# Patient Record
Sex: FEMALE | Race: WHITE | Employment: OTHER
[De-identification: names, ages, dates, MRNs, and addresses within clinical notes are randomized per-mention and may not be internally consistent; named-entity substitution may affect disease eponyms.]

---

## 2017-03-13 ENCOUNTER — TELEPHONE (OUTPATIENT)
Dept: CASE MANAGEMENT | Age: 62
End: 2017-03-13

## 2017-03-22 ENCOUNTER — TELEPHONE (OUTPATIENT)
Dept: CASE MANAGEMENT | Age: 62
End: 2017-03-22

## 2017-04-21 ENCOUNTER — TELEPHONE (OUTPATIENT)
Dept: CASE MANAGEMENT | Age: 62
End: 2017-04-21

## 2017-04-24 PROBLEM — I95.1 ORTHOSTATIC HYPOTENSION DYSAUTONOMIC SYNDROME: Status: ACTIVE | Noted: 2017-04-24

## 2017-04-24 PROBLEM — E34.9 NEUROPATHY ASSOCIATED WITH ENDOCRINE DISORDER (HCC): Status: ACTIVE | Noted: 2017-04-24

## 2017-04-24 PROBLEM — G63 NEUROPATHY ASSOCIATED WITH ENDOCRINE DISORDER (HCC): Status: ACTIVE | Noted: 2017-04-24

## 2017-05-02 PROBLEM — D12.6 TUBULAR ADENOMA OF COLON: Status: ACTIVE | Noted: 2017-05-02

## 2018-03-23 ENCOUNTER — HOSPITAL ENCOUNTER (OUTPATIENT)
Age: 63
Discharge: HOME | End: 2018-03-25
Payer: COMMERCIAL

## 2018-03-23 ENCOUNTER — OFFICE VISIT (OUTPATIENT)
Dept: FAMILY MEDICINE CLINIC | Age: 63
End: 2018-03-23
Payer: COMMERCIAL

## 2018-03-23 VITALS
HEART RATE: 66 BPM | BODY MASS INDEX: 41.14 KG/M2 | TEMPERATURE: 98.3 F | DIASTOLIC BLOOD PRESSURE: 82 MMHG | RESPIRATION RATE: 16 BRPM | HEIGHT: 66 IN | OXYGEN SATURATION: 96 % | WEIGHT: 256 LBS | SYSTOLIC BLOOD PRESSURE: 124 MMHG

## 2018-03-23 DIAGNOSIS — G63 NEUROPATHY ASSOCIATED WITH ENDOCRINE DISORDER (HCC): ICD-10-CM

## 2018-03-23 DIAGNOSIS — E11.9 TYPE 2 DIABETES MELLITUS WITHOUT COMPLICATION, WITHOUT LONG-TERM CURRENT USE OF INSULIN (HCC): ICD-10-CM

## 2018-03-23 DIAGNOSIS — Z79.899 ENCOUNTER FOR LONG-TERM (CURRENT) USE OF HIGH-RISK MEDICATION: ICD-10-CM

## 2018-03-23 DIAGNOSIS — G25.81 RESTLESS LEG SYNDROME: Primary | ICD-10-CM

## 2018-03-23 DIAGNOSIS — Z23 NEED FOR PROPHYLACTIC VACCINATION AND INOCULATION AGAINST VARICELLA: ICD-10-CM

## 2018-03-23 DIAGNOSIS — E34.9 NEUROPATHY ASSOCIATED WITH ENDOCRINE DISORDER (HCC): ICD-10-CM

## 2018-03-23 LAB
AMPHETAMINE SCREEN, URINE: NOT DETECTED
BARBITURATE SCREEN URINE: NOT DETECTED
BENZODIAZEPINE SCREEN, URINE: NOT DETECTED
CANNABINOID SCREEN URINE: NOT DETECTED
COCAINE METABOLITE SCREEN URINE: NOT DETECTED
CREATININE URINE POCT: 100
METHADONE SCREEN, URINE: NOT DETECTED
MICROALBUMIN/CREAT 24H UR: 10 MG/G{CREAT}
MICROALBUMIN/CREAT UR-RTO: <30
OPIATE SCREEN URINE: NOT DETECTED
PHENCYCLIDINE SCREEN URINE: NOT DETECTED
PROPOXYPHENE SCREEN: NOT DETECTED

## 2018-03-23 PROCEDURE — 3014F SCREEN MAMMO DOC REV: CPT | Performed by: FAMILY MEDICINE

## 2018-03-23 PROCEDURE — G8427 DOCREV CUR MEDS BY ELIG CLIN: HCPCS | Performed by: FAMILY MEDICINE

## 2018-03-23 PROCEDURE — G8417 CALC BMI ABV UP PARAM F/U: HCPCS | Performed by: FAMILY MEDICINE

## 2018-03-23 PROCEDURE — 80307 DRUG TEST PRSMV CHEM ANLYZR: CPT

## 2018-03-23 PROCEDURE — 3046F HEMOGLOBIN A1C LEVEL >9.0%: CPT | Performed by: FAMILY MEDICINE

## 2018-03-23 PROCEDURE — 4004F PT TOBACCO SCREEN RCVD TLK: CPT | Performed by: FAMILY MEDICINE

## 2018-03-23 PROCEDURE — G8482 FLU IMMUNIZE ORDER/ADMIN: HCPCS | Performed by: FAMILY MEDICINE

## 2018-03-23 PROCEDURE — 3017F COLORECTAL CA SCREEN DOC REV: CPT | Performed by: FAMILY MEDICINE

## 2018-03-23 PROCEDURE — 99214 OFFICE O/P EST MOD 30 MIN: CPT | Performed by: FAMILY MEDICINE

## 2018-03-23 PROCEDURE — 82044 UR ALBUMIN SEMIQUANTITATIVE: CPT | Performed by: FAMILY MEDICINE

## 2018-03-23 RX ORDER — ROPINIROLE 1 MG/1
1 TABLET, FILM COATED ORAL EVERY MORNING
Qty: 30 TABLET | Refills: 2 | Status: ON HOLD
Start: 2018-03-23 | End: 2021-01-01

## 2018-03-23 RX ORDER — PREGABALIN 50 MG/1
50 CAPSULE ORAL 2 TIMES DAILY
Qty: 60 CAPSULE | Refills: 0 | Status: SHIPPED | OUTPATIENT
Start: 2018-03-23 | End: 2018-04-23 | Stop reason: SDUPTHER

## 2018-03-23 ASSESSMENT — ENCOUNTER SYMPTOMS
WHEEZING: 0
VOMITING: 0
RHINORRHEA: 0
DIARRHEA: 0
COUGH: 0
CONSTIPATION: 0
SINUS PRESSURE: 0
SORE THROAT: 0
NAUSEA: 0
SHORTNESS OF BREATH: 0
BACK PAIN: 0
ABDOMINAL PAIN: 0

## 2018-03-23 NOTE — PATIENT INSTRUCTIONS
longer than recommended. Follow the directions on your prescription label. You may take pregabalin with or without food. Measure liquid medicine with a special dose-measuring spoon or medicine cup, not with a regular table spoon. If you do not have a dose-measuring device, ask your pharmacist for one. Do not change your dose of pregabalin without your doctor's advice. Tell your doctor if the medication does not seem to work as well in treating your condition. Call your doctor if you have any problems with your vision while taking pregabalin. If you are taking pregabalin to prevent seizures, keep taking it even if you feel fine. You may have an increase in seizures if you stop taking pregabalin. Follow your doctor's instructions. Do not stop using pregabalin without first talking to your doctor, even if you feel fine. You may have increased seizures or withdrawal symptoms such as headache, sleep problems, nausea, and diarrhea. Ask your doctor how to avoid withdrawal symptoms when you stop using pregabalin. Wear a medical alert tag or carry an ID card stating that you take pregabalin. Any medical care provider who treats you should know that you take seizure medication. Store at room temperature away from moisture, light, and heat. What happens if I miss a dose? Take the missed dose as soon as you remember. Skip the missed dose if it is almost time for your next scheduled dose. Do not  take extra medicine to make up the missed dose. What happens if I overdose? Seek emergency medical attention or call the Poison Help line at 1-972.581.1133. What should I avoid while taking pregabalin? Drinking alcohol can increase certain side effects of pregabalin. Pregabalin may impair your thinking or reactions. Be careful if you drive or do anything that requires you to be alert. What are the possible side effects of pregabalin?   Get emergency medical help if you have any of these signs of an allergic reaction: hives; difficulty breathing; swelling of your face, lips, tongue, or throat. Report any new or worsening symptoms to your doctor, such as: mood or behavior changes, anxiety, panic attacks, trouble sleeping, or if you feel impulsive, irritable, agitated, hostile, aggressive, restless, hyperactive (mentally or physically), more depressed, or have thoughts about suicide or hurting yourself. Call your doctor at once if you have any of these serious side effects:  · muscle pain, weakness, or tenderness (especially if you also have a fever and feel tired);  · vision problems;  · easy bruising or bleeding; or  · swelling in your hands or feet, rapid weight gain. Less serious side effects may include:  · dizziness, drowsiness;  · loss of balance or coordination;  · problems with memory or concentration;  · breast swelling;  · tremors; or  · dry mouth, constipation. This is not a complete list of side effects and others may occur. Call your doctor for medical advice about side effects. You may report side effects to FDA at 8-387-FDA-6973. What other drugs will affect pregabalin? Cold or allergy medicine, sedatives, narcotic pain medicine, sleeping pills, muscle relaxers, and medicine for depression or anxiety can add to dizziness or sleepiness caused by pregabalin. Tell your doctor if you regularly use any of these medicines, or any other seizure medication. Tell your doctor about all other medicines you use, especially:  · rosiglitazone (Avandia, Avandamet, Avandaryl); or  · heart or blood pressure medication such as benazepril (Lotensin), enalapril (Vasotec), lisinopril (Prinivil, Zestril), quinapril (Accupril), ramipril (Altace), and others. This list is not complete and other drugs may interact with pregabalin. Tell your doctor about all medications you use. This includes prescription, over-the-counter, vitamin, and herbal products. Do not start a new medication without telling your doctor.   Where can I get more

## 2018-03-28 ENCOUNTER — TELEPHONE (OUTPATIENT)
Dept: CASE MANAGEMENT | Age: 63
End: 2018-03-28

## 2018-04-05 ENCOUNTER — HOSPITAL ENCOUNTER (OUTPATIENT)
Age: 63
Discharge: HOME | End: 2018-04-07

## 2018-04-05 PROCEDURE — 88342 IMHCHEM/IMCYTCHM 1ST ANTB: CPT

## 2018-04-05 PROCEDURE — 88305 TISSUE EXAM BY PATHOLOGIST: CPT

## 2018-04-10 ENCOUNTER — OFFICE VISIT (OUTPATIENT)
Dept: FAMILY MEDICINE CLINIC | Age: 63
End: 2018-04-10
Payer: COMMERCIAL

## 2018-04-10 VITALS
HEART RATE: 75 BPM | SYSTOLIC BLOOD PRESSURE: 126 MMHG | BODY MASS INDEX: 41.69 KG/M2 | OXYGEN SATURATION: 94 % | WEIGHT: 259.4 LBS | TEMPERATURE: 97.7 F | HEIGHT: 66 IN | DIASTOLIC BLOOD PRESSURE: 82 MMHG | RESPIRATION RATE: 15 BRPM

## 2018-04-10 DIAGNOSIS — G25.81 RESTLESS LEG SYNDROME: Primary | ICD-10-CM

## 2018-04-10 DIAGNOSIS — F17.210 CIGARETTE NICOTINE DEPENDENCE WITHOUT COMPLICATION: ICD-10-CM

## 2018-04-10 PROCEDURE — 3017F COLORECTAL CA SCREEN DOC REV: CPT | Performed by: FAMILY MEDICINE

## 2018-04-10 PROCEDURE — 4004F PT TOBACCO SCREEN RCVD TLK: CPT | Performed by: FAMILY MEDICINE

## 2018-04-10 PROCEDURE — 3014F SCREEN MAMMO DOC REV: CPT | Performed by: FAMILY MEDICINE

## 2018-04-10 PROCEDURE — 99214 OFFICE O/P EST MOD 30 MIN: CPT | Performed by: FAMILY MEDICINE

## 2018-04-10 PROCEDURE — G8427 DOCREV CUR MEDS BY ELIG CLIN: HCPCS | Performed by: FAMILY MEDICINE

## 2018-04-10 PROCEDURE — G8417 CALC BMI ABV UP PARAM F/U: HCPCS | Performed by: FAMILY MEDICINE

## 2018-04-10 RX ORDER — TOPIRAMATE 25 MG/1
25 TABLET ORAL NIGHTLY
Qty: 60 TABLET | Refills: 3 | Status: SHIPPED | OUTPATIENT
Start: 2018-04-10 | End: 2018-04-24 | Stop reason: SDUPTHER

## 2018-04-10 RX ORDER — POLYETHYLENE GLYCOL 3350 17 G
4 POWDER IN PACKET (EA) ORAL PRN
Qty: 100 EACH | Refills: 3 | Status: SHIPPED | OUTPATIENT
Start: 2018-04-10 | End: 2018-10-18 | Stop reason: SDUPTHER

## 2018-04-10 ASSESSMENT — ENCOUNTER SYMPTOMS
COUGH: 0
DIARRHEA: 0
SORE THROAT: 0
CONSTIPATION: 0
SINUS PRESSURE: 0
NAUSEA: 0
BACK PAIN: 0
RHINORRHEA: 0
SHORTNESS OF BREATH: 0
WHEEZING: 0
VOMITING: 0
ABDOMINAL PAIN: 0

## 2018-04-18 ENCOUNTER — TELEPHONE (OUTPATIENT)
Dept: FAMILY MEDICINE CLINIC | Age: 63
End: 2018-04-18

## 2018-04-18 DIAGNOSIS — G25.81 RESTLESS LEG SYNDROME: ICD-10-CM

## 2018-04-23 DIAGNOSIS — E11.9 TYPE 2 DIABETES MELLITUS WITHOUT COMPLICATION, WITHOUT LONG-TERM CURRENT USE OF INSULIN (HCC): ICD-10-CM

## 2018-04-23 DIAGNOSIS — E34.9 NEUROPATHY ASSOCIATED WITH ENDOCRINE DISORDER (HCC): ICD-10-CM

## 2018-04-23 DIAGNOSIS — G63 NEUROPATHY ASSOCIATED WITH ENDOCRINE DISORDER (HCC): ICD-10-CM

## 2018-04-23 RX ORDER — PREGABALIN 50 MG/1
50 CAPSULE ORAL 2 TIMES DAILY
Qty: 60 CAPSULE | Refills: 0 | Status: SHIPPED | OUTPATIENT
Start: 2018-04-23 | End: 2018-04-30 | Stop reason: SDUPTHER

## 2018-04-24 RX ORDER — TOPIRAMATE 25 MG/1
25 TABLET ORAL 2 TIMES DAILY
Qty: 60 TABLET | Refills: 3 | Status: SHIPPED | OUTPATIENT
Start: 2018-04-24 | End: 2018-05-10 | Stop reason: SDUPTHER

## 2018-04-30 DIAGNOSIS — E34.9 NEUROPATHY ASSOCIATED WITH ENDOCRINE DISORDER (HCC): ICD-10-CM

## 2018-04-30 DIAGNOSIS — G63 NEUROPATHY ASSOCIATED WITH ENDOCRINE DISORDER (HCC): ICD-10-CM

## 2018-04-30 RX ORDER — PREGABALIN 50 MG/1
50 CAPSULE ORAL 2 TIMES DAILY
Qty: 60 CAPSULE | Refills: 0 | Status: SHIPPED | OUTPATIENT
Start: 2018-04-30 | End: 2018-06-07 | Stop reason: SDUPTHER

## 2018-05-10 ENCOUNTER — OFFICE VISIT (OUTPATIENT)
Dept: FAMILY MEDICINE CLINIC | Age: 63
End: 2018-05-10
Payer: COMMERCIAL

## 2018-05-10 VITALS
DIASTOLIC BLOOD PRESSURE: 74 MMHG | TEMPERATURE: 97.8 F | HEIGHT: 66 IN | HEART RATE: 76 BPM | BODY MASS INDEX: 39.36 KG/M2 | OXYGEN SATURATION: 98 % | WEIGHT: 244.9 LBS | SYSTOLIC BLOOD PRESSURE: 128 MMHG

## 2018-05-10 DIAGNOSIS — I10 ESSENTIAL HYPERTENSION: ICD-10-CM

## 2018-05-10 DIAGNOSIS — G25.81 RESTLESS LEG SYNDROME: ICD-10-CM

## 2018-05-10 DIAGNOSIS — E11.9 TYPE 2 DIABETES MELLITUS WITHOUT COMPLICATION, WITHOUT LONG-TERM CURRENT USE OF INSULIN (HCC): Primary | ICD-10-CM

## 2018-05-10 DIAGNOSIS — E78.5 HYPERLIPIDEMIA ASSOCIATED WITH TYPE 2 DIABETES MELLITUS (HCC): ICD-10-CM

## 2018-05-10 DIAGNOSIS — R25.2 LEG CRAMPING: ICD-10-CM

## 2018-05-10 DIAGNOSIS — E11.69 HYPERLIPIDEMIA ASSOCIATED WITH TYPE 2 DIABETES MELLITUS (HCC): ICD-10-CM

## 2018-05-10 LAB — HBA1C MFR BLD: 8 %

## 2018-05-10 PROCEDURE — 3045F PR MOST RECENT HEMOGLOBIN A1C LEVEL 7.0-9.0%: CPT | Performed by: FAMILY MEDICINE

## 2018-05-10 PROCEDURE — 83036 HEMOGLOBIN GLYCOSYLATED A1C: CPT | Performed by: FAMILY MEDICINE

## 2018-05-10 PROCEDURE — 4004F PT TOBACCO SCREEN RCVD TLK: CPT | Performed by: FAMILY MEDICINE

## 2018-05-10 PROCEDURE — G8427 DOCREV CUR MEDS BY ELIG CLIN: HCPCS | Performed by: FAMILY MEDICINE

## 2018-05-10 PROCEDURE — 2022F DILAT RTA XM EVC RTNOPTHY: CPT | Performed by: FAMILY MEDICINE

## 2018-05-10 PROCEDURE — 3017F COLORECTAL CA SCREEN DOC REV: CPT | Performed by: FAMILY MEDICINE

## 2018-05-10 PROCEDURE — 99214 OFFICE O/P EST MOD 30 MIN: CPT | Performed by: FAMILY MEDICINE

## 2018-05-10 PROCEDURE — G8417 CALC BMI ABV UP PARAM F/U: HCPCS | Performed by: FAMILY MEDICINE

## 2018-05-10 RX ORDER — TOPIRAMATE 50 MG/1
50 TABLET, FILM COATED ORAL 2 TIMES DAILY
Qty: 60 TABLET | Refills: 1 | Status: SHIPPED | OUTPATIENT
Start: 2018-05-10 | End: 2018-06-07 | Stop reason: SDUPTHER

## 2018-05-10 ASSESSMENT — ENCOUNTER SYMPTOMS
COUGH: 0
SINUS PRESSURE: 0
DIARRHEA: 0
CONSTIPATION: 0
SHORTNESS OF BREATH: 0
ABDOMINAL PAIN: 0
NAUSEA: 0
VOMITING: 0
RHINORRHEA: 0
BACK PAIN: 0
SORE THROAT: 0
WHEEZING: 0

## 2018-05-17 ENCOUNTER — HOSPITAL ENCOUNTER (OUTPATIENT)
Age: 63
Discharge: HOME | End: 2018-05-17
Payer: COMMERCIAL

## 2018-05-17 ENCOUNTER — HOSPITAL ENCOUNTER (OUTPATIENT)
Dept: INTERVENTIONAL RADIOLOGY/VASCULAR | Age: 63
Discharge: HOME | End: 2018-05-19
Payer: COMMERCIAL

## 2018-05-17 DIAGNOSIS — R25.2 LEG CRAMPING: ICD-10-CM

## 2018-05-17 DIAGNOSIS — E78.5 HYPERLIPIDEMIA ASSOCIATED WITH TYPE 2 DIABETES MELLITUS (HCC): ICD-10-CM

## 2018-05-17 DIAGNOSIS — E11.69 HYPERLIPIDEMIA ASSOCIATED WITH TYPE 2 DIABETES MELLITUS (HCC): ICD-10-CM

## 2018-05-17 DIAGNOSIS — E11.9 TYPE 2 DIABETES MELLITUS WITHOUT COMPLICATION, WITHOUT LONG-TERM CURRENT USE OF INSULIN (HCC): ICD-10-CM

## 2018-05-17 DIAGNOSIS — G25.81 RESTLESS LEG SYNDROME: ICD-10-CM

## 2018-05-17 LAB
ALBUMIN SERPL-MCNC: 4.1 G/DL (ref 3.5–5.2)
ALP BLD-CCNC: 75 U/L (ref 35–104)
ALT SERPL-CCNC: 24 U/L (ref 0–32)
ANION GAP SERPL CALCULATED.3IONS-SCNC: 16 MMOL/L (ref 7–16)
AST SERPL-CCNC: 32 U/L (ref 0–31)
BILIRUB SERPL-MCNC: 0.2 MG/DL (ref 0–1.2)
BUN BLDV-MCNC: 14 MG/DL (ref 8–23)
CALCIUM SERPL-MCNC: 9.2 MG/DL (ref 8.6–10.2)
CHLORIDE BLD-SCNC: 103 MMOL/L (ref 98–107)
CHOLESTEROL, TOTAL: 137 MG/DL (ref 0–199)
CO2: 21 MMOL/L (ref 22–29)
CREAT SERPL-MCNC: 0.8 MG/DL (ref 0.5–1)
GFR AFRICAN AMERICAN: >60
GFR NON-AFRICAN AMERICAN: >60 ML/MIN/1.73
GLUCOSE BLD-MCNC: 143 MG/DL (ref 74–109)
HDLC SERPL-MCNC: 32 MG/DL
LDL CHOLESTEROL CALCULATED: 64 MG/DL (ref 0–99)
POTASSIUM SERPL-SCNC: 3.8 MMOL/L (ref 3.5–5)
SODIUM BLD-SCNC: 140 MMOL/L (ref 132–146)
TOTAL PROTEIN: 7.2 G/DL (ref 6.4–8.3)
TRIGL SERPL-MCNC: 204 MG/DL (ref 0–149)
VLDLC SERPL CALC-MCNC: 41 MG/DL

## 2018-05-17 PROCEDURE — 80061 LIPID PANEL: CPT

## 2018-05-17 PROCEDURE — 93922 UPR/L XTREMITY ART 2 LEVELS: CPT

## 2018-05-17 PROCEDURE — 80053 COMPREHEN METABOLIC PANEL: CPT

## 2018-05-17 PROCEDURE — 36415 COLL VENOUS BLD VENIPUNCTURE: CPT

## 2018-05-30 DIAGNOSIS — G63 NEUROPATHY ASSOCIATED WITH ENDOCRINE DISORDER (HCC): ICD-10-CM

## 2018-05-30 DIAGNOSIS — E34.9 NEUROPATHY ASSOCIATED WITH ENDOCRINE DISORDER (HCC): ICD-10-CM

## 2018-06-01 RX ORDER — PREGABALIN 50 MG/1
50 CAPSULE ORAL 2 TIMES DAILY
Qty: 60 CAPSULE | Refills: 0 | OUTPATIENT
Start: 2018-06-01 | End: 2018-07-01

## 2018-06-01 RX ORDER — ATORVASTATIN CALCIUM 10 MG/1
10 TABLET, FILM COATED ORAL DAILY
Qty: 30 TABLET | Refills: 2 | Status: SHIPPED | OUTPATIENT
Start: 2018-06-01 | End: 2018-06-07 | Stop reason: SDUPTHER

## 2018-06-07 ENCOUNTER — OFFICE VISIT (OUTPATIENT)
Dept: FAMILY MEDICINE CLINIC | Age: 63
End: 2018-06-07
Payer: COMMERCIAL

## 2018-06-07 ENCOUNTER — HOSPITAL ENCOUNTER (OUTPATIENT)
Age: 63
Discharge: HOME | End: 2018-06-09
Payer: COMMERCIAL

## 2018-06-07 VITALS
BODY MASS INDEX: 39.57 KG/M2 | OXYGEN SATURATION: 98 % | HEIGHT: 66 IN | SYSTOLIC BLOOD PRESSURE: 112 MMHG | DIASTOLIC BLOOD PRESSURE: 70 MMHG | TEMPERATURE: 98 F | HEART RATE: 67 BPM | WEIGHT: 246.2 LBS

## 2018-06-07 DIAGNOSIS — G63 NEUROPATHY ASSOCIATED WITH ENDOCRINE DISORDER (HCC): ICD-10-CM

## 2018-06-07 DIAGNOSIS — F06.30 MOOD DISORDER DUE TO A GENERAL MEDICAL CONDITION: ICD-10-CM

## 2018-06-07 DIAGNOSIS — Z79.899 ENCOUNTER FOR LONG-TERM (CURRENT) DRUG USE: ICD-10-CM

## 2018-06-07 DIAGNOSIS — G25.81 RESTLESS LEG SYNDROME: Primary | ICD-10-CM

## 2018-06-07 DIAGNOSIS — I95.1 ORTHOSTATIC HYPOTENSION DYSAUTONOMIC SYNDROME: ICD-10-CM

## 2018-06-07 DIAGNOSIS — I10 ESSENTIAL HYPERTENSION: ICD-10-CM

## 2018-06-07 DIAGNOSIS — E34.9 NEUROPATHY ASSOCIATED WITH ENDOCRINE DISORDER (HCC): ICD-10-CM

## 2018-06-07 LAB
AMPHETAMINE SCREEN, URINE: NOT DETECTED
BARBITURATE SCREEN URINE: NOT DETECTED
BENZODIAZEPINE SCREEN, URINE: NOT DETECTED
CANNABINOID SCREEN URINE: NOT DETECTED
COCAINE METABOLITE SCREEN URINE: NOT DETECTED
METHADONE SCREEN, URINE: NOT DETECTED
OPIATE SCREEN URINE: NOT DETECTED
PHENCYCLIDINE SCREEN URINE: NOT DETECTED
PROPOXYPHENE SCREEN: NOT DETECTED

## 2018-06-07 PROCEDURE — G8427 DOCREV CUR MEDS BY ELIG CLIN: HCPCS | Performed by: FAMILY MEDICINE

## 2018-06-07 PROCEDURE — 3017F COLORECTAL CA SCREEN DOC REV: CPT | Performed by: FAMILY MEDICINE

## 2018-06-07 PROCEDURE — G8417 CALC BMI ABV UP PARAM F/U: HCPCS | Performed by: FAMILY MEDICINE

## 2018-06-07 PROCEDURE — 80307 DRUG TEST PRSMV CHEM ANLYZR: CPT

## 2018-06-07 PROCEDURE — 99214 OFFICE O/P EST MOD 30 MIN: CPT | Performed by: FAMILY MEDICINE

## 2018-06-07 PROCEDURE — 4004F PT TOBACCO SCREEN RCVD TLK: CPT | Performed by: FAMILY MEDICINE

## 2018-06-07 RX ORDER — ATORVASTATIN CALCIUM 10 MG/1
10 TABLET, FILM COATED ORAL DAILY
Qty: 30 TABLET | Refills: 3 | Status: SHIPPED | OUTPATIENT
Start: 2018-06-07 | End: 2018-12-18 | Stop reason: SDUPTHER

## 2018-06-07 RX ORDER — OMEPRAZOLE 40 MG/1
1 CAPSULE, DELAYED RELEASE ORAL DAILY
Status: ON HOLD | COMMUNITY
Start: 2018-05-30 | End: 2021-01-01

## 2018-06-07 RX ORDER — TOPIRAMATE 50 MG/1
50 TABLET, FILM COATED ORAL 2 TIMES DAILY
Qty: 60 TABLET | Refills: 3 | Status: SHIPPED | OUTPATIENT
Start: 2018-06-07 | End: 2018-11-16 | Stop reason: SDUPTHER

## 2018-06-07 RX ORDER — PREGABALIN 50 MG/1
50 CAPSULE ORAL 2 TIMES DAILY
Qty: 60 CAPSULE | Refills: 1 | Status: SHIPPED | OUTPATIENT
Start: 2018-06-07 | End: 2018-07-16 | Stop reason: SDUPTHER

## 2018-06-07 RX ORDER — ALOGLIPTIN 25 MG/1
25 TABLET, FILM COATED ORAL DAILY
Qty: 30 TABLET | Refills: 3 | Status: ON HOLD
Start: 2018-06-07 | End: 2021-01-01 | Stop reason: HOSPADM

## 2018-06-07 RX ORDER — HYDROXYZINE PAMOATE 25 MG/1
CAPSULE ORAL
Qty: 60 CAPSULE | Refills: 3 | Status: ON HOLD
Start: 2018-06-07 | End: 2021-01-01

## 2018-06-07 RX ORDER — HYDROCHLOROTHIAZIDE 25 MG/1
TABLET ORAL
Qty: 30 TABLET | Refills: 3 | Status: SHIPPED | OUTPATIENT
Start: 2018-06-07 | End: 2018-11-16 | Stop reason: SDUPTHER

## 2018-06-07 ASSESSMENT — ENCOUNTER SYMPTOMS
VOMITING: 0
WHEEZING: 0
DIARRHEA: 0
SORE THROAT: 0
RHINORRHEA: 0
COUGH: 0
NAUSEA: 0
SINUS PRESSURE: 0
CONSTIPATION: 0
BACK PAIN: 0
SHORTNESS OF BREATH: 0
ABDOMINAL PAIN: 0

## 2018-06-18 ENCOUNTER — OFFICE VISIT (OUTPATIENT)
Dept: FAMILY MEDICINE CLINIC | Age: 63
End: 2018-06-18
Payer: COMMERCIAL

## 2018-06-18 VITALS
BODY MASS INDEX: 39.21 KG/M2 | HEART RATE: 79 BPM | RESPIRATION RATE: 18 BRPM | TEMPERATURE: 98 F | OXYGEN SATURATION: 98 % | WEIGHT: 244 LBS | SYSTOLIC BLOOD PRESSURE: 123 MMHG | DIASTOLIC BLOOD PRESSURE: 86 MMHG | HEIGHT: 66 IN

## 2018-06-18 DIAGNOSIS — N76.4 LABIAL ABSCESS: Primary | ICD-10-CM

## 2018-06-18 DIAGNOSIS — M54.42 ACUTE LEFT-SIDED LOW BACK PAIN WITH LEFT-SIDED SCIATICA: ICD-10-CM

## 2018-06-18 PROCEDURE — G8417 CALC BMI ABV UP PARAM F/U: HCPCS | Performed by: PHYSICIAN ASSISTANT

## 2018-06-18 PROCEDURE — G8427 DOCREV CUR MEDS BY ELIG CLIN: HCPCS | Performed by: PHYSICIAN ASSISTANT

## 2018-06-18 PROCEDURE — 99213 OFFICE O/P EST LOW 20 MIN: CPT | Performed by: PHYSICIAN ASSISTANT

## 2018-06-18 PROCEDURE — 4004F PT TOBACCO SCREEN RCVD TLK: CPT | Performed by: PHYSICIAN ASSISTANT

## 2018-06-18 PROCEDURE — 3017F COLORECTAL CA SCREEN DOC REV: CPT | Performed by: PHYSICIAN ASSISTANT

## 2018-06-18 RX ORDER — SULFAMETHOXAZOLE AND TRIMETHOPRIM 800; 160 MG/1; MG/1
1 TABLET ORAL 2 TIMES DAILY
Qty: 20 TABLET | Refills: 0 | Status: SHIPPED | OUTPATIENT
Start: 2018-06-18 | End: 2018-06-28

## 2018-06-18 RX ORDER — NAPROXEN 500 MG/1
500 TABLET ORAL 2 TIMES DAILY WITH MEALS
Qty: 60 TABLET | Refills: 3 | Status: SHIPPED | OUTPATIENT
Start: 2018-06-18 | End: 2018-10-18 | Stop reason: SDUPTHER

## 2018-06-18 RX ORDER — CEPHALEXIN 500 MG/1
1000 CAPSULE ORAL 2 TIMES DAILY
Qty: 40 CAPSULE | Refills: 0 | Status: SHIPPED | OUTPATIENT
Start: 2018-06-18 | End: 2018-07-19

## 2018-06-21 ENCOUNTER — OFFICE VISIT (OUTPATIENT)
Dept: FAMILY MEDICINE CLINIC | Age: 63
End: 2018-06-21
Payer: COMMERCIAL

## 2018-06-21 VITALS
DIASTOLIC BLOOD PRESSURE: 62 MMHG | RESPIRATION RATE: 16 BRPM | SYSTOLIC BLOOD PRESSURE: 106 MMHG | OXYGEN SATURATION: 96 % | BODY MASS INDEX: 39.53 KG/M2 | WEIGHT: 246 LBS | HEIGHT: 66 IN | HEART RATE: 74 BPM

## 2018-06-21 DIAGNOSIS — N76.4 LEFT GENITAL LABIAL ABSCESS: Primary | ICD-10-CM

## 2018-06-21 PROCEDURE — 99213 OFFICE O/P EST LOW 20 MIN: CPT | Performed by: FAMILY MEDICINE

## 2018-06-21 PROCEDURE — 4004F PT TOBACCO SCREEN RCVD TLK: CPT | Performed by: FAMILY MEDICINE

## 2018-06-21 PROCEDURE — G8427 DOCREV CUR MEDS BY ELIG CLIN: HCPCS | Performed by: FAMILY MEDICINE

## 2018-06-21 PROCEDURE — 3017F COLORECTAL CA SCREEN DOC REV: CPT | Performed by: FAMILY MEDICINE

## 2018-06-21 PROCEDURE — G8417 CALC BMI ABV UP PARAM F/U: HCPCS | Performed by: FAMILY MEDICINE

## 2018-06-21 ASSESSMENT — ENCOUNTER SYMPTOMS
ABDOMINAL PAIN: 0
DIARRHEA: 0
VOMITING: 0
SINUS PRESSURE: 0
WHEEZING: 0
SHORTNESS OF BREATH: 0
CONSTIPATION: 0
SORE THROAT: 0
NAUSEA: 0
BACK PAIN: 0
COUGH: 0
RHINORRHEA: 0

## 2018-06-27 ENCOUNTER — OFFICE VISIT (OUTPATIENT)
Dept: FAMILY MEDICINE CLINIC | Age: 63
End: 2018-06-27
Payer: COMMERCIAL

## 2018-06-27 VITALS
SYSTOLIC BLOOD PRESSURE: 126 MMHG | HEIGHT: 66 IN | BODY MASS INDEX: 42.19 KG/M2 | OXYGEN SATURATION: 97 % | DIASTOLIC BLOOD PRESSURE: 76 MMHG | HEART RATE: 65 BPM | TEMPERATURE: 97.5 F | WEIGHT: 262.5 LBS

## 2018-06-27 DIAGNOSIS — E11.9 TYPE 2 DIABETES MELLITUS WITHOUT COMPLICATION, WITHOUT LONG-TERM CURRENT USE OF INSULIN (HCC): Primary | ICD-10-CM

## 2018-06-27 DIAGNOSIS — R60.0 LOWER EXTREMITY EDEMA: ICD-10-CM

## 2018-06-27 LAB — GLUCOSE BLD-MCNC: 135 MG/DL

## 2018-06-27 PROCEDURE — 3017F COLORECTAL CA SCREEN DOC REV: CPT | Performed by: FAMILY MEDICINE

## 2018-06-27 PROCEDURE — 99214 OFFICE O/P EST MOD 30 MIN: CPT | Performed by: FAMILY MEDICINE

## 2018-06-27 PROCEDURE — 2022F DILAT RTA XM EVC RTNOPTHY: CPT | Performed by: FAMILY MEDICINE

## 2018-06-27 PROCEDURE — 4004F PT TOBACCO SCREEN RCVD TLK: CPT | Performed by: FAMILY MEDICINE

## 2018-06-27 PROCEDURE — G8417 CALC BMI ABV UP PARAM F/U: HCPCS | Performed by: FAMILY MEDICINE

## 2018-06-27 PROCEDURE — 82962 GLUCOSE BLOOD TEST: CPT | Performed by: FAMILY MEDICINE

## 2018-06-27 PROCEDURE — G8427 DOCREV CUR MEDS BY ELIG CLIN: HCPCS | Performed by: FAMILY MEDICINE

## 2018-06-27 PROCEDURE — 3045F PR MOST RECENT HEMOGLOBIN A1C LEVEL 7.0-9.0%: CPT | Performed by: FAMILY MEDICINE

## 2018-06-27 RX ORDER — FUROSEMIDE 20 MG/1
TABLET ORAL
Qty: 30 TABLET | Refills: 3 | Status: SHIPPED | OUTPATIENT
Start: 2018-06-27 | End: 2018-10-18 | Stop reason: SDUPTHER

## 2018-06-27 RX ORDER — GLIPIZIDE 2.5 MG/1
2.5 TABLET, EXTENDED RELEASE ORAL DAILY
Qty: 30 TABLET | Refills: 1 | Status: SHIPPED | OUTPATIENT
Start: 2018-06-27 | End: 2018-08-21 | Stop reason: SDUPTHER

## 2018-06-27 ASSESSMENT — ENCOUNTER SYMPTOMS
NAUSEA: 0
VOMITING: 0
BACK PAIN: 0
DIARRHEA: 0
SORE THROAT: 0
CONSTIPATION: 0
RHINORRHEA: 0
WHEEZING: 0
SHORTNESS OF BREATH: 0
ABDOMINAL PAIN: 0
COUGH: 0
SINUS PRESSURE: 0

## 2018-07-10 ENCOUNTER — TELEPHONE (OUTPATIENT)
Dept: ADMINISTRATIVE | Age: 63
End: 2018-07-10

## 2018-07-10 NOTE — TELEPHONE ENCOUNTER
Pt wishes to cx her 7/11 appt with dr Jolie Matta for diabetes/swelling, reschd for 7/19, out of town

## 2018-07-16 DIAGNOSIS — E11.9 TYPE 2 DIABETES MELLITUS WITHOUT COMPLICATION, WITHOUT LONG-TERM CURRENT USE OF INSULIN (HCC): ICD-10-CM

## 2018-07-16 DIAGNOSIS — G63 NEUROPATHY ASSOCIATED WITH ENDOCRINE DISORDER (HCC): ICD-10-CM

## 2018-07-16 DIAGNOSIS — E34.9 NEUROPATHY ASSOCIATED WITH ENDOCRINE DISORDER (HCC): ICD-10-CM

## 2018-07-16 RX ORDER — PREGABALIN 50 MG/1
50 CAPSULE ORAL 2 TIMES DAILY
Qty: 60 CAPSULE | Refills: 1 | Status: SHIPPED | OUTPATIENT
Start: 2018-07-16 | End: 2018-07-19

## 2018-07-19 ENCOUNTER — OFFICE VISIT (OUTPATIENT)
Dept: FAMILY MEDICINE CLINIC | Age: 63
End: 2018-07-19
Payer: COMMERCIAL

## 2018-07-19 VITALS
BODY MASS INDEX: 39.37 KG/M2 | SYSTOLIC BLOOD PRESSURE: 108 MMHG | OXYGEN SATURATION: 99 % | HEART RATE: 75 BPM | DIASTOLIC BLOOD PRESSURE: 62 MMHG | HEIGHT: 66 IN | WEIGHT: 245 LBS | RESPIRATION RATE: 18 BRPM

## 2018-07-19 DIAGNOSIS — E34.9 NEUROPATHY ASSOCIATED WITH ENDOCRINE DISORDER (HCC): ICD-10-CM

## 2018-07-19 DIAGNOSIS — E11.9 TYPE 2 DIABETES MELLITUS WITHOUT COMPLICATION, WITHOUT LONG-TERM CURRENT USE OF INSULIN (HCC): Primary | ICD-10-CM

## 2018-07-19 DIAGNOSIS — Z87.891 PERSONAL HISTORY OF TOBACCO USE: ICD-10-CM

## 2018-07-19 DIAGNOSIS — E27.8 ADRENAL MASS (HCC): ICD-10-CM

## 2018-07-19 DIAGNOSIS — G63 NEUROPATHY ASSOCIATED WITH ENDOCRINE DISORDER (HCC): ICD-10-CM

## 2018-07-19 PROCEDURE — 4004F PT TOBACCO SCREEN RCVD TLK: CPT | Performed by: FAMILY MEDICINE

## 2018-07-19 PROCEDURE — 3017F COLORECTAL CA SCREEN DOC REV: CPT | Performed by: FAMILY MEDICINE

## 2018-07-19 PROCEDURE — 99214 OFFICE O/P EST MOD 30 MIN: CPT | Performed by: FAMILY MEDICINE

## 2018-07-19 PROCEDURE — 2022F DILAT RTA XM EVC RTNOPTHY: CPT | Performed by: FAMILY MEDICINE

## 2018-07-19 PROCEDURE — G0296 VISIT TO DETERM LDCT ELIG: HCPCS | Performed by: FAMILY MEDICINE

## 2018-07-19 PROCEDURE — G8417 CALC BMI ABV UP PARAM F/U: HCPCS | Performed by: FAMILY MEDICINE

## 2018-07-19 PROCEDURE — 3045F PR MOST RECENT HEMOGLOBIN A1C LEVEL 7.0-9.0%: CPT | Performed by: FAMILY MEDICINE

## 2018-07-19 PROCEDURE — G8427 DOCREV CUR MEDS BY ELIG CLIN: HCPCS | Performed by: FAMILY MEDICINE

## 2018-07-19 RX ORDER — GABAPENTIN 300 MG/1
300 CAPSULE ORAL 2 TIMES DAILY
Qty: 60 CAPSULE | Refills: 1 | Status: ON HOLD
Start: 2018-07-19 | End: 2021-01-01

## 2018-07-19 ASSESSMENT — ENCOUNTER SYMPTOMS
DIARRHEA: 0
CONSTIPATION: 0
COUGH: 0
BACK PAIN: 0
VOMITING: 0
SINUS PRESSURE: 0
NAUSEA: 0
WHEEZING: 0
SORE THROAT: 0
RHINORRHEA: 0
ABDOMINAL PAIN: 0
SHORTNESS OF BREATH: 0

## 2018-07-19 NOTE — PROGRESS NOTES
DM2:   Patient is here to fu regarding DM2. Patient is  controlled. Taking all medications and tolerating well. Currently on bydureon 2 mg weekly, glipizide 2.5 mg daily, nesina 25 mg daily. Fasting sugars are running 120-130. Patient is taking ASA and Ace Inhibitor/ARB. Patient is  on appropriately-dosed statin. LDL is  at goal.  BP is  controlled. No hypoglycemic episodes. Patient does see Podiatry regularly. Saw an Eye Dr within the last year. Patient is aware that it is necessary to see an Eye Dr yearly. Patient does smoke. Most recent labs reviewed with patient. Patient does have complaints or concerns today. Patient states swelling of her legs has not gotten any better. She thinks it is secondary to the lyrica. She would like to stop lyrica and see if she gets improvement. She is taking lasix as prescribed with no improvement. Eye exam current (within one year): yes  Weight trend: stable  Ever attended diabetic education? yes  Compliance with diet:fair   Compliance with medication: fair   Home blood glucose monitoring? frequent  Home blood sugar records: fasting range: 120-130  Any episodes of hypoglycemia? no  Proper foot care?yes  Lab Results   Component Value Date    LABA1C 8.0 05/10/2018     No results found for: EAG    Lab Results   Component Value Date    LDLCALC 64 05/17/2018         Patient's past medical, surgical, social and/or family history reviewed, updated in chart, and are non-contributory (unless otherwise stated). Medications and allergies also reviewed and updated in chart. /62 (Site: Right Arm, Position: Sitting, Cuff Size: Large Adult)   Pulse 75   Resp 18   Ht 5' 6\" (1.676 m)   Wt 245 lb (111.1 kg)   LMP  (LMP Unknown)   SpO2 99%   BMI 39.54 kg/m²     Review of Systems   Constitutional: Negative for chills, fatigue and fever.    HENT: Negative for congestion, ear discharge, ear pain, postnasal drip, rhinorrhea, sinus pressure, sneezing and sore reviewed  Discussed with the patient that we will titrate up as needed. - gabapentin (NEURONTIN) 300 MG capsule; Take 1 capsule by mouth 2 times daily for 60 days. .  Dispense: 60 capsule; Refill: 1    2. Neuropathy associated with endocrine disorder ([facility]  Will stop lyrica and change to neurontin to see if swelling improves  Side effects reviewed  May need to titrate medication.   - gabapentin (NEURONTIN) 300 MG capsule; Take 1 capsule by mouth 2 times daily for 60 days. .  Dispense: 60 capsule; Refill: 1    3. Personal history of tobacco use  CT of lungs ordered  - WA VISIT TO DISCUSS LUNG CA SCREEN W LDCT  - CT Lung Screening; Future    4. Adrenal mass ([facility].)  Will repeat CT abdomen/pelvis to reevaluate adrenal mass. - CT ABDOMEN PELVIS W WO CONTRAST Additional Contrast? None; Future      Plan:  As above. Call or go to ED immediately if symptoms worsen or persist.  Return in about 1 month (around 8/19/2018). , or sooner if necessary. Educational materials and/or home exercises printed for patient's review and were included in patient instructions on his/her After Visit Summary and given to patient at the end of visit. Counseled regarding above diagnosis, including possible risks and complications,  especially if left uncontrolled. Counseled regarding the possible side effects, risks, benefits and alternatives to treatment; patient and/or guardian verbalizes understanding, agrees, feels comfortable with and wishes to proceed with above treatment plan. Advised patient to call with any new medication issues, and read all Rx info from pharmacy to assure aware of all possible risks and side effects of medication before taking. Reviewed age and gender appropriate health screening exams and vaccinations.   Advised patient regarding importance of keeping up with recommended health maintenance and to schedule as soon as possible if overdue, as this is important in assessing for undiagnosed pathology,

## 2018-07-25 ENCOUNTER — TELEPHONE (OUTPATIENT)
Dept: FAMILY MEDICINE CLINIC | Age: 63
End: 2018-07-25

## 2018-07-25 DIAGNOSIS — E11.9 TYPE 2 DIABETES MELLITUS WITHOUT COMPLICATION, WITHOUT LONG-TERM CURRENT USE OF INSULIN (HCC): Primary | ICD-10-CM

## 2018-07-25 NOTE — TELEPHONE ENCOUNTER
Called Pelon Tamez to inform her labs were ordered; no answer on home phone. Left detailed message asking her to call the office to schedule a lab visit or to let us know where we can send it.

## 2018-07-31 ENCOUNTER — TELEPHONE (OUTPATIENT)
Dept: CASE MANAGEMENT | Age: 63
End: 2018-07-31

## 2018-07-31 NOTE — LETTER
Lung Screening Program        7/31/2018      [address] [place] [address]      Dear Max Sorto,        Our records indicate you missed your scheduled CT Lung Screening on  7/30/18. We value you as a patient and want to ensure that you are getting the appropriate care. Please call our scheduling department ([phone]) for assistance in rescheduling this important screening exam.  If you have any questions regarding CT Lung Screening, please call our patient navigator at [phone]. Sincerely,    Aracelis    P:  [phone]  F:  [phone]          Cc.   Boy Ernst,

## 2018-07-31 NOTE — TELEPHONE ENCOUNTER
No show for CT Lung Screen scheduled 7/30/18. Reminder letter with rescheduling instructions mailed to patient. Order physician notified.     KIARA Rogers., R.T.(R)(T)  Navigator  [facility]

## 2018-08-02 ENCOUNTER — HOSPITAL ENCOUNTER (EMERGENCY)
Age: 63
Discharge: HOME | End: 2018-08-02
Attending: EMERGENCY MEDICINE
Payer: COMMERCIAL

## 2018-08-02 ENCOUNTER — APPOINTMENT (OUTPATIENT)
Dept: ULTRASOUND IMAGING | Age: 63
End: 2018-08-02
Payer: COMMERCIAL

## 2018-08-02 VITALS
DIASTOLIC BLOOD PRESSURE: 64 MMHG | HEIGHT: 66 IN | SYSTOLIC BLOOD PRESSURE: 126 MMHG | WEIGHT: 230 LBS | BODY MASS INDEX: 36.96 KG/M2 | RESPIRATION RATE: 18 BRPM | TEMPERATURE: 97.6 F | OXYGEN SATURATION: 97 % | HEART RATE: 74 BPM

## 2018-08-02 DIAGNOSIS — M79.604 BILATERAL LEG PAIN: ICD-10-CM

## 2018-08-02 DIAGNOSIS — M79.605 BILATERAL LEG PAIN: ICD-10-CM

## 2018-08-02 DIAGNOSIS — G57.93 NEUROPATHIC PAIN, LEG, BILATERAL: Primary | ICD-10-CM

## 2018-08-02 LAB
ANION GAP SERPL CALCULATED.3IONS-SCNC: 11 MMOL/L (ref 7–16)
BASOPHILS ABSOLUTE: 0.09 E9/L (ref 0–0.2)
BASOPHILS RELATIVE PERCENT: 0.7 % (ref 0–2)
BUN BLDV-MCNC: 20 MG/DL (ref 8–23)
CALCIUM SERPL-MCNC: 9.5 MG/DL (ref 8.6–10.2)
CHLORIDE BLD-SCNC: 107 MMOL/L (ref 98–107)
CO2: 26 MMOL/L (ref 22–29)
CREAT SERPL-MCNC: 1.1 MG/DL (ref 0.5–1)
EOSINOPHILS ABSOLUTE: 0.48 E9/L (ref 0.05–0.5)
EOSINOPHILS RELATIVE PERCENT: 3.8 % (ref 0–6)
GFR AFRICAN AMERICAN: >60
GFR NON-AFRICAN AMERICAN: 50 ML/MIN/1.73
GLUCOSE BLD-MCNC: 99 MG/DL (ref 74–109)
HCT VFR BLD CALC: 43.1 % (ref 34–48)
HEMOGLOBIN: 14.6 G/DL (ref 11.5–15.5)
IMMATURE GRANULOCYTES #: 0.05 E9/L
IMMATURE GRANULOCYTES %: 0.4 % (ref 0–5)
LYMPHOCYTES ABSOLUTE: 4.87 E9/L (ref 1.5–4)
LYMPHOCYTES RELATIVE PERCENT: 38.5 % (ref 20–42)
MAGNESIUM: 2.4 MG/DL (ref 1.6–2.6)
MCH RBC QN AUTO: 31.6 PG (ref 26–35)
MCHC RBC AUTO-ENTMCNC: 33.9 % (ref 32–34.5)
MCV RBC AUTO: 93.3 FL (ref 80–99.9)
MONOCYTES ABSOLUTE: 1.03 E9/L (ref 0.1–0.95)
MONOCYTES RELATIVE PERCENT: 8.1 % (ref 2–12)
NEUTROPHILS ABSOLUTE: 6.12 E9/L (ref 1.8–7.3)
NEUTROPHILS RELATIVE PERCENT: 48.5 % (ref 43–80)
PDW BLD-RTO: 14 FL (ref 11.5–15)
PLATELET # BLD: 281 E9/L (ref 130–450)
PMV BLD AUTO: 10 FL (ref 7–12)
POTASSIUM SERPL-SCNC: 4 MMOL/L (ref 3.5–5)
RBC # BLD: 4.62 E12/L (ref 3.5–5.5)
SODIUM BLD-SCNC: 144 MMOL/L (ref 132–146)
TOTAL CK: 85 U/L (ref 20–180)
WBC # BLD: 12.6 E9/L (ref 4.5–11.5)

## 2018-08-02 PROCEDURE — 85025 COMPLETE CBC W/AUTO DIFF WBC: CPT

## 2018-08-02 PROCEDURE — 6360000002 HC RX W HCPCS: Performed by: EMERGENCY MEDICINE

## 2018-08-02 PROCEDURE — 80048 BASIC METABOLIC PNL TOTAL CA: CPT

## 2018-08-02 PROCEDURE — 96375 TX/PRO/DX INJ NEW DRUG ADDON: CPT

## 2018-08-02 PROCEDURE — 83735 ASSAY OF MAGNESIUM: CPT

## 2018-08-02 PROCEDURE — 96374 THER/PROPH/DIAG INJ IV PUSH: CPT

## 2018-08-02 PROCEDURE — 99284 EMERGENCY DEPT VISIT MOD MDM: CPT

## 2018-08-02 PROCEDURE — 82550 ASSAY OF CK (CPK): CPT

## 2018-08-02 PROCEDURE — 6370000000 HC RX 637 (ALT 250 FOR IP): Performed by: EMERGENCY MEDICINE

## 2018-08-02 PROCEDURE — 93970 EXTREMITY STUDY: CPT

## 2018-08-02 RX ORDER — KETOROLAC TROMETHAMINE 30 MG/ML
15 INJECTION, SOLUTION INTRAMUSCULAR; INTRAVENOUS ONCE
Status: COMPLETED | OUTPATIENT
Start: 2018-08-02 | End: 2018-08-02

## 2018-08-02 RX ORDER — TRAMADOL HYDROCHLORIDE 50 MG/1
50 TABLET ORAL EVERY 6 HOURS PRN
Qty: 12 TABLET | Refills: 0 | Status: SHIPPED | OUTPATIENT
Start: 2018-08-02 | End: 2018-08-05

## 2018-08-02 RX ORDER — TRAMADOL HYDROCHLORIDE 50 MG/1
50 TABLET ORAL ONCE
Status: COMPLETED | OUTPATIENT
Start: 2018-08-02 | End: 2018-08-02

## 2018-08-02 RX ORDER — PERPHENAZINE 16 MG
1 TABLET ORAL 2 TIMES DAILY
Qty: 30 CAPSULE | Refills: 0 | Status: ON HOLD | OUTPATIENT
Start: 2018-08-02 | End: 2021-01-01

## 2018-08-02 RX ORDER — MORPHINE SULFATE 4 MG/ML
4 INJECTION, SOLUTION INTRAMUSCULAR; INTRAVENOUS ONCE
Status: COMPLETED | OUTPATIENT
Start: 2018-08-02 | End: 2018-08-02

## 2018-08-02 RX ADMIN — KETOROLAC TROMETHAMINE 15 MG: 30 INJECTION, SOLUTION INTRAMUSCULAR at 22:28

## 2018-08-02 RX ADMIN — MORPHINE SULFATE 4 MG: 4 INJECTION, SOLUTION INTRAMUSCULAR; INTRAVENOUS at 23:30

## 2018-08-02 RX ADMIN — TRAMADOL HYDROCHLORIDE 50 MG: 50 TABLET, FILM COATED ORAL at 23:06

## 2018-08-02 ASSESSMENT — PAIN SCALES - GENERAL
PAINLEVEL_OUTOF10: 9
PAINLEVEL_OUTOF10: 10

## 2018-08-02 NOTE — LETTER
5 [facility] Emergency Department  [address] [place] [address]  Phone: [phone]    No name on file. August 2, 2018     Patient: Opal Cardozo   YOB: 1955   Date of Visit: 8/2/2018       To Whom It May Concern: It is my medical opinion that Luz Dinh may return to work on 2 days. If you have any questions or concerns, please don't hesitate to call. Sincerely,        No name on file.

## 2018-08-03 NOTE — ED PROVIDER NOTES
08/02/18   CBC Auto Differential   Result Value Ref [place]    WBC 12.6 (H) 4.5 - 11.5 E9/L    RBC 4.62 3.50 - 5.50 E12/L    Hemoglobin 14.6 11.5 - 15.5 g/dL    Hematocrit 43.1 34.0 - 48.0 %    MCV 93.3 80.0 - 99.9 fL    MCH 31.6 26.0 - 35.0 pg    MCHC 33.9 32.0 - 34.5 %    RDW 14.0 11.5 - 15.0 fL    Platelets 843 140 - 520 E9/L    MPV 10.0 7.0 - 12.0 fL    Neutrophils % 48.5 43.0 - 80.0 %    Immature Granulocytes % 0.4 0.0 - 5.0 %    Lymphocytes % 38.5 20.0 - 42.0 %    Monocytes % 8.1 2.0 - 12.0 %    Eosinophils % 3.8 0.0 - 6.0 %    Basophils % 0.7 0.0 - 2.0 %    Neutrophils # 6.12 1.80 - 7.30 E9/L    Immature Granulocytes # 0.05 E9/L    Lymphocytes # 4.87 (H) 1.50 - 4.00 E9/L    Monocytes # 1.03 (H) 0.10 - 0.95 E9/L    Eosinophils # 0.48 0.05 - 0.50 E9/L    Basophils # 0.09 0.00 - 0.20 W3/Q   Basic Metabolic Panel   Result Value Ref [place]    Sodium 144 132 - 146 mmol/L    Potassium 4.0 3.5 - 5.0 mmol/L    Chloride 107 98 - 107 mmol/L    CO2 26 22 - 29 mmol/L    Anion Gap 11 7 - 16 mmol/L    Glucose 99 74 - 109 mg/dL    BUN 20 8 - 23 mg/dL    CREATININE 1.1 (H) 0.5 - 1.0 mg/dL    GFR Non-African American 50 >=60 mL/min/1.73    GFR African American >60     Calcium 9.5 8.6 - 10.2 mg/dL   Magnesium   Result Value Ref [place]    Magnesium 2.4 1.6 - 2.6 mg/dL   CK   Result Value Ref [place]    Total CK 85 20 - 180 U/L       RADIOLOGY:  Interpreted by Radiologist.  US DUP LOWER EXTREMITIES BILATERAL VENOUS   Final Result        ------------------------- NURSING NOTES AND VITALS REVIEWED ---------------------------   The nursing notes within the ED encounter and vital signs as below have been reviewed by myself. /64   Pulse 74   Temp 97.6 °F (36.4 °C) (Oral)   Resp 18   Ht 5' 6\" (1.676 m)   Wt 230 lb (104.3 kg)   LMP  (LMP Unknown)   SpO2 97%   BMI 37.12 kg/m²   Oxygen Saturation Interpretation: Normal    The patients available past medical records and past encounters were reviewed.

## 2018-08-03 NOTE — ED NOTES
FIRST PROVIDER CONTACT ASSESSMENT NOTE      Department of Emergency Medicine   8/2/18  9:15 PM    Chief Complaint: Leg Pain      History of Present Illness:    Hafsa Sommer is a 59 y.o. female who presents to the ED by private car for  Bilateral leg pain   Focused Screening Exam:  Constitutional:  Alert, appears stated age and is in no distress.   Heart rrr   Lungs clear     *ALLERGIES*     Lisinopril; Procardia [nifedipine]; and Metformin and related     ED Triage Vitals   BP Temp Temp src Pulse Resp SpO2 Height Weight   -- -- -- -- -- -- -- --        Initial Plan of Care:  Initiate Treatment-Testing, Proceed toTreatment Area When Bed Available for ED Attending/MLP to Continue Care    -----------------END OF FIRST PROVIDER CONTACT ASSESSMENT NOTE--------------  Electronically signed by ROBYN Faulkner   DD: 8/2/18     ROBYN Faulkner  08/02/18 2118

## 2018-08-03 NOTE — ED NOTES
Pt c/o increased leg cramps last 24 hours.  She states \"gets them daily but they usually go away on their own\"  Pt sitting on the cart stating \"i am unable  To keep my legs still\"      Lorri Juarez RN  08/02/18 4887

## 2018-08-20 ENCOUNTER — OFFICE VISIT (OUTPATIENT)
Dept: FAMILY MEDICINE CLINIC | Age: 63
End: 2018-08-20
Payer: COMMERCIAL

## 2018-08-20 ENCOUNTER — HOSPITAL ENCOUNTER (OUTPATIENT)
Age: 63
Discharge: HOME | End: 2018-08-20
Payer: COMMERCIAL

## 2018-08-20 VITALS
HEIGHT: 66 IN | SYSTOLIC BLOOD PRESSURE: 116 MMHG | WEIGHT: 243 LBS | HEART RATE: 76 BPM | BODY MASS INDEX: 39.05 KG/M2 | DIASTOLIC BLOOD PRESSURE: 68 MMHG | RESPIRATION RATE: 16 BRPM | OXYGEN SATURATION: 97 %

## 2018-08-20 DIAGNOSIS — E11.9 TYPE 2 DIABETES MELLITUS WITHOUT COMPLICATION, WITHOUT LONG-TERM CURRENT USE OF INSULIN (HCC): ICD-10-CM

## 2018-08-20 DIAGNOSIS — H02.403 PTOSIS OF BOTH EYELIDS: ICD-10-CM

## 2018-08-20 DIAGNOSIS — Z01.810 PREOP CARDIOVASCULAR EXAM: ICD-10-CM

## 2018-08-20 DIAGNOSIS — G25.81 RESTLESS LEG SYNDROME: ICD-10-CM

## 2018-08-20 DIAGNOSIS — E11.9 TYPE 2 DIABETES MELLITUS WITHOUT COMPLICATION, WITHOUT LONG-TERM CURRENT USE OF INSULIN (HCC): Primary | ICD-10-CM

## 2018-08-20 DIAGNOSIS — G63 NEUROPATHY ASSOCIATED WITH ENDOCRINE DISORDER (HCC): ICD-10-CM

## 2018-08-20 DIAGNOSIS — E34.9 NEUROPATHY ASSOCIATED WITH ENDOCRINE DISORDER (HCC): ICD-10-CM

## 2018-08-20 LAB
ALBUMIN SERPL-MCNC: 3.7 G/DL (ref 3.5–5.2)
ALP BLD-CCNC: 81 U/L (ref 35–104)
ALT SERPL-CCNC: 14 U/L (ref 0–32)
ANION GAP SERPL CALCULATED.3IONS-SCNC: 16 MMOL/L (ref 7–16)
AST SERPL-CCNC: 19 U/L (ref 0–31)
BILIRUB SERPL-MCNC: 0.3 MG/DL (ref 0–1.2)
BUN BLDV-MCNC: 13 MG/DL (ref 8–23)
CALCIUM SERPL-MCNC: 9.1 MG/DL (ref 8.6–10.2)
CHLORIDE BLD-SCNC: 104 MMOL/L (ref 98–107)
CHOLESTEROL, TOTAL: 150 MG/DL (ref 0–199)
CO2: 23 MMOL/L (ref 22–29)
CREAT SERPL-MCNC: 0.8 MG/DL (ref 0.5–1)
GFR AFRICAN AMERICAN: >60
GFR NON-AFRICAN AMERICAN: >60 ML/MIN/1.73
GLUCOSE BLD-MCNC: 149 MG/DL (ref 74–109)
HBA1C MFR BLD: 6.7 %
HDLC SERPL-MCNC: 28 MG/DL
LDL CHOLESTEROL CALCULATED: 82 MG/DL (ref 0–99)
POTASSIUM SERPL-SCNC: 4.9 MMOL/L (ref 3.5–5)
SODIUM BLD-SCNC: 143 MMOL/L (ref 132–146)
TOTAL PROTEIN: 7.1 G/DL (ref 6.4–8.3)
TRIGL SERPL-MCNC: 198 MG/DL (ref 0–149)
VLDLC SERPL CALC-MCNC: 40 MG/DL

## 2018-08-20 PROCEDURE — 99214 OFFICE O/P EST MOD 30 MIN: CPT | Performed by: FAMILY MEDICINE

## 2018-08-20 PROCEDURE — G8427 DOCREV CUR MEDS BY ELIG CLIN: HCPCS | Performed by: FAMILY MEDICINE

## 2018-08-20 PROCEDURE — 83036 HEMOGLOBIN GLYCOSYLATED A1C: CPT | Performed by: FAMILY MEDICINE

## 2018-08-20 PROCEDURE — 80053 COMPREHEN METABOLIC PANEL: CPT

## 2018-08-20 PROCEDURE — 93000 ELECTROCARDIOGRAM COMPLETE: CPT | Performed by: FAMILY MEDICINE

## 2018-08-20 PROCEDURE — 36415 COLL VENOUS BLD VENIPUNCTURE: CPT

## 2018-08-20 PROCEDURE — 3017F COLORECTAL CA SCREEN DOC REV: CPT | Performed by: FAMILY MEDICINE

## 2018-08-20 PROCEDURE — G8417 CALC BMI ABV UP PARAM F/U: HCPCS | Performed by: FAMILY MEDICINE

## 2018-08-20 PROCEDURE — 2022F DILAT RTA XM EVC RTNOPTHY: CPT | Performed by: FAMILY MEDICINE

## 2018-08-20 PROCEDURE — 4004F PT TOBACCO SCREEN RCVD TLK: CPT | Performed by: FAMILY MEDICINE

## 2018-08-20 PROCEDURE — 80061 LIPID PANEL: CPT

## 2018-08-20 PROCEDURE — 3044F HG A1C LEVEL LT 7.0%: CPT | Performed by: FAMILY MEDICINE

## 2018-08-20 RX ORDER — ROPINIROLE 1 MG/1
1 TABLET, FILM COATED ORAL EVERY MORNING
Qty: 30 TABLET | Refills: 2 | Status: CANCELLED | OUTPATIENT
Start: 2018-08-20

## 2018-08-20 RX ORDER — GABAPENTIN 300 MG/1
300 CAPSULE ORAL 2 TIMES DAILY
Qty: 60 CAPSULE | Refills: 1 | Status: CANCELLED | OUTPATIENT
Start: 2018-08-20 | End: 2018-10-19

## 2018-08-20 RX ORDER — PAROXETINE HYDROCHLORIDE 20 MG/1
TABLET, FILM COATED ORAL
Refills: 0 | Status: ON HOLD | COMMUNITY
Start: 2018-08-10 | End: 2021-01-01

## 2018-08-20 ASSESSMENT — ENCOUNTER SYMPTOMS
SORE THROAT: 0
WHEEZING: 0
DIARRHEA: 0
SINUS PRESSURE: 0
NAUSEA: 0
COUGH: 0
CONSTIPATION: 0
SHORTNESS OF BREATH: 0
ABDOMINAL PAIN: 0
BACK PAIN: 0
VOMITING: 0
RHINORRHEA: 0

## 2018-08-20 NOTE — PROGRESS NOTES
patient's knowledge, my knowledge, or of that recorded in patient's current medical record. Patient has had cardiac testing in the past including EKG, stress test, and/or catheterization. If available, these records have been reviewed today. Patient's past medical, surgical, social and/or family history reviewed, updated in chart, and are non-contributory (unless otherwise stated). Medications and allergies also reviewed and updated in chart. /68 (Site: Right Arm, Position: Sitting, Cuff Size: Large Adult)   Pulse 76   Resp 16   Ht 5' 6\" (1.676 m)   Wt 243 lb (110.2 kg)   LMP  (LMP Unknown)   SpO2 97%   BMI 39.22 kg/m²     Review of Systems   Constitutional: Negative for chills, fatigue and fever. HENT: Negative for congestion, ear discharge, ear pain, postnasal drip, rhinorrhea, sinus pressure, sneezing and sore throat. Respiratory: Negative for cough, shortness of breath and wheezing. Cardiovascular: Negative for chest pain, palpitations and leg swelling. Gastrointestinal: Negative for abdominal pain, constipation, diarrhea, nausea and vomiting. Genitourinary: Negative for dysuria, frequency and hematuria. Musculoskeletal: Negative for arthralgias, back pain and myalgias. Skin: Negative for rash. Neurological: Negative for dizziness, light-headedness and headaches. Physical Exam   Constitutional: She is oriented to person, place, and time. She appears well-developed and well-nourished. HENT:   Head: Normocephalic and atraumatic. Right Ear: External ear normal.   Left Ear: External ear normal.   Nose: Nose normal.   Mouth/Throat: Oropharynx is clear and moist.   Eyes: Pupils are equal, round, and reactive to light. Conjunctivae and EOM are normal.   Neck: Normal range of motion. Neck supple. No thyromegaly present. Cardiovascular: Normal rate, regular rhythm, normal heart sounds and intact distal pulses.     Pulmonary/Chest: Effort normal and breath sounds normal. She has no wheezes. Abdominal: Soft. Bowel sounds are normal. There is no tenderness. Musculoskeletal: Normal range of motion. Lymphadenopathy:     She has no cervical adenopathy. Neurological: She is alert and oriented to person, place, and time. She has normal reflexes. Skin: Skin is warm and dry. No rash noted. Psychiatric: She has a normal mood and affect. Her behavior is normal.   Nursing note and vitals reviewed. Assessment:  1. Type 2 diabetes mellitus without complication, without long-term current use of insulin ([facility])  HbA1C improved from 8.0 to 6.7  Diet and exercise were discussed and recommended to the patient. Continue current medications  Labs ordered  - POCT glycosylated hemoglobin (Hb A1C)  - CBC Auto Differential; Future  - Comprehensive Metabolic Panel; Future  - Lipid Panel; Future    2. Restless leg syndrome  Stable. Psychiatry has taken over prescribing medicaitons. 3. Neuropathy associated with endocrine disorder [facility])  Psychiatry has taken over prescribing medications. 4. Preop cardiovascular exam  EKG showed sinus bradycardia likely due to metoprolol use  Labs ordered  Will await results of labs before clearing the patient. Patient advised that smoking is contributing to their illness. Cessation encouraged. Picking a quit date was encouraged. Labs were normal.   The 10-year ASCVD risk score (Kitty Garcia., et al., 2013) is: 19.6%    Values used to calculate the score:      Age: 59 years      Sex: Female      Is Non- : No      Diabetic: Yes      Tobacco smoker: Yes      Systolic Blood Pressure: 195 mmHg      Is BP treated: Yes      HDL Cholesterol: 28 mg/dL      Total Cholesterol: 150 mg/dL    - CBC Auto Differential; Future  - Comprehensive Metabolic Panel; Future  - EKG 12 Lead    5. Ptosis of both eyelids  Will await labs for clearance. Plan:  As above.   Call or go to ED immediately if symptoms worsen or persist.  Return in about 3 months (around 11/20/2018). , or sooner if necessary. Educational materials and/or home exercises printed for patient's review and were included in patient instructions on his/her After Visit Summary and given to patient at the end of visit. Counseled regarding above diagnosis, including possible risks and complications,  especially if left uncontrolled. Counseled regarding the possible side effects, risks, benefits and alternatives to treatment; patient and/or guardian verbalizes understanding, agrees, feels comfortable with and wishes to proceed with above treatment plan. Advised patient to call with any new medication issues, and read all Rx info from pharmacy to assure aware of all possible risks and side effects of medication before taking. Reviewed age and gender appropriate health screening exams and vaccinations. Advised patient regarding importance of keeping up with recommended health maintenance and to schedule as soon as possible if overdue, as this is important in assessing for undiagnosed pathology, especially cancer, as well as protecting against potentially harmful/life threatening disease. Patient and/or guardian verbalizes understanding and agrees with above counseling, assessment and plan. All questions answered.

## 2018-08-21 DIAGNOSIS — R79.0 LOW FERRITIN LEVEL: ICD-10-CM

## 2018-08-21 DIAGNOSIS — K21.9 GASTROESOPHAGEAL REFLUX DISEASE WITHOUT ESOPHAGITIS: ICD-10-CM

## 2018-08-21 DIAGNOSIS — G25.81 RESTLESS LEG SYNDROME: ICD-10-CM

## 2018-08-21 DIAGNOSIS — I10 ESSENTIAL HYPERTENSION: ICD-10-CM

## 2018-08-21 DIAGNOSIS — E11.9 TYPE 2 DIABETES MELLITUS WITHOUT COMPLICATION, WITHOUT LONG-TERM CURRENT USE OF INSULIN (HCC): ICD-10-CM

## 2018-08-21 RX ORDER — ROPINIROLE 5 MG/1
5 TABLET, FILM COATED ORAL NIGHTLY
Qty: 30 TABLET | Refills: 5 | OUTPATIENT
Start: 2018-08-21

## 2018-08-21 RX ORDER — SUCRALFATE 1 G/1
1 TABLET ORAL
Qty: 90 TABLET | Refills: 5 | Status: SHIPPED | OUTPATIENT
Start: 2018-08-21 | End: 2019-02-15 | Stop reason: SDUPTHER

## 2018-08-21 RX ORDER — ROPINIROLE 1 MG/1
1 TABLET, FILM COATED ORAL EVERY MORNING
Qty: 30 TABLET | Refills: 2 | OUTPATIENT
Start: 2018-08-21

## 2018-08-21 RX ORDER — FERROUS SULFATE 325(65) MG
325 TABLET ORAL
Qty: 30 TABLET | Refills: 5 | Status: SHIPPED | OUTPATIENT
Start: 2018-08-21 | End: 2019-02-15 | Stop reason: SDUPTHER

## 2018-08-21 RX ORDER — GLIPIZIDE 2.5 MG/1
2.5 TABLET, EXTENDED RELEASE ORAL DAILY
Qty: 30 TABLET | Refills: 1 | Status: SHIPPED | OUTPATIENT
Start: 2018-08-21 | End: 2018-10-18 | Stop reason: SDUPTHER

## 2018-08-21 RX ORDER — METOPROLOL TARTRATE 100 MG/1
TABLET ORAL
Qty: 60 TABLET | Refills: 5 | Status: SHIPPED | OUTPATIENT
Start: 2018-08-21 | End: 2019-02-15 | Stop reason: SDUPTHER

## 2018-08-23 ENCOUNTER — HOSPITAL ENCOUNTER (OUTPATIENT)
Age: 63
Discharge: HOME | End: 2018-08-23
Payer: COMMERCIAL

## 2018-08-23 LAB
BASOPHILS ABSOLUTE: 0.04 E9/L (ref 0–0.2)
BASOPHILS RELATIVE PERCENT: 0.4 % (ref 0–2)
EOSINOPHILS ABSOLUTE: 0.18 E9/L (ref 0.05–0.5)
EOSINOPHILS RELATIVE PERCENT: 1.7 % (ref 0–6)
HCT VFR BLD CALC: 43.9 % (ref 34–48)
HEMOGLOBIN: 14.6 G/DL (ref 11.5–15.5)
IMMATURE GRANULOCYTES #: 0.06 E9/L
IMMATURE GRANULOCYTES %: 0.6 % (ref 0–5)
LYMPHOCYTES ABSOLUTE: 2.74 E9/L (ref 1.5–4)
LYMPHOCYTES RELATIVE PERCENT: 26.3 % (ref 20–42)
MCH RBC QN AUTO: 31.4 PG (ref 26–35)
MCHC RBC AUTO-ENTMCNC: 33.3 % (ref 32–34.5)
MCV RBC AUTO: 94.4 FL (ref 80–99.9)
MONOCYTES ABSOLUTE: 0.67 E9/L (ref 0.1–0.95)
MONOCYTES RELATIVE PERCENT: 6.4 % (ref 2–12)
NEUTROPHILS ABSOLUTE: 6.74 E9/L (ref 1.8–7.3)
NEUTROPHILS RELATIVE PERCENT: 64.6 % (ref 43–80)
PDW BLD-RTO: 13.5 FL (ref 11.5–15)
PLATELET # BLD: 271 E9/L (ref 130–450)
PMV BLD AUTO: 9.9 FL (ref 7–12)
RBC # BLD: 4.65 E12/L (ref 3.5–5.5)
WBC # BLD: 10.4 E9/L (ref 4.5–11.5)

## 2018-08-23 PROCEDURE — 85025 COMPLETE CBC W/AUTO DIFF WBC: CPT

## 2018-08-23 PROCEDURE — 36415 COLL VENOUS BLD VENIPUNCTURE: CPT

## 2018-08-23 NOTE — PATIENT INSTRUCTIONS
of these can increase the risk of bleeding or interact with anesthesia.     · If you take blood thinners, such as warfarin (Coumadin), clopidogrel (Plavix), or aspirin, be sure to talk to your doctor. He or she will tell you if you should stop taking these medicines before your surgery. Make sure that you understand exactly what your doctor wants you to do.     · Your doctor will tell you which medicines to take or stop before your surgery. You may need to stop taking certain medicines a week or more before surgery. So talk to your doctor as soon as you can.     · If you have an advance directive, let your doctor know. It may include a living will and a durable power of  for health care. Bring a copy to the hospital. If you don't have one, you may want to prepare one. It lets your doctor and loved ones know your health care wishes. Doctors advise that everyone prepare these papers before any type of surgery or procedure.     · You will get exact instructions about when to stop eating before your surgery. It is important to have an empty stomach before surgery. But this can also lead to low blood sugar. Be sure to talk to your doctor more about this.     · Check your blood sugar often in the hours before the surgery. What happens on the day of surgery? · Follow the instructions exactly about when to stop eating and drinking. If you don't, your surgery may be canceled. If your doctor told you to take your medicines on the day of surgery, take them with only a sip of water.     · Take a bath or shower before you come in for your surgery. Do not apply lotions, perfumes, deodorants, or nail polish.     · Do not shave the surgical site yourself.     · Take off all jewelry and piercings.  And take out contact lenses, if you wear them.    At the hospital or surgery center   · Bring a picture ID.     · The area for surgery is often marked to make sure there are no errors.     · You will be kept comfortable and

## 2018-09-19 ENCOUNTER — TELEPHONE (OUTPATIENT)
Dept: FAMILY MEDICINE CLINIC | Age: 63
End: 2018-09-19

## 2018-10-18 DIAGNOSIS — E11.9 TYPE 2 DIABETES MELLITUS WITHOUT COMPLICATION, WITHOUT LONG-TERM CURRENT USE OF INSULIN (HCC): ICD-10-CM

## 2018-10-18 DIAGNOSIS — M54.42 ACUTE LEFT-SIDED LOW BACK PAIN WITH LEFT-SIDED SCIATICA: ICD-10-CM

## 2018-10-18 DIAGNOSIS — F06.30 MOOD DISORDER DUE TO A GENERAL MEDICAL CONDITION: ICD-10-CM

## 2018-10-18 DIAGNOSIS — F17.210 CIGARETTE NICOTINE DEPENDENCE WITHOUT COMPLICATION: ICD-10-CM

## 2018-10-18 DIAGNOSIS — R60.0 LOWER EXTREMITY EDEMA: ICD-10-CM

## 2018-10-18 RX ORDER — POLYETHYLENE GLYCOL 3350 17 G
4 POWDER IN PACKET (EA) ORAL PRN
Qty: 100 EACH | Refills: 3 | Status: SHIPPED | OUTPATIENT
Start: 2018-10-18 | End: 2019-03-15 | Stop reason: SDUPTHER

## 2018-10-18 RX ORDER — FUROSEMIDE 20 MG/1
TABLET ORAL
Qty: 30 TABLET | Refills: 3 | Status: SHIPPED | OUTPATIENT
Start: 2018-10-18 | End: 2019-02-15 | Stop reason: SDUPTHER

## 2018-10-18 RX ORDER — TRAZODONE HYDROCHLORIDE 100 MG/1
TABLET ORAL
Qty: 30 TABLET | Refills: 0 | Status: SHIPPED | OUTPATIENT
Start: 2018-10-18 | End: 2018-11-16 | Stop reason: SDUPTHER

## 2018-10-18 RX ORDER — NAPROXEN 500 MG/1
500 TABLET ORAL 2 TIMES DAILY WITH MEALS
Qty: 60 TABLET | Refills: 3 | Status: SHIPPED | OUTPATIENT
Start: 2018-10-18 | End: 2019-02-15 | Stop reason: SDUPTHER

## 2018-10-18 RX ORDER — GLIPIZIDE 2.5 MG/1
2.5 TABLET, EXTENDED RELEASE ORAL DAILY
Qty: 30 TABLET | Refills: 1 | Status: SHIPPED | OUTPATIENT
Start: 2018-10-18 | End: 2018-12-18 | Stop reason: SDUPTHER

## 2018-11-16 DIAGNOSIS — I10 ESSENTIAL HYPERTENSION: ICD-10-CM

## 2018-11-16 DIAGNOSIS — G25.81 RESTLESS LEG SYNDROME: ICD-10-CM

## 2018-11-16 DIAGNOSIS — F06.30 MOOD DISORDER DUE TO A GENERAL MEDICAL CONDITION: ICD-10-CM

## 2018-11-19 RX ORDER — HYDROCHLOROTHIAZIDE 25 MG/1
TABLET ORAL
Qty: 30 TABLET | Refills: 3 | Status: SHIPPED | OUTPATIENT
Start: 2018-11-19 | End: 2019-03-15 | Stop reason: SDUPTHER

## 2018-11-19 RX ORDER — TRAZODONE HYDROCHLORIDE 100 MG/1
TABLET ORAL
Qty: 30 TABLET | Refills: 0 | Status: SHIPPED | OUTPATIENT
Start: 2018-11-19 | End: 2018-12-18 | Stop reason: SDUPTHER

## 2018-11-19 RX ORDER — TOPIRAMATE 50 MG/1
50 TABLET, FILM COATED ORAL 2 TIMES DAILY
Qty: 60 TABLET | Refills: 3 | Status: SHIPPED | OUTPATIENT
Start: 2018-11-19 | End: 2019-03-15 | Stop reason: SDUPTHER

## 2018-12-18 DIAGNOSIS — F06.30 MOOD DISORDER DUE TO A GENERAL MEDICAL CONDITION: ICD-10-CM

## 2018-12-18 DIAGNOSIS — E11.9 TYPE 2 DIABETES MELLITUS WITHOUT COMPLICATION, WITHOUT LONG-TERM CURRENT USE OF INSULIN (HCC): ICD-10-CM

## 2018-12-18 RX ORDER — GLIPIZIDE 2.5 MG/1
2.5 TABLET, EXTENDED RELEASE ORAL DAILY
Qty: 30 TABLET | Refills: 1 | Status: SHIPPED | OUTPATIENT
Start: 2018-12-18 | End: 2019-02-15 | Stop reason: SDUPTHER

## 2018-12-18 RX ORDER — ATORVASTATIN CALCIUM 10 MG/1
10 TABLET, FILM COATED ORAL DAILY
Qty: 30 TABLET | Refills: 3 | Status: SHIPPED | OUTPATIENT
Start: 2018-12-18 | End: 2019-03-18 | Stop reason: SDUPTHER

## 2018-12-18 RX ORDER — TRAZODONE HYDROCHLORIDE 100 MG/1
TABLET ORAL
Qty: 30 TABLET | Refills: 0 | Status: SHIPPED | OUTPATIENT
Start: 2018-12-18 | End: 2019-01-17 | Stop reason: SDUPTHER

## 2019-01-17 DIAGNOSIS — E11.9 TYPE 2 DIABETES MELLITUS WITHOUT COMPLICATION, WITHOUT LONG-TERM CURRENT USE OF INSULIN (HCC): ICD-10-CM

## 2019-01-17 DIAGNOSIS — F06.30 MOOD DISORDER DUE TO A GENERAL MEDICAL CONDITION: ICD-10-CM

## 2019-01-18 RX ORDER — TRAZODONE HYDROCHLORIDE 100 MG/1
TABLET ORAL
Qty: 30 TABLET | Refills: 1 | Status: SHIPPED | OUTPATIENT
Start: 2019-01-18 | End: 2019-03-15 | Stop reason: SDUPTHER

## 2019-02-15 DIAGNOSIS — E11.9 TYPE 2 DIABETES MELLITUS WITHOUT COMPLICATION, WITHOUT LONG-TERM CURRENT USE OF INSULIN (HCC): ICD-10-CM

## 2019-02-15 DIAGNOSIS — R60.0 LOWER EXTREMITY EDEMA: ICD-10-CM

## 2019-02-15 DIAGNOSIS — K21.9 GASTROESOPHAGEAL REFLUX DISEASE WITHOUT ESOPHAGITIS: ICD-10-CM

## 2019-02-15 DIAGNOSIS — R79.0 LOW FERRITIN LEVEL: ICD-10-CM

## 2019-02-15 DIAGNOSIS — I10 ESSENTIAL HYPERTENSION: ICD-10-CM

## 2019-02-15 DIAGNOSIS — M54.42 ACUTE LEFT-SIDED LOW BACK PAIN WITH LEFT-SIDED SCIATICA: ICD-10-CM

## 2019-02-15 RX ORDER — GLIPIZIDE 2.5 MG/1
2.5 TABLET, EXTENDED RELEASE ORAL DAILY
Qty: 30 TABLET | Refills: 5 | Status: SHIPPED | OUTPATIENT
Start: 2019-02-15 | End: 2019-10-08 | Stop reason: SDUPTHER

## 2019-02-15 RX ORDER — FERROUS SULFATE 325(65) MG
325 TABLET ORAL
Qty: 30 TABLET | Refills: 5 | Status: ON HOLD
Start: 2019-02-15 | End: 2021-01-01

## 2019-02-15 RX ORDER — SUCRALFATE 1 G/1
1 TABLET ORAL
Qty: 90 TABLET | Refills: 5 | Status: ON HOLD
Start: 2019-02-15 | End: 2021-01-01

## 2019-02-15 RX ORDER — METOPROLOL TARTRATE 100 MG/1
TABLET ORAL
Qty: 60 TABLET | Refills: 5 | Status: SHIPPED | OUTPATIENT
Start: 2019-02-15 | End: 2019-10-08 | Stop reason: SDUPTHER

## 2019-02-15 RX ORDER — FUROSEMIDE 20 MG/1
TABLET ORAL
Qty: 30 TABLET | Refills: 3 | Status: SHIPPED | OUTPATIENT
Start: 2019-02-15 | End: 2019-06-10 | Stop reason: SDUPTHER

## 2019-02-15 RX ORDER — NAPROXEN 500 MG/1
500 TABLET ORAL 2 TIMES DAILY WITH MEALS
Qty: 60 TABLET | Refills: 3 | Status: SHIPPED | OUTPATIENT
Start: 2019-02-15 | End: 2019-06-10 | Stop reason: SDUPTHER

## 2019-03-15 DIAGNOSIS — F17.210 CIGARETTE NICOTINE DEPENDENCE WITHOUT COMPLICATION: ICD-10-CM

## 2019-03-15 DIAGNOSIS — F06.30 MOOD DISORDER DUE TO A GENERAL MEDICAL CONDITION: ICD-10-CM

## 2019-03-15 DIAGNOSIS — G25.81 RESTLESS LEG SYNDROME: ICD-10-CM

## 2019-03-15 DIAGNOSIS — E11.9 TYPE 2 DIABETES MELLITUS WITHOUT COMPLICATION, WITHOUT LONG-TERM CURRENT USE OF INSULIN (HCC): ICD-10-CM

## 2019-03-15 DIAGNOSIS — I10 ESSENTIAL HYPERTENSION: ICD-10-CM

## 2019-03-15 RX ORDER — TOPIRAMATE 50 MG/1
50 TABLET, FILM COATED ORAL 2 TIMES DAILY
Qty: 60 TABLET | Refills: 3 | Status: ON HOLD
Start: 2019-03-15 | End: 2021-01-01

## 2019-03-15 RX ORDER — POLYETHYLENE GLYCOL 3350 17 G
4 POWDER IN PACKET (EA) ORAL PRN
Qty: 100 EACH | Refills: 3 | Status: ON HOLD
Start: 2019-03-15 | End: 2021-01-01

## 2019-03-15 RX ORDER — TRAZODONE HYDROCHLORIDE 100 MG/1
TABLET ORAL
Qty: 30 TABLET | Refills: 3 | Status: ON HOLD
Start: 2019-03-15 | End: 2021-01-01

## 2019-03-15 RX ORDER — HYDROCHLOROTHIAZIDE 25 MG/1
TABLET ORAL
Qty: 30 TABLET | Refills: 3 | Status: ON HOLD
Start: 2019-03-15 | End: 2021-01-01

## 2019-03-18 RX ORDER — ATORVASTATIN CALCIUM 10 MG/1
10 TABLET, FILM COATED ORAL DAILY
Qty: 30 TABLET | Refills: 3 | Status: SHIPPED | OUTPATIENT
Start: 2019-03-18 | End: 2019-10-08 | Stop reason: SDUPTHER

## 2019-06-07 DIAGNOSIS — M54.42 ACUTE LEFT-SIDED LOW BACK PAIN WITH LEFT-SIDED SCIATICA: ICD-10-CM

## 2019-06-07 DIAGNOSIS — R60.0 LOWER EXTREMITY EDEMA: ICD-10-CM

## 2019-06-10 RX ORDER — NAPROXEN 500 MG/1
500 TABLET ORAL 2 TIMES DAILY WITH MEALS
Qty: 60 TABLET | Refills: 3 | Status: ON HOLD
Start: 2019-06-10 | End: 2021-01-01

## 2019-06-10 RX ORDER — FUROSEMIDE 20 MG/1
TABLET ORAL
Qty: 30 TABLET | Refills: 3 | Status: SHIPPED | OUTPATIENT
Start: 2019-06-10 | End: 2019-10-08 | Stop reason: SDUPTHER

## 2019-07-08 DIAGNOSIS — I10 ESSENTIAL HYPERTENSION: ICD-10-CM

## 2019-07-08 DIAGNOSIS — E11.9 TYPE 2 DIABETES MELLITUS WITHOUT COMPLICATION, WITHOUT LONG-TERM CURRENT USE OF INSULIN (HCC): ICD-10-CM

## 2019-07-08 DIAGNOSIS — G25.81 RESTLESS LEG SYNDROME: ICD-10-CM

## 2019-07-08 RX ORDER — HYDROCHLOROTHIAZIDE 25 MG/1
TABLET ORAL
Qty: 30 TABLET | Refills: 0 | OUTPATIENT
Start: 2019-07-08

## 2019-07-08 RX ORDER — TOPIRAMATE 50 MG/1
50 TABLET, FILM COATED ORAL 2 TIMES DAILY
Qty: 60 TABLET | Refills: 0 | OUTPATIENT
Start: 2019-07-08

## 2019-07-12 ENCOUNTER — TELEPHONE (OUTPATIENT)
Dept: FAMILY MEDICINE CLINIC | Age: 64
End: 2019-07-12

## 2019-07-12 NOTE — TELEPHONE ENCOUNTER
Left message asking patient if she has had a mammogram this year if not would she like us to send the order to the [facility] or [employer]

## 2019-08-05 DIAGNOSIS — R79.0 LOW FERRITIN LEVEL: ICD-10-CM

## 2019-08-05 DIAGNOSIS — E11.9 TYPE 2 DIABETES MELLITUS WITHOUT COMPLICATION, WITHOUT LONG-TERM CURRENT USE OF INSULIN (HCC): ICD-10-CM

## 2019-08-05 DIAGNOSIS — K21.9 GASTROESOPHAGEAL REFLUX DISEASE WITHOUT ESOPHAGITIS: ICD-10-CM

## 2019-08-05 DIAGNOSIS — I10 ESSENTIAL HYPERTENSION: ICD-10-CM

## 2019-08-05 DIAGNOSIS — G25.81 RESTLESS LEG SYNDROME: ICD-10-CM

## 2019-08-05 RX ORDER — TOPIRAMATE 50 MG/1
50 TABLET, FILM COATED ORAL 2 TIMES DAILY
Qty: 60 TABLET | Refills: 3 | OUTPATIENT
Start: 2019-08-05

## 2019-08-05 RX ORDER — FERROUS SULFATE 325(65) MG
325 TABLET ORAL
Qty: 30 TABLET | Refills: 5 | OUTPATIENT
Start: 2019-08-05

## 2019-08-05 RX ORDER — METOPROLOL TARTRATE 100 MG/1
TABLET ORAL
Qty: 60 TABLET | Refills: 5 | OUTPATIENT
Start: 2019-08-05

## 2019-08-05 RX ORDER — SUCRALFATE 1 G/1
1 TABLET ORAL
Qty: 90 TABLET | Refills: 5 | OUTPATIENT
Start: 2019-08-05

## 2019-08-05 RX ORDER — HYDROCHLOROTHIAZIDE 25 MG/1
TABLET ORAL
Qty: 30 TABLET | Refills: 3 | OUTPATIENT
Start: 2019-08-05

## 2019-08-05 RX ORDER — GLIPIZIDE 2.5 MG/1
2.5 TABLET, EXTENDED RELEASE ORAL DAILY
Qty: 30 TABLET | Refills: 5 | OUTPATIENT
Start: 2019-08-05

## 2019-08-05 RX ORDER — ATORVASTATIN CALCIUM 10 MG/1
10 TABLET, FILM COATED ORAL DAILY
Qty: 30 TABLET | Refills: 3 | OUTPATIENT
Start: 2019-08-05

## 2019-09-16 ENCOUNTER — HOSPITAL ENCOUNTER (EMERGENCY)
Age: 64
Discharge: HOME | End: 2019-09-16
Attending: EMERGENCY MEDICINE
Payer: COMMERCIAL

## 2019-09-16 ENCOUNTER — APPOINTMENT (OUTPATIENT)
Dept: CT IMAGING | Age: 64
End: 2019-09-16
Payer: COMMERCIAL

## 2019-09-16 VITALS
DIASTOLIC BLOOD PRESSURE: 84 MMHG | HEIGHT: 66 IN | WEIGHT: 250 LBS | SYSTOLIC BLOOD PRESSURE: 164 MMHG | RESPIRATION RATE: 16 BRPM | BODY MASS INDEX: 40.18 KG/M2 | HEART RATE: 97 BPM | TEMPERATURE: 98.3 F | OXYGEN SATURATION: 98 %

## 2019-09-16 DIAGNOSIS — M54.16 LUMBAR RADICULOPATHY, ACUTE: Primary | ICD-10-CM

## 2019-09-16 PROCEDURE — 72131 CT LUMBAR SPINE W/O DYE: CPT

## 2019-09-16 PROCEDURE — 99283 EMERGENCY DEPT VISIT LOW MDM: CPT

## 2019-09-16 PROCEDURE — 96372 THER/PROPH/DIAG INJ SC/IM: CPT

## 2019-09-16 PROCEDURE — 6360000002 HC RX W HCPCS: Performed by: EMERGENCY MEDICINE

## 2019-09-16 RX ORDER — ORPHENADRINE CITRATE 30 MG/ML
60 INJECTION INTRAMUSCULAR; INTRAVENOUS ONCE
Status: COMPLETED | OUTPATIENT
Start: 2019-09-16 | End: 2019-09-16

## 2019-09-16 RX ORDER — CHLORZOXAZONE 500 MG/1
500 TABLET ORAL 3 TIMES DAILY PRN
Qty: 30 TABLET | Refills: 0 | Status: SHIPPED | OUTPATIENT
Start: 2019-09-16 | End: 2019-09-26

## 2019-09-16 RX ORDER — OXYCODONE HYDROCHLORIDE AND ACETAMINOPHEN 5; 325 MG/1; MG/1
1 TABLET ORAL EVERY 6 HOURS PRN
Qty: 6 TABLET | Refills: 0 | Status: SHIPPED | OUTPATIENT
Start: 2019-09-16 | End: 2019-09-19

## 2019-09-16 RX ORDER — MORPHINE SULFATE 4 MG/ML
4 INJECTION, SOLUTION INTRAMUSCULAR; INTRAVENOUS ONCE
Status: COMPLETED | OUTPATIENT
Start: 2019-09-16 | End: 2019-09-16

## 2019-09-16 RX ORDER — PREDNISONE 10 MG/1
20 TABLET ORAL DAILY
Qty: 10 TABLET | Refills: 0 | Status: SHIPPED | OUTPATIENT
Start: 2019-09-16 | End: 2019-09-21

## 2019-09-16 RX ADMIN — MORPHINE SULFATE 4 MG: 4 INJECTION, SOLUTION INTRAMUSCULAR; INTRAVENOUS at 20:05

## 2019-09-16 RX ADMIN — ORPHENADRINE CITRATE 60 MG: 30 INJECTION INTRAMUSCULAR; INTRAVENOUS at 20:06

## 2019-09-16 ASSESSMENT — PAIN DESCRIPTION - DESCRIPTORS
DESCRIPTORS: SHARP
DESCRIPTORS: ACHING
DESCRIPTORS: SHARP

## 2019-09-16 ASSESSMENT — PAIN SCALES - GENERAL
PAINLEVEL_OUTOF10: 10
PAINLEVEL_OUTOF10: 8
PAINLEVEL_OUTOF10: 7
PAINLEVEL_OUTOF10: 10

## 2019-09-16 ASSESSMENT — PAIN DESCRIPTION - ORIENTATION
ORIENTATION: LEFT
ORIENTATION: LOWER
ORIENTATION: LOWER;LEFT

## 2019-09-16 ASSESSMENT — PAIN DESCRIPTION - PAIN TYPE
TYPE: ACUTE PAIN
TYPE: ACUTE PAIN

## 2019-09-16 ASSESSMENT — PAIN DESCRIPTION - PROGRESSION
CLINICAL_PROGRESSION: GRADUALLY IMPROVING
CLINICAL_PROGRESSION: GRADUALLY WORSENING

## 2019-09-16 ASSESSMENT — PAIN DESCRIPTION - FREQUENCY: FREQUENCY: CONTINUOUS

## 2019-09-16 ASSESSMENT — PAIN DESCRIPTION - LOCATION
LOCATION: LEG
LOCATION: BACK
LOCATION: BACK

## 2019-10-08 DIAGNOSIS — R79.0 LOW FERRITIN LEVEL: ICD-10-CM

## 2019-10-08 DIAGNOSIS — I10 ESSENTIAL HYPERTENSION: ICD-10-CM

## 2019-10-08 DIAGNOSIS — R60.0 LOWER EXTREMITY EDEMA: ICD-10-CM

## 2019-10-08 DIAGNOSIS — E11.9 TYPE 2 DIABETES MELLITUS WITHOUT COMPLICATION, WITHOUT LONG-TERM CURRENT USE OF INSULIN (HCC): ICD-10-CM

## 2019-10-08 DIAGNOSIS — M54.42 ACUTE LEFT-SIDED LOW BACK PAIN WITH LEFT-SIDED SCIATICA: ICD-10-CM

## 2019-10-08 DIAGNOSIS — K21.9 GASTROESOPHAGEAL REFLUX DISEASE WITHOUT ESOPHAGITIS: ICD-10-CM

## 2019-10-08 RX ORDER — METOPROLOL TARTRATE 100 MG/1
TABLET ORAL
Qty: 60 TABLET | Refills: 0 | Status: ON HOLD
Start: 2019-10-08 | End: 2021-01-01 | Stop reason: HOSPADM

## 2019-10-08 RX ORDER — FUROSEMIDE 20 MG/1
TABLET ORAL
Qty: 30 TABLET | Refills: 0 | Status: ON HOLD
Start: 2019-10-08 | End: 2021-01-01 | Stop reason: HOSPADM

## 2019-10-08 RX ORDER — GLIPIZIDE 2.5 MG/1
2.5 TABLET, EXTENDED RELEASE ORAL DAILY
Qty: 30 TABLET | Refills: 0 | Status: ON HOLD
Start: 2019-10-08 | End: 2021-01-01 | Stop reason: HOSPADM

## 2019-10-08 RX ORDER — SUCRALFATE 1 G/1
1 TABLET ORAL
Qty: 90 TABLET | Refills: 5 | OUTPATIENT
Start: 2019-10-08

## 2019-10-08 RX ORDER — ATORVASTATIN CALCIUM 10 MG/1
10 TABLET, FILM COATED ORAL DAILY
Qty: 30 TABLET | Refills: 0 | Status: ON HOLD
Start: 2019-10-08 | End: 2021-01-01

## 2019-10-08 RX ORDER — FERROUS SULFATE 325(65) MG
325 TABLET ORAL
Qty: 30 TABLET | Refills: 5 | OUTPATIENT
Start: 2019-10-08

## 2019-10-08 RX ORDER — NAPROXEN 500 MG/1
500 TABLET ORAL 2 TIMES DAILY WITH MEALS
Qty: 60 TABLET | Refills: 3 | OUTPATIENT
Start: 2019-10-08

## 2019-11-05 DIAGNOSIS — R60.0 LOWER EXTREMITY EDEMA: ICD-10-CM

## 2019-11-05 DIAGNOSIS — E11.9 TYPE 2 DIABETES MELLITUS WITHOUT COMPLICATION, WITHOUT LONG-TERM CURRENT USE OF INSULIN (HCC): ICD-10-CM

## 2019-11-05 DIAGNOSIS — I10 ESSENTIAL HYPERTENSION: ICD-10-CM

## 2019-11-05 DIAGNOSIS — E34.9 NEUROPATHY ASSOCIATED WITH ENDOCRINE DISORDER (HCC): ICD-10-CM

## 2019-11-05 DIAGNOSIS — G63 NEUROPATHY ASSOCIATED WITH ENDOCRINE DISORDER (HCC): ICD-10-CM

## 2019-11-05 RX ORDER — GABAPENTIN 300 MG/1
300 CAPSULE ORAL 2 TIMES DAILY
Qty: 60 CAPSULE | Refills: 1 | OUTPATIENT
Start: 2019-11-05 | End: 2021-01-01

## 2019-11-05 RX ORDER — METOPROLOL TARTRATE 100 MG/1
TABLET ORAL
Qty: 60 TABLET | Refills: 0 | OUTPATIENT
Start: 2019-11-05

## 2019-11-05 RX ORDER — ATORVASTATIN CALCIUM 10 MG/1
10 TABLET, FILM COATED ORAL DAILY
Qty: 30 TABLET | Refills: 0 | OUTPATIENT
Start: 2019-11-05

## 2019-11-05 RX ORDER — FUROSEMIDE 20 MG/1
TABLET ORAL
Qty: 30 TABLET | Refills: 0 | OUTPATIENT
Start: 2019-11-05

## 2019-12-23 ENCOUNTER — HOSPITAL ENCOUNTER (OUTPATIENT)
Age: 64
Discharge: HOME | End: 2019-12-25

## 2019-12-23 LAB
ABO/RH: NORMAL
ANION GAP SERPL CALCULATED.3IONS-SCNC: 13 MMOL/L (ref 7–16)
ANTIBODY SCREEN: NORMAL
BUN BLDV-MCNC: 24 MG/DL (ref 8–23)
CALCIUM SERPL-MCNC: 9.6 MG/DL (ref 8.6–10.2)
CHLORIDE BLD-SCNC: 97 MMOL/L (ref 98–107)
CO2: 27 MMOL/L (ref 22–29)
CREAT SERPL-MCNC: 0.9 MG/DL (ref 0.5–1)
GFR AFRICAN AMERICAN: >60
GFR NON-AFRICAN AMERICAN: >60 ML/MIN/1.73
GLUCOSE BLD-MCNC: 158 MG/DL (ref 74–99)
HCT VFR BLD CALC: 46.8 % (ref 34–48)
HEMOGLOBIN: 15.6 G/DL (ref 11.5–15.5)
MCH RBC QN AUTO: 30.8 PG (ref 26–35)
MCHC RBC AUTO-ENTMCNC: 33.3 % (ref 32–34.5)
MCV RBC AUTO: 92.3 FL (ref 80–99.9)
PDW BLD-RTO: 13 FL (ref 11.5–15)
PLATELET # BLD: 320 E9/L (ref 130–450)
PMV BLD AUTO: 10 FL (ref 7–12)
POTASSIUM SERPL-SCNC: 3.6 MMOL/L (ref 3.5–5)
RBC # BLD: 5.07 E12/L (ref 3.5–5.5)
SODIUM BLD-SCNC: 137 MMOL/L (ref 132–146)
WBC # BLD: 10.7 E9/L (ref 4.5–11.5)

## 2019-12-23 PROCEDURE — 86850 RBC ANTIBODY SCREEN: CPT

## 2019-12-23 PROCEDURE — 86900 BLOOD TYPING SEROLOGIC ABO: CPT

## 2019-12-23 PROCEDURE — 80048 BASIC METABOLIC PNL TOTAL CA: CPT

## 2019-12-23 PROCEDURE — 87081 CULTURE SCREEN ONLY: CPT

## 2019-12-23 PROCEDURE — 87088 URINE BACTERIA CULTURE: CPT

## 2019-12-23 PROCEDURE — 85027 COMPLETE CBC AUTOMATED: CPT

## 2019-12-23 PROCEDURE — 86901 BLOOD TYPING SEROLOGIC RH(D): CPT

## 2019-12-25 LAB
MRSA CULTURE ONLY: NORMAL
URINE CULTURE, ROUTINE: NORMAL

## 2020-03-24 ENCOUNTER — HOSPITAL ENCOUNTER (OUTPATIENT)
Age: 65
Discharge: HOME | End: 2020-03-26

## 2020-03-24 LAB
ABO/RH: NORMAL
ANTIBODY SCREEN: NORMAL

## 2020-03-24 PROCEDURE — 86901 BLOOD TYPING SEROLOGIC RH(D): CPT

## 2020-03-24 PROCEDURE — 86900 BLOOD TYPING SEROLOGIC ABO: CPT

## 2020-03-24 PROCEDURE — 86850 RBC ANTIBODY SCREEN: CPT

## 2020-03-24 PROCEDURE — 87081 CULTURE SCREEN ONLY: CPT

## 2020-03-26 LAB — MRSA CULTURE ONLY: NORMAL

## 2020-04-03 ENCOUNTER — HOSPITAL ENCOUNTER (OUTPATIENT)
Age: 65
Discharge: HOME | End: 2020-04-05

## 2020-04-03 LAB
ANION GAP SERPL CALCULATED.3IONS-SCNC: 13 MMOL/L (ref 7–16)
BUN BLDV-MCNC: 12 MG/DL (ref 8–23)
CALCIUM SERPL-MCNC: 8.4 MG/DL (ref 8.6–10.2)
CHLORIDE BLD-SCNC: 100 MMOL/L (ref 98–107)
CO2: 25 MMOL/L (ref 22–29)
CREAT SERPL-MCNC: 0.7 MG/DL (ref 0.5–1)
GFR AFRICAN AMERICAN: >60
GFR NON-AFRICAN AMERICAN: >60 ML/MIN/1.73
GLUCOSE BLD-MCNC: 199 MG/DL (ref 74–99)
HCT VFR BLD CALC: 38.9 % (ref 34–48)
HEMOGLOBIN: 12.8 G/DL (ref 11.5–15.5)
MCH RBC QN AUTO: 31.1 PG (ref 26–35)
MCHC RBC AUTO-ENTMCNC: 32.9 % (ref 32–34.5)
MCV RBC AUTO: 94.4 FL (ref 80–99.9)
PDW BLD-RTO: 13.1 FL (ref 11.5–15)
PLATELET # BLD: 301 E9/L (ref 130–450)
PMV BLD AUTO: 9.8 FL (ref 7–12)
POTASSIUM SERPL-SCNC: 4.2 MMOL/L (ref 3.5–5)
RBC # BLD: 4.12 E12/L (ref 3.5–5.5)
SODIUM BLD-SCNC: 138 MMOL/L (ref 132–146)
WBC # BLD: 16.4 E9/L (ref 4.5–11.5)

## 2020-04-03 PROCEDURE — 80048 BASIC METABOLIC PNL TOTAL CA: CPT

## 2020-04-03 PROCEDURE — 85027 COMPLETE CBC AUTOMATED: CPT

## 2020-04-04 ENCOUNTER — HOSPITAL ENCOUNTER (OUTPATIENT)
Age: 65
Discharge: HOME | End: 2020-04-06

## 2020-04-04 LAB
ANION GAP SERPL CALCULATED.3IONS-SCNC: 13 MMOL/L (ref 7–16)
BUN BLDV-MCNC: 14 MG/DL (ref 8–23)
CALCIUM SERPL-MCNC: 8.7 MG/DL (ref 8.6–10.2)
CHLORIDE BLD-SCNC: 101 MMOL/L (ref 98–107)
CO2: 24 MMOL/L (ref 22–29)
CREAT SERPL-MCNC: 0.8 MG/DL (ref 0.5–1)
GFR AFRICAN AMERICAN: >60
GFR NON-AFRICAN AMERICAN: >60 ML/MIN/1.73
GLUCOSE BLD-MCNC: 154 MG/DL (ref 74–99)
HCT VFR BLD CALC: 38 % (ref 34–48)
HEMOGLOBIN: 12.4 G/DL (ref 11.5–15.5)
MCH RBC QN AUTO: 31 PG (ref 26–35)
MCHC RBC AUTO-ENTMCNC: 32.6 % (ref 32–34.5)
MCV RBC AUTO: 95 FL (ref 80–99.9)
PDW BLD-RTO: 13.2 FL (ref 11.5–15)
PLATELET # BLD: 270 E9/L (ref 130–450)
PMV BLD AUTO: 9.6 FL (ref 7–12)
POTASSIUM SERPL-SCNC: 3.9 MMOL/L (ref 3.5–5)
RBC # BLD: 4 E12/L (ref 3.5–5.5)
SODIUM BLD-SCNC: 138 MMOL/L (ref 132–146)
WBC # BLD: 13.9 E9/L (ref 4.5–11.5)

## 2020-04-04 PROCEDURE — 85027 COMPLETE CBC AUTOMATED: CPT

## 2020-04-04 PROCEDURE — 80048 BASIC METABOLIC PNL TOTAL CA: CPT

## 2021-01-01 ENCOUNTER — APPOINTMENT (OUTPATIENT)
Dept: ULTRASOUND IMAGING | Age: 66
DRG: 466 | End: 2021-01-01
Attending: FAMILY MEDICINE
Payer: COMMERCIAL

## 2021-01-01 ENCOUNTER — APPOINTMENT (OUTPATIENT)
Dept: ULTRASOUND IMAGING | Age: 66
DRG: 951 | End: 2021-01-01
Payer: COMMERCIAL

## 2021-01-01 ENCOUNTER — APPOINTMENT (OUTPATIENT)
Dept: GENERAL RADIOLOGY | Age: 66
DRG: 710 | End: 2021-01-01
Attending: INTERNAL MEDICINE
Payer: COMMERCIAL

## 2021-01-01 ENCOUNTER — HOSPITAL ENCOUNTER (INPATIENT)
Age: 66
LOS: 7 days | Discharge: NURSING, REHAB OR LONG-TERM CARE | DRG: 710 | End: 2021-02-23
Attending: INTERNAL MEDICINE | Admitting: INTERNAL MEDICINE
Payer: COMMERCIAL

## 2021-01-01 ENCOUNTER — ANESTHESIA (OUTPATIENT)
Dept: OPERATING ROOM | Age: 66
DRG: 230 | End: 2021-01-01
Payer: COMMERCIAL

## 2021-01-01 ENCOUNTER — APPOINTMENT (OUTPATIENT)
Dept: GENERAL RADIOLOGY | Age: 66
End: 2021-01-01
Attending: SURGERY
Payer: COMMERCIAL

## 2021-01-01 ENCOUNTER — APPOINTMENT (OUTPATIENT)
Dept: GENERAL RADIOLOGY | Age: 66
DRG: 720 | End: 2021-01-01
Payer: COMMERCIAL

## 2021-01-01 ENCOUNTER — ANESTHESIA EVENT (OUTPATIENT)
Dept: OPERATING ROOM | Age: 66
DRG: 710 | End: 2021-01-01
Payer: COMMERCIAL

## 2021-01-01 ENCOUNTER — HOSPITAL ENCOUNTER (INPATIENT)
Age: 66
LOS: 7 days | Discharge: NURSING, REHAB OR LONG-TERM CARE | DRG: 466 | End: 2021-04-22
Attending: FAMILY MEDICINE | Admitting: SURGERY
Payer: COMMERCIAL

## 2021-01-01 ENCOUNTER — APPOINTMENT (OUTPATIENT)
Dept: GENERAL RADIOLOGY | Age: 66
DRG: 951 | End: 2021-01-01
Payer: COMMERCIAL

## 2021-01-01 ENCOUNTER — ANESTHESIA EVENT (OUTPATIENT)
Dept: OPERATING ROOM | Age: 66
DRG: 230 | End: 2021-01-01
Payer: COMMERCIAL

## 2021-01-01 ENCOUNTER — OUTSIDE SERVICES (OUTPATIENT)
Dept: VASCULAR SURGERY | Age: 66
End: 2021-01-01

## 2021-01-01 ENCOUNTER — APPOINTMENT (OUTPATIENT)
Dept: CT IMAGING | Age: 66
DRG: 951 | End: 2021-01-01
Payer: COMMERCIAL

## 2021-01-01 ENCOUNTER — APPOINTMENT (OUTPATIENT)
Dept: CT IMAGING | Age: 66
DRG: 230 | End: 2021-01-01
Payer: COMMERCIAL

## 2021-01-01 ENCOUNTER — HOSPITAL ENCOUNTER (INPATIENT)
Age: 66
LOS: 1 days | DRG: 720 | End: 2021-06-15
Attending: EMERGENCY MEDICINE | Admitting: INTERNAL MEDICINE
Payer: COMMERCIAL

## 2021-01-01 ENCOUNTER — ANESTHESIA EVENT (OUTPATIENT)
Dept: OPERATING ROOM | Age: 66
End: 2021-01-01
Payer: COMMERCIAL

## 2021-01-01 ENCOUNTER — APPOINTMENT (OUTPATIENT)
Dept: CT IMAGING | Age: 66
DRG: 720 | End: 2021-01-01
Payer: COMMERCIAL

## 2021-01-01 ENCOUNTER — TELEPHONE (OUTPATIENT)
Dept: NON INVASIVE DIAGNOSTICS | Age: 66
End: 2021-01-01

## 2021-01-01 ENCOUNTER — HOSPITAL ENCOUNTER (OUTPATIENT)
Age: 66
Setting detail: OBSERVATION
Discharge: OTHER INSTITUTION | End: 2021-04-28
Attending: EMERGENCY MEDICINE | Admitting: FAMILY MEDICINE
Payer: COMMERCIAL

## 2021-01-01 ENCOUNTER — HOSPITAL ENCOUNTER (OUTPATIENT)
Age: 66
Setting detail: OUTPATIENT SURGERY
Discharge: NURSING, REHAB OR LONG-TERM CARE | End: 2021-03-30
Attending: SURGERY | Admitting: SURGERY
Payer: COMMERCIAL

## 2021-01-01 ENCOUNTER — HOSPITAL ENCOUNTER (INPATIENT)
Age: 66
LOS: 6 days | Discharge: NURSING, REHAB OR LONG-TERM CARE | DRG: 951 | End: 2021-05-27
Attending: EMERGENCY MEDICINE | Admitting: FAMILY MEDICINE
Payer: COMMERCIAL

## 2021-01-01 ENCOUNTER — ANESTHESIA (OUTPATIENT)
Dept: OPERATING ROOM | Age: 66
DRG: 951 | End: 2021-01-01
Payer: COMMERCIAL

## 2021-01-01 ENCOUNTER — HOSPITAL ENCOUNTER (OUTPATIENT)
Dept: CARDIAC CATH/INVASIVE PROCEDURES | Age: 66
Discharge: HOME | End: 2021-04-02
Payer: COMMERCIAL

## 2021-01-01 ENCOUNTER — APPOINTMENT (OUTPATIENT)
Dept: INTERVENTIONAL RADIOLOGY/VASCULAR | Age: 66
DRG: 951 | End: 2021-01-01
Payer: COMMERCIAL

## 2021-01-01 ENCOUNTER — HOSPITAL ENCOUNTER (OUTPATIENT)
Age: 66
Setting detail: OUTPATIENT SURGERY
Discharge: OTHER INSTITUTION | End: 2021-04-05
Attending: SURGERY | Admitting: SURGERY
Payer: COMMERCIAL

## 2021-01-01 ENCOUNTER — ANESTHESIA (OUTPATIENT)
Dept: OPERATING ROOM | Age: 66
End: 2021-01-01
Payer: COMMERCIAL

## 2021-01-01 ENCOUNTER — ANESTHESIA (OUTPATIENT)
Dept: OPERATING ROOM | Age: 66
DRG: 466 | End: 2021-01-01
Payer: COMMERCIAL

## 2021-01-01 ENCOUNTER — ANESTHESIA (OUTPATIENT)
Dept: OPERATING ROOM | Age: 66
DRG: 710 | End: 2021-01-01
Payer: COMMERCIAL

## 2021-01-01 ENCOUNTER — ANESTHESIA EVENT (OUTPATIENT)
Dept: OPERATING ROOM | Age: 66
DRG: 466 | End: 2021-01-01
Payer: COMMERCIAL

## 2021-01-01 ENCOUNTER — APPOINTMENT (OUTPATIENT)
Dept: CT IMAGING | Age: 66
DRG: 710 | End: 2021-01-01
Attending: INTERNAL MEDICINE
Payer: COMMERCIAL

## 2021-01-01 ENCOUNTER — OUTSIDE SERVICES (OUTPATIENT)
Dept: VASCULAR SURGERY | Age: 66
End: 2021-01-01
Payer: COMMERCIAL

## 2021-01-01 ENCOUNTER — ANESTHESIA (OUTPATIENT)
Dept: CARDIAC CATH/INVASIVE PROCEDURES | Age: 66
End: 2021-01-01

## 2021-01-01 ENCOUNTER — TELEPHONE (OUTPATIENT)
Dept: ENDOCRINOLOGY | Age: 66
End: 2021-01-01

## 2021-01-01 ENCOUNTER — HOSPITAL ENCOUNTER (OUTPATIENT)
Age: 66
Setting detail: OUTPATIENT SURGERY
Discharge: HOME | End: 2021-03-20
Attending: PODIATRIST | Admitting: PODIATRIST
Payer: COMMERCIAL

## 2021-01-01 ENCOUNTER — OFFICE VISIT (OUTPATIENT)
Dept: ENDOCRINOLOGY | Age: 66
End: 2021-01-01
Payer: COMMERCIAL

## 2021-01-01 ENCOUNTER — APPOINTMENT (OUTPATIENT)
Dept: GENERAL RADIOLOGY | Age: 66
DRG: 466 | End: 2021-01-01
Attending: SURGERY
Payer: COMMERCIAL

## 2021-01-01 ENCOUNTER — HOSPITAL ENCOUNTER (INPATIENT)
Age: 66
LOS: 1 days | Discharge: HOME HEALTH | DRG: 230 | End: 2021-02-16
Attending: EMERGENCY MEDICINE | Admitting: INTERNAL MEDICINE
Payer: COMMERCIAL

## 2021-01-01 ENCOUNTER — HOSPITAL ENCOUNTER (OUTPATIENT)
Age: 66
Setting detail: OUTPATIENT SURGERY
Discharge: OTHER INSTITUTION | End: 2021-03-31
Attending: SURGERY | Admitting: SURGERY
Payer: COMMERCIAL

## 2021-01-01 ENCOUNTER — ANESTHESIA EVENT (OUTPATIENT)
Dept: OPERATING ROOM | Age: 66
DRG: 951 | End: 2021-01-01
Payer: COMMERCIAL

## 2021-01-01 ENCOUNTER — ANESTHESIA EVENT (OUTPATIENT)
Dept: CARDIAC CATH/INVASIVE PROCEDURES | Age: 66
End: 2021-01-01

## 2021-01-01 ENCOUNTER — APPOINTMENT (OUTPATIENT)
Dept: GENERAL RADIOLOGY | Age: 66
DRG: 230 | End: 2021-01-01
Payer: COMMERCIAL

## 2021-01-01 ENCOUNTER — HOSPITAL ENCOUNTER (OUTPATIENT)
Age: 66
Setting detail: OUTPATIENT SURGERY
Discharge: HOME | End: 2021-06-03
Attending: SURGERY | Admitting: SURGERY
Payer: COMMERCIAL

## 2021-01-01 VITALS
HEART RATE: 69 BPM | WEIGHT: 176 LBS | TEMPERATURE: 96.1 F | SYSTOLIC BLOOD PRESSURE: 105 MMHG | HEIGHT: 66 IN | OXYGEN SATURATION: 100 % | DIASTOLIC BLOOD PRESSURE: 55 MMHG | RESPIRATION RATE: 16 BRPM | BODY MASS INDEX: 28.28 KG/M2

## 2021-01-01 VITALS
BODY MASS INDEX: 41.78 KG/M2 | HEIGHT: 66 IN | SYSTOLIC BLOOD PRESSURE: 106 MMHG | OXYGEN SATURATION: 98 % | WEIGHT: 260 LBS | TEMPERATURE: 97.3 F | RESPIRATION RATE: 18 BRPM | DIASTOLIC BLOOD PRESSURE: 67 MMHG | HEART RATE: 88 BPM

## 2021-01-01 VITALS
OXYGEN SATURATION: 100 % | DIASTOLIC BLOOD PRESSURE: 70 MMHG | RESPIRATION RATE: 12 BRPM | SYSTOLIC BLOOD PRESSURE: 129 MMHG | TEMPERATURE: 101.8 F

## 2021-01-01 VITALS
OXYGEN SATURATION: 95 % | WEIGHT: 199 LBS | HEIGHT: 66 IN | TEMPERATURE: 97.6 F | BODY MASS INDEX: 31.98 KG/M2 | SYSTOLIC BLOOD PRESSURE: 92 MMHG | RESPIRATION RATE: 17 BRPM | HEART RATE: 87 BPM | DIASTOLIC BLOOD PRESSURE: 67 MMHG

## 2021-01-01 VITALS — OXYGEN SATURATION: 100 %

## 2021-01-01 VITALS
BODY MASS INDEX: 41.78 KG/M2 | DIASTOLIC BLOOD PRESSURE: 60 MMHG | TEMPERATURE: 98 F | RESPIRATION RATE: 24 BRPM | HEIGHT: 66 IN | WEIGHT: 260 LBS | SYSTOLIC BLOOD PRESSURE: 147 MMHG | OXYGEN SATURATION: 97 % | HEART RATE: 69 BPM

## 2021-01-01 VITALS
RESPIRATION RATE: 12 BRPM | DIASTOLIC BLOOD PRESSURE: 58 MMHG | SYSTOLIC BLOOD PRESSURE: 119 MMHG | OXYGEN SATURATION: 100 %

## 2021-01-01 VITALS
DIASTOLIC BLOOD PRESSURE: 59 MMHG | OXYGEN SATURATION: 100 % | RESPIRATION RATE: 10 BRPM | SYSTOLIC BLOOD PRESSURE: 103 MMHG

## 2021-01-01 VITALS
SYSTOLIC BLOOD PRESSURE: 137 MMHG | RESPIRATION RATE: 27 BRPM | DIASTOLIC BLOOD PRESSURE: 65 MMHG | HEART RATE: 102 BPM | OXYGEN SATURATION: 97 % | TEMPERATURE: 98.8 F

## 2021-01-01 VITALS
SYSTOLIC BLOOD PRESSURE: 142 MMHG | HEIGHT: 66 IN | BODY MASS INDEX: 38.73 KG/M2 | DIASTOLIC BLOOD PRESSURE: 78 MMHG | OXYGEN SATURATION: 94 % | HEART RATE: 127 BPM | TEMPERATURE: 97.8 F | WEIGHT: 241 LBS

## 2021-01-01 VITALS — DIASTOLIC BLOOD PRESSURE: 13 MMHG | OXYGEN SATURATION: 100 % | SYSTOLIC BLOOD PRESSURE: 87 MMHG

## 2021-01-01 VITALS
TEMPERATURE: 96.2 F | RESPIRATION RATE: 22 BRPM | BODY MASS INDEX: 32.05 KG/M2 | HEIGHT: 66 IN | HEART RATE: 85 BPM | SYSTOLIC BLOOD PRESSURE: 110 MMHG | WEIGHT: 199.44 LBS | OXYGEN SATURATION: 98 % | DIASTOLIC BLOOD PRESSURE: 54 MMHG

## 2021-01-01 VITALS
SYSTOLIC BLOOD PRESSURE: 118 MMHG | RESPIRATION RATE: 22 BRPM | BODY MASS INDEX: 37.01 KG/M2 | HEART RATE: 110 BPM | OXYGEN SATURATION: 98 % | DIASTOLIC BLOOD PRESSURE: 58 MMHG | HEIGHT: 66 IN | WEIGHT: 230.3 LBS | TEMPERATURE: 99 F

## 2021-01-01 VITALS
WEIGHT: 210.98 LBS | HEART RATE: 81 BPM | TEMPERATURE: 96.6 F | HEIGHT: 66 IN | SYSTOLIC BLOOD PRESSURE: 91 MMHG | RESPIRATION RATE: 21 BRPM | DIASTOLIC BLOOD PRESSURE: 51 MMHG | OXYGEN SATURATION: 96 % | BODY MASS INDEX: 33.91 KG/M2

## 2021-01-01 VITALS
SYSTOLIC BLOOD PRESSURE: 103 MMHG | RESPIRATION RATE: 33 BRPM | OXYGEN SATURATION: 100 % | DIASTOLIC BLOOD PRESSURE: 51 MMHG

## 2021-01-01 VITALS
TEMPERATURE: 96.7 F | WEIGHT: 181.44 LBS | DIASTOLIC BLOOD PRESSURE: 47 MMHG | HEIGHT: 66 IN | OXYGEN SATURATION: 98 % | BODY MASS INDEX: 29.16 KG/M2 | SYSTOLIC BLOOD PRESSURE: 144 MMHG

## 2021-01-01 VITALS
DIASTOLIC BLOOD PRESSURE: 60 MMHG | SYSTOLIC BLOOD PRESSURE: 91 MMHG | RESPIRATION RATE: 13 BRPM | HEART RATE: 72 BPM | TEMPERATURE: 97.1 F | OXYGEN SATURATION: 98 %

## 2021-01-01 VITALS
HEART RATE: 99 BPM | SYSTOLIC BLOOD PRESSURE: 99 MMHG | WEIGHT: 200.18 LBS | RESPIRATION RATE: 20 BRPM | TEMPERATURE: 98.1 F | BODY MASS INDEX: 32.17 KG/M2 | HEIGHT: 66 IN | DIASTOLIC BLOOD PRESSURE: 56 MMHG | OXYGEN SATURATION: 98 %

## 2021-01-01 VITALS
RESPIRATION RATE: 16 BRPM | OXYGEN SATURATION: 100 % | SYSTOLIC BLOOD PRESSURE: 92 MMHG | HEART RATE: 100 BPM | DIASTOLIC BLOOD PRESSURE: 57 MMHG

## 2021-01-01 VITALS — DIASTOLIC BLOOD PRESSURE: 65 MMHG | OXYGEN SATURATION: 99 % | SYSTOLIC BLOOD PRESSURE: 117 MMHG

## 2021-01-01 VITALS — TEMPERATURE: 100.8 F | RESPIRATION RATE: 18 BRPM | OXYGEN SATURATION: 95 %

## 2021-01-01 VITALS — DIASTOLIC BLOOD PRESSURE: 62 MMHG | SYSTOLIC BLOOD PRESSURE: 100 MMHG | OXYGEN SATURATION: 100 %

## 2021-01-01 VITALS
RESPIRATION RATE: 16 BRPM | SYSTOLIC BLOOD PRESSURE: 111 MMHG | OXYGEN SATURATION: 100 % | DIASTOLIC BLOOD PRESSURE: 60 MMHG

## 2021-01-01 VITALS — OXYGEN SATURATION: 94 % | RESPIRATION RATE: 14 BRPM

## 2021-01-01 VITALS — OXYGEN SATURATION: 93 %

## 2021-01-01 DIAGNOSIS — L89.159 PRESSURE INJURY OF SKIN OF SACRAL REGION, UNSPECIFIED INJURY STAGE: ICD-10-CM

## 2021-01-01 DIAGNOSIS — I95.9 HYPOTENSION, UNSPECIFIED HYPOTENSION TYPE: ICD-10-CM

## 2021-01-01 DIAGNOSIS — N18.6 ESRD (END STAGE RENAL DISEASE) ON DIALYSIS (HCC): Primary | ICD-10-CM

## 2021-01-01 DIAGNOSIS — L98.492 ULCER OF ABDOMEN WALL WITH FAT LAYER EXPOSED (HCC): Chronic | ICD-10-CM

## 2021-01-01 DIAGNOSIS — T81.31XD SURGICAL WOUND DEHISCENCE, SUBSEQUENT ENCOUNTER: Chronic | ICD-10-CM

## 2021-01-01 DIAGNOSIS — I96 GANGRENE OF TOE OF RIGHT FOOT (HCC): ICD-10-CM

## 2021-01-01 DIAGNOSIS — Z49.02 ENCOUNTER FOR PERITONEAL DIALYSIS CATHETER INSERTION (HCC): ICD-10-CM

## 2021-01-01 DIAGNOSIS — I70.235 ATHEROSCLEROSIS OF NATIVE ARTERY OF RIGHT LOWER EXTREMITY WITH ULCERATION OF OTHER PART OF FOOT (HCC): Chronic | ICD-10-CM

## 2021-01-01 DIAGNOSIS — R10.13 EPIGASTRIC PAIN: ICD-10-CM

## 2021-01-01 DIAGNOSIS — Z99.2 ESRD ON DIALYSIS (HCC): ICD-10-CM

## 2021-01-01 DIAGNOSIS — L98.492 ULCER OF ABDOMEN WALL WITH FAT LAYER EXPOSED (HCC): Primary | ICD-10-CM

## 2021-01-01 DIAGNOSIS — Z90.49 S/P SMALL BOWEL RESECTION: Primary | ICD-10-CM

## 2021-01-01 DIAGNOSIS — T68.XXXA HYPOTHERMIA, INITIAL ENCOUNTER: ICD-10-CM

## 2021-01-01 DIAGNOSIS — L98.492 ULCER OF ABDOMEN WALL WITH FAT LAYER EXPOSED (HCC): Primary | Chronic | ICD-10-CM

## 2021-01-01 DIAGNOSIS — T81.30XA DEHISCENCE OF FASCIA, INITIAL ENCOUNTER: ICD-10-CM

## 2021-01-01 DIAGNOSIS — N18.6 ESRD ON DIALYSIS (HCC): ICD-10-CM

## 2021-01-01 DIAGNOSIS — E27.8 ADRENAL MASS (HCC): ICD-10-CM

## 2021-01-01 DIAGNOSIS — J96.01 ACUTE RESPIRATORY FAILURE WITH HYPOXIA (HCC): ICD-10-CM

## 2021-01-01 DIAGNOSIS — Z99.2 ESRD (END STAGE RENAL DISEASE) ON DIALYSIS (HCC): Primary | ICD-10-CM

## 2021-01-01 DIAGNOSIS — K72.00 SHOCK LIVER: ICD-10-CM

## 2021-01-01 DIAGNOSIS — E11.65 UNCONTROLLED TYPE 2 DIABETES MELLITUS WITH HYPERGLYCEMIA (HCC): Primary | ICD-10-CM

## 2021-01-01 DIAGNOSIS — E11.9 TYPE 2 DIABETES MELLITUS WITHOUT COMPLICATION, UNSPECIFIED WHETHER LONG TERM INSULIN USE (HCC): Primary | ICD-10-CM

## 2021-01-01 DIAGNOSIS — I70.235 ATHEROSCLEROSIS OF NATIVE ARTERY OF RIGHT LOWER EXTREMITY WITH ULCERATION OF OTHER PART OF FOOT (HCC): Primary | ICD-10-CM

## 2021-01-01 DIAGNOSIS — A41.9 SEPTICEMIA (HCC): Primary | ICD-10-CM

## 2021-01-01 DIAGNOSIS — T81.43XA POSTOPERATIVE INTRA-ABDOMINAL ABSCESS: ICD-10-CM

## 2021-01-01 DIAGNOSIS — T81.30XS: ICD-10-CM

## 2021-01-01 DIAGNOSIS — R65.21 SEPTIC SHOCK (HCC): Primary | ICD-10-CM

## 2021-01-01 DIAGNOSIS — A41.9 SEPTIC SHOCK (HCC): Primary | ICD-10-CM

## 2021-01-01 DIAGNOSIS — E87.5 HYPERKALEMIA: ICD-10-CM

## 2021-01-01 DIAGNOSIS — I70.235 ATHEROSCLEROSIS OF NATIVE ARTERY OF RIGHT LOWER EXTREMITY WITH ULCERATION OF OTHER PART OF FOOT (HCC): ICD-10-CM

## 2021-01-01 DIAGNOSIS — L89.154 PRESSURE INJURY OF SACRAL REGION, STAGE 4 (HCC): Chronic | ICD-10-CM

## 2021-01-01 LAB
AADO2: 114.5 MMHG
AADO2: 210.7 MMHG
AADO2: 241 MMHG
AADO2: 265.2 MMHG
AADO2: 297.4 MMHG
AADO2: 333.2 MMHG
AADO2: 345.7 MMHG
AADO2: 351.5 MMHG
AADO2: 480.3 MMHG
AADO2: 86.1 MMHG
ABO/RH: NORMAL
ACETAMINOPHEN LEVEL: <5 MCG/ML (ref 10–30)
ACINETOBACTER BAUMANNII BY PCR: NOT DETECTED
ADENOVIRUS BY PCR: NOT DETECTED
AFB CULTURE (MYCOBACTERIA): NORMAL
AFB SMEAR: NORMAL
ALBUMIN SERPL-MCNC: 1.5 G/DL (ref 3.5–5.2)
ALBUMIN SERPL-MCNC: 1.6 G/DL (ref 3.5–5.2)
ALBUMIN SERPL-MCNC: 1.8 G/DL (ref 3.5–5.2)
ALBUMIN SERPL-MCNC: 2 G/DL (ref 3.5–5.2)
ALBUMIN SERPL-MCNC: 2.1 G/DL (ref 3.5–5.2)
ALBUMIN SERPL-MCNC: 2.2 G/DL (ref 3.5–5.2)
ALBUMIN SERPL-MCNC: 2.3 G/DL (ref 3.5–5.2)
ALBUMIN SERPL-MCNC: 2.4 G/DL (ref 3.5–5.2)
ALBUMIN SERPL-MCNC: 2.6 G/DL (ref 3.5–5.2)
ALBUMIN SERPL-MCNC: 2.7 G/DL (ref 3.5–5.2)
ALBUMIN SERPL-MCNC: 2.8 G/DL (ref 3.5–5.2)
ALBUMIN SERPL-MCNC: 2.9 G/DL (ref 3.5–5.2)
ALBUMIN SERPL-MCNC: 2.9 G/DL (ref 3.5–5.2)
ALBUMIN SERPL-MCNC: 3 G/DL (ref 3.5–5.2)
ALBUMIN SERPL-MCNC: 3 G/DL (ref 3.5–5.2)
ALBUMIN SERPL-MCNC: 3.1 G/DL (ref 3.5–5.2)
ALBUMIN SERPL-MCNC: 3.1 G/DL (ref 3.5–5.2)
ALBUMIN SERPL-MCNC: 3.2 G/DL (ref 3.5–5.2)
ALP BLD-CCNC: 160 U/L (ref 35–104)
ALP BLD-CCNC: 166 U/L (ref 35–104)
ALP BLD-CCNC: 168 U/L (ref 35–104)
ALP BLD-CCNC: 179 U/L (ref 35–104)
ALP BLD-CCNC: 207 U/L (ref 35–104)
ALP BLD-CCNC: 43 U/L (ref 35–104)
ALP BLD-CCNC: 49 U/L (ref 35–104)
ALP BLD-CCNC: 50 U/L (ref 35–104)
ALP BLD-CCNC: 53 U/L (ref 35–104)
ALP BLD-CCNC: 54 U/L (ref 35–104)
ALP BLD-CCNC: 58 U/L (ref 35–104)
ALP BLD-CCNC: 59 U/L (ref 35–104)
ALP BLD-CCNC: 61 U/L (ref 35–104)
ALP BLD-CCNC: 62 U/L (ref 35–104)
ALP BLD-CCNC: 71 U/L (ref 35–104)
ALP BLD-CCNC: 78 U/L (ref 35–104)
ALP BLD-CCNC: 83 U/L (ref 35–104)
ALP BLD-CCNC: 84 U/L (ref 35–104)
ALP BLD-CCNC: 84 U/L (ref 35–104)
ALP BLD-CCNC: 98 U/L (ref 35–104)
ALT SERPL-CCNC: 11 U/L (ref 0–32)
ALT SERPL-CCNC: 1148 U/L (ref 0–32)
ALT SERPL-CCNC: 5 U/L (ref 0–32)
ALT SERPL-CCNC: 6 U/L (ref 0–32)
ALT SERPL-CCNC: 7 U/L (ref 0–32)
ALT SERPL-CCNC: 8 U/L (ref 0–32)
ALT SERPL-CCNC: 8 U/L (ref 0–32)
ALT SERPL-CCNC: 9 U/L (ref 0–32)
ALT SERPL-CCNC: 9 U/L (ref 0–32)
ALT SERPL-CCNC: <5 U/L (ref 0–32)
AMMONIA: 49.7 UMOL/L (ref 11–51)
ANAEROBIC CULTURE: ABNORMAL
ANAEROBIC CULTURE: NORMAL
ANION GAP SERPL CALCULATED.3IONS-SCNC: 10 MMOL/L (ref 7–16)
ANION GAP SERPL CALCULATED.3IONS-SCNC: 11 MMOL/L (ref 7–16)
ANION GAP SERPL CALCULATED.3IONS-SCNC: 12 MMOL/L (ref 7–16)
ANION GAP SERPL CALCULATED.3IONS-SCNC: 12 MMOL/L (ref 7–16)
ANION GAP SERPL CALCULATED.3IONS-SCNC: 13 MMOL/L (ref 7–16)
ANION GAP SERPL CALCULATED.3IONS-SCNC: 14 MMOL/L (ref 7–16)
ANION GAP SERPL CALCULATED.3IONS-SCNC: 15 MMOL/L (ref 7–16)
ANION GAP SERPL CALCULATED.3IONS-SCNC: 16 MMOL/L (ref 7–16)
ANION GAP SERPL CALCULATED.3IONS-SCNC: 17 MMOL/L (ref 7–16)
ANION GAP SERPL CALCULATED.3IONS-SCNC: 19 MMOL/L (ref 7–16)
ANION GAP SERPL CALCULATED.3IONS-SCNC: 31 MMOL/L (ref 7–16)
ANION GAP SERPL CALCULATED.3IONS-SCNC: 40 MMOL/L (ref 7–16)
ANION GAP SERPL CALCULATED.3IONS-SCNC: 45 MMOL/L (ref 7–16)
ANION GAP SERPL CALCULATED.3IONS-SCNC: 7 MMOL/L (ref 7–16)
ANION GAP SERPL CALCULATED.3IONS-SCNC: 8 MMOL/L (ref 7–16)
ANION GAP SERPL CALCULATED.3IONS-SCNC: 8 MMOL/L (ref 7–16)
ANION GAP SERPL CALCULATED.3IONS-SCNC: 9 MMOL/L (ref 7–16)
ANISOCYTOSIS: ABNORMAL
ANTIBODY SCREEN: NORMAL
ANTICARDIOLIPIN IGA ANTIBODY: 4 APL (ref 0–11)
ANTICARDIOLIPIN IGG ANTIBODY: 6 GPL (ref 0–14)
APTT: 173.7 SEC (ref 24.5–35.1)
APTT: 24.7 SEC (ref 24.5–35.1)
APTT: 25.9 SEC (ref 24.5–35.1)
APTT: 28.9 SEC (ref 24.5–35.1)
APTT: 30.8 SEC (ref 24.5–35.1)
APTT: 44.5 SEC (ref 24.5–35.1)
APTT: 46.8 SEC (ref 24.5–35.1)
APTT: 49.1 SEC (ref 24.5–35.1)
APTT: 49.6 SEC (ref 24.5–35.1)
APTT: 49.7 SEC (ref 24.5–35.1)
APTT: 52.5 SEC (ref 24.5–35.1)
APTT: 53.4 SEC (ref 24.5–35.1)
APTT: 59.1 SEC (ref 24.5–35.1)
APTT: 61.2 SEC (ref 24.5–35.1)
APTT: 69.2 SEC (ref 24.5–35.1)
APTT: 73.9 SEC (ref 24.5–35.1)
APTT: 74.9 SEC (ref 24.5–35.1)
APTT: 76.5 SEC (ref 24.5–35.1)
APTT: 99.8 SEC (ref 24.5–35.1)
AST SERPL-CCNC: 10 U/L (ref 0–31)
AST SERPL-CCNC: 10 U/L (ref 0–31)
AST SERPL-CCNC: 11 U/L (ref 0–31)
AST SERPL-CCNC: 12 U/L (ref 0–31)
AST SERPL-CCNC: 13 U/L (ref 0–31)
AST SERPL-CCNC: 19 U/L (ref 0–31)
AST SERPL-CCNC: 20 U/L (ref 0–31)
AST SERPL-CCNC: 20 U/L (ref 0–31)
AST SERPL-CCNC: 21 U/L (ref 0–31)
AST SERPL-CCNC: 21 U/L (ref 0–31)
AST SERPL-CCNC: 24 U/L (ref 0–31)
AST SERPL-CCNC: 28 U/L (ref 0–31)
AST SERPL-CCNC: 28 U/L (ref 0–31)
AST SERPL-CCNC: 3920 U/L (ref 0–31)
AST SERPL-CCNC: 9 U/L (ref 0–31)
B.E.: -1.3 MMOL/L (ref -3–3)
B.E.: -1.7 MMOL/L (ref -3–0)
B.E.: -1.7 MMOL/L (ref -3–3)
B.E.: -17.3 MMOL/L (ref -3–3)
B.E.: -19.8 MMOL/L (ref -3–3)
B.E.: -2 MMOL/L (ref -3–3)
B.E.: -2.6 MMOL/L (ref -3–3)
B.E.: -21.9 MMOL/L (ref -3–3)
B.E.: -27 MMOL/L (ref -3–3)
B.E.: -3.4 MMOL/L (ref -3–3)
B.E.: 0.8 MMOL/L (ref -3–3)
B.E.: 4.7 MMOL/L (ref -3–3)
B.E.: 6.4 MMOL/L (ref -3–3)
B.E.: 6.8 MMOL/L (ref -3–3)
B.E.: 6.9 MMOL/L (ref -3–3)
BACTERIA: ABNORMAL /HPF
BASOPHILIC STIPPLING: ABNORMAL
BASOPHILIC STIPPLING: ABNORMAL
BASOPHILS ABSOLUTE: 0 E9/L (ref 0–0.2)
BASOPHILS ABSOLUTE: 0.01 E9/L (ref 0–0.2)
BASOPHILS ABSOLUTE: 0.01 E9/L (ref 0–0.2)
BASOPHILS ABSOLUTE: 0.02 E9/L (ref 0–0.2)
BASOPHILS ABSOLUTE: 0.04 E9/L (ref 0–0.2)
BASOPHILS ABSOLUTE: 0.04 E9/L (ref 0–0.2)
BASOPHILS ABSOLUTE: 0.05 E9/L (ref 0–0.2)
BASOPHILS ABSOLUTE: 0.14 E9/L (ref 0–0.2)
BASOPHILS RELATIVE PERCENT: 0 % (ref 0–2)
BASOPHILS RELATIVE PERCENT: 0.1 % (ref 0–2)
BASOPHILS RELATIVE PERCENT: 0.2 % (ref 0–2)
BASOPHILS RELATIVE PERCENT: 0.3 % (ref 0–2)
BASOPHILS RELATIVE PERCENT: 0.4 % (ref 0–2)
BASOPHILS RELATIVE PERCENT: 0.6 % (ref 0–2)
BASOPHILS RELATIVE PERCENT: 0.9 % (ref 0–2)
BETA-2 GLYCOPROTEIN 1 IGG ANTIBODY: 0 SGU (ref 0–20)
BETA-2 GLYCOPROTEIN 1 IGM ANTIBODY: 5 SMU (ref 0–20)
BETA-HYDROXYBUTYRATE: 0.7 MMOL/L (ref 0.02–0.27)
BILIRUB SERPL-MCNC: 0.2 MG/DL (ref 0–1.2)
BILIRUB SERPL-MCNC: 0.2 MG/DL (ref 0–1.2)
BILIRUB SERPL-MCNC: 0.3 MG/DL (ref 0–1.2)
BILIRUB SERPL-MCNC: 0.4 MG/DL (ref 0–1.2)
BILIRUB SERPL-MCNC: 0.4 MG/DL (ref 0–1.2)
BILIRUB SERPL-MCNC: 0.5 MG/DL (ref 0–1.2)
BILIRUB SERPL-MCNC: 0.6 MG/DL (ref 0–1.2)
BILIRUB SERPL-MCNC: 0.7 MG/DL (ref 0–1.2)
BILIRUB SERPL-MCNC: 1 MG/DL (ref 0–1.2)
BILIRUB SERPL-MCNC: 1.1 MG/DL (ref 0–1.2)
BILIRUB SERPL-MCNC: 1.3 MG/DL (ref 0–1.2)
BILIRUB SERPL-MCNC: 2.1 MG/DL (ref 0–1.2)
BILIRUB SERPL-MCNC: 2.1 MG/DL (ref 0–1.2)
BILIRUB SERPL-MCNC: 3.4 MG/DL (ref 0–1.2)
BILIRUB SERPL-MCNC: 3.6 MG/DL (ref 0–1.2)
BILIRUB SERPL-MCNC: 3.7 MG/DL (ref 0–1.2)
BILIRUB SERPL-MCNC: <0.2 MG/DL (ref 0–1.2)
BILIRUBIN URINE: NEGATIVE
BILIRUBIN URINE: NEGATIVE
BLOOD BANK DISPENSE STATUS: NORMAL
BLOOD BANK PRODUCT CODE: NORMAL
BLOOD CULTURE, ROUTINE: ABNORMAL
BLOOD CULTURE, ROUTINE: NORMAL
BLOOD, URINE: ABNORMAL
BLOOD, URINE: NEGATIVE
BODY FLUID CULTURE, STERILE: ABNORMAL
BODY FLUID CULTURE, STERILE: ABNORMAL
BORDETELLA PARAPERTUSSIS BY PCR: NOT DETECTED
BORDETELLA PERTUSSIS BY PCR: NOT DETECTED
BOTTLE TYPE: ABNORMAL
BPU ID: NORMAL
BUN BLDV-MCNC: 11 MG/DL (ref 6–23)
BUN BLDV-MCNC: 14 MG/DL (ref 6–23)
BUN BLDV-MCNC: 17 MG/DL (ref 6–23)
BUN BLDV-MCNC: 19 MG/DL (ref 6–23)
BUN BLDV-MCNC: 20 MG/DL (ref 6–23)
BUN BLDV-MCNC: 25 MG/DL (ref 6–23)
BUN BLDV-MCNC: 27 MG/DL (ref 8–23)
BUN BLDV-MCNC: 27 MG/DL (ref 8–23)
BUN BLDV-MCNC: 28 MG/DL (ref 6–23)
BUN BLDV-MCNC: 28 MG/DL (ref 8–23)
BUN BLDV-MCNC: 28 MG/DL (ref 8–23)
BUN BLDV-MCNC: 29 MG/DL (ref 6–23)
BUN BLDV-MCNC: 29 MG/DL (ref 8–23)
BUN BLDV-MCNC: 30 MG/DL (ref 6–23)
BUN BLDV-MCNC: 31 MG/DL (ref 6–23)
BUN BLDV-MCNC: 31 MG/DL (ref 8–23)
BUN BLDV-MCNC: 32 MG/DL (ref 8–23)
BUN BLDV-MCNC: 33 MG/DL (ref 8–23)
BUN BLDV-MCNC: 37 MG/DL (ref 8–23)
BUN BLDV-MCNC: 37 MG/DL (ref 8–23)
BUN BLDV-MCNC: 38 MG/DL (ref 8–23)
BUN BLDV-MCNC: 38 MG/DL (ref 8–23)
BUN BLDV-MCNC: 39 MG/DL (ref 8–23)
BUN BLDV-MCNC: 40 MG/DL (ref 6–23)
BUN BLDV-MCNC: 40 MG/DL (ref 8–23)
BUN BLDV-MCNC: 41 MG/DL (ref 8–23)
BUN BLDV-MCNC: 42 MG/DL (ref 8–23)
BUN BLDV-MCNC: 48 MG/DL (ref 8–23)
BUN BLDV-MCNC: 51 MG/DL (ref 6–23)
BUN BLDV-MCNC: 60 MG/DL (ref 6–23)
BUN BLDV-MCNC: 60 MG/DL (ref 6–23)
BUN BLDV-MCNC: 65 MG/DL (ref 6–23)
BUN BLDV-MCNC: 8 MG/DL (ref 6–23)
BUN BLDV-MCNC: 9 MG/DL (ref 6–23)
BURR CELLS: ABNORMAL
BURR CELLS: ABNORMAL
C-REACTIVE PROTEIN: 11.4 MG/DL (ref 0–0.4)
C-REACTIVE PROTEIN: 20.3 MG/DL (ref 0–0.4)
CALCIUM IONIZED: 1.09 MMOL/L (ref 1.15–1.33)
CALCIUM IONIZED: 1.12 MMOL/L (ref 1.15–1.33)
CALCIUM IONIZED: 1.15 MMOL/L (ref 1.15–1.33)
CALCIUM IONIZED: 1.16 MMOL/L (ref 1.15–1.33)
CALCIUM IONIZED: 1.17 MMOL/L (ref 1.15–1.33)
CALCIUM IONIZED: 1.18 MMOL/L (ref 1.15–1.33)
CALCIUM SERPL-MCNC: 7.5 MG/DL (ref 8.6–10.2)
CALCIUM SERPL-MCNC: 7.6 MG/DL (ref 8.6–10.2)
CALCIUM SERPL-MCNC: 7.7 MG/DL (ref 8.6–10.2)
CALCIUM SERPL-MCNC: 7.8 MG/DL (ref 8.6–10.2)
CALCIUM SERPL-MCNC: 8 MG/DL (ref 8.6–10.2)
CALCIUM SERPL-MCNC: 8.1 MG/DL (ref 8.6–10.2)
CALCIUM SERPL-MCNC: 8.1 MG/DL (ref 8.6–10.2)
CALCIUM SERPL-MCNC: 8.2 MG/DL (ref 8.6–10.2)
CALCIUM SERPL-MCNC: 8.3 MG/DL (ref 8.6–10.2)
CALCIUM SERPL-MCNC: 8.4 MG/DL (ref 8.6–10.2)
CALCIUM SERPL-MCNC: 8.5 MG/DL (ref 8.6–10.2)
CALCIUM SERPL-MCNC: 8.6 MG/DL (ref 8.6–10.2)
CALCIUM SERPL-MCNC: 8.7 MG/DL (ref 8.6–10.2)
CALCIUM SERPL-MCNC: 8.8 MG/DL (ref 8.6–10.2)
CALCIUM SERPL-MCNC: 8.8 MG/DL (ref 8.6–10.2)
CALCIUM SERPL-MCNC: 8.9 MG/DL (ref 8.6–10.2)
CALCIUM SERPL-MCNC: 9 MG/DL (ref 8.6–10.2)
CALCIUM SERPL-MCNC: 9.1 MG/DL (ref 8.6–10.2)
CALCIUM SERPL-MCNC: 9.3 MG/DL (ref 8.6–10.2)
CALCIUM SERPL-MCNC: 9.5 MG/DL (ref 8.6–10.2)
CANDIDA ALBICANS BY PCR: NOT DETECTED
CANDIDA GLABRATA BY PCR: NOT DETECTED
CANDIDA KRUSEI BY PCR: NOT DETECTED
CANDIDA PARAPSILOSIS BY PCR: NOT DETECTED
CANDIDA TROPICALIS BY PCR: NOT DETECTED
CARDIOLIPIN AB IGM: 16 MPL (ref 0–12)
CARDIOPULMONARY BYPASS: NO
CHLAMYDOPHILIA PNEUMONIAE BY PCR: NOT DETECTED
CHLORIDE BLD-SCNC: 100 MMOL/L (ref 98–107)
CHLORIDE BLD-SCNC: 101 MMOL/L (ref 98–107)
CHLORIDE BLD-SCNC: 102 MMOL/L (ref 98–107)
CHLORIDE BLD-SCNC: 103 MMOL/L (ref 98–107)
CHLORIDE BLD-SCNC: 104 MMOL/L (ref 98–107)
CHLORIDE BLD-SCNC: 105 MMOL/L (ref 98–107)
CHLORIDE BLD-SCNC: 106 MMOL/L (ref 98–107)
CHLORIDE BLD-SCNC: 108 MMOL/L (ref 98–107)
CHLORIDE BLD-SCNC: 82 MMOL/L (ref 98–107)
CHLORIDE BLD-SCNC: 87 MMOL/L (ref 98–107)
CHLORIDE BLD-SCNC: 88 MMOL/L (ref 98–107)
CHLORIDE BLD-SCNC: 89 MMOL/L (ref 98–107)
CHLORIDE BLD-SCNC: 91 MMOL/L (ref 98–107)
CHLORIDE BLD-SCNC: 91 MMOL/L (ref 98–107)
CHLORIDE BLD-SCNC: 93 MMOL/L (ref 98–107)
CHLORIDE BLD-SCNC: 95 MMOL/L (ref 98–107)
CHLORIDE BLD-SCNC: 96 MMOL/L (ref 98–107)
CHLORIDE BLD-SCNC: 96 MMOL/L (ref 98–107)
CHLORIDE BLD-SCNC: 97 MMOL/L (ref 98–107)
CHLORIDE BLD-SCNC: 98 MMOL/L (ref 98–107)
CHLORIDE BLD-SCNC: 99 MMOL/L (ref 98–107)
CHP ED QC CHECK: NORMAL
CK MB: 2.9 NG/ML (ref 0–4.3)
CLARITY: ABNORMAL
CLARITY: CLEAR
CO2: 10 MMOL/L (ref 22–29)
CO2: 16 MMOL/L (ref 22–29)
CO2: 17 MMOL/L (ref 22–29)
CO2: 18 MMOL/L (ref 22–29)
CO2: 20 MMOL/L (ref 22–29)
CO2: 22 MMOL/L (ref 22–29)
CO2: 22 MMOL/L (ref 22–29)
CO2: 23 MMOL/L (ref 22–29)
CO2: 23 MMOL/L (ref 22–29)
CO2: 24 MMOL/L (ref 22–29)
CO2: 25 MMOL/L (ref 22–29)
CO2: 26 MMOL/L (ref 22–29)
CO2: 27 MMOL/L (ref 22–29)
CO2: 28 MMOL/L (ref 22–29)
CO2: 29 MMOL/L (ref 22–29)
CO2: 30 MMOL/L (ref 22–29)
CO2: 30 MMOL/L (ref 22–29)
CO2: 31 MMOL/L (ref 22–29)
CO2: 31 MMOL/L (ref 22–29)
CO2: 32 MMOL/L (ref 22–29)
CO2: 5 MMOL/L (ref 22–29)
CO2: 9 MMOL/L (ref 22–29)
COHB: 0.5 % (ref 0–1.5)
COHB: 0.6 % (ref 0–1.5)
COHB: 0.6 % (ref 0–1.5)
COHB: 0.7 % (ref 0–1.5)
COHB: 0.7 % (ref 0–1.5)
COHB: 0.8 % (ref 0–1.5)
COHB: 0.9 % (ref 0–1.5)
COHB: 1 % (ref 0–1.5)
COHB: 1.4 % (ref 0–1.5)
COHB: 1.4 % (ref 0–1.5)
COHB: 1.6 % (ref 0–1.5)
COHB: 1.6 % (ref 0–1.5)
COHB: 1.7 % (ref 0–1.5)
COHB: 2 % (ref 0–1.5)
COLOR: ABNORMAL
COLOR: YELLOW
CORONAVIRUS 229E BY PCR: NOT DETECTED
CORONAVIRUS HKU1 BY PCR: NOT DETECTED
CORONAVIRUS NL63 BY PCR: NOT DETECTED
CORONAVIRUS OC43 BY PCR: NOT DETECTED
CORTISOL TOTAL: 109.3 MCG/DL (ref 2.68–18.4)
CORTISOL TOTAL: 24.08 MCG/DL (ref 2.68–18.4)
CORTISOL TOTAL: 81.5 MCG/DL (ref 2.68–18.4)
CREAT SERPL-MCNC: 0.9 MG/DL (ref 0.5–1)
CREAT SERPL-MCNC: 1 MG/DL (ref 0.5–1)
CREAT SERPL-MCNC: 1.1 MG/DL (ref 0.5–1)
CREAT SERPL-MCNC: 1.2 MG/DL (ref 0.5–1)
CREAT SERPL-MCNC: 1.4 MG/DL (ref 0.5–1)
CREAT SERPL-MCNC: 1.5 MG/DL (ref 0.5–1)
CREAT SERPL-MCNC: 1.5 MG/DL (ref 0.5–1)
CREAT SERPL-MCNC: 1.6 MG/DL (ref 0.5–1)
CREAT SERPL-MCNC: 2 MG/DL (ref 0.5–1)
CREAT SERPL-MCNC: 2 MG/DL (ref 0.5–1)
CREAT SERPL-MCNC: 2.1 MG/DL (ref 0.5–1)
CREAT SERPL-MCNC: 2.2 MG/DL (ref 0.5–1)
CREAT SERPL-MCNC: 2.3 MG/DL (ref 0.5–1)
CREAT SERPL-MCNC: 2.5 MG/DL (ref 0.5–1)
CREAT SERPL-MCNC: 2.7 MG/DL (ref 0.5–1)
CREAT SERPL-MCNC: 2.8 MG/DL (ref 0.5–1)
CREAT SERPL-MCNC: 2.9 MG/DL (ref 0.5–1)
CREAT SERPL-MCNC: 3 MG/DL (ref 0.5–1)
CREAT SERPL-MCNC: 3.2 MG/DL (ref 0.5–1)
CREAT SERPL-MCNC: 3.2 MG/DL (ref 0.5–1)
CREAT SERPL-MCNC: 3.3 MG/DL (ref 0.5–1)
CREAT SERPL-MCNC: 3.4 MG/DL (ref 0.5–1)
CREAT SERPL-MCNC: 3.6 MG/DL (ref 0.5–1)
CREAT SERPL-MCNC: 3.7 MG/DL (ref 0.5–1)
CREAT SERPL-MCNC: 4.6 MG/DL (ref 0.5–1)
CRITICAL: ABNORMAL
CULTURE SURGICAL: NORMAL
CULTURE, BLOOD 2: NORMAL
CULTURE, RESPIRATORY: NORMAL
D DIMER: 1524 NG/ML DDU
D DIMER: 2020 NG/ML DDU
DATE ANALYZED: ABNORMAL
DATE OF COLLECTION: ABNORMAL
DESCRIPTION BLOOD BANK: NORMAL
DEVICE: ABNORMAL
EKG ATRIAL RATE: 113 BPM
EKG ATRIAL RATE: 170 BPM
EKG ATRIAL RATE: 49 BPM
EKG ATRIAL RATE: 62 BPM
EKG ATRIAL RATE: 71 BPM
EKG ATRIAL RATE: 74 BPM
EKG ATRIAL RATE: 84 BPM
EKG ATRIAL RATE: 90 BPM
EKG P AXIS: -12 DEGREES
EKG P AXIS: 27 DEGREES
EKG P AXIS: 39 DEGREES
EKG P AXIS: 48 DEGREES
EKG P AXIS: 48 DEGREES
EKG P-R INTERVAL: 116 MS
EKG P-R INTERVAL: 136 MS
EKG P-R INTERVAL: 146 MS
EKG P-R INTERVAL: 150 MS
EKG P-R INTERVAL: 152 MS
EKG P-R INTERVAL: 174 MS
EKG P-R INTERVAL: 174 MS
EKG Q-T INTERVAL: 260 MS
EKG Q-T INTERVAL: 328 MS
EKG Q-T INTERVAL: 334 MS
EKG Q-T INTERVAL: 350 MS
EKG Q-T INTERVAL: 360 MS
EKG Q-T INTERVAL: 390 MS
EKG Q-T INTERVAL: 424 MS
EKG Q-T INTERVAL: 432 MS
EKG QRS DURATION: 62 MS
EKG QRS DURATION: 66 MS
EKG QRS DURATION: 68 MS
EKG QRS DURATION: 72 MS
EKG QRS DURATION: 74 MS
EKG QRS DURATION: 82 MS
EKG QTC CALCULATION (BAZETT): 390 MS
EKG QTC CALCULATION (BAZETT): 394 MS
EKG QTC CALCULATION (BAZETT): 425 MS
EKG QTC CALCULATION (BAZETT): 428 MS
EKG QTC CALCULATION (BAZETT): 430 MS
EKG QTC CALCULATION (BAZETT): 432 MS
EKG QTC CALCULATION (BAZETT): 442 MS
EKG QTC CALCULATION (BAZETT): 455 MS
EKG R AXIS: -17 DEGREES
EKG R AXIS: -173 DEGREES
EKG R AXIS: -18 DEGREES
EKG R AXIS: -18 DEGREES
EKG R AXIS: -24 DEGREES
EKG R AXIS: -27 DEGREES
EKG R AXIS: -27 DEGREES
EKG R AXIS: -4 DEGREES
EKG T AXIS: -32 DEGREES
EKG T AXIS: 118 DEGREES
EKG T AXIS: 138 DEGREES
EKG T AXIS: 30 DEGREES
EKG T AXIS: 32 DEGREES
EKG T AXIS: 34 DEGREES
EKG T AXIS: 55 DEGREES
EKG T AXIS: 95 DEGREES
EKG VENTRICULAR RATE: 116 BPM
EKG VENTRICULAR RATE: 174 BPM
EKG VENTRICULAR RATE: 49 BPM
EKG VENTRICULAR RATE: 62 BPM
EKG VENTRICULAR RATE: 74 BPM
EKG VENTRICULAR RATE: 84 BPM
EKG VENTRICULAR RATE: 84 BPM
EKG VENTRICULAR RATE: 90 BPM
ENTEROBACTER CLOACAE COMPLEX BY PCR: NOT DETECTED
ENTEROBACTERALES BY PCR: NOT DETECTED
ENTEROCOCCUS BY PCR: NOT DETECTED
EOSINOPHILS ABSOLUTE: 0 E9/L (ref 0.05–0.5)
EOSINOPHILS ABSOLUTE: 0.01 E9/L (ref 0.05–0.5)
EOSINOPHILS ABSOLUTE: 0.01 E9/L (ref 0.05–0.5)
EOSINOPHILS ABSOLUTE: 0.02 E9/L (ref 0.05–0.5)
EOSINOPHILS ABSOLUTE: 0.02 E9/L (ref 0.05–0.5)
EOSINOPHILS ABSOLUTE: 0.05 E9/L (ref 0.05–0.5)
EOSINOPHILS ABSOLUTE: 0.1 E9/L (ref 0.05–0.5)
EOSINOPHILS ABSOLUTE: 0.28 E9/L (ref 0.05–0.5)
EOSINOPHILS ABSOLUTE: 0.41 E9/L (ref 0.05–0.5)
EOSINOPHILS RELATIVE PERCENT: 0 % (ref 0–6)
EOSINOPHILS RELATIVE PERCENT: 0.1 % (ref 0–6)
EOSINOPHILS RELATIVE PERCENT: 0.2 % (ref 0–6)
EOSINOPHILS RELATIVE PERCENT: 0.2 % (ref 0–6)
EOSINOPHILS RELATIVE PERCENT: 0.3 % (ref 0–6)
EOSINOPHILS RELATIVE PERCENT: 0.3 % (ref 0–6)
EOSINOPHILS RELATIVE PERCENT: 0.9 % (ref 0–6)
EOSINOPHILS RELATIVE PERCENT: 1 % (ref 0–6)
EOSINOPHILS RELATIVE PERCENT: 1.7 % (ref 0–6)
EOSINOPHILS RELATIVE PERCENT: 1.7 % (ref 0–6)
ESCHERICHIA COLI BY PCR: NOT DETECTED
ETHANOL: <10 MG/DL (ref 0–0.08)
FERRITIN: 1438 NG/ML
FIBRINOGEN: 258 MG/DL (ref 225–540)
FIBRINOGEN: >700 MG/DL (ref 225–540)
FIO2 ARTERIAL: 100
FIO2: 100 %
FIO2: 100 %
FIO2: 45 %
FIO2: 50 %
FIO2: 50 %
FIO2: 60 %
FIO2: 60 %
FIO2: 70 %
FUNGUS (MYCOLOGY) CULTURE: NORMAL
FUNGUS STAIN: NORMAL
GFR AFRICAN AMERICAN: 12
GFR AFRICAN AMERICAN: 15
GFR AFRICAN AMERICAN: 15
GFR AFRICAN AMERICAN: 16
GFR AFRICAN AMERICAN: 17
GFR AFRICAN AMERICAN: 18
GFR AFRICAN AMERICAN: 18
GFR AFRICAN AMERICAN: 19
GFR AFRICAN AMERICAN: 20
GFR AFRICAN AMERICAN: 21
GFR AFRICAN AMERICAN: 21
GFR AFRICAN AMERICAN: 23
GFR AFRICAN AMERICAN: 26
GFR AFRICAN AMERICAN: 27
GFR AFRICAN AMERICAN: 29
GFR AFRICAN AMERICAN: 30
GFR AFRICAN AMERICAN: 30
GFR AFRICAN AMERICAN: 39
GFR AFRICAN AMERICAN: 42
GFR AFRICAN AMERICAN: 42
GFR AFRICAN AMERICAN: 46
GFR AFRICAN AMERICAN: 55
GFR AFRICAN AMERICAN: >60
GFR NON-AFRICAN AMERICAN: 10 ML/MIN/1.73
GFR NON-AFRICAN AMERICAN: 12 ML/MIN/1.73
GFR NON-AFRICAN AMERICAN: 13 ML/MIN/1.73
GFR NON-AFRICAN AMERICAN: 14 ML/MIN/1.73
GFR NON-AFRICAN AMERICAN: 14 ML/MIN/1.73
GFR NON-AFRICAN AMERICAN: 15 ML/MIN/1.73
GFR NON-AFRICAN AMERICAN: 15 ML/MIN/1.73
GFR NON-AFRICAN AMERICAN: 16 ML/MIN/1.73
GFR NON-AFRICAN AMERICAN: 16 ML/MIN/1.73
GFR NON-AFRICAN AMERICAN: 17 ML/MIN/1.73
GFR NON-AFRICAN AMERICAN: 18 ML/MIN/1.73
GFR NON-AFRICAN AMERICAN: 19 ML/MIN/1.73
GFR NON-AFRICAN AMERICAN: 21 ML/MIN/1.73
GFR NON-AFRICAN AMERICAN: 22 ML/MIN/1.73
GFR NON-AFRICAN AMERICAN: 24 ML/MIN/1.73
GFR NON-AFRICAN AMERICAN: 25 ML/MIN/1.73
GFR NON-AFRICAN AMERICAN: 25 ML/MIN/1.73
GFR NON-AFRICAN AMERICAN: 32 ML/MIN/1.73
GFR NON-AFRICAN AMERICAN: 35 ML/MIN/1.73
GFR NON-AFRICAN AMERICAN: 35 ML/MIN/1.73
GFR NON-AFRICAN AMERICAN: 38 ML/MIN/1.73
GFR NON-AFRICAN AMERICAN: 45 ML/MIN/1.73
GFR NON-AFRICAN AMERICAN: 50 ML/MIN/1.73
GFR NON-AFRICAN AMERICAN: 56 ML/MIN/1.73
GFR NON-AFRICAN AMERICAN: >60 ML/MIN/1.73
GLUCOSE BLD-MCNC: 102 MG/DL (ref 74–99)
GLUCOSE BLD-MCNC: 107 MG/DL (ref 74–99)
GLUCOSE BLD-MCNC: 114 MG/DL (ref 74–99)
GLUCOSE BLD-MCNC: 116 MG/DL (ref 74–99)
GLUCOSE BLD-MCNC: 121 MG/DL (ref 74–99)
GLUCOSE BLD-MCNC: 121 MG/DL (ref 74–99)
GLUCOSE BLD-MCNC: 123 MG/DL (ref 74–99)
GLUCOSE BLD-MCNC: 132 MG/DL (ref 74–99)
GLUCOSE BLD-MCNC: 136 MG/DL (ref 74–99)
GLUCOSE BLD-MCNC: 137 MG/DL (ref 74–99)
GLUCOSE BLD-MCNC: 140 MG/DL (ref 74–99)
GLUCOSE BLD-MCNC: 145 MG/DL (ref 74–99)
GLUCOSE BLD-MCNC: 150 MG/DL (ref 74–99)
GLUCOSE BLD-MCNC: 152 MG/DL (ref 74–99)
GLUCOSE BLD-MCNC: 161 MG/DL
GLUCOSE BLD-MCNC: 161 MG/DL (ref 74–99)
GLUCOSE BLD-MCNC: 165 MG/DL (ref 74–99)
GLUCOSE BLD-MCNC: 167 MG/DL (ref 74–99)
GLUCOSE BLD-MCNC: 183 MG/DL (ref 74–99)
GLUCOSE BLD-MCNC: 187 MG/DL (ref 74–99)
GLUCOSE BLD-MCNC: 201 MG/DL (ref 74–99)
GLUCOSE BLD-MCNC: 202 MG/DL (ref 74–99)
GLUCOSE BLD-MCNC: 211 MG/DL (ref 74–99)
GLUCOSE BLD-MCNC: 220 MG/DL (ref 74–99)
GLUCOSE BLD-MCNC: 255 MG/DL (ref 74–99)
GLUCOSE BLD-MCNC: 321 MG/DL (ref 74–99)
GLUCOSE BLD-MCNC: 326 MG/DL (ref 74–99)
GLUCOSE BLD-MCNC: 329 MG/DL (ref 74–99)
GLUCOSE BLD-MCNC: 381 MG/DL (ref 74–99)
GLUCOSE BLD-MCNC: 500 MG/DL
GLUCOSE BLD-MCNC: 500 MG/DL
GLUCOSE BLD-MCNC: 587 MG/DL (ref 74–99)
GLUCOSE BLD-MCNC: 597 MG/DL (ref 74–99)
GLUCOSE BLD-MCNC: 67 MG/DL (ref 74–99)
GLUCOSE BLD-MCNC: 783 MG/DL (ref 74–99)
GLUCOSE BLD-MCNC: 79 MG/DL (ref 74–99)
GLUCOSE BLD-MCNC: 87 MG/DL (ref 74–99)
GLUCOSE BLD-MCNC: 89 MG/DL (ref 74–99)
GLUCOSE BLD-MCNC: 92 MG/DL (ref 74–99)
GLUCOSE BLD-MCNC: 94 MG/DL (ref 74–99)
GLUCOSE BLD-MCNC: 95 MG/DL (ref 74–99)
GLUCOSE BLD-MCNC: 98 MG/DL (ref 74–99)
GLUCOSE BLD-MCNC: 98 MG/DL (ref 74–99)
GLUCOSE URINE: >=1000 MG/DL
GLUCOSE URINE: NEGATIVE MG/DL
GRAM STAIN ORDERABLE: NORMAL
GRAM STAIN ORDERABLE: NORMAL
GRAM STAIN RESULT: ABNORMAL
GRAM STAIN RESULT: ABNORMAL
HAEMOPHILUS INFLUENZAE BY PCR: NOT DETECTED
HAV IGM SER IA-ACNC: NORMAL
HAV IGM SER IA-ACNC: NORMAL
HBA1C MFR BLD: 12.4 % (ref 4–5.6)
HBA1C MFR BLD: 12.6 %
HBA1C MFR BLD: 13.1 % (ref 4–5.6)
HBA1C MFR BLD: 4.7 % (ref 4–5.6)
HBV QNT LOG, IU/ML: NORMAL
HBV QNT, IU/ML: NORMAL IU/ML
HBV SURFACE AB TITR SER: NORMAL {TITER}
HCO3 ARTERIAL: 24.1 MMOL/L (ref 22–26)
HCO3: 20.4 MMOL/L (ref 22–26)
HCO3: 20.9 MMOL/L (ref 22–26)
HCO3: 22.1 MMOL/L (ref 22–26)
HCO3: 22.1 MMOL/L (ref 22–26)
HCO3: 23.7 MMOL/L (ref 22–26)
HCO3: 25.5 MMOL/L (ref 22–26)
HCO3: 29.2 MMOL/L (ref 22–26)
HCO3: 29.8 MMOL/L (ref 22–26)
HCO3: 30.1 MMOL/L (ref 22–26)
HCO3: 30.7 MMOL/L (ref 22–26)
HCO3: 4.7 MMOL/L (ref 22–26)
HCO3: 7.1 MMOL/L (ref 22–26)
HCO3: 7.3 MMOL/L (ref 22–26)
HCO3: 9.1 MMOL/L (ref 22–26)
HCT (EST): 38 % (ref 34–48)
HCT VFR BLD CALC: 18.1 % (ref 34–48)
HCT VFR BLD CALC: 18.5 % (ref 34–48)
HCT VFR BLD CALC: 18.7 % (ref 34–48)
HCT VFR BLD CALC: 22.8 % (ref 34–48)
HCT VFR BLD CALC: 23.6 % (ref 34–48)
HCT VFR BLD CALC: 23.9 % (ref 34–48)
HCT VFR BLD CALC: 24.1 % (ref 34–48)
HCT VFR BLD CALC: 24.2 % (ref 34–48)
HCT VFR BLD CALC: 25.7 % (ref 34–48)
HCT VFR BLD CALC: 26.1 % (ref 34–48)
HCT VFR BLD CALC: 26.2 % (ref 34–48)
HCT VFR BLD CALC: 26.6 % (ref 34–48)
HCT VFR BLD CALC: 26.7 % (ref 34–48)
HCT VFR BLD CALC: 26.8 % (ref 34–48)
HCT VFR BLD CALC: 27.2 % (ref 34–48)
HCT VFR BLD CALC: 27.2 % (ref 34–48)
HCT VFR BLD CALC: 28 % (ref 34–48)
HCT VFR BLD CALC: 28.1 % (ref 34–48)
HCT VFR BLD CALC: 28.1 % (ref 34–48)
HCT VFR BLD CALC: 28.3 % (ref 34–48)
HCT VFR BLD CALC: 28.8 % (ref 34–48)
HCT VFR BLD CALC: 28.8 % (ref 34–48)
HCT VFR BLD CALC: 28.9 % (ref 34–48)
HCT VFR BLD CALC: 29.1 % (ref 34–48)
HCT VFR BLD CALC: 30.1 % (ref 34–48)
HCT VFR BLD CALC: 32.5 % (ref 34–48)
HCT VFR BLD CALC: 32.9 % (ref 34–48)
HCT VFR BLD CALC: 33.4 % (ref 34–48)
HCT VFR BLD CALC: 34.3 % (ref 34–48)
HCT VFR BLD CALC: 35.8 % (ref 34–48)
HCT VFR BLD CALC: 36.4 % (ref 34–48)
HCT VFR BLD CALC: 37.9 % (ref 34–48)
HCT VFR BLD CALC: 40 % (ref 34–48)
HCT VFR BLD CALC: 42.8 % (ref 34–48)
HCT VFR BLD CALC: 44.5 % (ref 34–48)
HCT VFR BLD CALC: 45.9 % (ref 34–48)
HEMOGLOBIN: 10 G/DL (ref 11.5–15.5)
HEMOGLOBIN: 10 G/DL (ref 11.5–15.5)
HEMOGLOBIN: 11.4 G/DL (ref 11.5–15.5)
HEMOGLOBIN: 11.5 G/DL (ref 11.5–15.5)
HEMOGLOBIN: 12 G/DL (ref 11.5–15.5)
HEMOGLOBIN: 13.2 G/DL (ref 11.5–15.5)
HEMOGLOBIN: 14.8 G/DL (ref 11.5–15.5)
HEMOGLOBIN: 15.3 G/DL (ref 11.5–15.5)
HEMOGLOBIN: 15.4 G/DL (ref 11.5–15.5)
HEMOGLOBIN: 5.7 G/DL (ref 11.5–15.5)
HEMOGLOBIN: 5.8 G/DL (ref 11.5–15.5)
HEMOGLOBIN: 5.9 G/DL (ref 11.5–15.5)
HEMOGLOBIN: 6.9 G/DL (ref 11.5–15.5)
HEMOGLOBIN: 7.1 G/DL (ref 11.5–15.5)
HEMOGLOBIN: 7.2 G/DL (ref 11.5–15.5)
HEMOGLOBIN: 7.6 G/DL (ref 11.5–15.5)
HEMOGLOBIN: 7.8 G/DL (ref 11.5–15.5)
HEMOGLOBIN: 7.9 G/DL (ref 11.5–15.5)
HEMOGLOBIN: 8.2 G/DL (ref 11.5–15.5)
HEMOGLOBIN: 8.3 G/DL (ref 11.5–15.5)
HEMOGLOBIN: 8.4 G/DL (ref 11.5–15.5)
HEMOGLOBIN: 8.4 G/DL (ref 11.5–15.5)
HEMOGLOBIN: 8.5 G/DL (ref 11.5–15.5)
HEMOGLOBIN: 8.6 G/DL (ref 11.5–15.5)
HEMOGLOBIN: 8.7 G/DL (ref 11.5–15.5)
HEMOGLOBIN: 9 G/DL (ref 11.5–15.5)
HEMOGLOBIN: 9.1 G/DL (ref 11.5–15.5)
HEMOGLOBIN: 9.2 G/DL (ref 11.5–15.5)
HEMOGLOBIN: 9.7 G/DL (ref 11.5–15.5)
HEMOGLOBIN: 9.8 G/DL (ref 11.5–15.5)
HEMOGLOBIN: 9.9 G/DL (ref 11.5–15.5)
HEPATITIS B CORE IGM ANTIBODY: NORMAL
HEPATITIS B CORE IGM ANTIBODY: NORMAL
HEPATITIS B SURFACE ANTIGEN INTERPRETATION: NORMAL
HEPATITIS C ANTIBODY INTERPRETATION: NORMAL
HGB, (EST): 13 G/DL (ref 11.5–15.5)
HHB: 0.7 % (ref 0–5)
HHB: 0.8 % (ref 0–5)
HHB: 1.3 % (ref 0–5)
HHB: 1.4 % (ref 0–5)
HHB: 1.6 % (ref 0–5)
HHB: 2.2 % (ref 0–5)
HHB: 2.2 % (ref 0–5)
HHB: 2.5 % (ref 0–5)
HHB: 3.1 % (ref 0–5)
HHB: 3.5 % (ref 0–5)
HHB: 4.3 % (ref 0–5)
HHB: 4.3 % (ref 0–5)
HHB: 7.8 % (ref 0–5)
HHB: 8.4 % (ref 0–5)
HUMAN METAPNEUMOVIRUS BY PCR: NOT DETECTED
HUMAN RHINOVIRUS/ENTEROVIRUS BY PCR: NOT DETECTED
HYPOCHROMIA: ABNORMAL
IMMATURE GRANULOCYTES #: 0.08 E9/L
IMMATURE GRANULOCYTES #: 0.17 E9/L
IMMATURE GRANULOCYTES #: 0.48 E9/L
IMMATURE GRANULOCYTES #: 0.5 E9/L
IMMATURE GRANULOCYTES #: 0.57 E9/L
IMMATURE GRANULOCYTES #: 0.98 E9/L
IMMATURE GRANULOCYTES %: 0.8 % (ref 0–5)
IMMATURE GRANULOCYTES %: 1.2 % (ref 0–5)
IMMATURE GRANULOCYTES %: 3.8 % (ref 0–5)
IMMATURE GRANULOCYTES %: 4.1 % (ref 0–5)
IMMATURE GRANULOCYTES %: 4.4 % (ref 0–5)
IMMATURE GRANULOCYTES %: 5.1 % (ref 0–5)
INFLUENZA A BY PCR: NOT DETECTED
INFLUENZA B BY PCR: NOT DETECTED
INR BLD: 1
INR BLD: 1.1
INR BLD: 1.1
INR BLD: 1.3
INR BLD: 1.5
INR BLD: 1.8
INR BLD: 2.1
INR BLD: 3.1
INTERPRETATION: NOT DETECTED
KETONES, URINE: NEGATIVE MG/DL
KETONES, URINE: NEGATIVE MG/DL
KLEBSIELLA OXYTOCA BY PCR: NOT DETECTED
KLEBSIELLA PNEUMONIAE GROUP BY PCR: NOT DETECTED
LAB: ABNORMAL
LACTATE DEHYDROGENASE: 255 U/L (ref 135–214)
LACTIC ACID, SEPSIS: 16.4 MMOL/L (ref 0.5–1.9)
LACTIC ACID, SEPSIS: 16.7 MMOL/L (ref 0.5–1.9)
LACTIC ACID, SEPSIS: 2.1 MMOL/L (ref 0.5–1.9)
LACTIC ACID: 0.9 MMOL/L (ref 0.5–2.2)
LACTIC ACID: 1.2 MMOL/L (ref 0.5–2.2)
LACTIC ACID: 1.5 MMOL/L (ref 0.5–2.2)
LACTIC ACID: 1.5 MMOL/L (ref 0.5–2.2)
LACTIC ACID: 1.6 MMOL/L (ref 0.5–2.2)
LACTIC ACID: 1.9 MMOL/L (ref 0.5–2.2)
LACTIC ACID: 1.9 MMOL/L (ref 0.5–2.2)
LACTIC ACID: 2.2 MMOL/L (ref 0.5–2.2)
LACTIC ACID: 26 MMOL/L (ref 0.5–2.2)
LACTIC ACID: 29.4 MMOL/L (ref 0.5–2.2)
LEUKOCYTE ESTERASE, URINE: ABNORMAL
LEUKOCYTE ESTERASE, URINE: NEGATIVE
LIPASE: 13 U/L (ref 13–60)
LIPASE: 56 U/L (ref 13–60)
LISTERIA MONOCYTOGENES BY PCR: NOT DETECTED
LUPUS ANTICOAG DVVT: NORMAL
LV EF: 60 %
LV EF: 63 %
LVEF MODALITY: NORMAL
LVEF MODALITY: NORMAL
LYMPHOCYTES ABSOLUTE: 0.3 E9/L (ref 1.5–4)
LYMPHOCYTES ABSOLUTE: 0.45 E9/L (ref 1.5–4)
LYMPHOCYTES ABSOLUTE: 0.51 E9/L (ref 1.5–4)
LYMPHOCYTES ABSOLUTE: 0.69 E9/L (ref 1.5–4)
LYMPHOCYTES ABSOLUTE: 0.91 E9/L (ref 1.5–4)
LYMPHOCYTES ABSOLUTE: 0.96 E9/L (ref 1.5–4)
LYMPHOCYTES ABSOLUTE: 1.09 E9/L (ref 1.5–4)
LYMPHOCYTES ABSOLUTE: 1.15 E9/L (ref 1.5–4)
LYMPHOCYTES ABSOLUTE: 1.2 E9/L (ref 1.5–4)
LYMPHOCYTES ABSOLUTE: 1.24 E9/L (ref 1.5–4)
LYMPHOCYTES ABSOLUTE: 1.35 E9/L (ref 1.5–4)
LYMPHOCYTES ABSOLUTE: 1.39 E9/L (ref 1.5–4)
LYMPHOCYTES ABSOLUTE: 1.39 E9/L (ref 1.5–4)
LYMPHOCYTES ABSOLUTE: 1.45 E9/L (ref 1.5–4)
LYMPHOCYTES ABSOLUTE: 1.81 E9/L (ref 1.5–4)
LYMPHOCYTES ABSOLUTE: 1.92 E9/L (ref 1.5–4)
LYMPHOCYTES ABSOLUTE: 2.32 E9/L (ref 1.5–4)
LYMPHOCYTES ABSOLUTE: 2.93 E9/L (ref 1.5–4)
LYMPHOCYTES ABSOLUTE: 3.34 E9/L (ref 1.5–4)
LYMPHOCYTES RELATIVE PERCENT: 1.7 % (ref 20–42)
LYMPHOCYTES RELATIVE PERCENT: 11 % (ref 20–42)
LYMPHOCYTES RELATIVE PERCENT: 12.2 % (ref 20–42)
LYMPHOCYTES RELATIVE PERCENT: 20 % (ref 20–42)
LYMPHOCYTES RELATIVE PERCENT: 20.3 % (ref 20–42)
LYMPHOCYTES RELATIVE PERCENT: 3.5 % (ref 20–42)
LYMPHOCYTES RELATIVE PERCENT: 5.2 % (ref 20–42)
LYMPHOCYTES RELATIVE PERCENT: 5.3 % (ref 20–42)
LYMPHOCYTES RELATIVE PERCENT: 6.1 % (ref 20–42)
LYMPHOCYTES RELATIVE PERCENT: 6.2 % (ref 20–42)
LYMPHOCYTES RELATIVE PERCENT: 7 % (ref 20–42)
LYMPHOCYTES RELATIVE PERCENT: 7 % (ref 20–42)
LYMPHOCYTES RELATIVE PERCENT: 7.8 % (ref 20–42)
LYMPHOCYTES RELATIVE PERCENT: 8.7 % (ref 20–42)
LYMPHOCYTES RELATIVE PERCENT: 8.7 % (ref 20–42)
LYMPHOCYTES RELATIVE PERCENT: 8.8 % (ref 20–42)
LYMPHOCYTES RELATIVE PERCENT: 9.6 % (ref 20–42)
LYMPHOCYTES RELATIVE PERCENT: 9.6 % (ref 20–42)
LYMPHOCYTES RELATIVE PERCENT: 9.7 % (ref 20–42)
Lab: ABNORMAL
MAGNESIUM: 0.9 MG/DL (ref 1.6–2.6)
MAGNESIUM: 1 MG/DL (ref 1.6–2.6)
MAGNESIUM: 1.5 MG/DL (ref 1.6–2.6)
MAGNESIUM: 1.6 MG/DL (ref 1.6–2.6)
MAGNESIUM: 1.6 MG/DL (ref 1.6–2.6)
MAGNESIUM: 1.7 MG/DL (ref 1.6–2.6)
MAGNESIUM: 1.8 MG/DL (ref 1.6–2.6)
MAGNESIUM: 1.9 MG/DL (ref 1.6–2.6)
MAGNESIUM: 1.9 MG/DL (ref 1.6–2.6)
MAGNESIUM: 2 MG/DL (ref 1.6–2.6)
MAGNESIUM: 2.1 MG/DL (ref 1.6–2.6)
MAGNESIUM: 2.1 MG/DL (ref 1.6–2.6)
MAGNESIUM: 2.2 MG/DL (ref 1.6–2.6)
MAGNESIUM: 2.3 MG/DL (ref 1.6–2.6)
MCH RBC QN AUTO: 27.3 PG (ref 26–35)
MCH RBC QN AUTO: 27.3 PG (ref 26–35)
MCH RBC QN AUTO: 27.6 PG (ref 26–35)
MCH RBC QN AUTO: 27.7 PG (ref 26–35)
MCH RBC QN AUTO: 27.7 PG (ref 26–35)
MCH RBC QN AUTO: 27.9 PG (ref 26–35)
MCH RBC QN AUTO: 28 PG (ref 26–35)
MCH RBC QN AUTO: 28 PG (ref 26–35)
MCH RBC QN AUTO: 28.3 PG (ref 26–35)
MCH RBC QN AUTO: 28.3 PG (ref 26–35)
MCH RBC QN AUTO: 28.4 PG (ref 26–35)
MCH RBC QN AUTO: 28.5 PG (ref 26–35)
MCH RBC QN AUTO: 28.7 PG (ref 26–35)
MCH RBC QN AUTO: 28.8 PG (ref 26–35)
MCH RBC QN AUTO: 29 PG (ref 26–35)
MCH RBC QN AUTO: 29.1 PG (ref 26–35)
MCH RBC QN AUTO: 29.3 PG (ref 26–35)
MCH RBC QN AUTO: 29.9 PG (ref 26–35)
MCH RBC QN AUTO: 29.9 PG (ref 26–35)
MCH RBC QN AUTO: 30 PG (ref 26–35)
MCH RBC QN AUTO: 30.1 PG (ref 26–35)
MCH RBC QN AUTO: 30.5 PG (ref 26–35)
MCH RBC QN AUTO: 30.6 PG (ref 26–35)
MCH RBC QN AUTO: 30.6 PG (ref 26–35)
MCH RBC QN AUTO: 30.7 PG (ref 26–35)
MCHC RBC AUTO-ENTMCNC: 28.3 % (ref 32–34.5)
MCHC RBC AUTO-ENTMCNC: 28.6 % (ref 32–34.5)
MCHC RBC AUTO-ENTMCNC: 29.6 % (ref 32–34.5)
MCHC RBC AUTO-ENTMCNC: 29.6 % (ref 32–34.5)
MCHC RBC AUTO-ENTMCNC: 29.8 % (ref 32–34.5)
MCHC RBC AUTO-ENTMCNC: 29.9 % (ref 32–34.5)
MCHC RBC AUTO-ENTMCNC: 30 % (ref 32–34.5)
MCHC RBC AUTO-ENTMCNC: 30.1 % (ref 32–34.5)
MCHC RBC AUTO-ENTMCNC: 30.2 % (ref 32–34.5)
MCHC RBC AUTO-ENTMCNC: 30.3 % (ref 32–34.5)
MCHC RBC AUTO-ENTMCNC: 30.4 % (ref 32–34.5)
MCHC RBC AUTO-ENTMCNC: 30.6 % (ref 32–34.5)
MCHC RBC AUTO-ENTMCNC: 30.6 % (ref 32–34.5)
MCHC RBC AUTO-ENTMCNC: 30.9 % (ref 32–34.5)
MCHC RBC AUTO-ENTMCNC: 31 % (ref 32–34.5)
MCHC RBC AUTO-ENTMCNC: 31.2 % (ref 32–34.5)
MCHC RBC AUTO-ENTMCNC: 31.4 % (ref 32–34.5)
MCHC RBC AUTO-ENTMCNC: 31.6 % (ref 32–34.5)
MCHC RBC AUTO-ENTMCNC: 31.6 % (ref 32–34.5)
MCHC RBC AUTO-ENTMCNC: 31.8 % (ref 32–34.5)
MCHC RBC AUTO-ENTMCNC: 31.8 % (ref 32–34.5)
MCHC RBC AUTO-ENTMCNC: 33 % (ref 32–34.5)
MCHC RBC AUTO-ENTMCNC: 33.1 % (ref 32–34.5)
MCHC RBC AUTO-ENTMCNC: 33.2 % (ref 32–34.5)
MCHC RBC AUTO-ENTMCNC: 33.3 % (ref 32–34.5)
MCHC RBC AUTO-ENTMCNC: 34.6 % (ref 32–34.5)
MCHC RBC AUTO-ENTMCNC: 34.6 % (ref 32–34.5)
MCV RBC AUTO: 101.7 FL (ref 80–99.9)
MCV RBC AUTO: 87 FL (ref 80–99.9)
MCV RBC AUTO: 88.3 FL (ref 80–99.9)
MCV RBC AUTO: 89.6 FL (ref 80–99.9)
MCV RBC AUTO: 89.9 FL (ref 80–99.9)
MCV RBC AUTO: 90.8 FL (ref 80–99.9)
MCV RBC AUTO: 91.1 FL (ref 80–99.9)
MCV RBC AUTO: 91.3 FL (ref 80–99.9)
MCV RBC AUTO: 91.3 FL (ref 80–99.9)
MCV RBC AUTO: 91.5 FL (ref 80–99.9)
MCV RBC AUTO: 92 FL (ref 80–99.9)
MCV RBC AUTO: 92.1 FL (ref 80–99.9)
MCV RBC AUTO: 92.2 FL (ref 80–99.9)
MCV RBC AUTO: 92.3 FL (ref 80–99.9)
MCV RBC AUTO: 92.6 FL (ref 80–99.9)
MCV RBC AUTO: 93 FL (ref 80–99.9)
MCV RBC AUTO: 93 FL (ref 80–99.9)
MCV RBC AUTO: 93.1 FL (ref 80–99.9)
MCV RBC AUTO: 93.2 FL (ref 80–99.9)
MCV RBC AUTO: 93.2 FL (ref 80–99.9)
MCV RBC AUTO: 93.3 FL (ref 80–99.9)
MCV RBC AUTO: 93.7 FL (ref 80–99.9)
MCV RBC AUTO: 93.8 FL (ref 80–99.9)
MCV RBC AUTO: 94 FL (ref 80–99.9)
MCV RBC AUTO: 94.6 FL (ref 80–99.9)
MCV RBC AUTO: 94.6 FL (ref 80–99.9)
MCV RBC AUTO: 95.3 FL (ref 80–99.9)
MCV RBC AUTO: 96.6 FL (ref 80–99.9)
MCV RBC AUTO: 96.8 FL (ref 80–99.9)
METAMYELOCYTES RELATIVE PERCENT: 0.9 % (ref 0–1)
METAMYELOCYTES RELATIVE PERCENT: 1.7 % (ref 0–1)
METER GLUCOSE: 100 MG/DL (ref 74–99)
METER GLUCOSE: 100 MG/DL (ref 74–99)
METER GLUCOSE: 101 MG/DL (ref 74–99)
METER GLUCOSE: 101 MG/DL (ref 74–99)
METER GLUCOSE: 102 MG/DL (ref 74–99)
METER GLUCOSE: 103 MG/DL (ref 74–99)
METER GLUCOSE: 103 MG/DL (ref 74–99)
METER GLUCOSE: 105 MG/DL (ref 74–99)
METER GLUCOSE: 105 MG/DL (ref 74–99)
METER GLUCOSE: 106 MG/DL (ref 74–99)
METER GLUCOSE: 106 MG/DL (ref 74–99)
METER GLUCOSE: 108 MG/DL (ref 74–99)
METER GLUCOSE: 108 MG/DL (ref 74–99)
METER GLUCOSE: 109 MG/DL (ref 74–99)
METER GLUCOSE: 110 MG/DL (ref 74–99)
METER GLUCOSE: 111 MG/DL (ref 74–99)
METER GLUCOSE: 111 MG/DL (ref 74–99)
METER GLUCOSE: 112 MG/DL (ref 74–99)
METER GLUCOSE: 114 MG/DL (ref 74–99)
METER GLUCOSE: 116 MG/DL (ref 74–99)
METER GLUCOSE: 117 MG/DL (ref 74–99)
METER GLUCOSE: 119 MG/DL (ref 74–99)
METER GLUCOSE: 121 MG/DL (ref 74–99)
METER GLUCOSE: 121 MG/DL (ref 74–99)
METER GLUCOSE: 125 MG/DL (ref 74–99)
METER GLUCOSE: 126 MG/DL (ref 74–99)
METER GLUCOSE: 127 MG/DL (ref 74–99)
METER GLUCOSE: 128 MG/DL (ref 74–99)
METER GLUCOSE: 133 MG/DL (ref 74–99)
METER GLUCOSE: 133 MG/DL (ref 74–99)
METER GLUCOSE: 134 MG/DL (ref 74–99)
METER GLUCOSE: 135 MG/DL (ref 74–99)
METER GLUCOSE: 136 MG/DL (ref 74–99)
METER GLUCOSE: 137 MG/DL (ref 74–99)
METER GLUCOSE: 137 MG/DL (ref 74–99)
METER GLUCOSE: 140 MG/DL (ref 74–99)
METER GLUCOSE: 140 MG/DL (ref 74–99)
METER GLUCOSE: 142 MG/DL (ref 74–99)
METER GLUCOSE: 143 MG/DL (ref 74–99)
METER GLUCOSE: 143 MG/DL (ref 74–99)
METER GLUCOSE: 144 MG/DL (ref 74–99)
METER GLUCOSE: 145 MG/DL (ref 74–99)
METER GLUCOSE: 147 MG/DL (ref 74–99)
METER GLUCOSE: 148 MG/DL (ref 74–99)
METER GLUCOSE: 149 MG/DL (ref 74–99)
METER GLUCOSE: 150 MG/DL (ref 74–99)
METER GLUCOSE: 151 MG/DL (ref 74–99)
METER GLUCOSE: 151 MG/DL (ref 74–99)
METER GLUCOSE: 152 MG/DL (ref 74–99)
METER GLUCOSE: 153 MG/DL (ref 74–99)
METER GLUCOSE: 153 MG/DL (ref 74–99)
METER GLUCOSE: 154 MG/DL (ref 74–99)
METER GLUCOSE: 154 MG/DL (ref 74–99)
METER GLUCOSE: 155 MG/DL (ref 74–99)
METER GLUCOSE: 156 MG/DL (ref 74–99)
METER GLUCOSE: 157 MG/DL (ref 74–99)
METER GLUCOSE: 158 MG/DL (ref 74–99)
METER GLUCOSE: 159 MG/DL (ref 74–99)
METER GLUCOSE: 160 MG/DL (ref 74–99)
METER GLUCOSE: 161 MG/DL (ref 74–99)
METER GLUCOSE: 162 MG/DL (ref 74–99)
METER GLUCOSE: 162 MG/DL (ref 74–99)
METER GLUCOSE: 163 MG/DL (ref 74–99)
METER GLUCOSE: 164 MG/DL (ref 74–99)
METER GLUCOSE: 165 MG/DL (ref 74–99)
METER GLUCOSE: 166 MG/DL (ref 74–99)
METER GLUCOSE: 167 MG/DL (ref 74–99)
METER GLUCOSE: 169 MG/DL (ref 74–99)
METER GLUCOSE: 170 MG/DL (ref 74–99)
METER GLUCOSE: 171 MG/DL (ref 74–99)
METER GLUCOSE: 171 MG/DL (ref 74–99)
METER GLUCOSE: 172 MG/DL (ref 74–99)
METER GLUCOSE: 176 MG/DL (ref 74–99)
METER GLUCOSE: 176 MG/DL (ref 74–99)
METER GLUCOSE: 177 MG/DL (ref 74–99)
METER GLUCOSE: 178 MG/DL (ref 74–99)
METER GLUCOSE: 178 MG/DL (ref 74–99)
METER GLUCOSE: 180 MG/DL (ref 74–99)
METER GLUCOSE: 180 MG/DL (ref 74–99)
METER GLUCOSE: 181 MG/DL (ref 74–99)
METER GLUCOSE: 182 MG/DL (ref 74–99)
METER GLUCOSE: 183 MG/DL (ref 74–99)
METER GLUCOSE: 188 MG/DL (ref 74–99)
METER GLUCOSE: 190 MG/DL (ref 74–99)
METER GLUCOSE: 193 MG/DL (ref 74–99)
METER GLUCOSE: 196 MG/DL (ref 74–99)
METER GLUCOSE: 196 MG/DL (ref 74–99)
METER GLUCOSE: 198 MG/DL (ref 74–99)
METER GLUCOSE: 199 MG/DL (ref 74–99)
METER GLUCOSE: 200 MG/DL (ref 74–99)
METER GLUCOSE: 201 MG/DL (ref 74–99)
METER GLUCOSE: 202 MG/DL (ref 74–99)
METER GLUCOSE: 209 MG/DL (ref 74–99)
METER GLUCOSE: 212 MG/DL (ref 74–99)
METER GLUCOSE: 213 MG/DL (ref 74–99)
METER GLUCOSE: 214 MG/DL (ref 74–99)
METER GLUCOSE: 215 MG/DL (ref 74–99)
METER GLUCOSE: 219 MG/DL (ref 74–99)
METER GLUCOSE: 224 MG/DL (ref 74–99)
METER GLUCOSE: 227 MG/DL (ref 74–99)
METER GLUCOSE: 231 MG/DL (ref 74–99)
METER GLUCOSE: 236 MG/DL (ref 74–99)
METER GLUCOSE: 239 MG/DL (ref 74–99)
METER GLUCOSE: 259 MG/DL (ref 74–99)
METER GLUCOSE: 292 MG/DL (ref 74–99)
METER GLUCOSE: 313 MG/DL (ref 74–99)
METER GLUCOSE: 346 MG/DL (ref 74–99)
METER GLUCOSE: 351 MG/DL (ref 74–99)
METER GLUCOSE: 377 MG/DL (ref 74–99)
METER GLUCOSE: 415 MG/DL (ref 74–99)
METER GLUCOSE: 434 MG/DL (ref 74–99)
METER GLUCOSE: 438 MG/DL (ref 74–99)
METER GLUCOSE: 444 MG/DL (ref 74–99)
METER GLUCOSE: 481 MG/DL (ref 74–99)
METER GLUCOSE: 67 MG/DL (ref 74–99)
METER GLUCOSE: 77 MG/DL (ref 74–99)
METER GLUCOSE: 79 MG/DL (ref 74–99)
METER GLUCOSE: 80 MG/DL (ref 74–99)
METER GLUCOSE: 80 MG/DL (ref 74–99)
METER GLUCOSE: 82 MG/DL (ref 74–99)
METER GLUCOSE: 89 MG/DL (ref 74–99)
METER GLUCOSE: 90 MG/DL (ref 74–99)
METER GLUCOSE: 90 MG/DL (ref 74–99)
METER GLUCOSE: 93 MG/DL (ref 74–99)
METER GLUCOSE: 97 MG/DL (ref 74–99)
METER GLUCOSE: 97 MG/DL (ref 74–99)
METER GLUCOSE: >500 MG/DL (ref 74–99)
METHB: 0.1 % (ref 0–1.5)
METHB: 0.3 % (ref 0–1.5)
METHICILLIN RESISTANCE MECA/C  BY PCR: DETECTED
MODE: ABNORMAL
MODE: AC
MONOCYTES ABSOLUTE: 0 E9/L (ref 0.1–0.95)
MONOCYTES ABSOLUTE: 0.34 E9/L (ref 0.1–0.95)
MONOCYTES ABSOLUTE: 0.41 E9/L (ref 0.1–0.95)
MONOCYTES ABSOLUTE: 0.41 E9/L (ref 0.1–0.95)
MONOCYTES ABSOLUTE: 0.46 E9/L (ref 0.1–0.95)
MONOCYTES ABSOLUTE: 0.56 E9/L (ref 0.1–0.95)
MONOCYTES ABSOLUTE: 0.58 E9/L (ref 0.1–0.95)
MONOCYTES ABSOLUTE: 0.6 E9/L (ref 0.1–0.95)
MONOCYTES ABSOLUTE: 0.64 E9/L (ref 0.1–0.95)
MONOCYTES ABSOLUTE: 0.67 E9/L (ref 0.1–0.95)
MONOCYTES ABSOLUTE: 0.68 E9/L (ref 0.1–0.95)
MONOCYTES ABSOLUTE: 0.7 E9/L (ref 0.1–0.95)
MONOCYTES ABSOLUTE: 0.7 E9/L (ref 0.1–0.95)
MONOCYTES ABSOLUTE: 0.81 E9/L (ref 0.1–0.95)
MONOCYTES ABSOLUTE: 0.89 E9/L (ref 0.1–0.95)
MONOCYTES ABSOLUTE: 0.96 E9/L (ref 0.1–0.95)
MONOCYTES ABSOLUTE: 0.99 E9/L (ref 0.1–0.95)
MONOCYTES RELATIVE PERCENT: 2.5 % (ref 2–12)
MONOCYTES RELATIVE PERCENT: 2.6 % (ref 2–12)
MONOCYTES RELATIVE PERCENT: 3.3 % (ref 2–12)
MONOCYTES RELATIVE PERCENT: 3.5 % (ref 2–12)
MONOCYTES RELATIVE PERCENT: 3.6 % (ref 2–12)
MONOCYTES RELATIVE PERCENT: 3.9 % (ref 2–12)
MONOCYTES RELATIVE PERCENT: 4.3 % (ref 2–12)
MONOCYTES RELATIVE PERCENT: 4.3 % (ref 2–12)
MONOCYTES RELATIVE PERCENT: 4.5 % (ref 2–12)
MONOCYTES RELATIVE PERCENT: 5.2 % (ref 2–12)
MONOCYTES RELATIVE PERCENT: 6.1 % (ref 2–12)
MONOCYTES RELATIVE PERCENT: 6.9 % (ref 2–12)
MONOCYTES RELATIVE PERCENT: 7.8 % (ref 2–12)
MRSA CULTURE ONLY: NORMAL
MYCOPLASMA PNEUMONIAE BY PCR: NOT DETECTED
MYELOCYTE PERCENT: 0.9 % (ref 0–0)
MYELOCYTE PERCENT: 1.7 % (ref 0–0)
MYELOCYTE PERCENT: 2.6 % (ref 0–0)
MYELOCYTE PERCENT: 2.6 % (ref 0–0)
NEISSERIA MENINGITIDIS BY PCR: NOT DETECTED
NEUTROPHILS ABSOLUTE: 10.23 E9/L (ref 1.8–7.3)
NEUTROPHILS ABSOLUTE: 10.51 E9/L (ref 1.8–7.3)
NEUTROPHILS ABSOLUTE: 10.57 E9/L (ref 1.8–7.3)
NEUTROPHILS ABSOLUTE: 11.01 E9/L (ref 1.8–7.3)
NEUTROPHILS ABSOLUTE: 11.68 E9/L (ref 1.8–7.3)
NEUTROPHILS ABSOLUTE: 12.06 E9/L (ref 1.8–7.3)
NEUTROPHILS ABSOLUTE: 12.36 E9/L (ref 1.8–7.3)
NEUTROPHILS ABSOLUTE: 12.68 E9/L (ref 1.8–7.3)
NEUTROPHILS ABSOLUTE: 13.55 E9/L (ref 1.8–7.3)
NEUTROPHILS ABSOLUTE: 13.62 E9/L (ref 1.8–7.3)
NEUTROPHILS ABSOLUTE: 13.95 E9/L (ref 1.8–7.3)
NEUTROPHILS ABSOLUTE: 19.71 E9/L (ref 1.8–7.3)
NEUTROPHILS ABSOLUTE: 20.42 E9/L (ref 1.8–7.3)
NEUTROPHILS ABSOLUTE: 21.6 E9/L (ref 1.8–7.3)
NEUTROPHILS ABSOLUTE: 21.69 E9/L (ref 1.8–7.3)
NEUTROPHILS ABSOLUTE: 8.79 E9/L (ref 1.8–7.3)
NEUTROPHILS ABSOLUTE: 8.97 E9/L (ref 1.8–7.3)
NEUTROPHILS ABSOLUTE: 9.23 E9/L (ref 1.8–7.3)
NEUTROPHILS ABSOLUTE: 9.97 E9/L (ref 1.8–7.3)
NEUTROPHILS RELATIVE PERCENT: 71 % (ref 43–80)
NEUTROPHILS RELATIVE PERCENT: 73.9 % (ref 43–80)
NEUTROPHILS RELATIVE PERCENT: 81.3 % (ref 43–80)
NEUTROPHILS RELATIVE PERCENT: 82.6 % (ref 43–80)
NEUTROPHILS RELATIVE PERCENT: 82.6 % (ref 43–80)
NEUTROPHILS RELATIVE PERCENT: 82.7 % (ref 43–80)
NEUTROPHILS RELATIVE PERCENT: 83.1 % (ref 43–80)
NEUTROPHILS RELATIVE PERCENT: 83.5 % (ref 43–80)
NEUTROPHILS RELATIVE PERCENT: 83.5 % (ref 43–80)
NEUTROPHILS RELATIVE PERCENT: 86.1 % (ref 43–80)
NEUTROPHILS RELATIVE PERCENT: 86.2 % (ref 43–80)
NEUTROPHILS RELATIVE PERCENT: 87 % (ref 43–80)
NEUTROPHILS RELATIVE PERCENT: 87.5 % (ref 43–80)
NEUTROPHILS RELATIVE PERCENT: 87.8 % (ref 43–80)
NEUTROPHILS RELATIVE PERCENT: 88.7 % (ref 43–80)
NEUTROPHILS RELATIVE PERCENT: 90.4 % (ref 43–80)
NEUTROPHILS RELATIVE PERCENT: 90.4 % (ref 43–80)
NEUTROPHILS RELATIVE PERCENT: 92.2 % (ref 43–80)
NEUTROPHILS RELATIVE PERCENT: 92.2 % (ref 43–80)
NITRITE, URINE: NEGATIVE
NITRITE, URINE: NEGATIVE
NUCLEATED RED BLOOD CELLS: 2.6 /100 WBC
O2 CONTENT: 10.9 ML/DL
O2 CONTENT: 11.5 ML/DL
O2 CONTENT: 8.9 ML/DL
O2 CONTENT: 9 ML/DL
O2 SATURATION: 91.5 % (ref 92–98.5)
O2 SATURATION: 92.1 % (ref 92–98.5)
O2 SATURATION: 95.7 % (ref 92–98.5)
O2 SATURATION: 95.7 % (ref 92–98.5)
O2 SATURATION: 96.5 % (ref 92–98.5)
O2 SATURATION: 96.9 % (ref 92–98.5)
O2 SATURATION: 97.5 % (ref 92–98.5)
O2 SATURATION: 97.8 % (ref 92–98.5)
O2 SATURATION: 97.8 % (ref 92–98.5)
O2 SATURATION: 98.4 % (ref 92–98.5)
O2 SATURATION: 98.6 % (ref 92–98.5)
O2 SATURATION: 98.7 % (ref 92–98.5)
O2 SATURATION: 99.2 % (ref 92–98.5)
O2 SATURATION: 99.3 % (ref 92–98.5)
O2 SATURATION: 99.4 % (ref 92–98.5)
O2HB: 90.5 % (ref 94–97)
O2HB: 90.5 % (ref 94–97)
O2HB: 94.7 % (ref 94–97)
O2HB: 94.7 % (ref 94–97)
O2HB: 95.2 % (ref 94–97)
O2HB: 95.8 % (ref 94–97)
O2HB: 95.8 % (ref 94–97)
O2HB: 95.9 % (ref 94–97)
O2HB: 96.1 % (ref 94–97)
O2HB: 96.8 % (ref 94–97)
O2HB: 96.9 % (ref 94–97)
O2HB: 97.4 % (ref 94–97)
O2HB: 97.7 % (ref 94–97)
O2HB: 98.4 % (ref 94–97)
OPERATOR ID: 1632
OPERATOR ID: 2485
OPERATOR ID: 2577
OPERATOR ID: 2577
OPERATOR ID: 2593
OPERATOR ID: 420
OPERATOR ID: 789
OPERATOR ID: 789
OPERATOR ID: ABNORMAL
ORDER NUMBER: ABNORMAL
ORGANISM: ABNORMAL
OVALOCYTES: ABNORMAL
PARAINFLUENZA VIRUS 1 BY PCR: NOT DETECTED
PARAINFLUENZA VIRUS 2 BY PCR: NOT DETECTED
PARAINFLUENZA VIRUS 3 BY PCR: NOT DETECTED
PARAINFLUENZA VIRUS 4 BY PCR: NOT DETECTED
PATIENT TEMP: 37 C
PCO2 ARTERIAL: 43.7 MMHG (ref 35–45)
PCO2: 20.6 MMHG (ref 35–45)
PCO2: 23.5 MMHG (ref 35–45)
PCO2: 27.4 MMHG (ref 35–45)
PCO2: 29.7 MMHG (ref 35–45)
PCO2: 32.8 MMHG (ref 35–45)
PCO2: 33 MMHG (ref 35–45)
PCO2: 35.3 MMHG (ref 35–45)
PCO2: 35.5 MMHG (ref 35–45)
PCO2: 37 MMHG (ref 35–45)
PCO2: 37.9 MMHG (ref 35–45)
PCO2: 40.8 MMHG (ref 35–45)
PCO2: 41.3 MMHG (ref 35–45)
PCO2: 41.4 MMHG (ref 35–45)
PCO2: 43.5 MMHG (ref 35–45)
PDW BLD-RTO: 13.5 FL (ref 11.5–15)
PDW BLD-RTO: 13.5 FL (ref 11.5–15)
PDW BLD-RTO: 13.8 FL (ref 11.5–15)
PDW BLD-RTO: 13.9 FL (ref 11.5–15)
PDW BLD-RTO: 14.1 FL (ref 11.5–15)
PDW BLD-RTO: 14.4 FL (ref 11.5–15)
PDW BLD-RTO: 14.5 FL (ref 11.5–15)
PDW BLD-RTO: 14.5 FL (ref 11.5–15)
PDW BLD-RTO: 15.7 FL (ref 11.5–15)
PDW BLD-RTO: 15.7 FL (ref 11.5–15)
PDW BLD-RTO: 15.8 FL (ref 11.5–15)
PDW BLD-RTO: 15.8 FL (ref 11.5–15)
PDW BLD-RTO: 16 FL (ref 11.5–15)
PDW BLD-RTO: 16.2 FL (ref 11.5–15)
PDW BLD-RTO: 16.3 FL (ref 11.5–15)
PDW BLD-RTO: 16.4 FL (ref 11.5–15)
PDW BLD-RTO: 16.4 FL (ref 11.5–15)
PDW BLD-RTO: 16.5 FL (ref 11.5–15)
PDW BLD-RTO: 16.5 FL (ref 11.5–15)
PEEP/CPAP: 5 CMH2O
PEEP/CPAP: 8 CMH2O
PFO2: 1.26 MMHG/%
PFO2: 1.35 MMHG/%
PFO2: 1.44 MMHG/%
PFO2: 1.49 MMHG/%
PFO2: 1.69 MMHG/%
PFO2: 1.7 MMHG/%
PFO2: 1.72 MMHG/%
PFO2: 3.63 MMHG/%
PFO2: 4.37 MMHG/%
PFO2: 4.43 MMHG/%
PH BLOOD GAS: 6.86 (ref 7.35–7.45)
PH BLOOD GAS: 7.01 (ref 7.35–7.45)
PH BLOOD GAS: 7.16 (ref 7.35–7.45)
PH BLOOD GAS: 7.21 (ref 7.35–7.45)
PH BLOOD GAS: 7.35 (ref 7.35–7.45)
PH BLOOD GAS: 7.38 (ref 7.35–7.45)
PH BLOOD GAS: 7.41 (ref 7.35–7.45)
PH BLOOD GAS: 7.42 (ref 7.35–7.45)
PH BLOOD GAS: 7.42 (ref 7.35–7.45)
PH BLOOD GAS: 7.45 (ref 7.35–7.45)
PH BLOOD GAS: 7.49 (ref 7.35–7.45)
PH BLOOD GAS: 7.51 (ref 7.35–7.45)
PH BLOOD GAS: 7.53 (ref 7.35–7.45)
PH UA: 5 (ref 5–9)
PH UA: 8.5 (ref 5–9)
PH VENOUS: 7.44 (ref 7.35–7.45)
PHOSPHORUS: 1.5 MG/DL (ref 2.5–4.5)
PHOSPHORUS: 1.6 MG/DL (ref 2.5–4.5)
PHOSPHORUS: 1.6 MG/DL (ref 2.5–4.5)
PHOSPHORUS: 1.8 MG/DL (ref 2.5–4.5)
PHOSPHORUS: 2 MG/DL (ref 2.5–4.5)
PHOSPHORUS: 2.1 MG/DL (ref 2.5–4.5)
PHOSPHORUS: 2.2 MG/DL (ref 2.5–4.5)
PHOSPHORUS: 2.3 MG/DL (ref 2.5–4.5)
PHOSPHORUS: 2.4 MG/DL (ref 2.5–4.5)
PHOSPHORUS: 2.6 MG/DL (ref 2.5–4.5)
PHOSPHORUS: 2.7 MG/DL (ref 2.5–4.5)
PHOSPHORUS: 2.8 MG/DL (ref 2.5–4.5)
PHOSPHORUS: 3 MG/DL (ref 2.5–4.5)
PHOSPHORUS: 3.2 MG/DL (ref 2.5–4.5)
PHOSPHORUS: 3.2 MG/DL (ref 2.5–4.5)
PHOSPHORUS: 4.3 MG/DL (ref 2.5–4.5)
PLATELET # BLD: 183 E9/L (ref 130–450)
PLATELET # BLD: 229 E9/L (ref 130–450)
PLATELET # BLD: 242 E9/L (ref 130–450)
PLATELET # BLD: 261 E9/L (ref 130–450)
PLATELET # BLD: 267 E9/L (ref 130–450)
PLATELET # BLD: 268 E9/L (ref 130–450)
PLATELET # BLD: 287 E9/L (ref 130–450)
PLATELET # BLD: 290 E9/L (ref 130–450)
PLATELET # BLD: 291 E9/L (ref 130–450)
PLATELET # BLD: 293 E9/L (ref 130–450)
PLATELET # BLD: 294 E9/L (ref 130–450)
PLATELET # BLD: 308 E9/L (ref 130–450)
PLATELET # BLD: 308 E9/L (ref 130–450)
PLATELET # BLD: 309 E9/L (ref 130–450)
PLATELET # BLD: 313 E9/L (ref 130–450)
PLATELET # BLD: 318 E9/L (ref 130–450)
PLATELET # BLD: 320 E9/L (ref 130–450)
PLATELET # BLD: 323 E9/L (ref 130–450)
PLATELET # BLD: 323 E9/L (ref 130–450)
PLATELET # BLD: 332 E9/L (ref 130–450)
PLATELET # BLD: 342 E9/L (ref 130–450)
PLATELET # BLD: 344 E9/L (ref 130–450)
PLATELET # BLD: 365 E9/L (ref 130–450)
PLATELET # BLD: 371 E9/L (ref 130–450)
PLATELET # BLD: 382 E9/L (ref 130–450)
PLATELET # BLD: 385 E9/L (ref 130–450)
PLATELET # BLD: 395 E9/L (ref 130–450)
PLATELET # BLD: 416 E9/L (ref 130–450)
PLATELET # BLD: 489 E9/L (ref 130–450)
PMV BLD AUTO: 10.1 FL (ref 7–12)
PMV BLD AUTO: 10.5 FL (ref 7–12)
PMV BLD AUTO: 10.7 FL (ref 7–12)
PMV BLD AUTO: 10.8 FL (ref 7–12)
PMV BLD AUTO: 10.9 FL (ref 7–12)
PMV BLD AUTO: 11 FL (ref 7–12)
PMV BLD AUTO: 11.1 FL (ref 7–12)
PMV BLD AUTO: 11.2 FL (ref 7–12)
PMV BLD AUTO: 11.5 FL (ref 7–12)
PMV BLD AUTO: 11.7 FL (ref 7–12)
PMV BLD AUTO: 11.7 FL (ref 7–12)
PMV BLD AUTO: 11.9 FL (ref 7–12)
PMV BLD AUTO: 12 FL (ref 7–12)
PMV BLD AUTO: 12.3 FL (ref 7–12)
PMV BLD AUTO: 8.9 FL (ref 7–12)
PMV BLD AUTO: 9 FL (ref 7–12)
PMV BLD AUTO: 9.2 FL (ref 7–12)
PMV BLD AUTO: 9.3 FL (ref 7–12)
PMV BLD AUTO: 9.3 FL (ref 7–12)
PMV BLD AUTO: 9.4 FL (ref 7–12)
PMV BLD AUTO: 9.4 FL (ref 7–12)
PMV BLD AUTO: 9.5 FL (ref 7–12)
PMV BLD AUTO: 9.6 FL (ref 7–12)
PMV BLD AUTO: 9.7 FL (ref 7–12)
PO2 ARTERIAL: 164.1 MMHG (ref 60–80)
PO2: 100.6 MMHG (ref 75–100)
PO2: 102.1 MMHG (ref 75–100)
PO2: 104.5 MMHG (ref 75–100)
PO2: 172.4 MMHG (ref 75–100)
PO2: 192.2 MMHG (ref 75–100)
PO2: 196.8 MMHG (ref 75–100)
PO2: 265.6 MMHG (ref 75–100)
PO2: 363.2 MMHG (ref 75–100)
PO2: 63.1 MMHG (ref 75–100)
PO2: 63.4 MMHG (ref 75–100)
PO2: 79.9 MMHG (ref 75–100)
PO2: 84.4 MMHG (ref 75–100)
PO2: 87 MMHG (ref 75–100)
PO2: 94.6 MMHG (ref 75–100)
POC ACT LR: 199 SECONDS
POC ACT LR: 276 SECONDS
POIKILOCYTES: ABNORMAL
POLYCHROMASIA: ABNORMAL
POTASSIUM REFLEX MAGNESIUM: 3.4 MMOL/L (ref 3.5–5)
POTASSIUM REFLEX MAGNESIUM: 3.8 MMOL/L (ref 3.5–5)
POTASSIUM REFLEX MAGNESIUM: 3.9 MMOL/L (ref 3.5–5)
POTASSIUM REFLEX MAGNESIUM: 4 MMOL/L (ref 3.5–5)
POTASSIUM REFLEX MAGNESIUM: 4.1 MMOL/L (ref 3.5–5)
POTASSIUM REFLEX MAGNESIUM: 4.2 MMOL/L (ref 3.5–5)
POTASSIUM REFLEX MAGNESIUM: 4.4 MMOL/L (ref 3.5–5)
POTASSIUM REFLEX MAGNESIUM: 4.9 MMOL/L (ref 3.5–5)
POTASSIUM REFLEX MAGNESIUM: 5.7 MMOL/L (ref 3.5–5)
POTASSIUM REFLEX MAGNESIUM: 6 MMOL/L (ref 3.5–5)
POTASSIUM SERPL-SCNC: 3.1 MMOL/L (ref 3.5–5)
POTASSIUM SERPL-SCNC: 3.1 MMOL/L (ref 3.5–5)
POTASSIUM SERPL-SCNC: 3.6 MMOL/L (ref 3.5–5)
POTASSIUM SERPL-SCNC: 3.6 MMOL/L (ref 3.5–5.5)
POTASSIUM SERPL-SCNC: 3.7 MMOL/L (ref 3.5–5)
POTASSIUM SERPL-SCNC: 3.8 MMOL/L (ref 3.5–5)
POTASSIUM SERPL-SCNC: 3.9 MMOL/L (ref 3.5–5)
POTASSIUM SERPL-SCNC: 4 MMOL/L (ref 3.5–5)
POTASSIUM SERPL-SCNC: 4.1 MMOL/L (ref 3.5–5)
POTASSIUM SERPL-SCNC: 4.4 MMOL/L (ref 3.5–5)
POTASSIUM SERPL-SCNC: 4.4 MMOL/L (ref 3.5–5)
POTASSIUM SERPL-SCNC: 5 MMOL/L (ref 3.5–5)
POTASSIUM SERPL-SCNC: 5.1 MMOL/L (ref 3.5–5)
PRO-BNP: 7519 PG/ML (ref 0–125)
PRO-BNP: ABNORMAL PG/ML (ref 0–125)
PROCALCITONIN: 0.17 NG/ML (ref 0–0.08)
PROCALCITONIN: 0.3 NG/ML (ref 0–0.08)
PROCALCITONIN: 1.3 NG/ML (ref 0–0.08)
PROMYELOCYTES PERCENT: 0.9 % (ref 0–0)
PROTEIN UA: 100 MG/DL
PROTEIN UA: NEGATIVE MG/DL
PROTEUS SPECIES BY PCR: NOT DETECTED
PROTHROMBIN TIME: 11.2 SEC (ref 9.3–12.4)
PROTHROMBIN TIME: 11.5 SEC (ref 9.3–12.4)
PROTHROMBIN TIME: 11.6 SEC (ref 9.3–12.4)
PROTHROMBIN TIME: 12.4 SEC (ref 9.3–12.4)
PROTHROMBIN TIME: 12.9 SEC (ref 9.3–12.4)
PROTHROMBIN TIME: 14.7 SEC (ref 9.3–12.4)
PROTHROMBIN TIME: 17.4 SEC (ref 9.3–12.4)
PROTHROMBIN TIME: 19.3 SEC (ref 9.3–12.4)
PROTHROMBIN TIME: 22.8 SEC (ref 9.3–12.4)
PROTHROMBIN TIME: 33.9 SEC (ref 9.3–12.4)
PSEUDOMONAS AERUGINOSA BY PCR: NOT DETECTED
RAPID HIV 1&2: NORMAL
RBC # BLD: 2.08 E12/L (ref 3.5–5.5)
RBC # BLD: 2.32 E12/L (ref 3.5–5.5)
RBC # BLD: 2.53 E12/L (ref 3.5–5.5)
RBC # BLD: 2.65 E12/L (ref 3.5–5.5)
RBC # BLD: 2.81 E12/L (ref 3.5–5.5)
RBC # BLD: 2.83 E12/L (ref 3.5–5.5)
RBC # BLD: 2.85 E12/L (ref 3.5–5.5)
RBC # BLD: 2.86 E12/L (ref 3.5–5.5)
RBC # BLD: 2.86 E12/L (ref 3.5–5.5)
RBC # BLD: 2.88 E12/L (ref 3.5–5.5)
RBC # BLD: 2.97 E12/L (ref 3.5–5.5)
RBC # BLD: 2.99 E12/L (ref 3.5–5.5)
RBC # BLD: 3.01 E12/L (ref 3.5–5.5)
RBC # BLD: 3.07 E12/L (ref 3.5–5.5)
RBC # BLD: 3.07 E12/L (ref 3.5–5.5)
RBC # BLD: 3.1 E12/L (ref 3.5–5.5)
RBC # BLD: 3.16 E12/L (ref 3.5–5.5)
RBC # BLD: 3.26 E12/L (ref 3.5–5.5)
RBC # BLD: 3.53 E12/L (ref 3.5–5.5)
RBC # BLD: 3.53 E12/L (ref 3.5–5.5)
RBC # BLD: 3.55 E12/L (ref 3.5–5.5)
RBC # BLD: 3.55 E12/L (ref 3.5–5.5)
RBC # BLD: 3.89 E12/L (ref 3.5–5.5)
RBC # BLD: 3.93 E12/L (ref 3.5–5.5)
RBC # BLD: 4.06 E12/L (ref 3.5–5.5)
RBC # BLD: 4.39 E12/L (ref 3.5–5.5)
RBC # BLD: 4.92 E12/L (ref 3.5–5.5)
RBC # BLD: 5.04 E12/L (ref 3.5–5.5)
RBC # BLD: 5.12 E12/L (ref 3.5–5.5)
RBC UA: ABNORMAL /HPF (ref 0–2)
RESPIRATORY SYNCYTIAL VIRUS BY PCR: NOT DETECTED
RI(T): 2.5
RI(T): 2.6
RI(T): 2.79
RI(T): 3.19
RI(T): 3.44
RI(T): 3.72
RI(T): 3.82
RI(T): 43 %
RI(T): 44 %
RI(T): 82 %
RR MECHANICAL: 16 B/MIN
SALICYLATE, SERUM: 1 MG/DL (ref 0–30)
SARS-COV-2, NAAT: NOT DETECTED
SARS-COV-2, NAAT: NOT DETECTED
SARS-COV-2, PCR: NOT DETECTED
SARS-COV-2: NOT DETECTED
SEDIMENTATION RATE, ERYTHROCYTE: 113 MM/HR (ref 0–20)
SERRATIA MARCESCENS BY PCR: NOT DETECTED
SMEAR, RESPIRATORY: NORMAL
SMUDGE CELLS: ABNORMAL
SODIUM BLD-SCNC: 121 MMOL/L (ref 132–146)
SODIUM BLD-SCNC: 125 MMOL/L (ref 132–146)
SODIUM BLD-SCNC: 129 MMOL/L (ref 132–146)
SODIUM BLD-SCNC: 130 MMOL/L (ref 132–146)
SODIUM BLD-SCNC: 130 MMOL/L (ref 132–146)
SODIUM BLD-SCNC: 131 MMOL/L (ref 132–146)
SODIUM BLD-SCNC: 131 MMOL/L (ref 132–146)
SODIUM BLD-SCNC: 132 MMOL/L (ref 132–146)
SODIUM BLD-SCNC: 133 MMOL/L (ref 132–146)
SODIUM BLD-SCNC: 133 MMOL/L (ref 132–146)
SODIUM BLD-SCNC: 134 MMOL/L (ref 132–146)
SODIUM BLD-SCNC: 135 MMOL/L (ref 132–146)
SODIUM BLD-SCNC: 136 MMOL/L (ref 132–146)
SODIUM BLD-SCNC: 137 MMOL/L (ref 132–146)
SODIUM BLD-SCNC: 138 MMOL/L (ref 132–146)
SODIUM BLD-SCNC: 139 MMOL/L (ref 132–146)
SODIUM BLD-SCNC: 140 MMOL/L (ref 132–146)
SODIUM BLD-SCNC: 141 MMOL/L (ref 132–146)
SODIUM BLD-SCNC: 142 MMOL/L (ref 132–146)
SODIUM BLD-SCNC: 142 MMOL/L (ref 132–146)
SODIUM BLD-SCNC: 144 MMOL/L (ref 132–146)
SODIUM BLD-SCNC: 144 MMOL/L (ref 132–146)
SOURCE OF BLOOD CULTURE: ABNORMAL
SOURCE, BLOOD GAS: ABNORMAL
SOURCE: NORMAL
SPECIFIC GRAVITY UA: 1.02 (ref 1–1.03)
SPECIFIC GRAVITY UA: <=1.005 (ref 1–1.03)
STAPHYLOCOCCUS AUREUS BY PCR: NOT DETECTED
STAPHYLOCOCCUS SPECIES BY PCR: DETECTED
STOMATOCYTES: ABNORMAL
STREPTOCOCCUS AGALACTIAE BY PCR: NOT DETECTED
STREPTOCOCCUS PNEUMONIAE BY PCR: NOT DETECTED
STREPTOCOCCUS PYOGENES  BY PCR: NOT DETECTED
STREPTOCOCCUS SPECIES BY PCR: NOT DETECTED
TARGET CELLS: ABNORMAL
THB: 10.4 G/DL (ref 11.5–16.5)
THB: 10.4 G/DL (ref 11.5–16.5)
THB: 10.9 G/DL (ref 11.5–16.5)
THB: 12 G/DL (ref 11.5–16.5)
THB: 12.6 G/DL (ref 11.5–16.5)
THB: 13.7 G/DL (ref 11.5–16.5)
THB: 15.8 G/DL (ref 11.5–16.5)
THB: 6 G/DL (ref 11.5–16.5)
THB: 6.2 G/DL (ref 11.5–16.5)
THB: 7.6 G/DL (ref 11.5–16.5)
THB: 7.6 G/DL (ref 11.5–16.5)
THB: 9.3 G/DL (ref 11.5–16.5)
THB: 9.6 G/DL (ref 11.5–16.5)
THB: 9.8 G/DL (ref 11.5–16.5)
THROMBOPHILIA DNA ASSAY: NORMAL
TIME ANALYZED: 1032
TIME ANALYZED: 106
TIME ANALYZED: 1218
TIME ANALYZED: 1413
TIME ANALYZED: 1932
TIME ANALYZED: 2335
TIME ANALYZED: 2339
TIME ANALYZED: 424
TIME ANALYZED: 429
TIME ANALYZED: 508
TIME ANALYZED: 519
TIME ANALYZED: 527
TIME ANALYZED: 550
TIME ANALYZED: 603
TOTAL CK: 13 U/L (ref 20–180)
TOTAL CK: 256 U/L (ref 20–180)
TOTAL CK: 731 U/L (ref 20–180)
TOTAL PROTEIN: 4.9 G/DL (ref 6.4–8.3)
TOTAL PROTEIN: 5 G/DL (ref 6.4–8.3)
TOTAL PROTEIN: 5.1 G/DL (ref 6.4–8.3)
TOTAL PROTEIN: 5.2 G/DL (ref 6.4–8.3)
TOTAL PROTEIN: 5.4 G/DL (ref 6.4–8.3)
TOTAL PROTEIN: 5.4 G/DL (ref 6.4–8.3)
TOTAL PROTEIN: 5.5 G/DL (ref 6.4–8.3)
TOTAL PROTEIN: 5.5 G/DL (ref 6.4–8.3)
TOTAL PROTEIN: 5.6 G/DL (ref 6.4–8.3)
TOTAL PROTEIN: 5.6 G/DL (ref 6.4–8.3)
TOTAL PROTEIN: 5.7 G/DL (ref 6.4–8.3)
TOTAL PROTEIN: 5.7 G/DL (ref 6.4–8.3)
TOTAL PROTEIN: 5.8 G/DL (ref 6.4–8.3)
TOTAL PROTEIN: 6.4 G/DL (ref 6.4–8.3)
TOTAL PROTEIN: 6.5 G/DL (ref 6.4–8.3)
TOTAL PROTEIN: 6.5 G/DL (ref 6.4–8.3)
TOTAL PROTEIN: 7.1 G/DL (ref 6.4–8.3)
TOTAL PROTEIN: 7.7 G/DL (ref 6.4–8.3)
TRICYCLIC ANTIDEPRESSANTS SCREEN SERUM: NEGATIVE NG/ML
TRIGL SERPL-MCNC: 405 MG/DL (ref 0–149)
TROPONIN, HIGH SENSITIVITY: 414 NG/L (ref 0–9)
TROPONIN: 0.17 NG/ML (ref 0–0.03)
TROPONIN: 0.18 NG/ML (ref 0–0.03)
TROPONIN: 0.27 NG/ML (ref 0–0.03)
TROPONIN: <0.01 NG/ML (ref 0–0.03)
TROPONIN: <0.01 NG/ML (ref 0–0.03)
URINE CULTURE, ROUTINE: ABNORMAL
UROBILINOGEN, URINE: 0.2 E.U./DL
UROBILINOGEN, URINE: 0.2 E.U./DL
VACUOLATED NEUTROPHILS: ABNORMAL
VANCOMYCIN RANDOM: 20.4 MCG/ML (ref 5–40)
VT MECHANICAL: 400 ML
VT MECHANICAL: 500 ML
VT MECHANICAL: 500 ML
WBC # BLD: 10.1 E9/L (ref 4.5–11.5)
WBC # BLD: 10.4 E9/L (ref 4.5–11.5)
WBC # BLD: 10.4 E9/L (ref 4.5–11.5)
WBC # BLD: 11.1 E9/L (ref 4.5–11.5)
WBC # BLD: 11.3 E9/L (ref 4.5–11.5)
WBC # BLD: 12.3 E9/L (ref 4.5–11.5)
WBC # BLD: 12.8 E9/L (ref 4.5–11.5)
WBC # BLD: 12.8 E9/L (ref 4.5–11.5)
WBC # BLD: 12.9 E9/L (ref 4.5–11.5)
WBC # BLD: 13.7 E9/L (ref 4.5–11.5)
WBC # BLD: 13.9 E9/L (ref 4.5–11.5)
WBC # BLD: 14.4 E9/L (ref 4.5–11.5)
WBC # BLD: 14.6 E9/L (ref 4.5–11.5)
WBC # BLD: 14.7 E9/L (ref 4.5–11.5)
WBC # BLD: 14.8 E9/L (ref 4.5–11.5)
WBC # BLD: 15.1 E9/L (ref 4.5–11.5)
WBC # BLD: 15.4 E9/L (ref 4.5–11.5)
WBC # BLD: 15.5 E9/L (ref 4.5–11.5)
WBC # BLD: 15.6 E9/L (ref 4.5–11.5)
WBC # BLD: 16.7 E9/L (ref 4.5–11.5)
WBC # BLD: 22.5 E9/L (ref 4.5–11.5)
WBC # BLD: 23.2 E9/L (ref 4.5–11.5)
WBC # BLD: 23.2 E9/L (ref 4.5–11.5)
WBC # BLD: 24 E9/L (ref 4.5–11.5)
WBC # BLD: 24.1 E9/L (ref 4.5–11.5)
WBC # BLD: 6.5 E9/L (ref 4.5–11.5)
WBC # BLD: 6.9 E9/L (ref 4.5–11.5)
WBC # BLD: 7.9 E9/L (ref 4.5–11.5)
WBC # BLD: 9.7 E9/L (ref 4.5–11.5)
WBC UA: >20 /HPF (ref 0–5)
WOUND/ABSCESS: ABNORMAL

## 2021-01-01 PROCEDURE — C9113 INJ PANTOPRAZOLE SODIUM, VIA: HCPCS | Performed by: INTERNAL MEDICINE

## 2021-01-01 PROCEDURE — 2700000000 HC OXYGEN THERAPY PER DAY

## 2021-01-01 PROCEDURE — 80053 COMPREHEN METABOLIC PANEL: CPT

## 2021-01-01 PROCEDURE — 93005 ELECTROCARDIOGRAM TRACING: CPT | Performed by: FAMILY MEDICINE

## 2021-01-01 PROCEDURE — 2580000003 HC RX 258: Performed by: STUDENT IN AN ORGANIZED HEALTH CARE EDUCATION/TRAINING PROGRAM

## 2021-01-01 PROCEDURE — 85730 THROMBOPLASTIN TIME PARTIAL: CPT

## 2021-01-01 PROCEDURE — 3700000000 HC ANESTHESIA ATTENDED CARE: Performed by: SURGERY

## 2021-01-01 PROCEDURE — 6360000002 HC RX W HCPCS: Performed by: STUDENT IN AN ORGANIZED HEALTH CARE EDUCATION/TRAINING PROGRAM

## 2021-01-01 PROCEDURE — 99291 CRITICAL CARE FIRST HOUR: CPT | Performed by: SURGERY

## 2021-01-01 PROCEDURE — 6360000002 HC RX W HCPCS: Performed by: SURGERY

## 2021-01-01 PROCEDURE — 2060000000 HC ICU INTERMEDIATE R&B

## 2021-01-01 PROCEDURE — 86923 COMPATIBILITY TEST ELECTRIC: CPT

## 2021-01-01 PROCEDURE — 93923 UPR/LXTR ART STDY 3+ LVLS: CPT

## 2021-01-01 PROCEDURE — 82550 ASSAY OF CK (CPK): CPT

## 2021-01-01 PROCEDURE — 93325 DOPPLER ECHO COLOR FLOW MAPG: CPT

## 2021-01-01 PROCEDURE — 36415 COLL VENOUS BLD VENIPUNCTURE: CPT

## 2021-01-01 PROCEDURE — 71045 X-RAY EXAM CHEST 1 VIEW: CPT

## 2021-01-01 PROCEDURE — 80048 BASIC METABOLIC PNL TOTAL CA: CPT

## 2021-01-01 PROCEDURE — 3700000001 HC ADD 15 MINUTES (ANESTHESIA): Performed by: SURGERY

## 2021-01-01 PROCEDURE — 6370000000 HC RX 637 (ALT 250 FOR IP): Performed by: STUDENT IN AN ORGANIZED HEALTH CARE EDUCATION/TRAINING PROGRAM

## 2021-01-01 PROCEDURE — 2580000003 HC RX 258: Performed by: SURGERY

## 2021-01-01 PROCEDURE — 85610 PROTHROMBIN TIME: CPT

## 2021-01-01 PROCEDURE — 2500000003 HC RX 250 WO HCPCS: Performed by: STUDENT IN AN ORGANIZED HEALTH CARE EDUCATION/TRAINING PROGRAM

## 2021-01-01 PROCEDURE — 6360000002 HC RX W HCPCS: Performed by: NURSE PRACTITIONER

## 2021-01-01 PROCEDURE — 82330 ASSAY OF CALCIUM: CPT

## 2021-01-01 PROCEDURE — 97606 NEG PRS WND THER DME>50 SQCM: CPT

## 2021-01-01 PROCEDURE — 82140 ASSAY OF AMMONIA: CPT

## 2021-01-01 PROCEDURE — 2500000003 HC RX 250 WO HCPCS: Performed by: INTERNAL MEDICINE

## 2021-01-01 PROCEDURE — 2500000003 HC RX 250 WO HCPCS: Performed by: NURSE ANESTHETIST, CERTIFIED REGISTERED

## 2021-01-01 PROCEDURE — 2580000003 HC RX 258: Performed by: INTERNAL MEDICINE

## 2021-01-01 PROCEDURE — 81291 MTHFR GENE: CPT

## 2021-01-01 PROCEDURE — 6370000000 HC RX 637 (ALT 250 FOR IP): Performed by: SURGERY

## 2021-01-01 PROCEDURE — 2000000000 HC ICU R&B

## 2021-01-01 PROCEDURE — 84100 ASSAY OF PHOSPHORUS: CPT

## 2021-01-01 PROCEDURE — 97530 THERAPEUTIC ACTIVITIES: CPT

## 2021-01-01 PROCEDURE — 6370000000 HC RX 637 (ALT 250 FOR IP): Performed by: INTERNAL MEDICINE

## 2021-01-01 PROCEDURE — 2022F DILAT RTA XM EVC RTNOPTHY: CPT | Performed by: INTERNAL MEDICINE

## 2021-01-01 PROCEDURE — 3700000001 HC ADD 15 MINUTES (ANESTHESIA)

## 2021-01-01 PROCEDURE — 2709999900 HC NON-CHARGEABLE SUPPLY: Performed by: SURGERY

## 2021-01-01 PROCEDURE — 88311 DECALCIFY TISSUE: CPT

## 2021-01-01 PROCEDURE — 82803 BLOOD GASES ANY COMBINATION: CPT

## 2021-01-01 PROCEDURE — 83690 ASSAY OF LIPASE: CPT

## 2021-01-01 PROCEDURE — 83605 ASSAY OF LACTIC ACID: CPT

## 2021-01-01 PROCEDURE — 87186 SC STD MICRODIL/AGAR DIL: CPT

## 2021-01-01 PROCEDURE — 96361 HYDRATE IV INFUSION ADD-ON: CPT

## 2021-01-01 PROCEDURE — 82962 GLUCOSE BLOOD TEST: CPT

## 2021-01-01 PROCEDURE — P9016 RBC LEUKOCYTES REDUCED: HCPCS

## 2021-01-01 PROCEDURE — 84484 ASSAY OF TROPONIN QUANT: CPT

## 2021-01-01 PROCEDURE — C1894 INTRO/SHEATH, NON-LASER: HCPCS | Performed by: SURGERY

## 2021-01-01 PROCEDURE — 2580000003 HC RX 258: Performed by: NURSE PRACTITIONER

## 2021-01-01 PROCEDURE — 2500000003 HC RX 250 WO HCPCS

## 2021-01-01 PROCEDURE — 36620 INSERTION CATHETER ARTERY: CPT

## 2021-01-01 PROCEDURE — 85613 RUSSELL VIPER VENOM DILUTED: CPT

## 2021-01-01 PROCEDURE — 6360000002 HC RX W HCPCS: Performed by: ANESTHESIOLOGIST ASSISTANT

## 2021-01-01 PROCEDURE — 85025 COMPLETE CBC W/AUTO DIFF WBC: CPT

## 2021-01-01 PROCEDURE — 81240 F2 GENE: CPT

## 2021-01-01 PROCEDURE — G0378 HOSPITAL OBSERVATION PER HR: HCPCS

## 2021-01-01 PROCEDURE — 2709999900 HC NON-CHARGEABLE SUPPLY: Performed by: PODIATRIST

## 2021-01-01 PROCEDURE — 87081 CULTURE SCREEN ONLY: CPT

## 2021-01-01 PROCEDURE — 83735 ASSAY OF MAGNESIUM: CPT

## 2021-01-01 PROCEDURE — 3046F HEMOGLOBIN A1C LEVEL >9.0%: CPT | Performed by: INTERNAL MEDICINE

## 2021-01-01 PROCEDURE — 86140 C-REACTIVE PROTEIN: CPT

## 2021-01-01 PROCEDURE — 2580000003 HC RX 258: Performed by: FAMILY MEDICINE

## 2021-01-01 PROCEDURE — 2500000003 HC RX 250 WO HCPCS: Performed by: SURGERY

## 2021-01-01 PROCEDURE — 6360000002 HC RX W HCPCS: Performed by: INTERNAL MEDICINE

## 2021-01-01 PROCEDURE — 37799 UNLISTED PX VASCULAR SURGERY: CPT

## 2021-01-01 PROCEDURE — 90935 HEMODIALYSIS ONE EVALUATION: CPT

## 2021-01-01 PROCEDURE — 93320 DOPPLER ECHO COMPLETE: CPT | Performed by: INTERNAL MEDICINE

## 2021-01-01 PROCEDURE — 87150 DNA/RNA AMPLIFIED PROBE: CPT

## 2021-01-01 PROCEDURE — 85378 FIBRIN DEGRADE SEMIQUANT: CPT

## 2021-01-01 PROCEDURE — 13160 SEC CLSR SURG WND/DEHSN XTN: CPT | Performed by: SURGERY

## 2021-01-01 PROCEDURE — C9113 INJ PANTOPRAZOLE SODIUM, VIA: HCPCS | Performed by: STUDENT IN AN ORGANIZED HEALTH CARE EDUCATION/TRAINING PROGRAM

## 2021-01-01 PROCEDURE — 34201 REMOVAL OF ARTERY CLOT: CPT | Performed by: SURGERY

## 2021-01-01 PROCEDURE — 97162 PT EVAL MOD COMPLEX 30 MIN: CPT

## 2021-01-01 PROCEDURE — 82728 ASSAY OF FERRITIN: CPT

## 2021-01-01 PROCEDURE — 6360000002 HC RX W HCPCS

## 2021-01-01 PROCEDURE — 84145 PROCALCITONIN (PCT): CPT

## 2021-01-01 PROCEDURE — 3600000003 HC SURGERY LEVEL 3 BASE: Performed by: SURGERY

## 2021-01-01 PROCEDURE — 94667 MNPJ CHEST WALL 1ST: CPT

## 2021-01-01 PROCEDURE — 2580000003 HC RX 258: Performed by: ANESTHESIOLOGIST ASSISTANT

## 2021-01-01 PROCEDURE — 82805 BLOOD GASES W/O2 SATURATION: CPT

## 2021-01-01 PROCEDURE — 4004F PT TOBACCO SCREEN RCVD TLK: CPT | Performed by: INTERNAL MEDICINE

## 2021-01-01 PROCEDURE — 93010 ELECTROCARDIOGRAM REPORT: CPT | Performed by: INTERNAL MEDICINE

## 2021-01-01 PROCEDURE — 6360000002 HC RX W HCPCS: Performed by: ANESTHESIOLOGY

## 2021-01-01 PROCEDURE — 85027 COMPLETE CBC AUTOMATED: CPT

## 2021-01-01 PROCEDURE — 3700000001 HC ADD 15 MINUTES (ANESTHESIA): Performed by: PODIATRIST

## 2021-01-01 PROCEDURE — 06HY33Z INSERTION OF INFUSION DEVICE INTO LOWER VEIN, PERCUTANEOUS APPROACH: ICD-10-PCS | Performed by: STUDENT IN AN ORGANIZED HEALTH CARE EDUCATION/TRAINING PROGRAM

## 2021-01-01 PROCEDURE — 0BH18EZ INSERTION OF ENDOTRACHEAL AIRWAY INTO TRACHEA, VIA NATURAL OR ARTIFICIAL OPENING ENDOSCOPIC: ICD-10-PCS | Performed by: INTERNAL MEDICINE

## 2021-01-01 PROCEDURE — 6360000002 HC RX W HCPCS: Performed by: NURSE ANESTHETIST, CERTIFIED REGISTERED

## 2021-01-01 PROCEDURE — 6370000000 HC RX 637 (ALT 250 FOR IP): Performed by: NURSE PRACTITIONER

## 2021-01-01 PROCEDURE — 36558 INSERT TUNNELED CV CATH: CPT | Performed by: SURGERY

## 2021-01-01 PROCEDURE — 87205 SMEAR GRAM STAIN: CPT

## 2021-01-01 PROCEDURE — 05HM33Z INSERTION OF INFUSION DEVICE INTO RIGHT INTERNAL JUGULAR VEIN, PERCUTANEOUS APPROACH: ICD-10-PCS | Performed by: SURGERY

## 2021-01-01 PROCEDURE — 3600000013 HC SURGERY LEVEL 3 ADDTL 15MIN: Performed by: SURGERY

## 2021-01-01 PROCEDURE — 6360000004 HC RX CONTRAST MEDICATION: Performed by: RADIOLOGY

## 2021-01-01 PROCEDURE — P9047 ALBUMIN (HUMAN), 25%, 50ML: HCPCS | Performed by: SURGERY

## 2021-01-01 PROCEDURE — 74177 CT ABD & PELVIS W/CONTRAST: CPT

## 2021-01-01 PROCEDURE — C1769 GUIDE WIRE: HCPCS | Performed by: SURGERY

## 2021-01-01 PROCEDURE — 6370000000 HC RX 637 (ALT 250 FOR IP): Performed by: FAMILY MEDICINE

## 2021-01-01 PROCEDURE — 6360000002 HC RX W HCPCS: Performed by: FAMILY MEDICINE

## 2021-01-01 PROCEDURE — 2580000003 HC RX 258: Performed by: EMERGENCY MEDICINE

## 2021-01-01 PROCEDURE — 36430 TRANSFUSION BLD/BLD COMPNT: CPT

## 2021-01-01 PROCEDURE — 94003 VENT MGMT INPAT SUBQ DAY: CPT

## 2021-01-01 PROCEDURE — 81001 URINALYSIS AUTO W/SCOPE: CPT

## 2021-01-01 PROCEDURE — 93970 EXTREMITY STUDY: CPT

## 2021-01-01 PROCEDURE — 82800 BLOOD PH: CPT

## 2021-01-01 PROCEDURE — P9047 ALBUMIN (HUMAN), 25%, 50ML: HCPCS | Performed by: STUDENT IN AN ORGANIZED HEALTH CARE EDUCATION/TRAINING PROGRAM

## 2021-01-01 PROCEDURE — 93321 DOPPLER ECHO F-UP/LMTD STD: CPT

## 2021-01-01 PROCEDURE — 2500000003 HC RX 250 WO HCPCS: Performed by: FAMILY MEDICINE

## 2021-01-01 PROCEDURE — 94640 AIRWAY INHALATION TREATMENT: CPT

## 2021-01-01 PROCEDURE — 7100000001 HC PACU RECOVERY - ADDTL 15 MIN: Performed by: PODIATRIST

## 2021-01-01 PROCEDURE — 99024 POSTOP FOLLOW-UP VISIT: CPT | Performed by: SURGERY

## 2021-01-01 PROCEDURE — 93005 ELECTROCARDIOGRAM TRACING: CPT | Performed by: STUDENT IN AN ORGANIZED HEALTH CARE EDUCATION/TRAINING PROGRAM

## 2021-01-01 PROCEDURE — 94002 VENT MGMT INPAT INIT DAY: CPT

## 2021-01-01 PROCEDURE — 83036 HEMOGLOBIN GLYCOSYLATED A1C: CPT

## 2021-01-01 PROCEDURE — 99284 EMERGENCY DEPT VISIT MOD MDM: CPT

## 2021-01-01 PROCEDURE — 80202 ASSAY OF VANCOMYCIN: CPT

## 2021-01-01 PROCEDURE — 82533 TOTAL CORTISOL: CPT

## 2021-01-01 PROCEDURE — 81400 MOPATH PROCEDURE LEVEL 1: CPT

## 2021-01-01 PROCEDURE — 85018 HEMOGLOBIN: CPT

## 2021-01-01 PROCEDURE — 99255 IP/OBS CONSLTJ NEW/EST HI 80: CPT | Performed by: INTERNAL MEDICINE

## 2021-01-01 PROCEDURE — 5A1955Z RESPIRATORY VENTILATION, GREATER THAN 96 CONSECUTIVE HOURS: ICD-10-PCS | Performed by: STUDENT IN AN ORGANIZED HEALTH CARE EDUCATION/TRAINING PROGRAM

## 2021-01-01 PROCEDURE — 96365 THER/PROPH/DIAG IV INF INIT: CPT

## 2021-01-01 PROCEDURE — 82553 CREATINE MB FRACTION: CPT

## 2021-01-01 PROCEDURE — 97161 PT EVAL LOW COMPLEX 20 MIN: CPT

## 2021-01-01 PROCEDURE — 80143 DRUG ASSAY ACETAMINOPHEN: CPT

## 2021-01-01 PROCEDURE — 87206 SMEAR FLUORESCENT/ACID STAI: CPT

## 2021-01-01 PROCEDURE — 90935 HEMODIALYSIS ONE EVALUATION: CPT | Performed by: INTERNAL MEDICINE

## 2021-01-01 PROCEDURE — 31500 INSERT EMERGENCY AIRWAY: CPT

## 2021-01-01 PROCEDURE — 99233 SBSQ HOSP IP/OBS HIGH 50: CPT | Performed by: INTERNAL MEDICINE

## 2021-01-01 PROCEDURE — 87077 CULTURE AEROBIC IDENTIFY: CPT

## 2021-01-01 PROCEDURE — 86850 RBC ANTIBODY SCREEN: CPT

## 2021-01-01 PROCEDURE — 87340 HEPATITIS B SURFACE AG IA: CPT

## 2021-01-01 PROCEDURE — 87040 BLOOD CULTURE FOR BACTERIA: CPT

## 2021-01-01 PROCEDURE — 99291 CRITICAL CARE FIRST HOUR: CPT

## 2021-01-01 PROCEDURE — 3600000014 HC SURGERY LEVEL 4 ADDTL 15MIN: Performed by: SURGERY

## 2021-01-01 PROCEDURE — 97166 OT EVAL MOD COMPLEX 45 MIN: CPT

## 2021-01-01 PROCEDURE — 2580000003 HC RX 258: Performed by: NURSE ANESTHETIST, CERTIFIED REGISTERED

## 2021-01-01 PROCEDURE — 5A1D70Z PERFORMANCE OF URINARY FILTRATION, INTERMITTENT, LESS THAN 6 HOURS PER DAY: ICD-10-PCS | Performed by: INTERNAL MEDICINE

## 2021-01-01 PROCEDURE — 3600000004 HC SURGERY LEVEL 4 BASE: Performed by: SURGERY

## 2021-01-01 PROCEDURE — P9047 ALBUMIN (HUMAN), 25%, 50ML: HCPCS | Performed by: INTERNAL MEDICINE

## 2021-01-01 PROCEDURE — 7100000000 HC PACU RECOVERY - FIRST 15 MIN: Performed by: SURGERY

## 2021-01-01 PROCEDURE — 76937 US GUIDE VASCULAR ACCESS: CPT | Performed by: SURGERY

## 2021-01-01 PROCEDURE — 3209999900 FLUORO FOR SURGICAL PROCEDURES

## 2021-01-01 PROCEDURE — 5A1935Z RESPIRATORY VENTILATION, LESS THAN 24 CONSECUTIVE HOURS: ICD-10-PCS | Performed by: INTERNAL MEDICINE

## 2021-01-01 PROCEDURE — 87075 CULTR BACTERIA EXCEPT BLOOD: CPT

## 2021-01-01 PROCEDURE — 88305 TISSUE EXAM BY PATHOLOGIST: CPT

## 2021-01-01 PROCEDURE — 80074 ACUTE HEPATITIS PANEL: CPT

## 2021-01-01 PROCEDURE — 97110 THERAPEUTIC EXERCISES: CPT

## 2021-01-01 PROCEDURE — 97535 SELF CARE MNGMENT TRAINING: CPT

## 2021-01-01 PROCEDURE — 96375 TX/PRO/DX INJ NEW DRUG ADDON: CPT

## 2021-01-01 PROCEDURE — 3700000000 HC ANESTHESIA ATTENDED CARE: Performed by: PODIATRIST

## 2021-01-01 PROCEDURE — 87088 URINE BACTERIA CULTURE: CPT

## 2021-01-01 PROCEDURE — 96366 THER/PROPH/DIAG IV INF ADDON: CPT

## 2021-01-01 PROCEDURE — 3600000002 HC SURGERY LEVEL 2 BASE: Performed by: SURGERY

## 2021-01-01 PROCEDURE — C1881 DIALYSIS ACCESS SYSTEM: HCPCS | Performed by: SURGERY

## 2021-01-01 PROCEDURE — G8484 FLU IMMUNIZE NO ADMIN: HCPCS | Performed by: INTERNAL MEDICINE

## 2021-01-01 PROCEDURE — 0DTH0ZZ RESECTION OF CECUM, OPEN APPROACH: ICD-10-PCS | Performed by: STUDENT IN AN ORGANIZED HEALTH CARE EDUCATION/TRAINING PROGRAM

## 2021-01-01 PROCEDURE — 2580000003 HC RX 258

## 2021-01-01 PROCEDURE — 3600000012 HC SURGERY LEVEL 2 ADDTL 15MIN: Performed by: SURGERY

## 2021-01-01 PROCEDURE — 74174 CTA ABD&PLVS W/CONTRAST: CPT

## 2021-01-01 PROCEDURE — 0JH63XZ INSERTION OF TUNNELED VASCULAR ACCESS DEVICE INTO CHEST SUBCUTANEOUS TISSUE AND FASCIA, PERCUTANEOUS APPROACH: ICD-10-PCS | Performed by: SURGERY

## 2021-01-01 PROCEDURE — 6370000000 HC RX 637 (ALT 250 FOR IP): Performed by: SPECIALIST

## 2021-01-01 PROCEDURE — 73620 X-RAY EXAM OF FOOT: CPT

## 2021-01-01 PROCEDURE — 82077 ASSAY SPEC XCP UR&BREATH IA: CPT

## 2021-01-01 PROCEDURE — 87635 SARS-COV-2 COVID-19 AMP PRB: CPT

## 2021-01-01 PROCEDURE — 2500000003 HC RX 250 WO HCPCS: Performed by: ANESTHESIOLOGY

## 2021-01-01 PROCEDURE — 6360000002 HC RX W HCPCS: Performed by: SPECIALIST

## 2021-01-01 PROCEDURE — 36556 INSERT NON-TUNNEL CV CATH: CPT

## 2021-01-01 PROCEDURE — 85384 FIBRINOGEN ACTIVITY: CPT

## 2021-01-01 PROCEDURE — 87070 CULTURE OTHR SPECIMN AEROBIC: CPT

## 2021-01-01 PROCEDURE — 7100000001 HC PACU RECOVERY - ADDTL 15 MIN: Performed by: SURGERY

## 2021-01-01 PROCEDURE — 93971 EXTREMITY STUDY: CPT | Performed by: RADIOLOGY

## 2021-01-01 PROCEDURE — 83880 ASSAY OF NATRIURETIC PEPTIDE: CPT

## 2021-01-01 PROCEDURE — 6360000002 HC RX W HCPCS: Performed by: EMERGENCY MEDICINE

## 2021-01-01 PROCEDURE — 04CC0ZZ EXTIRPATION OF MATTER FROM RIGHT COMMON ILIAC ARTERY, OPEN APPROACH: ICD-10-PCS | Performed by: SURGERY

## 2021-01-01 PROCEDURE — 99233 SBSQ HOSP IP/OBS HIGH 50: CPT | Performed by: SURGERY

## 2021-01-01 PROCEDURE — 3017F COLORECTAL CA SCREEN DOC REV: CPT | Performed by: INTERNAL MEDICINE

## 2021-01-01 PROCEDURE — 85347 COAGULATION TIME ACTIVATED: CPT

## 2021-01-01 PROCEDURE — 99222 1ST HOSP IP/OBS MODERATE 55: CPT | Performed by: INTERNAL MEDICINE

## 2021-01-01 PROCEDURE — 0J9N00Z DRAINAGE OF RIGHT LOWER LEG SUBCUTANEOUS TISSUE AND FASCIA WITH DRAINAGE DEVICE, OPEN APPROACH: ICD-10-PCS | Performed by: SURGERY

## 2021-01-01 PROCEDURE — C1751 CATH, INF, PER/CENT/MIDLINE: HCPCS

## 2021-01-01 PROCEDURE — 86901 BLOOD TYPING SEROLOGIC RH(D): CPT

## 2021-01-01 PROCEDURE — U0003 INFECTIOUS AGENT DETECTION BY NUCLEIC ACID (DNA OR RNA); SEVERE ACUTE RESPIRATORY SYNDROME CORONAVIRUS 2 (SARS-COV-2) (CORONAVIRUS DISEASE [COVID-19]), AMPLIFIED PROBE TECHNIQUE, MAKING USE OF HIGH THROUGHPUT TECHNOLOGIES AS DESCRIBED BY CMS-2020-01-R: HCPCS

## 2021-01-01 PROCEDURE — 2580000003 HC RX 258: Performed by: SPECIALIST

## 2021-01-01 PROCEDURE — 99233 SBSQ HOSP IP/OBS HIGH 50: CPT | Performed by: NURSE PRACTITIONER

## 2021-01-01 PROCEDURE — 84478 ASSAY OF TRIGLYCERIDES: CPT

## 2021-01-01 PROCEDURE — 99222 1ST HOSP IP/OBS MODERATE 55: CPT | Performed by: SPECIALIST

## 2021-01-01 PROCEDURE — 71275 CT ANGIOGRAPHY CHEST: CPT

## 2021-01-01 PROCEDURE — 77001 FLUOROGUIDE FOR VEIN DEVICE: CPT | Performed by: SURGERY

## 2021-01-01 PROCEDURE — 02HV33Z INSERTION OF INFUSION DEVICE INTO SUPERIOR VENA CAVA, PERCUTANEOUS APPROACH: ICD-10-PCS | Performed by: STUDENT IN AN ORGANIZED HEALTH CARE EDUCATION/TRAINING PROGRAM

## 2021-01-01 PROCEDURE — 93325 DOPPLER ECHO COLOR FLOW MAPG: CPT | Performed by: INTERNAL MEDICINE

## 2021-01-01 PROCEDURE — 86146 BETA-2 GLYCOPROTEIN ANTIBODY: CPT

## 2021-01-01 PROCEDURE — 0BH18EZ INSERTION OF ENDOTRACHEAL AIRWAY INTO TRACHEA, VIA NATURAL OR ARTIFICIAL OPENING ENDOSCOPIC: ICD-10-PCS | Performed by: STUDENT IN AN ORGANIZED HEALTH CARE EDUCATION/TRAINING PROGRAM

## 2021-01-01 PROCEDURE — C1757 CATH, THROMBECTOMY/EMBOLECT: HCPCS | Performed by: SURGERY

## 2021-01-01 PROCEDURE — 99204 OFFICE O/P NEW MOD 45 MIN: CPT | Performed by: INTERNAL MEDICINE

## 2021-01-01 PROCEDURE — 93306 TTE W/DOPPLER COMPLETE: CPT

## 2021-01-01 PROCEDURE — 93312 ECHO TRANSESOPHAGEAL: CPT

## 2021-01-01 PROCEDURE — 7100000000 HC PACU RECOVERY - FIRST 15 MIN: Performed by: PODIATRIST

## 2021-01-01 PROCEDURE — 82010 KETONE BODYS QUAN: CPT

## 2021-01-01 PROCEDURE — 86706 HEP B SURFACE ANTIBODY: CPT

## 2021-01-01 PROCEDURE — 80307 DRUG TEST PRSMV CHEM ANLYZR: CPT

## 2021-01-01 PROCEDURE — 86147 CARDIOLIPIN ANTIBODY EA IG: CPT

## 2021-01-01 PROCEDURE — 96374 THER/PROPH/DIAG INJ IV PUSH: CPT

## 2021-01-01 PROCEDURE — 3600000012 HC SURGERY LEVEL 2 ADDTL 15MIN: Performed by: PODIATRIST

## 2021-01-01 PROCEDURE — 3600000002 HC SURGERY LEVEL 2 BASE: Performed by: PODIATRIST

## 2021-01-01 PROCEDURE — 6360000004 HC RX CONTRAST MEDICATION: Performed by: STUDENT IN AN ORGANIZED HEALTH CARE EDUCATION/TRAINING PROGRAM

## 2021-01-01 PROCEDURE — 36592 COLLECT BLOOD FROM PICC: CPT

## 2021-01-01 PROCEDURE — 2500000003 HC RX 250 WO HCPCS: Performed by: ANESTHESIOLOGIST ASSISTANT

## 2021-01-01 PROCEDURE — 94664 DEMO&/EVAL PT USE INHALER: CPT

## 2021-01-01 PROCEDURE — 86900 BLOOD TYPING SEROLOGIC ABO: CPT

## 2021-01-01 PROCEDURE — 83036 HEMOGLOBIN GLYCOSYLATED A1C: CPT | Performed by: INTERNAL MEDICINE

## 2021-01-01 PROCEDURE — XW033N5 INTRODUCTION OF MEROPENEM-VABORBACTAM ANTI-INFECTIVE INTO PERIPHERAL VEIN, PERCUTANEOUS APPROACH, NEW TECHNOLOGY GROUP 5: ICD-10-PCS | Performed by: INTERNAL MEDICINE

## 2021-01-01 PROCEDURE — 0D1B0Z4 BYPASS ILEUM TO CUTANEOUS, OPEN APPROACH: ICD-10-PCS | Performed by: STUDENT IN AN ORGANIZED HEALTH CARE EDUCATION/TRAINING PROGRAM

## 2021-01-01 PROCEDURE — 0D1A0ZH BYPASS JEJUNUM TO CECUM, OPEN APPROACH: ICD-10-PCS | Performed by: STUDENT IN AN ORGANIZED HEALTH CARE EDUCATION/TRAINING PROGRAM

## 2021-01-01 PROCEDURE — 32552 REMOVE LUNG CATHETER: CPT | Performed by: SURGERY

## 2021-01-01 PROCEDURE — 2709999900 HC NON-CHARGEABLE SUPPLY

## 2021-01-01 PROCEDURE — 96368 THER/DIAG CONCURRENT INF: CPT

## 2021-01-01 PROCEDURE — 99232 SBSQ HOSP IP/OBS MODERATE 35: CPT | Performed by: INTERNAL MEDICINE

## 2021-01-01 PROCEDURE — 99232 SBSQ HOSP IP/OBS MODERATE 35: CPT | Performed by: NURSE PRACTITIONER

## 2021-01-01 PROCEDURE — G8417 CALC BMI ABV UP PARAM F/U: HCPCS | Performed by: INTERNAL MEDICINE

## 2021-01-01 PROCEDURE — 80179 DRUG ASSAY SALICYLATE: CPT

## 2021-01-01 PROCEDURE — G8427 DOCREV CUR MEDS BY ELIG CLIN: HCPCS | Performed by: INTERNAL MEDICINE

## 2021-01-01 PROCEDURE — 0DB80ZZ EXCISION OF SMALL INTESTINE, OPEN APPROACH: ICD-10-PCS | Performed by: SURGERY

## 2021-01-01 PROCEDURE — 85014 HEMATOCRIT: CPT

## 2021-01-01 PROCEDURE — 0QB10ZZ EXCISION OF SACRUM, OPEN APPROACH: ICD-10-PCS | Performed by: STUDENT IN AN ORGANIZED HEALTH CARE EDUCATION/TRAINING PROGRAM

## 2021-01-01 PROCEDURE — 99285 EMERGENCY DEPT VISIT HI MDM: CPT

## 2021-01-01 PROCEDURE — 7100000001 HC PACU RECOVERY - ADDTL 15 MIN

## 2021-01-01 PROCEDURE — C9113 INJ PANTOPRAZOLE SODIUM, VIA: HCPCS | Performed by: NURSE PRACTITIONER

## 2021-01-01 PROCEDURE — 81241 F5 GENE: CPT

## 2021-01-01 PROCEDURE — 6370000000 HC RX 637 (ALT 250 FOR IP): Performed by: ANESTHESIOLOGIST ASSISTANT

## 2021-01-01 PROCEDURE — 36556 INSERT NON-TUNNEL CV CATH: CPT | Performed by: SURGERY

## 2021-01-01 PROCEDURE — 93970 EXTREMITY STUDY: CPT | Performed by: RADIOLOGY

## 2021-01-01 PROCEDURE — 87116 MYCOBACTERIA CULTURE: CPT

## 2021-01-01 PROCEDURE — 75635 CT ANGIO ABDOMINAL ARTERIES: CPT

## 2021-01-01 PROCEDURE — P9041 ALBUMIN (HUMAN),5%, 50ML: HCPCS | Performed by: ANESTHESIOLOGIST ASSISTANT

## 2021-01-01 PROCEDURE — 83615 LACTATE (LD) (LDH) ENZYME: CPT

## 2021-01-01 PROCEDURE — 27602 DECOMPRESSION OF LOWER LEG: CPT | Performed by: SURGERY

## 2021-01-01 PROCEDURE — 7100000000 HC PACU RECOVERY - FIRST 15 MIN

## 2021-01-01 PROCEDURE — P9047 ALBUMIN (HUMAN), 25%, 50ML: HCPCS

## 2021-01-01 PROCEDURE — 85651 RBC SED RATE NONAUTOMATED: CPT

## 2021-01-01 PROCEDURE — U0002 COVID-19 LAB TEST NON-CDC: HCPCS

## 2021-01-01 PROCEDURE — 88304 TISSUE EXAM BY PATHOLOGIST: CPT

## 2021-01-01 PROCEDURE — 82947 ASSAY GLUCOSE BLOOD QUANT: CPT

## 2021-01-01 PROCEDURE — 3700000000 HC ANESTHESIA ATTENDED CARE

## 2021-01-01 PROCEDURE — 2500000003 HC RX 250 WO HCPCS: Performed by: PODIATRIST

## 2021-01-01 PROCEDURE — 0202U NFCT DS 22 TRGT SARS-COV-2: CPT

## 2021-01-01 PROCEDURE — 93312 ECHO TRANSESOPHAGEAL: CPT | Performed by: INTERNAL MEDICINE

## 2021-01-01 PROCEDURE — 88307 TISSUE EXAM BY PATHOLOGIST: CPT

## 2021-01-01 PROCEDURE — 87015 SPECIMEN INFECT AGNT CONCNTJ: CPT

## 2021-01-01 PROCEDURE — 93308 TTE F-UP OR LMTD: CPT

## 2021-01-01 PROCEDURE — 87102 FUNGUS ISOLATION CULTURE: CPT

## 2021-01-01 PROCEDURE — 02HV33Z INSERTION OF INFUSION DEVICE INTO SUPERIOR VENA CAVA, PERCUTANEOUS APPROACH: ICD-10-PCS | Performed by: SPECIALIST

## 2021-01-01 PROCEDURE — 2720000010 HC SURG SUPPLY STERILE: Performed by: SURGERY

## 2021-01-01 PROCEDURE — 81003 URINALYSIS AUTO W/O SCOPE: CPT

## 2021-01-01 DEVICE — 15.5F X 32CM PRE-CURVED15.5F X 3 TITAN HD CATHETERTITAN HD CATHET FULL SET W/SIDEHOLESFULL SET W/S (CUFF 27CM FROM TIP)(CUFF 27CM F
Type: IMPLANTABLE DEVICE | Site: CHEST | Status: FUNCTIONAL
Brand: MEDCOMP HEMO-FLOW DOUBLE LUMEN CATHETERMEDCOMP HEMO-FLOW DOUBLE LUMEN CATHETER

## 2021-01-01 RX ORDER — PANTOPRAZOLE SODIUM 40 MG/10ML
40 INJECTION, POWDER, LYOPHILIZED, FOR SOLUTION INTRAVENOUS 2 TIMES DAILY
Status: DISCONTINUED | OUTPATIENT
Start: 2021-01-01 | End: 2021-01-01 | Stop reason: CLARIF

## 2021-01-01 RX ORDER — AMIODARONE HYDROCHLORIDE 200 MG/1
400 TABLET ORAL 2 TIMES DAILY
Status: DISCONTINUED | OUTPATIENT
Start: 2021-01-01 | End: 2021-01-01

## 2021-01-01 RX ORDER — HYDROCORTISONE 20 MG/1
40 TABLET ORAL 2 TIMES DAILY
Status: DISCONTINUED | OUTPATIENT
Start: 2021-01-01 | End: 2021-01-01 | Stop reason: HOSPADM

## 2021-01-01 RX ORDER — CEPHALEXIN 500 MG/1
CAPSULE ORAL
Status: ON HOLD | COMMUNITY
Start: 2021-01-01 | End: 2021-01-01

## 2021-01-01 RX ORDER — SODIUM CHLORIDE 9 MG/ML
25 INJECTION, SOLUTION INTRAVENOUS EVERY 8 HOURS
Status: CANCELLED | OUTPATIENT
Start: 2021-01-01

## 2021-01-01 RX ORDER — ONDANSETRON 2 MG/ML
4 INJECTION INTRAMUSCULAR; INTRAVENOUS EVERY 6 HOURS PRN
Status: CANCELLED | OUTPATIENT
Start: 2021-01-01

## 2021-01-01 RX ORDER — DIGOXIN 0.25 MG/ML
62.5 INJECTION INTRAMUSCULAR; INTRAVENOUS
Status: DISCONTINUED | OUTPATIENT
Start: 2021-01-01 | End: 2021-01-01

## 2021-01-01 RX ORDER — DEXTROSE MONOHYDRATE 50 MG/ML
100 INJECTION, SOLUTION INTRAVENOUS PRN
Status: DISCONTINUED | OUTPATIENT
Start: 2021-01-01 | End: 2021-01-01 | Stop reason: HOSPADM

## 2021-01-01 RX ORDER — HYDROCORTISONE 5 MG/1
5 TABLET ORAL DAILY
Status: DISCONTINUED | OUTPATIENT
Start: 2021-01-01 | End: 2021-01-01 | Stop reason: HOSPADM

## 2021-01-01 RX ORDER — SODIUM CHLORIDE 9 MG/ML
INJECTION, SOLUTION INTRAVENOUS CONTINUOUS
Status: DISCONTINUED | OUTPATIENT
Start: 2021-01-01 | End: 2021-01-01

## 2021-01-01 RX ORDER — HEPARIN SODIUM 1000 [USP'U]/ML
80 INJECTION, SOLUTION INTRAVENOUS; SUBCUTANEOUS PRN
Status: DISCONTINUED | OUTPATIENT
Start: 2021-01-01 | End: 2021-01-01

## 2021-01-01 RX ORDER — PHENYLEPHRINE HCL IN 0.9% NACL 1 MG/10 ML
SYRINGE (ML) INTRAVENOUS PRN
Status: DISCONTINUED | OUTPATIENT
Start: 2021-01-01 | End: 2021-01-01 | Stop reason: SDUPTHER

## 2021-01-01 RX ORDER — CALCIUM CHLORIDE 100 MG/ML
INJECTION INTRAVENOUS; INTRAVENTRICULAR PRN
Status: DISCONTINUED | OUTPATIENT
Start: 2021-01-01 | End: 2021-01-01 | Stop reason: SDUPTHER

## 2021-01-01 RX ORDER — HEPARIN SODIUM 1000 [USP'U]/ML
80 INJECTION, SOLUTION INTRAVENOUS; SUBCUTANEOUS ONCE
Status: COMPLETED | OUTPATIENT
Start: 2021-01-01 | End: 2021-01-01

## 2021-01-01 RX ORDER — CLINDAMYCIN PHOSPHATE 900 MG/50ML
900 INJECTION INTRAVENOUS EVERY 8 HOURS
Status: DISCONTINUED | OUTPATIENT
Start: 2021-01-01 | End: 2021-01-01

## 2021-01-01 RX ORDER — OXYCODONE AND ACETAMINOPHEN 10; 325 MG/1; MG/1
1 TABLET ORAL EVERY 4 HOURS PRN
COMMUNITY

## 2021-01-01 RX ORDER — DOXYCYCLINE 100 MG/1
CAPSULE ORAL
Status: ON HOLD | COMMUNITY
End: 2021-01-01

## 2021-01-01 RX ORDER — BENZONATATE 100 MG/1
CAPSULE ORAL
Status: ON HOLD | COMMUNITY
End: 2021-01-01 | Stop reason: HOSPADM

## 2021-01-01 RX ORDER — ROCURONIUM BROMIDE 10 MG/ML
INJECTION, SOLUTION INTRAVENOUS
Status: COMPLETED
Start: 2021-01-01 | End: 2021-01-01

## 2021-01-01 RX ORDER — PROMETHAZINE HYDROCHLORIDE 25 MG/ML
6.25 INJECTION, SOLUTION INTRAMUSCULAR; INTRAVENOUS EVERY 10 MIN PRN
Status: DISCONTINUED | OUTPATIENT
Start: 2021-01-01 | End: 2021-01-01 | Stop reason: HOSPADM

## 2021-01-01 RX ORDER — HEPARIN SODIUM 1000 [USP'U]/ML
3800 INJECTION, SOLUTION INTRAVENOUS; SUBCUTANEOUS PRN
Status: DISCONTINUED | OUTPATIENT
Start: 2021-01-01 | End: 2021-01-01 | Stop reason: HOSPADM

## 2021-01-01 RX ORDER — FENTANYL CITRATE 50 UG/ML
INJECTION, SOLUTION INTRAMUSCULAR; INTRAVENOUS PRN
Status: DISCONTINUED | OUTPATIENT
Start: 2021-01-01 | End: 2021-01-01 | Stop reason: SDUPTHER

## 2021-01-01 RX ORDER — HYDROCODONE BITARTRATE AND ACETAMINOPHEN 5; 325 MG/1; MG/1
2 TABLET ORAL PRN
Status: DISCONTINUED | OUTPATIENT
Start: 2021-01-01 | End: 2021-01-01 | Stop reason: HOSPADM

## 2021-01-01 RX ORDER — POLYETHYLENE GLYCOL 3350 17 G/17G
17 POWDER, FOR SOLUTION ORAL DAILY PRN
COMMUNITY

## 2021-01-01 RX ORDER — FUROSEMIDE 10 MG/ML
20 INJECTION INTRAMUSCULAR; INTRAVENOUS ONCE
Status: COMPLETED | OUTPATIENT
Start: 2021-01-01 | End: 2021-01-01

## 2021-01-01 RX ORDER — PROPOFOL 10 MG/ML
INJECTION, EMULSION INTRAVENOUS CONTINUOUS PRN
Status: DISCONTINUED | OUTPATIENT
Start: 2021-01-01 | End: 2021-01-01 | Stop reason: SDUPTHER

## 2021-01-01 RX ORDER — SODIUM CHLORIDE 9 MG/ML
25 INJECTION, SOLUTION INTRAVENOUS PRN
Status: DISCONTINUED | OUTPATIENT
Start: 2021-01-01 | End: 2021-01-01 | Stop reason: HOSPADM

## 2021-01-01 RX ORDER — MECLIZINE HYDROCHLORIDE CHEWABLE TABLETS 25 MG/1
TABLET, CHEWABLE ORAL
Status: ON HOLD | COMMUNITY
End: 2021-01-01

## 2021-01-01 RX ORDER — DEXTROSE MONOHYDRATE 25 G/50ML
12.5 INJECTION, SOLUTION INTRAVENOUS PRN
Status: DISCONTINUED | OUTPATIENT
Start: 2021-01-01 | End: 2021-01-01 | Stop reason: HOSPADM

## 2021-01-01 RX ORDER — CHLORHEXIDINE GLUCONATE 0.12 MG/ML
15 RINSE ORAL 2 TIMES DAILY
Status: DISCONTINUED | OUTPATIENT
Start: 2021-01-01 | End: 2021-01-01

## 2021-01-01 RX ORDER — PROMETHAZINE HYDROCHLORIDE 25 MG/1
12.5 TABLET ORAL EVERY 6 HOURS PRN
Status: DISCONTINUED | OUTPATIENT
Start: 2021-01-01 | End: 2021-01-01 | Stop reason: HOSPADM

## 2021-01-01 RX ORDER — ACETAMINOPHEN 650 MG/1
650 SUPPOSITORY RECTAL EVERY 6 HOURS PRN
Status: DISCONTINUED | OUTPATIENT
Start: 2021-01-01 | End: 2021-01-01 | Stop reason: HOSPADM

## 2021-01-01 RX ORDER — ZINC SULFATE 50(220)MG
50 CAPSULE ORAL DAILY
Status: DISCONTINUED | OUTPATIENT
Start: 2021-01-01 | End: 2021-01-01 | Stop reason: HOSPADM

## 2021-01-01 RX ORDER — SODIUM CHLORIDE, SODIUM LACTATE, POTASSIUM CHLORIDE, AND CALCIUM CHLORIDE .6; .31; .03; .02 G/100ML; G/100ML; G/100ML; G/100ML
1000 INJECTION, SOLUTION INTRAVENOUS ONCE
Status: COMPLETED | OUTPATIENT
Start: 2021-01-01 | End: 2021-01-01

## 2021-01-01 RX ORDER — PROPOFOL 10 MG/ML
INJECTION, EMULSION INTRAVENOUS PRN
Status: DISCONTINUED | OUTPATIENT
Start: 2021-01-01 | End: 2021-01-01 | Stop reason: SDUPTHER

## 2021-01-01 RX ORDER — METOCLOPRAMIDE 5 MG/1
5 TABLET ORAL
Status: DISCONTINUED | OUTPATIENT
Start: 2021-01-01 | End: 2021-01-01 | Stop reason: HOSPADM

## 2021-01-01 RX ORDER — GUAIFENESIN 400 MG/1
400 TABLET ORAL 3 TIMES DAILY
Status: DISCONTINUED | OUTPATIENT
Start: 2021-01-01 | End: 2021-01-01 | Stop reason: HOSPADM

## 2021-01-01 RX ORDER — PANTOPRAZOLE SODIUM 40 MG/10ML
40 INJECTION, POWDER, LYOPHILIZED, FOR SOLUTION INTRAVENOUS DAILY
Status: DISCONTINUED | OUTPATIENT
Start: 2021-01-01 | End: 2021-01-01 | Stop reason: HOSPADM

## 2021-01-01 RX ORDER — SODIUM CHLORIDE 0.9 % (FLUSH) 0.9 %
10 SYRINGE (ML) INJECTION PRN
Status: CANCELLED | OUTPATIENT
Start: 2021-01-01

## 2021-01-01 RX ORDER — NICOTINE POLACRILEX 4 MG
15 LOZENGE BUCCAL PRN
Status: DISCONTINUED | OUTPATIENT
Start: 2021-01-01 | End: 2021-01-01 | Stop reason: HOSPADM

## 2021-01-01 RX ORDER — HEPARIN SODIUM 1000 [USP'U]/ML
4000 INJECTION, SOLUTION INTRAVENOUS; SUBCUTANEOUS PRN
Status: DISCONTINUED | OUTPATIENT
Start: 2021-01-01 | End: 2021-01-01

## 2021-01-01 RX ORDER — CELECOXIB 100 MG/1
CAPSULE ORAL
Status: ON HOLD | COMMUNITY
End: 2021-01-01

## 2021-01-01 RX ORDER — HEPARIN SODIUM (PORCINE) LOCK FLUSH IV SOLN 100 UNIT/ML 100 UNIT/ML
SOLUTION INTRAVENOUS PRN
Status: DISCONTINUED | OUTPATIENT
Start: 2021-01-01 | End: 2021-01-01 | Stop reason: ALTCHOICE

## 2021-01-01 RX ORDER — MIDAZOLAM HYDROCHLORIDE 1 MG/ML
INJECTION INTRAMUSCULAR; INTRAVENOUS PRN
Status: DISCONTINUED | OUTPATIENT
Start: 2021-01-01 | End: 2021-01-01 | Stop reason: SDUPTHER

## 2021-01-01 RX ORDER — SODIUM CHLORIDE, SODIUM LACTATE, POTASSIUM CHLORIDE, AND CALCIUM CHLORIDE .6; .31; .03; .02 G/100ML; G/100ML; G/100ML; G/100ML
500 INJECTION, SOLUTION INTRAVENOUS ONCE
Status: COMPLETED | OUTPATIENT
Start: 2021-01-01 | End: 2021-01-01

## 2021-01-01 RX ORDER — OXYCODONE HYDROCHLORIDE 5 MG/1
10 TABLET ORAL EVERY 4 HOURS PRN
Status: ON HOLD | COMMUNITY
End: 2021-01-01 | Stop reason: HOSPADM

## 2021-01-01 RX ORDER — AMIODARONE HYDROCHLORIDE 200 MG/1
200 TABLET ORAL 2 TIMES DAILY
Qty: 60 TABLET | Refills: 0
Start: 2021-01-01

## 2021-01-01 RX ORDER — CHOLESTYRAMINE 4 G/9G
POWDER, FOR SUSPENSION ORAL
Status: ON HOLD | COMMUNITY
End: 2021-01-01 | Stop reason: HOSPADM

## 2021-01-01 RX ORDER — SODIUM CHLORIDE 9 MG/ML
25 INJECTION, SOLUTION INTRAVENOUS PRN
Status: CANCELLED | OUTPATIENT
Start: 2021-01-01

## 2021-01-01 RX ORDER — LINEZOLID 600 MG/1
600 TABLET, FILM COATED ORAL EVERY 12 HOURS SCHEDULED
Status: DISCONTINUED | OUTPATIENT
Start: 2021-01-01 | End: 2021-01-01 | Stop reason: HOSPADM

## 2021-01-01 RX ORDER — SODIUM CHLORIDE 0.9 % (FLUSH) 0.9 %
10 SYRINGE (ML) INJECTION EVERY 12 HOURS SCHEDULED
Status: DISCONTINUED | OUTPATIENT
Start: 2021-01-01 | End: 2021-01-01 | Stop reason: HOSPADM

## 2021-01-01 RX ORDER — PANTOPRAZOLE SODIUM 40 MG/1
40 TABLET, DELAYED RELEASE ORAL
Status: DISCONTINUED | OUTPATIENT
Start: 2021-01-01 | End: 2021-01-01 | Stop reason: HOSPADM

## 2021-01-01 RX ORDER — FUROSEMIDE 10 MG/ML
40 INJECTION INTRAMUSCULAR; INTRAVENOUS ONCE
Status: COMPLETED | OUTPATIENT
Start: 2021-01-01 | End: 2021-01-01

## 2021-01-01 RX ORDER — OXYCODONE HYDROCHLORIDE 5 MG/1
5 TABLET ORAL EVERY 4 HOURS PRN
Status: DISCONTINUED | OUTPATIENT
Start: 2021-01-01 | End: 2021-01-01 | Stop reason: HOSPADM

## 2021-01-01 RX ORDER — 0.9 % SODIUM CHLORIDE 0.9 %
1000 INTRAVENOUS SOLUTION INTRAVENOUS ONCE
Status: COMPLETED | OUTPATIENT
Start: 2021-01-01 | End: 2021-01-01

## 2021-01-01 RX ORDER — SODIUM CHLORIDE 0.9 % (FLUSH) 0.9 %
5-40 SYRINGE (ML) INJECTION PRN
Status: DISCONTINUED | OUTPATIENT
Start: 2021-01-01 | End: 2021-01-01 | Stop reason: SDUPTHER

## 2021-01-01 RX ORDER — PROPOFOL 10 MG/ML
10 INJECTION, EMULSION INTRAVENOUS
Status: CANCELLED | OUTPATIENT
Start: 2021-01-01

## 2021-01-01 RX ORDER — ACETAMINOPHEN 650 MG/1
650 SUPPOSITORY RECTAL EVERY 6 HOURS PRN
Status: DISCONTINUED | OUTPATIENT
Start: 2021-01-01 | End: 2021-01-01

## 2021-01-01 RX ORDER — HEPARIN SODIUM (PORCINE) LOCK FLUSH IV SOLN 100 UNIT/ML 100 UNIT/ML
3 SOLUTION INTRAVENOUS PRN
Status: DISCONTINUED | OUTPATIENT
Start: 2021-01-01 | End: 2021-01-01 | Stop reason: HOSPADM

## 2021-01-01 RX ORDER — OXYBUTYNIN CHLORIDE 15 MG/1
TABLET, EXTENDED RELEASE ORAL
Status: ON HOLD | COMMUNITY
Start: 2020-01-01 | End: 2021-01-01

## 2021-01-01 RX ORDER — SODIUM CHLORIDE 9 MG/ML
INJECTION INTRAVENOUS
Status: COMPLETED
Start: 2021-01-01 | End: 2021-01-01

## 2021-01-01 RX ORDER — MIDODRINE HYDROCHLORIDE 10 MG/1
10 TABLET ORAL
Status: DISCONTINUED | OUTPATIENT
Start: 2021-01-01 | End: 2021-01-01 | Stop reason: HOSPADM

## 2021-01-01 RX ORDER — MORPHINE SULFATE 2 MG/ML
2 INJECTION, SOLUTION INTRAMUSCULAR; INTRAVENOUS EVERY 5 MIN PRN
Status: DISCONTINUED | OUTPATIENT
Start: 2021-01-01 | End: 2021-01-01 | Stop reason: HOSPADM

## 2021-01-01 RX ORDER — AMOXICILLIN 875 MG/1
TABLET, COATED ORAL
Status: ON HOLD | COMMUNITY
End: 2021-01-01

## 2021-01-01 RX ORDER — SODIUM CHLORIDE 9 MG/ML
25 INJECTION, SOLUTION INTRAVENOUS PRN
Status: DISCONTINUED | OUTPATIENT
Start: 2021-01-01 | End: 2021-01-01 | Stop reason: SDUPTHER

## 2021-01-01 RX ORDER — ALBUMIN (HUMAN) 12.5 G/50ML
25 SOLUTION INTRAVENOUS PRN
Status: DISCONTINUED | OUTPATIENT
Start: 2021-01-01 | End: 2021-01-01 | Stop reason: HOSPADM

## 2021-01-01 RX ORDER — INSULIN LISPRO 200 [IU]/ML
INJECTION, SOLUTION SUBCUTANEOUS
Status: ON HOLD | COMMUNITY
Start: 2021-01-01 | End: 2021-01-01 | Stop reason: HOSPADM

## 2021-01-01 RX ORDER — PHENYLEPHRINE HYDROCHLORIDE 10 MG/ML
INJECTION INTRAVENOUS PRN
Status: DISCONTINUED | OUTPATIENT
Start: 2021-01-01 | End: 2021-01-01 | Stop reason: SDUPTHER

## 2021-01-01 RX ORDER — LIDOCAINE HYDROCHLORIDE 20 MG/ML
INJECTION, SOLUTION INTRAVENOUS PRN
Status: DISCONTINUED | OUTPATIENT
Start: 2021-01-01 | End: 2021-01-01 | Stop reason: SDUPTHER

## 2021-01-01 RX ORDER — LOPERAMIDE HCL 1 MG/7.5ML
2 SOLUTION ORAL 3 TIMES DAILY
Qty: 150 ML | Refills: 0 | Status: ON HOLD | DISCHARGE
Start: 2021-01-01 | End: 2021-01-01

## 2021-01-01 RX ORDER — ONDANSETRON 2 MG/ML
4 INJECTION INTRAMUSCULAR; INTRAVENOUS EVERY 6 HOURS PRN
Status: DISCONTINUED | OUTPATIENT
Start: 2021-01-01 | End: 2021-01-01 | Stop reason: HOSPADM

## 2021-01-01 RX ORDER — HYDRALAZINE HYDROCHLORIDE 20 MG/ML
5 INJECTION INTRAMUSCULAR; INTRAVENOUS EVERY 6 HOURS PRN
Status: DISCONTINUED | OUTPATIENT
Start: 2021-01-01 | End: 2021-01-01 | Stop reason: HOSPADM

## 2021-01-01 RX ORDER — NABUMETONE 500 MG/1
TABLET, FILM COATED ORAL
Status: ON HOLD | COMMUNITY
End: 2021-01-01

## 2021-01-01 RX ORDER — HEPARIN SODIUM 1000 [USP'U]/ML
4000 INJECTION, SOLUTION INTRAVENOUS; SUBCUTANEOUS ONCE
Status: COMPLETED | OUTPATIENT
Start: 2021-01-01 | End: 2021-01-01

## 2021-01-01 RX ORDER — SODIUM CHLORIDE 0.9 % (FLUSH) 0.9 %
5-40 SYRINGE (ML) INJECTION EVERY 12 HOURS SCHEDULED
Status: DISCONTINUED | OUTPATIENT
Start: 2021-01-01 | End: 2021-01-01 | Stop reason: HOSPADM

## 2021-01-01 RX ORDER — ETOMIDATE 2 MG/ML
20 INJECTION INTRAVENOUS ONCE
Status: COMPLETED | OUTPATIENT
Start: 2021-01-01 | End: 2021-01-01

## 2021-01-01 RX ORDER — BLOOD-GLUCOSE METER
EACH MISCELLANEOUS
COMMUNITY
Start: 2021-01-01

## 2021-01-01 RX ORDER — LOPERAMIDE HYDROCHLORIDE 2 MG/15ML
2 SOLUTION ORAL 3 TIMES DAILY
Status: DISCONTINUED | OUTPATIENT
Start: 2021-01-01 | End: 2021-01-01

## 2021-01-01 RX ORDER — KETAMINE HCL IN NACL, ISO-OSM 100MG/10ML
SYRINGE (ML) INJECTION PRN
Status: DISCONTINUED | OUTPATIENT
Start: 2021-01-01 | End: 2021-01-01 | Stop reason: SDUPTHER

## 2021-01-01 RX ORDER — ASCORBIC ACID 500 MG
1000 TABLET ORAL DAILY
Status: DISCONTINUED | OUTPATIENT
Start: 2021-01-01 | End: 2021-01-01

## 2021-01-01 RX ORDER — LOPERAMIDE HCL 1 MG/7.5ML
2 SUSPENSION ORAL 3 TIMES DAILY
Status: DISCONTINUED | OUTPATIENT
Start: 2021-01-01 | End: 2021-01-01 | Stop reason: CLARIF

## 2021-01-01 RX ORDER — HYDROCORTISONE 20 MG/1
40 TABLET ORAL 2 TIMES DAILY
Status: DISCONTINUED | OUTPATIENT
Start: 2021-01-01 | End: 2021-01-01

## 2021-01-01 RX ORDER — BISACODYL 10 MG
10 SUPPOSITORY, RECTAL RECTAL DAILY PRN
Status: DISCONTINUED | OUTPATIENT
Start: 2021-01-01 | End: 2021-01-01 | Stop reason: HOSPADM

## 2021-01-01 RX ORDER — PROCHLORPERAZINE EDISYLATE 5 MG/ML
5 INJECTION INTRAMUSCULAR; INTRAVENOUS
Status: DISCONTINUED | OUTPATIENT
Start: 2021-01-01 | End: 2021-01-01 | Stop reason: HOSPADM

## 2021-01-01 RX ORDER — DEXTROSE MONOHYDRATE 25 G/50ML
12.5 INJECTION, SOLUTION INTRAVENOUS PRN
Status: CANCELLED | OUTPATIENT
Start: 2021-01-01

## 2021-01-01 RX ORDER — NITROGLYCERIN 40 MG/1
1 PATCH TRANSDERMAL DAILY
COMMUNITY

## 2021-01-01 RX ORDER — CALCIUM GLUCONATE 94 MG/ML
1000 INJECTION, SOLUTION INTRAVENOUS ONCE
Status: COMPLETED | OUTPATIENT
Start: 2021-01-01 | End: 2021-01-01

## 2021-01-01 RX ORDER — MORPHINE SULFATE 8 MG/ML
6 INJECTION, SOLUTION INTRAMUSCULAR; INTRAVENOUS ONCE
Status: COMPLETED | OUTPATIENT
Start: 2021-01-01 | End: 2021-01-01

## 2021-01-01 RX ORDER — SUCRALFATE 1 G/1
1 TABLET ORAL
Status: DISCONTINUED | OUTPATIENT
Start: 2021-01-01 | End: 2021-01-01 | Stop reason: HOSPADM

## 2021-01-01 RX ORDER — SODIUM CHLORIDE 0.9 % (FLUSH) 0.9 %
SYRINGE (ML) INJECTION
Status: COMPLETED
Start: 2021-01-01 | End: 2021-01-01

## 2021-01-01 RX ORDER — ROCURONIUM BROMIDE 10 MG/ML
INJECTION, SOLUTION INTRAVENOUS PRN
Status: DISCONTINUED | OUTPATIENT
Start: 2021-01-01 | End: 2021-01-01 | Stop reason: SDUPTHER

## 2021-01-01 RX ORDER — SODIUM CHLORIDE 9 MG/ML
INJECTION, SOLUTION INTRAVENOUS CONTINUOUS PRN
Status: DISCONTINUED | OUTPATIENT
Start: 2021-01-01 | End: 2021-01-01 | Stop reason: SDUPTHER

## 2021-01-01 RX ORDER — DIAZEPAM 5 MG/1
TABLET ORAL
Status: ON HOLD | COMMUNITY
Start: 2020-01-01 | End: 2021-01-01 | Stop reason: HOSPADM

## 2021-01-01 RX ORDER — SODIUM CHLORIDE 9 MG/ML
10 INJECTION INTRAVENOUS DAILY
Status: DISCONTINUED | OUTPATIENT
Start: 2021-01-01 | End: 2021-01-01 | Stop reason: HOSPADM

## 2021-01-01 RX ORDER — NYSTATIN 100000 [USP'U]/G
POWDER TOPICAL
Status: ON HOLD | COMMUNITY
Start: 2020-01-01 | End: 2021-01-01 | Stop reason: HOSPADM

## 2021-01-01 RX ORDER — PANTOPRAZOLE SODIUM 40 MG/10ML
40 INJECTION, POWDER, LYOPHILIZED, FOR SOLUTION INTRAVENOUS DAILY
Status: DISCONTINUED | OUTPATIENT
Start: 2021-01-01 | End: 2021-01-01

## 2021-01-01 RX ORDER — ALBUTEROL SULFATE 2.5 MG/3ML
2.5 SOLUTION RESPIRATORY (INHALATION) EVERY 6 HOURS
Status: DISCONTINUED | OUTPATIENT
Start: 2021-01-01 | End: 2021-01-01

## 2021-01-01 RX ORDER — LORAZEPAM 2 MG/ML
1 INJECTION INTRAMUSCULAR EVERY 6 HOURS PRN
Status: DISCONTINUED | OUTPATIENT
Start: 2021-01-01 | End: 2021-01-01 | Stop reason: HOSPADM

## 2021-01-01 RX ORDER — ESCITALOPRAM OXALATE 10 MG/1
TABLET ORAL
Status: ON HOLD | COMMUNITY
End: 2021-01-01

## 2021-01-01 RX ORDER — ONDANSETRON 2 MG/ML
4 INJECTION INTRAMUSCULAR; INTRAVENOUS ONCE
Status: COMPLETED | OUTPATIENT
Start: 2021-01-01 | End: 2021-01-01

## 2021-01-01 RX ORDER — MAGNESIUM SULFATE IN WATER 40 MG/ML
2000 INJECTION, SOLUTION INTRAVENOUS ONCE
Status: DISCONTINUED | OUTPATIENT
Start: 2021-01-01 | End: 2021-01-01

## 2021-01-01 RX ORDER — SODIUM CHLORIDE 9 MG/ML
10 INJECTION INTRAVENOUS 2 TIMES DAILY
Status: DISCONTINUED | OUTPATIENT
Start: 2021-01-01 | End: 2021-01-01

## 2021-01-01 RX ORDER — INSULIN GLARGINE 100 [IU]/ML
40 INJECTION, SOLUTION SUBCUTANEOUS NIGHTLY
Status: DISCONTINUED | OUTPATIENT
Start: 2021-01-01 | End: 2021-01-01

## 2021-01-01 RX ORDER — PROMETHAZINE HYDROCHLORIDE 12.5 MG/1
12.5 TABLET ORAL EVERY 6 HOURS PRN
Qty: 30 TABLET | Refills: 0 | Status: ON HOLD
Start: 2021-01-01 | End: 2021-01-01

## 2021-01-01 RX ORDER — HYDROCODONE BITARTRATE AND ACETAMINOPHEN 5; 325 MG/1; MG/1
1 TABLET ORAL
Status: DISCONTINUED | OUTPATIENT
Start: 2021-01-01 | End: 2021-01-01 | Stop reason: HOSPADM

## 2021-01-01 RX ORDER — HEPARIN SODIUM 10000 [USP'U]/100ML
5-30 INJECTION, SOLUTION INTRAVENOUS CONTINUOUS
Status: DISCONTINUED | OUTPATIENT
Start: 2021-01-01 | End: 2021-01-01

## 2021-01-01 RX ORDER — ALBUTEROL SULFATE 90 UG/1
2 AEROSOL, METERED RESPIRATORY (INHALATION) EVERY 6 HOURS
Status: DISCONTINUED | OUTPATIENT
Start: 2021-01-01 | End: 2021-01-01 | Stop reason: HOSPADM

## 2021-01-01 RX ORDER — POLYETHYLENE GLYCOL 3350 17 G/17G
17 POWDER, FOR SOLUTION ORAL DAILY PRN
Status: DISCONTINUED | OUTPATIENT
Start: 2021-01-01 | End: 2021-01-01 | Stop reason: HOSPADM

## 2021-01-01 RX ORDER — MEPERIDINE HYDROCHLORIDE 25 MG/ML
12.5 INJECTION INTRAMUSCULAR; INTRAVENOUS; SUBCUTANEOUS EVERY 5 MIN PRN
Status: DISCONTINUED | OUTPATIENT
Start: 2021-01-01 | End: 2021-01-01 | Stop reason: HOSPADM

## 2021-01-01 RX ORDER — NALOXONE HYDROCHLORIDE 0.4 MG/ML
0.4 INJECTION, SOLUTION INTRAMUSCULAR; INTRAVENOUS; SUBCUTANEOUS PRN
Status: DISCONTINUED | OUTPATIENT
Start: 2021-01-01 | End: 2021-01-01

## 2021-01-01 RX ORDER — CLOTRIMAZOLE 1 %
CREAM (GRAM) TOPICAL
Qty: 1 TUBE | Refills: 1 | Status: ON HOLD
Start: 2021-01-01 | End: 2021-01-01

## 2021-01-01 RX ORDER — CALCIUM GLUCONATE 94 MG/ML
1000 INJECTION, SOLUTION INTRAVENOUS ONCE
Status: DISCONTINUED | OUTPATIENT
Start: 2021-01-01 | End: 2021-01-01 | Stop reason: CLARIF

## 2021-01-01 RX ORDER — SODIUM CHLORIDE 0.9 % (FLUSH) 0.9 %
10 SYRINGE (ML) INJECTION EVERY 12 HOURS SCHEDULED
Status: CANCELLED | OUTPATIENT
Start: 2021-01-01

## 2021-01-01 RX ORDER — DICLOFENAC POTASSIUM 50 MG/1
TABLET, FILM COATED ORAL
Status: ON HOLD | COMMUNITY
End: 2021-01-01 | Stop reason: HOSPADM

## 2021-01-01 RX ORDER — SODIUM CHLORIDE 0.9 % (FLUSH) 0.9 %
10 SYRINGE (ML) INJECTION PRN
Status: DISCONTINUED | OUTPATIENT
Start: 2021-01-01 | End: 2021-01-01

## 2021-01-01 RX ORDER — MORPHINE SULFATE 2 MG/ML
1 INJECTION, SOLUTION INTRAMUSCULAR; INTRAVENOUS EVERY 5 MIN PRN
Status: DISCONTINUED | OUTPATIENT
Start: 2021-01-01 | End: 2021-01-01 | Stop reason: HOSPADM

## 2021-01-01 RX ORDER — ACETAMINOPHEN 325 MG/1
650 TABLET ORAL EVERY 6 HOURS PRN
Status: DISCONTINUED | OUTPATIENT
Start: 2021-01-01 | End: 2021-01-01 | Stop reason: HOSPADM

## 2021-01-01 RX ORDER — ALBUMIN (HUMAN) 12.5 G/50ML
25 SOLUTION INTRAVENOUS EVERY 6 HOURS
Status: COMPLETED | OUTPATIENT
Start: 2021-01-01 | End: 2021-01-01

## 2021-01-01 RX ORDER — SODIUM CHLORIDE 9 MG/ML
25 INJECTION, SOLUTION INTRAVENOUS EVERY 8 HOURS
Status: DISCONTINUED | OUTPATIENT
Start: 2021-01-01 | End: 2021-01-01 | Stop reason: HOSPADM

## 2021-01-01 RX ORDER — ALBUMIN, HUMAN INJ 5% 5 %
SOLUTION INTRAVENOUS PRN
Status: DISCONTINUED | OUTPATIENT
Start: 2021-01-01 | End: 2021-01-01 | Stop reason: SDUPTHER

## 2021-01-01 RX ORDER — OXYCODONE HYDROCHLORIDE 10 MG/1
10 TABLET ORAL EVERY 4 HOURS PRN
Status: DISCONTINUED | OUTPATIENT
Start: 2021-01-01 | End: 2021-01-01 | Stop reason: HOSPADM

## 2021-01-01 RX ORDER — INSULIN GLARGINE 100 [IU]/ML
40 INJECTION, SOLUTION SUBCUTANEOUS ONCE
Status: COMPLETED | OUTPATIENT
Start: 2021-01-01 | End: 2021-01-01

## 2021-01-01 RX ORDER — GABAPENTIN 100 MG/1
100 CAPSULE ORAL EVERY 8 HOURS
Status: DISCONTINUED | OUTPATIENT
Start: 2021-01-01 | End: 2021-01-01 | Stop reason: HOSPADM

## 2021-01-01 RX ORDER — DOXYCYCLINE HYCLATE 100 MG/1
100 CAPSULE ORAL EVERY 12 HOURS SCHEDULED
Status: DISCONTINUED | OUTPATIENT
Start: 2021-01-01 | End: 2021-01-01

## 2021-01-01 RX ORDER — SODIUM CHLORIDE 0.9 % (FLUSH) 0.9 %
10 SYRINGE (ML) INJECTION PRN
Status: DISCONTINUED | OUTPATIENT
Start: 2021-01-01 | End: 2021-01-01 | Stop reason: HOSPADM

## 2021-01-01 RX ORDER — SODIUM CHLORIDE 0.9 % (FLUSH) 0.9 %
10 SYRINGE (ML) INJECTION EVERY 12 HOURS SCHEDULED
Status: DISCONTINUED | OUTPATIENT
Start: 2021-01-01 | End: 2021-01-01

## 2021-01-01 RX ORDER — FLUCONAZOLE 200 MG/1
200 TABLET ORAL DAILY
Qty: 34 TABLET | Refills: 0 | Status: ON HOLD | OUTPATIENT
Start: 2021-01-01 | End: 2021-01-01

## 2021-01-01 RX ORDER — MAGNESIUM SULFATE IN WATER 40 MG/ML
2000 INJECTION, SOLUTION INTRAVENOUS ONCE
Status: COMPLETED | OUTPATIENT
Start: 2021-01-01 | End: 2021-01-01

## 2021-01-01 RX ORDER — SODIUM CHLORIDE 9 MG/ML
INJECTION, SOLUTION INTRAVENOUS PRN
Status: DISCONTINUED | OUTPATIENT
Start: 2021-01-01 | End: 2021-01-01 | Stop reason: HOSPADM

## 2021-01-01 RX ORDER — HYDROCORTISONE 5 MG/1
5 TABLET ORAL EVERY EVENING
Status: DISCONTINUED | OUTPATIENT
Start: 2021-01-01 | End: 2021-01-01 | Stop reason: HOSPADM

## 2021-01-01 RX ORDER — DEXTROSE MONOHYDRATE 25 G/50ML
25 INJECTION, SOLUTION INTRAVENOUS ONCE
Status: COMPLETED | OUTPATIENT
Start: 2021-01-01 | End: 2021-01-01

## 2021-01-01 RX ORDER — CHOLESTYRAMINE 4 G/9G
1 POWDER, FOR SUSPENSION ORAL
Status: DISCONTINUED | OUTPATIENT
Start: 2021-01-01 | End: 2021-01-01 | Stop reason: HOSPADM

## 2021-01-01 RX ORDER — MIDODRINE HYDROCHLORIDE 5 MG/1
10 TABLET ORAL 3 TIMES DAILY
Status: DISCONTINUED | OUTPATIENT
Start: 2021-01-01 | End: 2021-01-01

## 2021-01-01 RX ORDER — ALBUTEROL SULFATE 90 UG/1
2 AEROSOL, METERED RESPIRATORY (INHALATION) EVERY 6 HOURS
Status: DISCONTINUED | OUTPATIENT
Start: 2021-01-01 | End: 2021-01-01 | Stop reason: CLARIF

## 2021-01-01 RX ORDER — NICOTINE POLACRILEX 4 MG
15 LOZENGE BUCCAL PRN
Status: CANCELLED | OUTPATIENT
Start: 2021-01-01

## 2021-01-01 RX ORDER — IBUPROFEN 400 MG/1
400 TABLET ORAL EVERY 8 HOURS PRN
Status: ON HOLD | COMMUNITY
End: 2021-01-01

## 2021-01-01 RX ORDER — AMLODIPINE BESYLATE 5 MG/1
5 TABLET ORAL DAILY
Status: DISCONTINUED | OUTPATIENT
Start: 2021-01-01 | End: 2021-01-01 | Stop reason: HOSPADM

## 2021-01-01 RX ORDER — SODIUM CHLORIDE 0.9 % (FLUSH) 0.9 %
5-40 SYRINGE (ML) INJECTION PRN
Status: DISCONTINUED | OUTPATIENT
Start: 2021-01-01 | End: 2021-01-01 | Stop reason: HOSPADM

## 2021-01-01 RX ORDER — ALBUMIN (HUMAN) 12.5 G/50ML
25 SOLUTION INTRAVENOUS EVERY 8 HOURS
Status: DISCONTINUED | OUTPATIENT
Start: 2021-01-01 | End: 2021-01-01

## 2021-01-01 RX ORDER — HYDROCODONE BITARTRATE AND ACETAMINOPHEN 5; 325 MG/1; MG/1
1 TABLET ORAL EVERY 6 HOURS PRN
Status: DISCONTINUED | OUTPATIENT
Start: 2021-01-01 | End: 2021-01-01 | Stop reason: HOSPADM

## 2021-01-01 RX ORDER — AMIODARONE HYDROCHLORIDE 200 MG/1
400 TABLET ORAL 2 TIMES DAILY
Qty: 28 TABLET | Refills: 2 | Status: ON HOLD
Start: 2021-01-01 | End: 2021-01-01 | Stop reason: HOSPADM

## 2021-01-01 RX ORDER — ONDANSETRON 4 MG/1
4 TABLET, ORALLY DISINTEGRATING ORAL EVERY 8 HOURS PRN
Status: DISCONTINUED | OUTPATIENT
Start: 2021-01-01 | End: 2021-01-01 | Stop reason: HOSPADM

## 2021-01-01 RX ORDER — MAGNESIUM SULFATE 1 G/100ML
1000 INJECTION INTRAVENOUS ONCE
Status: COMPLETED | OUTPATIENT
Start: 2021-01-01 | End: 2021-01-01

## 2021-01-01 RX ORDER — LOPERAMIDE HYDROCHLORIDE 2 MG/1
2 CAPSULE ORAL 3 TIMES DAILY
Status: DISCONTINUED | OUTPATIENT
Start: 2021-01-01 | End: 2021-01-01 | Stop reason: SDUPTHER

## 2021-01-01 RX ORDER — HEPARIN SODIUM 1000 [USP'U]/ML
3800 INJECTION, SOLUTION INTRAVENOUS; SUBCUTANEOUS PRN
Status: DISCONTINUED | OUTPATIENT
Start: 2021-01-01 | End: 2021-01-01

## 2021-01-01 RX ORDER — HEPARIN SODIUM 1000 [USP'U]/ML
40 INJECTION, SOLUTION INTRAVENOUS; SUBCUTANEOUS PRN
Status: DISCONTINUED | OUTPATIENT
Start: 2021-01-01 | End: 2021-01-01

## 2021-01-01 RX ORDER — PROMETHAZINE HYDROCHLORIDE 25 MG/ML
6.25 INJECTION, SOLUTION INTRAMUSCULAR; INTRAVENOUS
Status: DISCONTINUED | OUTPATIENT
Start: 2021-01-01 | End: 2021-01-01 | Stop reason: HOSPADM

## 2021-01-01 RX ORDER — FLUMAZENIL 0.1 MG/ML
INJECTION, SOLUTION INTRAVENOUS PRN
Status: DISCONTINUED | OUTPATIENT
Start: 2021-01-01 | End: 2021-01-01 | Stop reason: SDUPTHER

## 2021-01-01 RX ORDER — POTASSIUM CHLORIDE 29.8 MG/ML
20 INJECTION INTRAVENOUS PRN
Status: DISCONTINUED | OUTPATIENT
Start: 2021-01-01 | End: 2021-01-01

## 2021-01-01 RX ORDER — HEPARIN SODIUM (PORCINE) LOCK FLUSH IV SOLN 100 UNIT/ML 100 UNIT/ML
SOLUTION INTRAVENOUS PRN
Status: DISCONTINUED | OUTPATIENT
Start: 2021-01-01 | End: 2021-01-01 | Stop reason: HOSPADM

## 2021-01-01 RX ORDER — LOPERAMIDE HCL 1 MG/7.5ML
2 SOLUTION ORAL 3 TIMES DAILY
Status: DISCONTINUED | OUTPATIENT
Start: 2021-01-01 | End: 2021-01-01 | Stop reason: HOSPADM

## 2021-01-01 RX ORDER — ALBUTEROL SULFATE 2.5 MG/3ML
2.5 SOLUTION RESPIRATORY (INHALATION) 4 TIMES DAILY
Status: DISCONTINUED | OUTPATIENT
Start: 2021-01-01 | End: 2021-01-01 | Stop reason: HOSPADM

## 2021-01-01 RX ORDER — HEPARIN SODIUM 1000 [USP'U]/ML
INJECTION, SOLUTION INTRAVENOUS; SUBCUTANEOUS PRN
Status: DISCONTINUED | OUTPATIENT
Start: 2021-01-01 | End: 2021-01-01 | Stop reason: SDUPTHER

## 2021-01-01 RX ORDER — HYDRALAZINE HYDROCHLORIDE 20 MG/ML
5 INJECTION INTRAMUSCULAR; INTRAVENOUS EVERY 6 HOURS PRN
Status: DISCONTINUED | OUTPATIENT
Start: 2021-01-01 | End: 2021-01-01

## 2021-01-01 RX ORDER — HEPARIN SODIUM 10000 [USP'U]/ML
5000 INJECTION, SOLUTION INTRAVENOUS; SUBCUTANEOUS EVERY 8 HOURS SCHEDULED
Status: DISCONTINUED | OUTPATIENT
Start: 2021-01-01 | End: 2021-01-01

## 2021-01-01 RX ORDER — SODIUM CHLORIDE 450 MG/100ML
INJECTION, SOLUTION INTRAVENOUS CONTINUOUS
Status: CANCELLED | OUTPATIENT
Start: 2021-01-01

## 2021-01-01 RX ORDER — OMEPRAZOLE 20 MG/1
20 CAPSULE, DELAYED RELEASE ORAL DAILY
Status: ON HOLD | COMMUNITY
End: 2021-01-01 | Stop reason: HOSPADM

## 2021-01-01 RX ORDER — SODIUM CHLORIDE 9 MG/ML
25 INJECTION, SOLUTION INTRAVENOUS PRN
Status: DISCONTINUED | OUTPATIENT
Start: 2021-01-01 | End: 2021-01-01

## 2021-01-01 RX ORDER — HEPARIN SODIUM 1000 [USP'U]/ML
3800 INJECTION, SOLUTION INTRAVENOUS; SUBCUTANEOUS PRN
COMMUNITY

## 2021-01-01 RX ORDER — GLYCOPYRROLATE 1 MG/5 ML
SYRINGE (ML) INTRAVENOUS PRN
Status: DISCONTINUED | OUTPATIENT
Start: 2021-01-01 | End: 2021-01-01 | Stop reason: SDUPTHER

## 2021-01-01 RX ORDER — DEXTROSE MONOHYDRATE 50 MG/ML
INJECTION, SOLUTION INTRAVENOUS
Status: DISPENSED
Start: 2021-01-01 | End: 2021-01-01

## 2021-01-01 RX ORDER — ONDANSETRON 4 MG/1
4 TABLET, FILM COATED ORAL EVERY 8 HOURS PRN
COMMUNITY

## 2021-01-01 RX ORDER — LABETALOL HYDROCHLORIDE 5 MG/ML
INJECTION, SOLUTION INTRAVENOUS
Status: COMPLETED
Start: 2021-01-01 | End: 2021-01-01

## 2021-01-01 RX ORDER — ACETAMINOPHEN 650 MG/1
650 SUPPOSITORY RECTAL EVERY 6 HOURS PRN
Status: CANCELLED | OUTPATIENT
Start: 2021-01-01

## 2021-01-01 RX ORDER — HEPARIN SODIUM (PORCINE) LOCK FLUSH IV SOLN 100 UNIT/ML 100 UNIT/ML
3 SOLUTION INTRAVENOUS EVERY 12 HOURS SCHEDULED
Status: DISCONTINUED | OUTPATIENT
Start: 2021-01-01 | End: 2021-01-01 | Stop reason: HOSPADM

## 2021-01-01 RX ORDER — CHOLESTYRAMINE 4 G/9G
1 POWDER, FOR SUSPENSION ORAL 2 TIMES DAILY
Status: DISCONTINUED | OUTPATIENT
Start: 2021-01-01 | End: 2021-01-01

## 2021-01-01 RX ORDER — OXYCODONE HCL 5 MG/5 ML
5 SOLUTION, ORAL ORAL EVERY 4 HOURS PRN
Status: DISCONTINUED | OUTPATIENT
Start: 2021-01-01 | End: 2021-01-01 | Stop reason: ALTCHOICE

## 2021-01-01 RX ORDER — FLUCONAZOLE 100 MG/1
200 TABLET ORAL DAILY
Status: DISCONTINUED | OUTPATIENT
Start: 2021-01-01 | End: 2021-01-01 | Stop reason: HOSPADM

## 2021-01-01 RX ORDER — HYDROCORTISONE 20 MG/1
20 TABLET ORAL 2 TIMES DAILY
Qty: 60 TABLET | Refills: 0 | Status: ON HOLD
Start: 2021-01-01 | End: 2021-01-01

## 2021-01-01 RX ORDER — HEPARIN SODIUM 1000 [USP'U]/ML
2000 INJECTION, SOLUTION INTRAVENOUS; SUBCUTANEOUS PRN
Status: DISCONTINUED | OUTPATIENT
Start: 2021-01-01 | End: 2021-01-01

## 2021-01-01 RX ORDER — DIGOXIN 0.25 MG/ML
INJECTION INTRAMUSCULAR; INTRAVENOUS
Status: DISPENSED
Start: 2021-01-01 | End: 2021-01-01

## 2021-01-01 RX ORDER — FUROSEMIDE 10 MG/ML
40 INJECTION INTRAMUSCULAR; INTRAVENOUS ONCE
Status: DISCONTINUED | OUTPATIENT
Start: 2021-01-01 | End: 2021-01-01

## 2021-01-01 RX ORDER — METOPROLOL TARTRATE 5 MG/5ML
5 INJECTION INTRAVENOUS EVERY 6 HOURS
Status: DISCONTINUED | OUTPATIENT
Start: 2021-01-01 | End: 2021-01-01

## 2021-01-01 RX ORDER — PANTOPRAZOLE SODIUM 40 MG/1
40 TABLET, DELAYED RELEASE ORAL
Qty: 30 TABLET | Refills: 3 | Status: SHIPPED | OUTPATIENT
Start: 2021-01-01

## 2021-01-01 RX ORDER — NITROGLYCERIN 0.4 MG/1
0.4 TABLET SUBLINGUAL EVERY 5 MIN PRN
Status: DISCONTINUED | OUTPATIENT
Start: 2021-01-01 | End: 2021-01-01 | Stop reason: HOSPADM

## 2021-01-01 RX ORDER — HYDROCODONE BITARTRATE AND ACETAMINOPHEN 5; 325 MG/1; MG/1
1 TABLET ORAL PRN
Status: DISCONTINUED | OUTPATIENT
Start: 2021-01-01 | End: 2021-01-01 | Stop reason: HOSPADM

## 2021-01-01 RX ORDER — TRIAMCINOLONE ACETONIDE 1 MG/ML
LOTION TOPICAL
Status: ON HOLD | COMMUNITY
Start: 2021-01-01 | End: 2021-01-01 | Stop reason: HOSPADM

## 2021-01-01 RX ORDER — HYDROCORTISONE 5 MG/1
5 TABLET ORAL EVERY EVENING
Qty: 30 TABLET | Refills: 0 | Status: ON HOLD
Start: 2021-01-01 | End: 2021-01-01 | Stop reason: HOSPADM

## 2021-01-01 RX ORDER — OXYCODONE HYDROCHLORIDE AND ACETAMINOPHEN 5; 325 MG/1; MG/1
TABLET ORAL
Status: ON HOLD | COMMUNITY
End: 2021-01-01 | Stop reason: HOSPADM

## 2021-01-01 RX ORDER — HYDRALAZINE HYDROCHLORIDE 20 MG/ML
5 INJECTION INTRAMUSCULAR; INTRAVENOUS EVERY 6 HOURS PRN
Status: CANCELLED | OUTPATIENT
Start: 2021-01-01

## 2021-01-01 RX ORDER — METOCLOPRAMIDE HYDROCHLORIDE 5 MG/ML
10 INJECTION INTRAMUSCULAR; INTRAVENOUS ONCE
Status: DISCONTINUED | OUTPATIENT
Start: 2021-01-01 | End: 2021-01-01 | Stop reason: HOSPADM

## 2021-01-01 RX ORDER — PANTOPRAZOLE SODIUM 40 MG/10ML
40 INJECTION, POWDER, LYOPHILIZED, FOR SOLUTION INTRAVENOUS 2 TIMES DAILY
Status: DISCONTINUED | OUTPATIENT
Start: 2021-01-01 | End: 2021-01-01

## 2021-01-01 RX ORDER — ROPINIROLE 0.5 MG/1
0.5 TABLET, FILM COATED ORAL 3 TIMES DAILY
COMMUNITY

## 2021-01-01 RX ORDER — PROMETHAZINE HYDROCHLORIDE 25 MG/1
12.5 TABLET ORAL EVERY 6 HOURS PRN
Status: CANCELLED | OUTPATIENT
Start: 2021-01-01

## 2021-01-01 RX ORDER — 0.9 % SODIUM CHLORIDE 0.9 %
250 INTRAVENOUS SOLUTION INTRAVENOUS ONCE
Status: COMPLETED | OUTPATIENT
Start: 2021-01-01 | End: 2021-01-01

## 2021-01-01 RX ORDER — AMIODARONE HYDROCHLORIDE 200 MG/1
200 TABLET ORAL 2 TIMES DAILY
Status: DISCONTINUED | OUTPATIENT
Start: 2021-01-01 | End: 2021-01-01

## 2021-01-01 RX ORDER — POTASSIUM CHLORIDE 7.45 MG/ML
10 INJECTION INTRAVENOUS
Status: DISCONTINUED | OUTPATIENT
Start: 2021-01-01 | End: 2021-01-01

## 2021-01-01 RX ORDER — SENNOSIDES 8.8 MG/5ML
5 LIQUID ORAL 2 TIMES DAILY PRN
Status: DISCONTINUED | OUTPATIENT
Start: 2021-01-01 | End: 2021-01-01 | Stop reason: HOSPADM

## 2021-01-01 RX ORDER — SODIUM CHLORIDE 0.9 % (FLUSH) 0.9 %
10 SYRINGE (ML) INJECTION ONCE
Status: DISCONTINUED | OUTPATIENT
Start: 2021-01-01 | End: 2021-01-01

## 2021-01-01 RX ORDER — HYDRALAZINE HYDROCHLORIDE 25 MG/1
TABLET, FILM COATED ORAL
Status: ON HOLD | COMMUNITY
End: 2021-01-01

## 2021-01-01 RX ORDER — SODIUM CHLORIDE, SODIUM LACTATE, POTASSIUM CHLORIDE, CALCIUM CHLORIDE 600; 310; 30; 20 MG/100ML; MG/100ML; MG/100ML; MG/100ML
INJECTION, SOLUTION INTRAVENOUS ONCE
Status: DISCONTINUED | OUTPATIENT
Start: 2021-01-01 | End: 2021-01-01

## 2021-01-01 RX ORDER — METOCLOPRAMIDE HYDROCHLORIDE 5 MG/ML
INJECTION INTRAMUSCULAR; INTRAVENOUS PRN
Status: DISCONTINUED | OUTPATIENT
Start: 2021-01-01 | End: 2021-01-01 | Stop reason: SDUPTHER

## 2021-01-01 RX ORDER — SODIUM CHLORIDE 9 MG/ML
INJECTION, SOLUTION INTRAVENOUS PRN
Status: DISCONTINUED | OUTPATIENT
Start: 2021-01-01 | End: 2021-01-01

## 2021-01-01 RX ORDER — PROPOFOL 10 MG/ML
5-50 INJECTION, EMULSION INTRAVENOUS
Status: DISCONTINUED | OUTPATIENT
Start: 2021-01-01 | End: 2021-01-01

## 2021-01-01 RX ORDER — ONDANSETRON 2 MG/ML
4 INJECTION INTRAMUSCULAR; INTRAVENOUS
Status: DISCONTINUED | OUTPATIENT
Start: 2021-01-01 | End: 2021-01-01 | Stop reason: HOSPADM

## 2021-01-01 RX ORDER — PROPOFOL 10 MG/ML
5-50 INJECTION, EMULSION INTRAVENOUS
Status: CANCELLED | OUTPATIENT
Start: 2021-01-01

## 2021-01-01 RX ORDER — PRAMIPEXOLE DIHYDROCHLORIDE 1.5 MG/1
TABLET ORAL
Status: ON HOLD | COMMUNITY
Start: 2021-01-01 | End: 2021-01-01 | Stop reason: HOSPADM

## 2021-01-01 RX ORDER — HEPARIN SODIUM 1000 [USP'U]/ML
80 INJECTION, SOLUTION INTRAVENOUS; SUBCUTANEOUS ONCE
Status: DISCONTINUED | OUTPATIENT
Start: 2021-01-01 | End: 2021-01-01

## 2021-01-01 RX ORDER — FENTANYL CITRATE 50 UG/ML
50 INJECTION, SOLUTION INTRAMUSCULAR; INTRAVENOUS ONCE
Status: COMPLETED | OUTPATIENT
Start: 2021-01-01 | End: 2021-01-01

## 2021-01-01 RX ORDER — CHOLECALCIFEROL (VITAMIN D3) 50 MCG
2000 TABLET ORAL DAILY
Status: DISCONTINUED | OUTPATIENT
Start: 2021-01-01 | End: 2021-01-01 | Stop reason: HOSPADM

## 2021-01-01 RX ORDER — AMIODARONE HYDROCHLORIDE 200 MG/1
400 TABLET ORAL 2 TIMES DAILY
Status: DISCONTINUED | OUTPATIENT
Start: 2021-01-01 | End: 2021-01-01 | Stop reason: HOSPADM

## 2021-01-01 RX ORDER — LOPERAMIDE HYDROCHLORIDE 2 MG/1
2 CAPSULE ORAL 3 TIMES DAILY
Status: ON HOLD | COMMUNITY
End: 2021-01-01 | Stop reason: HOSPADM

## 2021-01-01 RX ORDER — ATORVASTATIN CALCIUM 10 MG/1
10 TABLET, FILM COATED ORAL NIGHTLY
Status: DISCONTINUED | OUTPATIENT
Start: 2021-01-01 | End: 2021-01-01 | Stop reason: HOSPADM

## 2021-01-01 RX ORDER — CHOLESTYRAMINE 4 G/9G
2 POWDER, FOR SUSPENSION ORAL 2 TIMES DAILY
Status: DISCONTINUED | OUTPATIENT
Start: 2021-01-01 | End: 2021-01-01 | Stop reason: HOSPADM

## 2021-01-01 RX ORDER — DEXTROSE MONOHYDRATE 50 MG/ML
100 INJECTION, SOLUTION INTRAVENOUS PRN
Status: DISCONTINUED | OUTPATIENT
Start: 2021-01-01 | End: 2021-01-01 | Stop reason: SDUPTHER

## 2021-01-01 RX ORDER — DIPHENOXYLATE HCL/ATROPINE 2.5-.025/5
5 LIQUID (ML) ORAL 4 TIMES DAILY
Status: DISCONTINUED | OUTPATIENT
Start: 2021-01-01 | End: 2021-01-01 | Stop reason: SDUPTHER

## 2021-01-01 RX ORDER — ASCORBIC ACID 500 MG
1000 TABLET ORAL DAILY
Status: DISCONTINUED | OUTPATIENT
Start: 2021-01-01 | End: 2021-01-01 | Stop reason: HOSPADM

## 2021-01-01 RX ORDER — MIDAZOLAM HYDROCHLORIDE 1 MG/ML
INJECTION INTRAMUSCULAR; INTRAVENOUS
Status: COMPLETED
Start: 2021-01-01 | End: 2021-01-01

## 2021-01-01 RX ORDER — SODIUM CHLORIDE 9 MG/ML
INJECTION, SOLUTION INTRAVENOUS CONTINUOUS
Status: DISCONTINUED | OUTPATIENT
Start: 2021-01-01 | End: 2021-01-01 | Stop reason: HOSPADM

## 2021-01-01 RX ORDER — POTASSIUM CHLORIDE 20 MEQ/1
40 TABLET, EXTENDED RELEASE ORAL ONCE
Status: COMPLETED | OUTPATIENT
Start: 2021-01-01 | End: 2021-01-01

## 2021-01-01 RX ORDER — EPHEDRINE SULFATE/0.9% NACL/PF 50 MG/5 ML
SYRINGE (ML) INTRAVENOUS PRN
Status: DISCONTINUED | OUTPATIENT
Start: 2021-01-01 | End: 2021-01-01 | Stop reason: SDUPTHER

## 2021-01-01 RX ORDER — ACETAMINOPHEN 325 MG/1
650 TABLET ORAL EVERY 6 HOURS PRN
Status: CANCELLED | OUTPATIENT
Start: 2021-01-01

## 2021-01-01 RX ORDER — HYDRALAZINE HYDROCHLORIDE 20 MG/ML
5 INJECTION INTRAMUSCULAR; INTRAVENOUS EVERY 10 MIN PRN
Status: DISCONTINUED | OUTPATIENT
Start: 2021-01-01 | End: 2021-01-01 | Stop reason: HOSPADM

## 2021-01-01 RX ORDER — KETAMINE HYDROCHLORIDE 10 MG/ML
INJECTION, SOLUTION INTRAMUSCULAR; INTRAVENOUS PRN
Status: DISCONTINUED | OUTPATIENT
Start: 2021-01-01 | End: 2021-01-01 | Stop reason: SDUPTHER

## 2021-01-01 RX ORDER — BLOOD-GLUCOSE METER
KIT MISCELLANEOUS
Status: ON HOLD | COMMUNITY
Start: 2021-01-01 | End: 2021-01-01 | Stop reason: HOSPADM

## 2021-01-01 RX ORDER — SODIUM CHLORIDE 9 MG/ML
INJECTION, SOLUTION INTRAVENOUS CONTINUOUS
Status: CANCELLED | OUTPATIENT
Start: 2021-01-01

## 2021-01-01 RX ORDER — DEXTROSE MONOHYDRATE 25 G/50ML
25 INJECTION, SOLUTION INTRAVENOUS ONCE
Status: DISCONTINUED | OUTPATIENT
Start: 2021-01-01 | End: 2021-01-01

## 2021-01-01 RX ORDER — NOREPINEPHRINE BITARTRATE 1 MG/ML
INJECTION, SOLUTION INTRAVENOUS
Status: DISCONTINUED
Start: 2021-01-01 | End: 2021-01-01 | Stop reason: HOSPADM

## 2021-01-01 RX ORDER — IBUPROFEN 800 MG/1
TABLET ORAL
Status: ON HOLD | COMMUNITY
End: 2021-01-01 | Stop reason: HOSPADM

## 2021-01-01 RX ORDER — SODIUM CHLORIDE 0.9 % (FLUSH) 0.9 %
5-40 SYRINGE (ML) INJECTION 2 TIMES DAILY
Status: DISCONTINUED | OUTPATIENT
Start: 2021-01-01 | End: 2021-01-01 | Stop reason: HOSPADM

## 2021-01-01 RX ORDER — SENNA PLUS 8.6 MG/1
TABLET ORAL
Status: ON HOLD | COMMUNITY
End: 2021-01-01

## 2021-01-01 RX ORDER — OXYCODONE HYDROCHLORIDE 5 MG/1
5 TABLET ORAL EVERY 6 HOURS PRN
Qty: 12 TABLET | Refills: 0 | Status: SHIPPED | OUTPATIENT
Start: 2021-01-01 | End: 2021-01-01

## 2021-01-01 RX ORDER — PRAMIPEXOLE DIHYDROCHLORIDE 0.25 MG/1
0.5 TABLET ORAL 2 TIMES DAILY
Status: DISCONTINUED | OUTPATIENT
Start: 2021-01-01 | End: 2021-01-01 | Stop reason: HOSPADM

## 2021-01-01 RX ORDER — HYDROCORTISONE 5 MG/1
15 TABLET ORAL EVERY MORNING
Status: DISCONTINUED | OUTPATIENT
Start: 2021-01-01 | End: 2021-01-01 | Stop reason: HOSPADM

## 2021-01-01 RX ORDER — AMIODARONE HYDROCHLORIDE 200 MG/1
200 TABLET ORAL 2 TIMES DAILY
Status: DISCONTINUED | OUTPATIENT
Start: 2021-01-01 | End: 2021-01-01 | Stop reason: HOSPADM

## 2021-01-01 RX ORDER — DIPHENHYDRAMINE HCL 25 MG
25 TABLET ORAL NIGHTLY PRN
Status: ON HOLD | COMMUNITY
End: 2021-01-01 | Stop reason: HOSPADM

## 2021-01-01 RX ORDER — ALBUTEROL SULFATE 2.5 MG/3ML
2.5 SOLUTION RESPIRATORY (INHALATION) 4 TIMES DAILY
Status: CANCELLED | OUTPATIENT
Start: 2021-01-01

## 2021-01-01 RX ORDER — CHLORHEXIDINE GLUCONATE 0.12 MG/ML
15 RINSE ORAL 2 TIMES DAILY
Status: CANCELLED | OUTPATIENT
Start: 2021-01-01

## 2021-01-01 RX ORDER — INSULIN GLARGINE 100 [IU]/ML
62 INJECTION, SOLUTION SUBCUTANEOUS NIGHTLY
Status: DISCONTINUED | OUTPATIENT
Start: 2021-01-01 | End: 2021-01-01

## 2021-01-01 RX ORDER — FENTANYL CITRATE 50 UG/ML
50 INJECTION, SOLUTION INTRAMUSCULAR; INTRAVENOUS
Status: DISCONTINUED | OUTPATIENT
Start: 2021-01-01 | End: 2021-01-01 | Stop reason: ALTCHOICE

## 2021-01-01 RX ORDER — VECURONIUM BROMIDE 1 MG/ML
INJECTION, POWDER, LYOPHILIZED, FOR SOLUTION INTRAVENOUS PRN
Status: DISCONTINUED | OUTPATIENT
Start: 2021-01-01 | End: 2021-01-01 | Stop reason: SDUPTHER

## 2021-01-01 RX ORDER — PANTOPRAZOLE SODIUM 40 MG/1
40 TABLET, DELAYED RELEASE ORAL
Status: DISCONTINUED | OUTPATIENT
Start: 2021-01-01 | End: 2021-01-01

## 2021-01-01 RX ORDER — MORPHINE SULFATE 2 MG/ML
1 INJECTION, SOLUTION INTRAMUSCULAR; INTRAVENOUS EVERY 4 HOURS PRN
Status: DISCONTINUED | OUTPATIENT
Start: 2021-01-01 | End: 2021-01-01 | Stop reason: HOSPADM

## 2021-01-01 RX ORDER — ALBUMIN (HUMAN) 12.5 G/50ML
SOLUTION INTRAVENOUS PRN
Status: DISCONTINUED | OUTPATIENT
Start: 2021-01-01 | End: 2021-01-01 | Stop reason: SDUPTHER

## 2021-01-01 RX ORDER — ONDANSETRON 2 MG/ML
INJECTION INTRAMUSCULAR; INTRAVENOUS PRN
Status: DISCONTINUED | OUTPATIENT
Start: 2021-01-01 | End: 2021-01-01 | Stop reason: SDUPTHER

## 2021-01-01 RX ORDER — SODIUM CHLORIDE, SODIUM LACTATE, POTASSIUM CHLORIDE, CALCIUM CHLORIDE 600; 310; 30; 20 MG/100ML; MG/100ML; MG/100ML; MG/100ML
INJECTION, SOLUTION INTRAVENOUS CONTINUOUS PRN
Status: DISCONTINUED | OUTPATIENT
Start: 2021-01-01 | End: 2021-01-01 | Stop reason: SDUPTHER

## 2021-01-01 RX ORDER — INSULIN GLARGINE 100 [IU]/ML
10 INJECTION, SOLUTION SUBCUTANEOUS 2 TIMES DAILY
Qty: 1 VIAL | Refills: 3 | DISCHARGE
Start: 2021-01-01

## 2021-01-01 RX ORDER — SODIUM CHLORIDE 9 MG/ML
10 INJECTION INTRAVENOUS DAILY
Status: CANCELLED | OUTPATIENT
Start: 2021-01-01

## 2021-01-01 RX ORDER — FENTANYL CITRATE 50 UG/ML
INJECTION, SOLUTION INTRAMUSCULAR; INTRAVENOUS
Status: COMPLETED
Start: 2021-01-01 | End: 2021-01-01

## 2021-01-01 RX ORDER — 0.9 % SODIUM CHLORIDE 0.9 %
1000 INTRAVENOUS SOLUTION INTRAVENOUS ONCE
Status: DISCONTINUED | OUTPATIENT
Start: 2021-01-01 | End: 2021-01-01 | Stop reason: HOSPADM

## 2021-01-01 RX ORDER — FENTANYL CITRATE 50 UG/ML
25 INJECTION, SOLUTION INTRAMUSCULAR; INTRAVENOUS
Status: DISCONTINUED | OUTPATIENT
Start: 2021-01-01 | End: 2021-01-01 | Stop reason: HOSPADM

## 2021-01-01 RX ORDER — CEFAZOLIN SODIUM 1 G/3ML
INJECTION, POWDER, FOR SOLUTION INTRAMUSCULAR; INTRAVENOUS PRN
Status: DISCONTINUED | OUTPATIENT
Start: 2021-01-01 | End: 2021-01-01 | Stop reason: SDUPTHER

## 2021-01-01 RX ORDER — GABAPENTIN 100 MG/1
100 CAPSULE ORAL 3 TIMES DAILY
Status: DISCONTINUED | OUTPATIENT
Start: 2021-01-01 | End: 2021-01-01

## 2021-01-01 RX ORDER — AMIODARONE HYDROCHLORIDE 400 MG/1
400 TABLET ORAL 2 TIMES DAILY
Qty: 30 TABLET | Refills: 0 | Status: ON HOLD
Start: 2021-01-01 | End: 2021-01-01 | Stop reason: HOSPADM

## 2021-01-01 RX ORDER — DIPHENHYDRAMINE HYDROCHLORIDE 50 MG/ML
12.5 INJECTION INTRAMUSCULAR; INTRAVENOUS
Status: DISCONTINUED | OUTPATIENT
Start: 2021-01-01 | End: 2021-01-01 | Stop reason: HOSPADM

## 2021-01-01 RX ORDER — DEXTROSE MONOHYDRATE 25 G/50ML
12.5 INJECTION, SOLUTION INTRAVENOUS PRN
Status: DISCONTINUED | OUTPATIENT
Start: 2021-01-01 | End: 2021-01-01 | Stop reason: SDUPTHER

## 2021-01-01 RX ORDER — POTASSIUM CHLORIDE 29.8 MG/ML
20 INJECTION INTRAVENOUS ONCE
Status: COMPLETED | OUTPATIENT
Start: 2021-01-01 | End: 2021-01-01

## 2021-01-01 RX ORDER — LISINOPRIL AND HYDROCHLOROTHIAZIDE 25; 20 MG/1; MG/1
TABLET ORAL
Status: ON HOLD | COMMUNITY
Start: 2020-01-01 | End: 2021-01-01 | Stop reason: HOSPADM

## 2021-01-01 RX ORDER — INSULIN GLARGINE 100 [IU]/ML
15 INJECTION, SOLUTION SUBCUTANEOUS 2 TIMES DAILY
Status: DISCONTINUED | OUTPATIENT
Start: 2021-01-01 | End: 2021-01-01

## 2021-01-01 RX ORDER — HYDROCORTISONE 5 MG/1
15 TABLET ORAL
Status: DISCONTINUED | OUTPATIENT
Start: 2021-01-01 | End: 2021-01-01 | Stop reason: HOSPADM

## 2021-01-01 RX ORDER — PROPOFOL 10 MG/ML
10 INJECTION, EMULSION INTRAVENOUS
Status: DISCONTINUED | OUTPATIENT
Start: 2021-01-01 | End: 2021-01-01 | Stop reason: SDUPTHER

## 2021-01-01 RX ORDER — CHOLESTYRAMINE 4 G/9G
2 POWDER, FOR SUSPENSION ORAL 2 TIMES DAILY
Qty: 90 PACKET | Refills: 3 | Status: ON HOLD | OUTPATIENT
Start: 2021-01-01 | End: 2021-01-01 | Stop reason: HOSPADM

## 2021-01-01 RX ORDER — METOCLOPRAMIDE HYDROCHLORIDE 5 MG/ML
INJECTION INTRAMUSCULAR; INTRAVENOUS
Status: COMPLETED
Start: 2021-01-01 | End: 2021-01-01

## 2021-01-01 RX ORDER — ALCOHOL ANTISEPTIC PADS
PADS, MEDICATED (EA) TOPICAL
COMMUNITY
Start: 2021-01-01

## 2021-01-01 RX ORDER — FUROSEMIDE 10 MG/ML
60 INJECTION INTRAMUSCULAR; INTRAVENOUS ONCE
Status: COMPLETED | OUTPATIENT
Start: 2021-01-01 | End: 2021-01-01

## 2021-01-01 RX ORDER — AMIODARONE HYDROCHLORIDE 200 MG/1
100 TABLET ORAL DAILY
Status: DISCONTINUED | OUTPATIENT
Start: 2021-01-01 | End: 2021-01-01

## 2021-01-01 RX ORDER — MINERAL OIL AND WHITE PETROLATUM 150; 830 MG/G; MG/G
OINTMENT OPHTHALMIC EVERY 4 HOURS
Status: DISCONTINUED | OUTPATIENT
Start: 2021-01-01 | End: 2021-01-01

## 2021-01-01 RX ORDER — ALOGLIPTIN 25 MG/1
25 TABLET, FILM COATED ORAL DAILY
Status: DISCONTINUED | OUTPATIENT
Start: 2021-01-01 | End: 2021-01-01 | Stop reason: HOSPADM

## 2021-01-01 RX ORDER — HYDROCORTISONE 20 MG/1
20 TABLET ORAL 2 TIMES DAILY
Status: DISCONTINUED | OUTPATIENT
Start: 2021-01-01 | End: 2021-01-01

## 2021-01-01 RX ORDER — PROPOFOL 10 MG/ML
10 INJECTION, EMULSION INTRAVENOUS
Status: DISCONTINUED | OUTPATIENT
Start: 2021-01-01 | End: 2021-01-01 | Stop reason: HOSPADM

## 2021-01-01 RX ORDER — POTASSIUM CHLORIDE 29.8 MG/ML
40 INJECTION INTRAVENOUS ONCE
Status: COMPLETED | OUTPATIENT
Start: 2021-01-01 | End: 2021-01-01

## 2021-01-01 RX ORDER — MIDODRINE HYDROCHLORIDE 5 MG/1
15 TABLET ORAL 3 TIMES DAILY
Status: DISCONTINUED | OUTPATIENT
Start: 2021-01-01 | End: 2021-01-01 | Stop reason: HOSPADM

## 2021-01-01 RX ORDER — SODIUM CHLORIDE, SODIUM LACTATE, POTASSIUM CHLORIDE, CALCIUM CHLORIDE 600; 310; 30; 20 MG/100ML; MG/100ML; MG/100ML; MG/100ML
1000 INJECTION, SOLUTION INTRAVENOUS ONCE
Status: COMPLETED | OUTPATIENT
Start: 2021-01-01 | End: 2021-01-01

## 2021-01-01 RX ORDER — ACETAMINOPHEN 325 MG/1
650 TABLET ORAL EVERY 6 HOURS PRN
Status: DISCONTINUED | OUTPATIENT
Start: 2021-01-01 | End: 2021-01-01

## 2021-01-01 RX ORDER — SODIUM CHLORIDE 0.9 % (FLUSH) 0.9 %
5-40 SYRINGE (ML) INJECTION EVERY 12 HOURS SCHEDULED
Status: DISCONTINUED | OUTPATIENT
Start: 2021-01-01 | End: 2021-01-01 | Stop reason: SDUPTHER

## 2021-01-01 RX ORDER — BLOOD SUGAR DIAGNOSTIC
STRIP MISCELLANEOUS
Status: ON HOLD | COMMUNITY
Start: 2021-01-01 | End: 2021-01-01 | Stop reason: HOSPADM

## 2021-01-01 RX ORDER — GLYCOPYRROLATE 0.2 MG/ML
0.2 INJECTION INTRAMUSCULAR; INTRAVENOUS EVERY 4 HOURS PRN
Status: DISCONTINUED | OUTPATIENT
Start: 2021-01-01 | End: 2021-01-01 | Stop reason: HOSPADM

## 2021-01-01 RX ORDER — LABETALOL HYDROCHLORIDE 5 MG/ML
10 INJECTION, SOLUTION INTRAVENOUS EVERY 30 MIN PRN
Status: DISCONTINUED | OUTPATIENT
Start: 2021-01-01 | End: 2021-01-01 | Stop reason: HOSPADM

## 2021-01-01 RX ORDER — DEXTROSE MONOHYDRATE 50 MG/ML
INJECTION, SOLUTION INTRAVENOUS CONTINUOUS PRN
Status: DISCONTINUED | OUTPATIENT
Start: 2021-01-01 | End: 2021-01-01 | Stop reason: SDUPTHER

## 2021-01-01 RX ORDER — SODIUM CHLORIDE, SODIUM LACTATE, POTASSIUM CHLORIDE, CALCIUM CHLORIDE 600; 310; 30; 20 MG/100ML; MG/100ML; MG/100ML; MG/100ML
INJECTION, SOLUTION INTRAVENOUS CONTINUOUS
Status: DISCONTINUED | OUTPATIENT
Start: 2021-01-01 | End: 2021-01-01

## 2021-01-01 RX ORDER — SODIUM CHLORIDE 9 MG/ML
INJECTION, SOLUTION INTRAVENOUS EVERY 24 HOURS
Status: DISCONTINUED | OUTPATIENT
Start: 2021-01-01 | End: 2021-01-01 | Stop reason: HOSPADM

## 2021-01-01 RX ORDER — INSULIN GLARGINE 100 [IU]/ML
10 INJECTION, SOLUTION SUBCUTANEOUS 2 TIMES DAILY
Status: DISCONTINUED | OUTPATIENT
Start: 2021-01-01 | End: 2021-01-01 | Stop reason: HOSPADM

## 2021-01-01 RX ORDER — SUCCINYLCHOLINE/SOD CL,ISO/PF 200MG/10ML
SYRINGE (ML) INTRAVENOUS PRN
Status: DISCONTINUED | OUTPATIENT
Start: 2021-01-01 | End: 2021-01-01 | Stop reason: SDUPTHER

## 2021-01-01 RX ORDER — DIPHENOXYLATE HYDROCHLORIDE AND ATROPINE SULFATE 2.5; .025 MG/1; MG/1
1 TABLET ORAL 4 TIMES DAILY
Status: DISCONTINUED | OUTPATIENT
Start: 2021-01-01 | End: 2021-01-01 | Stop reason: HOSPADM

## 2021-01-01 RX ORDER — DEXTROSE MONOHYDRATE 50 MG/ML
100 INJECTION, SOLUTION INTRAVENOUS PRN
Status: CANCELLED | OUTPATIENT
Start: 2021-01-01

## 2021-01-01 RX ORDER — PANTOPRAZOLE SODIUM 40 MG/1
40 TABLET, DELAYED RELEASE ORAL DAILY
Status: DISCONTINUED | OUTPATIENT
Start: 2021-01-01 | End: 2021-01-01 | Stop reason: HOSPADM

## 2021-01-01 RX ORDER — MIDODRINE HYDROCHLORIDE 5 MG/1
10 TABLET ORAL
Status: DISCONTINUED | OUTPATIENT
Start: 2021-01-01 | End: 2021-01-01 | Stop reason: HOSPADM

## 2021-01-01 RX ORDER — MIDODRINE HYDROCHLORIDE 10 MG/1
10 TABLET ORAL
Qty: 90 TABLET | Refills: 3 | Status: SHIPPED | OUTPATIENT
Start: 2021-01-01

## 2021-01-01 RX ORDER — MIDODRINE HYDROCHLORIDE 5 MG/1
10 TABLET ORAL 3 TIMES DAILY
Status: ON HOLD | COMMUNITY
End: 2021-01-01 | Stop reason: HOSPADM

## 2021-01-01 RX ORDER — PROMETHAZINE HYDROCHLORIDE 25 MG/1
12.5 TABLET ORAL EVERY 6 HOURS PRN
Status: DISCONTINUED | OUTPATIENT
Start: 2021-01-01 | End: 2021-01-01

## 2021-01-01 RX ORDER — POTASSIUM CHLORIDE 20 MEQ/1
20 TABLET, EXTENDED RELEASE ORAL ONCE
Status: COMPLETED | OUTPATIENT
Start: 2021-01-01 | End: 2021-01-01

## 2021-01-01 RX ORDER — LOPERAMIDE HYDROCHLORIDE 2 MG/15ML
4 SOLUTION ORAL 3 TIMES DAILY
Status: DISCONTINUED | OUTPATIENT
Start: 2021-01-01 | End: 2021-01-01 | Stop reason: HOSPADM

## 2021-01-01 RX ORDER — CLOTRIMAZOLE 1 %
CREAM (GRAM) TOPICAL 2 TIMES DAILY
Status: DISCONTINUED | OUTPATIENT
Start: 2021-01-01 | End: 2021-01-01 | Stop reason: HOSPADM

## 2021-01-01 RX ORDER — PANTOPRAZOLE SODIUM 40 MG/10ML
40 INJECTION, POWDER, LYOPHILIZED, FOR SOLUTION INTRAVENOUS
Status: DISCONTINUED | OUTPATIENT
Start: 2021-06-16 | End: 2021-01-01

## 2021-01-01 RX ORDER — ALBUMIN (HUMAN) 12.5 G/50ML
25 SOLUTION INTRAVENOUS ONCE
Status: COMPLETED | OUTPATIENT
Start: 2021-01-01 | End: 2021-01-01

## 2021-01-01 RX ORDER — LIDOCAINE HYDROCHLORIDE 10 MG/ML
INJECTION, SOLUTION EPIDURAL; INFILTRATION; INTRACAUDAL; PERINEURAL
Status: COMPLETED
Start: 2021-01-01 | End: 2021-01-01

## 2021-01-01 RX ORDER — CHOLECALCIFEROL (VITAMIN D3) 125 MCG
5 CAPSULE ORAL NIGHTLY
Status: ON HOLD | COMMUNITY
End: 2021-01-01 | Stop reason: HOSPADM

## 2021-01-01 RX ORDER — ROCURONIUM BROMIDE 10 MG/ML
0.6 INJECTION, SOLUTION INTRAVENOUS ONCE
Status: COMPLETED | OUTPATIENT
Start: 2021-01-01 | End: 2021-01-01

## 2021-01-01 RX ORDER — ACETAMINOPHEN 325 MG/1
650 TABLET ORAL EVERY 6 HOURS
Status: DISCONTINUED | OUTPATIENT
Start: 2021-01-01 | End: 2021-01-01 | Stop reason: HOSPADM

## 2021-01-01 RX ORDER — ALCOHOL ANTISEPTIC PADS
PADS, MEDICATED (EA) TOPICAL
Status: ON HOLD | COMMUNITY
Start: 2021-01-01 | End: 2021-01-01 | Stop reason: HOSPADM

## 2021-01-01 RX ORDER — ROCURONIUM BROMIDE 10 MG/ML
100 INJECTION, SOLUTION INTRAVENOUS ONCE
Status: COMPLETED | OUTPATIENT
Start: 2021-01-01 | End: 2021-01-01

## 2021-01-01 RX ORDER — DIGOXIN 0.25 MG/ML
250 INJECTION INTRAMUSCULAR; INTRAVENOUS ONCE
Status: DISCONTINUED | OUTPATIENT
Start: 2021-01-01 | End: 2021-01-01

## 2021-01-01 RX ORDER — INSULIN GLARGINE 100 [IU]/ML
INJECTION, SOLUTION SUBCUTANEOUS
Status: ON HOLD | COMMUNITY
Start: 2021-01-01 | End: 2021-01-01 | Stop reason: HOSPADM

## 2021-01-01 RX ORDER — LINEZOLID 600 MG/1
600 TABLET, FILM COATED ORAL EVERY 12 HOURS SCHEDULED
Qty: 28 TABLET | Refills: 0
Start: 2021-01-01 | End: 2021-01-01

## 2021-01-01 RX ORDER — LIDOCAINE HYDROCHLORIDE 10 MG/ML
INJECTION, SOLUTION INFILTRATION; PERINEURAL
Status: DISCONTINUED
Start: 2021-01-01 | End: 2021-01-01

## 2021-01-01 RX ORDER — PANTOPRAZOLE SODIUM 40 MG/10ML
40 INJECTION, POWDER, LYOPHILIZED, FOR SOLUTION INTRAVENOUS DAILY
Status: CANCELLED | OUTPATIENT
Start: 2021-01-01

## 2021-01-01 RX ORDER — CYCLOBENZAPRINE HCL 10 MG
TABLET ORAL
Status: ON HOLD | COMMUNITY
Start: 2020-01-01 | End: 2021-01-01

## 2021-01-01 RX ORDER — MORPHINE SULFATE 2 MG/ML
2 INJECTION, SOLUTION INTRAMUSCULAR; INTRAVENOUS EVERY 4 HOURS PRN
Status: DISCONTINUED | OUTPATIENT
Start: 2021-01-01 | End: 2021-01-01 | Stop reason: HOSPADM

## 2021-01-01 RX ORDER — LABETALOL HYDROCHLORIDE 5 MG/ML
5 INJECTION, SOLUTION INTRAVENOUS EVERY 10 MIN PRN
Status: DISCONTINUED | OUTPATIENT
Start: 2021-01-01 | End: 2021-01-01 | Stop reason: HOSPADM

## 2021-01-01 RX ORDER — AMLODIPINE BESYLATE 5 MG/1
TABLET ORAL
Status: ON HOLD | COMMUNITY
End: 2021-01-01 | Stop reason: HOSPADM

## 2021-01-01 RX ORDER — HYDROCORTISONE 20 MG/1
20 TABLET ORAL 2 TIMES DAILY
Status: DISCONTINUED | OUTPATIENT
Start: 2021-01-01 | End: 2021-01-01 | Stop reason: HOSPADM

## 2021-01-01 RX ORDER — PREGABALIN 200 MG/1
CAPSULE ORAL
Status: ON HOLD | COMMUNITY
Start: 2021-01-01 | End: 2021-01-01

## 2021-01-01 RX ORDER — BISACODYL 10 MG
10 SUPPOSITORY, RECTAL RECTAL DAILY PRN
Status: CANCELLED | OUTPATIENT
Start: 2021-01-01

## 2021-01-01 RX ORDER — ALBUTEROL SULFATE 2.5 MG/3ML
2.5 SOLUTION RESPIRATORY (INHALATION) 4 TIMES DAILY
Qty: 120 EACH | Refills: 3 | DISCHARGE
Start: 2021-01-01 | End: 2021-01-01

## 2021-01-01 RX ORDER — LIDOCAINE HYDROCHLORIDE 10 MG/ML
5 INJECTION, SOLUTION EPIDURAL; INFILTRATION; INTRACAUDAL; PERINEURAL ONCE
Status: DISCONTINUED | OUTPATIENT
Start: 2021-01-01 | End: 2021-01-01 | Stop reason: HOSPADM

## 2021-01-01 RX ORDER — OMEPRAZOLE 20 MG/1
40 CAPSULE, DELAYED RELEASE ORAL DAILY
Status: DISCONTINUED | OUTPATIENT
Start: 2021-01-01 | End: 2021-01-01 | Stop reason: CLARIF

## 2021-01-01 RX ORDER — NALOXONE HYDROCHLORIDE 0.4 MG/ML
0.4 INJECTION, SOLUTION INTRAMUSCULAR; INTRAVENOUS; SUBCUTANEOUS PRN
Status: CANCELLED | OUTPATIENT
Start: 2021-01-01

## 2021-01-01 RX ORDER — HYDROCORTISONE 5 MG/1
15 TABLET ORAL
Qty: 30 TABLET | Refills: 0
Start: 2021-01-01

## 2021-01-01 RX ORDER — HYDROCORTISONE 5 MG/1
15 TABLET ORAL EVERY MORNING
Qty: 30 TABLET | Refills: 0 | Status: ON HOLD
Start: 2021-01-01 | End: 2021-01-01 | Stop reason: HOSPADM

## 2021-01-01 RX ORDER — PROPOFOL 10 MG/ML
INJECTION, EMULSION INTRAVENOUS
Status: COMPLETED
Start: 2021-01-01 | End: 2021-01-01

## 2021-01-01 RX ORDER — AMIODARONE HYDROCHLORIDE 200 MG/1
400 TABLET ORAL 3 TIMES DAILY
Status: DISCONTINUED | OUTPATIENT
Start: 2021-01-01 | End: 2021-01-01 | Stop reason: HOSPADM

## 2021-01-01 RX ORDER — LOPERAMIDE HYDROCHLORIDE 2 MG/1
2 CAPSULE ORAL 3 TIMES DAILY
Status: DISCONTINUED | OUTPATIENT
Start: 2021-01-01 | End: 2021-01-01 | Stop reason: ALTCHOICE

## 2021-01-01 RX ORDER — HEPARIN SODIUM 1000 [USP'U]/ML
4300 INJECTION, SOLUTION INTRAVENOUS; SUBCUTANEOUS PRN
Status: DISCONTINUED | OUTPATIENT
Start: 2021-01-01 | End: 2021-01-01 | Stop reason: HOSPADM

## 2021-01-01 RX ORDER — LIDOCAINE HYDROCHLORIDE 10 MG/ML
5 INJECTION, SOLUTION INFILTRATION; PERINEURAL ONCE
Status: COMPLETED | OUTPATIENT
Start: 2021-01-01 | End: 2021-01-01

## 2021-01-01 RX ORDER — HYDROCORTISONE 5 MG/1
5 TABLET ORAL
COMMUNITY

## 2021-01-01 RX ORDER — BISACODYL 10 MG
10 SUPPOSITORY, RECTAL RECTAL DAILY PRN
Status: ON HOLD | DISCHARGE
Start: 2021-01-01 | End: 2021-01-01

## 2021-01-01 RX ORDER — NICOTINE POLACRILEX 4 MG
15 LOZENGE BUCCAL PRN
Status: DISCONTINUED | OUTPATIENT
Start: 2021-01-01 | End: 2021-01-01 | Stop reason: SDUPTHER

## 2021-01-01 RX ORDER — LIDOCAINE HYDROCHLORIDE 20 MG/ML
INJECTION, SOLUTION INFILTRATION; PERINEURAL
Status: COMPLETED | OUTPATIENT
Start: 2021-01-01 | End: 2021-01-01

## 2021-01-01 RX ORDER — INSULIN GLARGINE 100 [IU]/ML
5 INJECTION, SOLUTION SUBCUTANEOUS NIGHTLY
Status: DISCONTINUED | OUTPATIENT
Start: 2021-01-01 | End: 2021-01-01 | Stop reason: HOSPADM

## 2021-01-01 RX ORDER — NICOTINE POLACRILEX 4 MG
15 LOZENGE BUCCAL SEE ADMIN INSTRUCTIONS
COMMUNITY

## 2021-01-01 RX ORDER — ERGOCALCIFEROL (VITAMIN D2) 1250 MCG
CAPSULE ORAL
Status: ON HOLD | COMMUNITY
End: 2021-01-01

## 2021-01-01 RX ORDER — IBUPROFEN 400 MG/1
400 TABLET ORAL EVERY 8 HOURS PRN
Status: DISCONTINUED | OUTPATIENT
Start: 2021-01-01 | End: 2021-01-01

## 2021-01-01 RX ORDER — INSULIN GLARGINE 100 [IU]/ML
62 INJECTION, SOLUTION SUBCUTANEOUS NIGHTLY
Status: DISCONTINUED | OUTPATIENT
Start: 2021-01-01 | End: 2021-01-01 | Stop reason: HOSPADM

## 2021-01-01 RX ORDER — METOPROLOL TARTRATE 50 MG/1
100 TABLET, FILM COATED ORAL 2 TIMES DAILY
Status: DISCONTINUED | OUTPATIENT
Start: 2021-01-01 | End: 2021-01-01 | Stop reason: HOSPADM

## 2021-01-01 RX ORDER — METOPROLOL TARTRATE 5 MG/5ML
5 INJECTION INTRAVENOUS ONCE
Status: COMPLETED | OUTPATIENT
Start: 2021-01-01 | End: 2021-01-01

## 2021-01-01 RX ORDER — SODIUM CHLORIDE 9 MG/ML
10 INJECTION INTRAVENOUS DAILY
Status: DISCONTINUED | OUTPATIENT
Start: 2021-01-01 | End: 2021-01-01

## 2021-01-01 RX ORDER — INSULIN GLARGINE 100 [IU]/ML
10 INJECTION, SOLUTION SUBCUTANEOUS 2 TIMES DAILY
Status: DISCONTINUED | OUTPATIENT
Start: 2021-01-01 | End: 2021-01-01

## 2021-01-01 RX ORDER — GABAPENTIN 300 MG/1
300 CAPSULE ORAL DAILY
Status: DISCONTINUED | OUTPATIENT
Start: 2021-01-01 | End: 2021-01-01 | Stop reason: HOSPADM

## 2021-01-01 RX ORDER — LIDOCAINE HYDROCHLORIDE AND EPINEPHRINE 10; 10 MG/ML; UG/ML
INJECTION, SOLUTION INFILTRATION; PERINEURAL PRN
Status: DISCONTINUED | OUTPATIENT
Start: 2021-01-01 | End: 2021-01-01 | Stop reason: ALTCHOICE

## 2021-01-01 RX ORDER — BUPIVACAINE HYDROCHLORIDE 5 MG/ML
INJECTION, SOLUTION EPIDURAL; INTRACAUDAL PRN
Status: DISCONTINUED | OUTPATIENT
Start: 2021-01-01 | End: 2021-01-01 | Stop reason: ALTCHOICE

## 2021-01-01 RX ORDER — 0.9 % SODIUM CHLORIDE 0.9 %
500 INTRAVENOUS SOLUTION INTRAVENOUS ONCE
Status: DISCONTINUED | OUTPATIENT
Start: 2021-01-01 | End: 2021-01-01

## 2021-01-01 RX ORDER — 0.9 % SODIUM CHLORIDE 0.9 %
250 INTRAVENOUS SOLUTION INTRAVENOUS ONCE
Status: DISCONTINUED | OUTPATIENT
Start: 2021-01-01 | End: 2021-01-01 | Stop reason: HOSPADM

## 2021-01-01 RX ORDER — DEXAMETHASONE 1 MG
1 TABLET ORAL ONCE
Qty: 1 TABLET | Refills: 0 | Status: SHIPPED | OUTPATIENT
Start: 2021-01-01 | End: 2021-01-01

## 2021-01-01 RX ORDER — INSULIN GLARGINE 100 [IU]/ML
62 INJECTION, SOLUTION SUBCUTANEOUS NIGHTLY
Status: CANCELLED | OUTPATIENT
Start: 2021-01-01

## 2021-01-01 RX ORDER — LOPERAMIDE HYDROCHLORIDE 2 MG/15ML
4 SOLUTION ORAL
Status: DISCONTINUED | OUTPATIENT
Start: 2021-01-01 | End: 2021-01-01 | Stop reason: HOSPADM

## 2021-01-01 RX ORDER — ETOMIDATE 2 MG/ML
INJECTION INTRAVENOUS
Status: COMPLETED
Start: 2021-01-01 | End: 2021-01-01

## 2021-01-01 RX ORDER — INSULIN GLARGINE 100 [IU]/ML
30 INJECTION, SOLUTION SUBCUTANEOUS ONCE
Status: COMPLETED | OUTPATIENT
Start: 2021-01-01 | End: 2021-01-01

## 2021-01-01 RX ORDER — OXYCODONE HYDROCHLORIDE 5 MG/1
5 CAPSULE ORAL EVERY 4 HOURS PRN
Status: ON HOLD | COMMUNITY
End: 2021-01-01 | Stop reason: HOSPADM

## 2021-01-01 RX ORDER — FENTANYL CITRATE 50 UG/ML
25 INJECTION, SOLUTION INTRAMUSCULAR; INTRAVENOUS EVERY 5 MIN PRN
Status: DISCONTINUED | OUTPATIENT
Start: 2021-01-01 | End: 2021-01-01 | Stop reason: HOSPADM

## 2021-01-01 RX ADMIN — OXYCODONE HYDROCHLORIDE AND ACETAMINOPHEN 1000 MG: 500 TABLET ORAL at 09:49

## 2021-01-01 RX ADMIN — NOREPINEPHRINE BITARTRATE 20 MCG/MIN: 1 INJECTION, SOLUTION, CONCENTRATE INTRAVENOUS at 00:20

## 2021-01-01 RX ADMIN — ALBUTEROL SULFATE 2.5 MG: 2.5 SOLUTION RESPIRATORY (INHALATION) at 09:04

## 2021-01-01 RX ADMIN — Medication 30 MG: at 09:28

## 2021-01-01 RX ADMIN — CHOLESTYRAMINE 8 G: 4 POWDER, FOR SUSPENSION ORAL at 21:50

## 2021-01-01 RX ADMIN — INSULIN GLARGINE 15 UNITS: 100 INJECTION, SOLUTION SUBCUTANEOUS at 20:59

## 2021-01-01 RX ADMIN — LIDOCAINE HYDROCHLORIDE 5 ML: 10 INJECTION, SOLUTION EPIDURAL; INFILTRATION; INTRACAUDAL; PERINEURAL at 03:49

## 2021-01-01 RX ADMIN — ALBUTEROL SULFATE 2.5 MG: 2.5 SOLUTION RESPIRATORY (INHALATION) at 20:06

## 2021-01-01 RX ADMIN — GABAPENTIN 100 MG: 100 CAPSULE ORAL at 12:22

## 2021-01-01 RX ADMIN — PIPERACILLIN AND TAZOBACTAM 3375 MG: 3; .375 INJECTION, POWDER, LYOPHILIZED, FOR SOLUTION INTRAVENOUS at 14:30

## 2021-01-01 RX ADMIN — HEPARIN SODIUM 15 UNITS/KG/HR: 10000 INJECTION, SOLUTION INTRAVENOUS at 19:50

## 2021-01-01 RX ADMIN — Medication 50 MG: at 13:02

## 2021-01-01 RX ADMIN — GUAIFENESIN 400 MG: 400 TABLET, FILM COATED ORAL at 08:57

## 2021-01-01 RX ADMIN — INSULIN LISPRO 1 UNITS: 100 INJECTION, SOLUTION INTRAVENOUS; SUBCUTANEOUS at 15:36

## 2021-01-01 RX ADMIN — Medication 100 MCG: at 15:10

## 2021-01-01 RX ADMIN — ERTAPENEM SODIUM 1000 MG: 1 INJECTION, POWDER, LYOPHILIZED, FOR SOLUTION INTRAMUSCULAR; INTRAVENOUS at 23:22

## 2021-01-01 RX ADMIN — POTASSIUM PHOSPHATE, MONOBASIC AND POTASSIUM PHOSPHATE, DIBASIC 30 MMOL: 224; 236 INJECTION, SOLUTION, CONCENTRATE INTRAVENOUS at 11:08

## 2021-01-01 RX ADMIN — POTASSIUM PHOSPHATE, MONOBASIC AND POTASSIUM PHOSPHATE, DIBASIC 20 MMOL: 224; 236 INJECTION, SOLUTION, CONCENTRATE INTRAVENOUS at 00:36

## 2021-01-01 RX ADMIN — Medication 50 MG: at 08:47

## 2021-01-01 RX ADMIN — SODIUM CHLORIDE, POTASSIUM CHLORIDE, SODIUM LACTATE AND CALCIUM CHLORIDE 1000 ML: 600; 310; 30; 20 INJECTION, SOLUTION INTRAVENOUS at 23:33

## 2021-01-01 RX ADMIN — MIDODRINE HYDROCHLORIDE 15 MG: 5 TABLET ORAL at 12:00

## 2021-01-01 RX ADMIN — SODIUM CHLORIDE: 9 INJECTION, SOLUTION INTRAVENOUS at 14:43

## 2021-01-01 RX ADMIN — ONDANSETRON 4 MG: 2 INJECTION INTRAMUSCULAR; INTRAVENOUS at 09:05

## 2021-01-01 RX ADMIN — MIDAZOLAM 2 MG: 1 INJECTION INTRAMUSCULAR; INTRAVENOUS at 09:45

## 2021-01-01 RX ADMIN — PANTOPRAZOLE SODIUM 40 MG: 40 INJECTION, POWDER, FOR SOLUTION INTRAVENOUS at 20:24

## 2021-01-01 RX ADMIN — Medication 10 ML: at 09:53

## 2021-01-01 RX ADMIN — FENTANYL CITRATE 25 MCG: 0.05 INJECTION, SOLUTION INTRAMUSCULAR; INTRAVENOUS at 10:08

## 2021-01-01 RX ADMIN — CHLORHEXIDINE GLUCONATE 15 ML: 1.2 RINSE ORAL at 08:48

## 2021-01-01 RX ADMIN — FENTANYL CITRATE 25 MCG: 0.05 INJECTION, SOLUTION INTRAMUSCULAR; INTRAVENOUS at 13:47

## 2021-01-01 RX ADMIN — INSULIN GLARGINE 15 UNITS: 100 INJECTION, SOLUTION SUBCUTANEOUS at 20:10

## 2021-01-01 RX ADMIN — INSULIN LISPRO 3 UNITS: 100 INJECTION, SOLUTION INTRAVENOUS; SUBCUTANEOUS at 00:35

## 2021-01-01 RX ADMIN — GABAPENTIN 100 MG: 100 CAPSULE ORAL at 12:06

## 2021-01-01 RX ADMIN — OXYCODONE 5 MG: 5 TABLET ORAL at 01:23

## 2021-01-01 RX ADMIN — METOCLOPRAMIDE 5 MG: 5 TABLET ORAL at 17:14

## 2021-01-01 RX ADMIN — INSULIN GLARGINE 15 UNITS: 100 INJECTION, SOLUTION SUBCUTANEOUS at 08:48

## 2021-01-01 RX ADMIN — INSULIN GLARGINE 10 UNITS: 100 INJECTION, SOLUTION SUBCUTANEOUS at 22:46

## 2021-01-01 RX ADMIN — PHENYLEPHRINE HYDROCHLORIDE 50 MCG: 10 INJECTION INTRAVENOUS at 15:13

## 2021-01-01 RX ADMIN — PROPOFOL 75 MCG/KG/MIN: 10 INJECTION, EMULSION INTRAVENOUS at 09:52

## 2021-01-01 RX ADMIN — GABAPENTIN 100 MG: 100 CAPSULE ORAL at 06:12

## 2021-01-01 RX ADMIN — SODIUM CHLORIDE, PRESERVATIVE FREE 10 ML: 5 INJECTION INTRAVENOUS at 09:52

## 2021-01-01 RX ADMIN — GUAIFENESIN 400 MG: 400 TABLET, FILM COATED ORAL at 21:43

## 2021-01-01 RX ADMIN — SODIUM CHLORIDE 0.2 MCG/KG/HR: 9 INJECTION, SOLUTION INTRAVENOUS at 18:51

## 2021-01-01 RX ADMIN — DOXYCYCLINE 100 MG: 100 INJECTION, POWDER, LYOPHILIZED, FOR SOLUTION INTRAVENOUS at 03:22

## 2021-01-01 RX ADMIN — GABAPENTIN 100 MG: 100 CAPSULE ORAL at 21:27

## 2021-01-01 RX ADMIN — OXYCODONE HYDROCHLORIDE AND ACETAMINOPHEN 1000 MG: 500 TABLET ORAL at 08:57

## 2021-01-01 RX ADMIN — PIPERACILLIN AND TAZOBACTAM 3375 MG: 3; .375 INJECTION, POWDER, LYOPHILIZED, FOR SOLUTION INTRAVENOUS at 08:36

## 2021-01-01 RX ADMIN — PANTOPRAZOLE SODIUM 40 MG: 40 TABLET, DELAYED RELEASE ORAL at 11:33

## 2021-01-01 RX ADMIN — DIPHENOXYLATE HYDROCHLORIDE AND ATROPINE SULFATE 5 ML: 2.5; .025 SOLUTION ORAL at 22:42

## 2021-01-01 RX ADMIN — LOPERAMIDE HYDROCHLORIDE 4 MG: 2 SOLUTION ORAL at 12:06

## 2021-01-01 RX ADMIN — LABETALOL HYDROCHLORIDE 10 MG: 5 INJECTION INTRAVENOUS at 12:08

## 2021-01-01 RX ADMIN — SODIUM CHLORIDE, POTASSIUM CHLORIDE, SODIUM LACTATE AND CALCIUM CHLORIDE 1000 ML: 600; 310; 30; 20 INJECTION, SOLUTION INTRAVENOUS at 21:32

## 2021-01-01 RX ADMIN — SODIUM CHLORIDE, PRESERVATIVE FREE 10 ML: 5 INJECTION INTRAVENOUS at 12:48

## 2021-01-01 RX ADMIN — POTASSIUM PHOSPHATE, MONOBASIC AND POTASSIUM PHOSPHATE, DIBASIC 20 MMOL: 224; 236 INJECTION, SOLUTION, CONCENTRATE INTRAVENOUS at 09:04

## 2021-01-01 RX ADMIN — GABAPENTIN 100 MG: 100 CAPSULE ORAL at 13:01

## 2021-01-01 RX ADMIN — MICONAZOLE NITRATE: 20.6 POWDER TOPICAL at 21:48

## 2021-01-01 RX ADMIN — INSULIN LISPRO 15 UNITS: 100 INJECTION, SOLUTION INTRAVENOUS; SUBCUTANEOUS at 07:37

## 2021-01-01 RX ADMIN — APIXABAN 5 MG: 5 TABLET, FILM COATED ORAL at 21:00

## 2021-01-01 RX ADMIN — MIDODRINE HYDROCHLORIDE 15 MG: 5 TABLET ORAL at 11:48

## 2021-01-01 RX ADMIN — INSULIN GLARGINE 62 UNITS: 100 INJECTION, SOLUTION SUBCUTANEOUS at 23:22

## 2021-01-01 RX ADMIN — PIPERACILLIN AND TAZOBACTAM 3375 MG: 3; .375 INJECTION, POWDER, LYOPHILIZED, FOR SOLUTION INTRAVENOUS at 01:12

## 2021-01-01 RX ADMIN — DIPHENOXYLATE HYDROCHLORIDE AND ATROPINE SULFATE 5 ML: 2.5; .025 SOLUTION ORAL at 00:03

## 2021-01-01 RX ADMIN — DIPHENOXYLATE HYDROCHLORIDE AND ATROPINE SULFATE 1 TABLET: 2.5; .025 TABLET ORAL at 14:00

## 2021-01-01 RX ADMIN — SODIUM CHLORIDE, POTASSIUM CHLORIDE, SODIUM LACTATE AND CALCIUM CHLORIDE: 600; 310; 30; 20 INJECTION, SOLUTION INTRAVENOUS at 17:58

## 2021-01-01 RX ADMIN — GABAPENTIN 300 MG: 300 CAPSULE ORAL at 16:00

## 2021-01-01 RX ADMIN — Medication 100 MCG: at 12:43

## 2021-01-01 RX ADMIN — FENTANYL CITRATE 25 MCG: 0.05 INJECTION, SOLUTION INTRAMUSCULAR; INTRAVENOUS at 00:26

## 2021-01-01 RX ADMIN — PROPOFOL 35 MCG/KG/MIN: 10 INJECTION, EMULSION INTRAVENOUS at 04:18

## 2021-01-01 RX ADMIN — ASCORBIC ACID 1500 MG: 500 INJECTION, SOLUTION INTRAMUSCULAR; INTRAVENOUS; SUBCUTANEOUS at 10:10

## 2021-01-01 RX ADMIN — FENTANYL CITRATE 25 MCG: 0.05 INJECTION, SOLUTION INTRAMUSCULAR; INTRAVENOUS at 09:59

## 2021-01-01 RX ADMIN — MIDODRINE HYDROCHLORIDE 10 MG: 5 TABLET ORAL at 06:47

## 2021-01-01 RX ADMIN — LOPERAMIDE HYDROCHLORIDE 4 MG: 2 SOLUTION ORAL at 11:00

## 2021-01-01 RX ADMIN — METOCLOPRAMIDE 5 MG: 5 TABLET ORAL at 17:47

## 2021-01-01 RX ADMIN — SODIUM BICARBONATE 50 MEQ: 84 INJECTION, SOLUTION INTRAVENOUS at 12:25

## 2021-01-01 RX ADMIN — GUAIFENESIN 400 MG: 400 TABLET, FILM COATED ORAL at 17:52

## 2021-01-01 RX ADMIN — Medication 10 ML: at 10:24

## 2021-01-01 RX ADMIN — GABAPENTIN 100 MG: 100 CAPSULE ORAL at 04:37

## 2021-01-01 RX ADMIN — MIDODRINE HYDROCHLORIDE 10 MG: 10 TABLET ORAL at 08:17

## 2021-01-01 RX ADMIN — DOXYCYCLINE 100 MG: 100 INJECTION, POWDER, LYOPHILIZED, FOR SOLUTION INTRAVENOUS at 22:37

## 2021-01-01 RX ADMIN — MIDODRINE HYDROCHLORIDE 15 MG: 5 TABLET ORAL at 11:12

## 2021-01-01 RX ADMIN — Medication 30 ML: at 08:49

## 2021-01-01 RX ADMIN — METOCLOPRAMIDE 5 MG: 5 TABLET ORAL at 11:33

## 2021-01-01 RX ADMIN — CALCIUM GLUCONATE 1000 MG: 98 INJECTION, SOLUTION INTRAVENOUS at 12:18

## 2021-01-01 RX ADMIN — ALOGLIPTIN 25 MG: 25 TABLET, FILM COATED ORAL at 08:34

## 2021-01-01 RX ADMIN — HEPARIN SODIUM 18 UNITS/KG/HR: 10000 INJECTION, SOLUTION INTRAVENOUS at 03:36

## 2021-01-01 RX ADMIN — Medication 50 MG: at 08:09

## 2021-01-01 RX ADMIN — VANCOMYCIN HYDROCHLORIDE 1000 MG: 1 INJECTION, POWDER, LYOPHILIZED, FOR SOLUTION INTRAVENOUS at 17:35

## 2021-01-01 RX ADMIN — FENTANYL CITRATE 100 MCG: 50 INJECTION, SOLUTION INTRAMUSCULAR; INTRAVENOUS at 18:05

## 2021-01-01 RX ADMIN — PHENYLEPHRINE HYDROCHLORIDE 100 MCG: 10 INJECTION INTRAVENOUS at 15:27

## 2021-01-01 RX ADMIN — ALBUTEROL SULFATE 2.5 MG: 2.5 SOLUTION RESPIRATORY (INHALATION) at 16:53

## 2021-01-01 RX ADMIN — ACETAMINOPHEN 650 MG: 325 TABLET, FILM COATED ORAL at 08:02

## 2021-01-01 RX ADMIN — FENTANYL CITRATE 25 MCG: 0.05 INJECTION, SOLUTION INTRAMUSCULAR; INTRAVENOUS at 21:49

## 2021-01-01 RX ADMIN — PRAMIPEXOLE DIHYDROCHLORIDE 0.5 MG: 0.25 TABLET ORAL at 13:01

## 2021-01-01 RX ADMIN — PRAMIPEXOLE DIHYDROCHLORIDE 0.5 MG: 0.25 TABLET ORAL at 22:32

## 2021-01-01 RX ADMIN — Medication 50 MG: at 08:58

## 2021-01-01 RX ADMIN — PIPERACILLIN AND TAZOBACTAM 3375 MG: 3; .375 INJECTION, POWDER, LYOPHILIZED, FOR SOLUTION INTRAVENOUS at 01:30

## 2021-01-01 RX ADMIN — NOREPINEPHRINE BITARTRATE 100 MCG/MIN: 1 INJECTION, SOLUTION, CONCENTRATE INTRAVENOUS at 13:01

## 2021-01-01 RX ADMIN — HYDROCORTISONE 40 MG: 20 TABLET ORAL at 11:47

## 2021-01-01 RX ADMIN — PRAMIPEXOLE DIHYDROCHLORIDE 0.5 MG: 0.25 TABLET ORAL at 09:49

## 2021-01-01 RX ADMIN — LOPERAMIDE HYDROCHLORIDE 4 MG: 2 SOLUTION ORAL at 06:17

## 2021-01-01 RX ADMIN — FENTANYL CITRATE 25 MCG: 0.05 INJECTION, SOLUTION INTRAMUSCULAR; INTRAVENOUS at 16:07

## 2021-01-01 RX ADMIN — VASOPRESSIN 0.04 UNITS/MIN: 20 INJECTION INTRAVENOUS at 10:05

## 2021-01-01 RX ADMIN — PHENYLEPHRINE HYDROCHLORIDE 50 MCG: 10 INJECTION INTRAVENOUS at 13:23

## 2021-01-01 RX ADMIN — SODIUM BICARBONATE 50 MEQ: 84 INJECTION INTRAVENOUS at 03:48

## 2021-01-01 RX ADMIN — VASOPRESSIN 0.04 UNITS/MIN: 20 INJECTION INTRAVENOUS at 13:49

## 2021-01-01 RX ADMIN — ALBUTEROL SULFATE 2.5 MG: 2.5 SOLUTION RESPIRATORY (INHALATION) at 20:38

## 2021-01-01 RX ADMIN — DOXYCYCLINE 100 MG: 100 INJECTION, POWDER, LYOPHILIZED, FOR SOLUTION INTRAVENOUS at 11:07

## 2021-01-01 RX ADMIN — ANORECTAL OINTMENT: 15.7; .44; 24; 20.6 OINTMENT TOPICAL at 00:37

## 2021-01-01 RX ADMIN — APIXABAN 5 MG: 5 TABLET, FILM COATED ORAL at 13:38

## 2021-01-01 RX ADMIN — Medication 2000 UNITS: at 13:00

## 2021-01-01 RX ADMIN — HYDROCORTISONE SODIUM SUCCINATE 100 MG: 100 INJECTION, POWDER, FOR SOLUTION INTRAMUSCULAR; INTRAVENOUS at 20:35

## 2021-01-01 RX ADMIN — METOPROLOL TARTRATE 25 MG: 25 TABLET, FILM COATED ORAL at 21:32

## 2021-01-01 RX ADMIN — MIDODRINE HYDROCHLORIDE 10 MG: 5 TABLET ORAL at 10:35

## 2021-01-01 RX ADMIN — SODIUM CHLORIDE 25 ML: 9 INJECTION, SOLUTION INTRAVENOUS at 11:21

## 2021-01-01 RX ADMIN — SODIUM CHLORIDE, PRESERVATIVE FREE 10 ML: 5 INJECTION INTRAVENOUS at 08:36

## 2021-01-01 RX ADMIN — PSYLLIUM HUSK 1 PACKET: 3.4 GRANULE ORAL at 11:36

## 2021-01-01 RX ADMIN — ALBUMIN (HUMAN) 25 G: 0.25 INJECTION, SOLUTION INTRAVENOUS at 00:36

## 2021-01-01 RX ADMIN — FENTANYL CITRATE 25 MCG: 50 INJECTION, SOLUTION INTRAMUSCULAR; INTRAVENOUS at 15:33

## 2021-01-01 RX ADMIN — Medication 200 MCG: at 10:26

## 2021-01-01 RX ADMIN — MORPHINE SULFATE 2 MG: 2 INJECTION, SOLUTION INTRAMUSCULAR; INTRAVENOUS at 23:14

## 2021-01-01 RX ADMIN — FENTANYL CITRATE 50 MCG: 0.05 INJECTION, SOLUTION INTRAMUSCULAR; INTRAVENOUS at 12:56

## 2021-01-01 RX ADMIN — INSULIN LISPRO 2 UNITS: 100 INJECTION, SOLUTION INTRAVENOUS; SUBCUTANEOUS at 08:44

## 2021-01-01 RX ADMIN — FENTANYL CITRATE 25 MCG: 0.05 INJECTION, SOLUTION INTRAMUSCULAR; INTRAVENOUS at 06:17

## 2021-01-01 RX ADMIN — SODIUM CHLORIDE: 9 INJECTION, SOLUTION INTRAVENOUS at 15:56

## 2021-01-01 RX ADMIN — ONDANSETRON 4 MG: 2 INJECTION INTRAMUSCULAR; INTRAVENOUS at 23:04

## 2021-01-01 RX ADMIN — PRAMIPEXOLE DIHYDROCHLORIDE 0.5 MG: 0.25 TABLET ORAL at 10:36

## 2021-01-01 RX ADMIN — MAGNESIUM SULFATE HEPTAHYDRATE 2000 MG: 40 INJECTION, SOLUTION INTRAVENOUS at 08:26

## 2021-01-01 RX ADMIN — HYDROCORTISONE 40 MG: 20 TABLET ORAL at 11:14

## 2021-01-01 RX ADMIN — SODIUM CHLORIDE, PRESERVATIVE FREE 10 ML: 5 INJECTION INTRAVENOUS at 20:06

## 2021-01-01 RX ADMIN — HEPARIN SODIUM 2000 UNITS: 1000 INJECTION INTRAVENOUS; SUBCUTANEOUS at 23:34

## 2021-01-01 RX ADMIN — HYDROCORTISONE SODIUM SUCCINATE 100 MG: 100 INJECTION, POWDER, FOR SOLUTION INTRAMUSCULAR; INTRAVENOUS at 04:24

## 2021-01-01 RX ADMIN — SODIUM BICARBONATE 50 MEQ: 84 INJECTION INTRAVENOUS at 10:45

## 2021-01-01 RX ADMIN — INSULIN HUMAN 10 UNITS: 100 INJECTION, SOLUTION PARENTERAL at 12:29

## 2021-01-01 RX ADMIN — OXYCODONE HYDROCHLORIDE 10 MG: 10 TABLET ORAL at 08:03

## 2021-01-01 RX ADMIN — PIPERACILLIN AND TAZOBACTAM 3375 MG: 3; .375 INJECTION, POWDER, LYOPHILIZED, FOR SOLUTION INTRAVENOUS at 08:49

## 2021-01-01 RX ADMIN — POTASSIUM PHOSPHATE, MONOBASIC AND POTASSIUM PHOSPHATE, DIBASIC 30 MMOL: 224; 236 INJECTION, SOLUTION, CONCENTRATE INTRAVENOUS at 08:49

## 2021-01-01 RX ADMIN — PIPERACILLIN AND TAZOBACTAM 3375 MG: 3; .375 INJECTION, POWDER, LYOPHILIZED, FOR SOLUTION INTRAVENOUS at 18:37

## 2021-01-01 RX ADMIN — PROPOFOL 50 MCG/KG/MIN: 10 INJECTION, EMULSION INTRAVENOUS at 12:57

## 2021-01-01 RX ADMIN — PHENYLEPHRINE HYDROCHLORIDE 50 MCG: 10 INJECTION INTRAVENOUS at 15:25

## 2021-01-01 RX ADMIN — HYDROCORTISONE 40 MG: 20 TABLET ORAL at 21:43

## 2021-01-01 RX ADMIN — INSULIN LISPRO 3 UNITS: 100 INJECTION, SOLUTION INTRAVENOUS; SUBCUTANEOUS at 00:30

## 2021-01-01 RX ADMIN — PIPERACILLIN AND TAZOBACTAM 3375 MG: 3; .375 INJECTION, POWDER, LYOPHILIZED, FOR SOLUTION INTRAVENOUS at 10:59

## 2021-01-01 RX ADMIN — CHLORHEXIDINE GLUCONATE 15 ML: 1.2 RINSE ORAL at 09:45

## 2021-01-01 RX ADMIN — FENTANYL CITRATE 25 MCG: 0.05 INJECTION, SOLUTION INTRAMUSCULAR; INTRAVENOUS at 00:53

## 2021-01-01 RX ADMIN — SODIUM CHLORIDE, PRESERVATIVE FREE 300 UNITS: 5 INJECTION INTRAVENOUS at 22:36

## 2021-01-01 RX ADMIN — FENTANYL CITRATE 25 MCG: 0.05 INJECTION, SOLUTION INTRAMUSCULAR; INTRAVENOUS at 10:43

## 2021-01-01 RX ADMIN — SODIUM CHLORIDE, PRESERVATIVE FREE 10 ML: 5 INJECTION INTRAVENOUS at 11:55

## 2021-01-01 RX ADMIN — APIXABAN 5 MG: 5 TABLET, FILM COATED ORAL at 21:31

## 2021-01-01 RX ADMIN — GABAPENTIN 100 MG: 100 CAPSULE ORAL at 04:56

## 2021-01-01 RX ADMIN — SODIUM CHLORIDE: 9 INJECTION, SOLUTION INTRAVENOUS at 07:03

## 2021-01-01 RX ADMIN — PROPOFOL 75 MCG/KG/MIN: 10 INJECTION, EMULSION INTRAVENOUS at 12:25

## 2021-01-01 RX ADMIN — HYDROCORTISONE SODIUM SUCCINATE 50 MG: 100 INJECTION, POWDER, FOR SOLUTION INTRAMUSCULAR; INTRAVENOUS at 17:13

## 2021-01-01 RX ADMIN — SODIUM CHLORIDE: 9 INJECTION, SOLUTION INTRAVENOUS at 03:50

## 2021-01-01 RX ADMIN — HYDROCORTISONE SODIUM SUCCINATE 100 MG: 100 INJECTION, POWDER, FOR SOLUTION INTRAMUSCULAR; INTRAVENOUS at 04:14

## 2021-01-01 RX ADMIN — IOPAMIDOL 90 ML: 755 INJECTION, SOLUTION INTRAVENOUS at 16:47

## 2021-01-01 RX ADMIN — PROPOFOL 90 MG: 10 INJECTION, EMULSION INTRAVENOUS at 16:04

## 2021-01-01 RX ADMIN — Medication 140 MG: at 17:21

## 2021-01-01 RX ADMIN — CEFAZOLIN 2000 MG: 1 INJECTION, POWDER, FOR SOLUTION INTRAMUSCULAR; INTRAVENOUS at 13:02

## 2021-01-01 RX ADMIN — ALBUTEROL SULFATE 2.5 MG: 2.5 SOLUTION RESPIRATORY (INHALATION) at 21:41

## 2021-01-01 RX ADMIN — PRAMIPEXOLE DIHYDROCHLORIDE 0.5 MG: 0.25 TABLET ORAL at 11:12

## 2021-01-01 RX ADMIN — INSULIN GLARGINE 5 UNITS: 100 INJECTION, SOLUTION SUBCUTANEOUS at 21:28

## 2021-01-01 RX ADMIN — ALBUTEROL SULFATE 2.5 MG: 2.5 SOLUTION RESPIRATORY (INHALATION) at 12:12

## 2021-01-01 RX ADMIN — PROPOFOL 25 MCG/KG/MIN: 10 INJECTION, EMULSION INTRAVENOUS at 11:24

## 2021-01-01 RX ADMIN — MINERAL OIL AND PETROLATUM: 150; 830 OINTMENT OPHTHALMIC at 04:24

## 2021-01-01 RX ADMIN — POTASSIUM CHLORIDE 40 MEQ: 400 INJECTION, SOLUTION INTRAVENOUS at 00:04

## 2021-01-01 RX ADMIN — PANTOPRAZOLE SODIUM 40 MG: 40 INJECTION, POWDER, FOR SOLUTION INTRAVENOUS at 08:19

## 2021-01-01 RX ADMIN — Medication 50 MG: at 11:10

## 2021-01-01 RX ADMIN — ACETAMINOPHEN 650 MG: 325 TABLET ORAL at 07:22

## 2021-01-01 RX ADMIN — LOPERAMIDE HYDROCHLORIDE 2 MG: 2 CAPSULE ORAL at 21:27

## 2021-01-01 RX ADMIN — MICONAZOLE NITRATE: 20.6 POWDER TOPICAL at 09:54

## 2021-01-01 RX ADMIN — Medication 200 MCG: at 13:08

## 2021-01-01 RX ADMIN — PROPOFOL 25 MCG/KG/MIN: 10 INJECTION, EMULSION INTRAVENOUS at 16:32

## 2021-01-01 RX ADMIN — Medication 100 MCG: at 15:24

## 2021-01-01 RX ADMIN — ALBUTEROL SULFATE 2.5 MG: 2.5 SOLUTION RESPIRATORY (INHALATION) at 08:51

## 2021-01-01 RX ADMIN — Medication 10 ML: at 09:52

## 2021-01-01 RX ADMIN — HYDROCORTISONE 5 MG: 5 TABLET ORAL at 16:56

## 2021-01-01 RX ADMIN — GUAIFENESIN 400 MG: 400 TABLET, FILM COATED ORAL at 11:32

## 2021-01-01 RX ADMIN — LOPERAMIDE HYDROCHLORIDE 2 MG: 2 CAPSULE ORAL at 11:34

## 2021-01-01 RX ADMIN — PANTOPRAZOLE SODIUM 40 MG: 40 INJECTION, POWDER, FOR SOLUTION INTRAVENOUS at 08:46

## 2021-01-01 RX ADMIN — SODIUM CHLORIDE, PRESERVATIVE FREE 10 ML: 5 INJECTION INTRAVENOUS at 09:18

## 2021-01-01 RX ADMIN — PROPOFOL 20 MCG/KG/MIN: 10 INJECTION, EMULSION INTRAVENOUS at 06:35

## 2021-01-01 RX ADMIN — VECURONIUM BROMIDE 10 MG: 10 INJECTION, POWDER, LYOPHILIZED, FOR SOLUTION INTRAVENOUS at 07:15

## 2021-01-01 RX ADMIN — ALBUMIN (HUMAN) 25 G: 0.25 INJECTION, SOLUTION INTRAVENOUS at 12:51

## 2021-01-01 RX ADMIN — SODIUM CHLORIDE: 9 INJECTION, SOLUTION INTRAVENOUS at 15:22

## 2021-01-01 RX ADMIN — INSULIN LISPRO 1 UNITS: 100 INJECTION, SOLUTION INTRAVENOUS; SUBCUTANEOUS at 21:35

## 2021-01-01 RX ADMIN — PROPOFOL 30 MG: 10 INJECTION, EMULSION INTRAVENOUS at 15:32

## 2021-01-01 RX ADMIN — OXYCODONE HYDROCHLORIDE 10 MG: 10 TABLET ORAL at 20:04

## 2021-01-01 RX ADMIN — HEPARIN SODIUM 13.1 UNITS/KG/HR: 10000 INJECTION, SOLUTION INTRAVENOUS at 13:54

## 2021-01-01 RX ADMIN — AMIODARONE HYDROCHLORIDE 0.5 MG/MIN: 50 INJECTION, SOLUTION INTRAVENOUS at 13:14

## 2021-01-01 RX ADMIN — METOCLOPRAMIDE 5 MG: 5 TABLET ORAL at 10:35

## 2021-01-01 RX ADMIN — GUAIFENESIN 400 MG: 400 TABLET, FILM COATED ORAL at 21:28

## 2021-01-01 RX ADMIN — LINEZOLID 600 MG: 600 TABLET ORAL at 21:03

## 2021-01-01 RX ADMIN — METOCLOPRAMIDE 5 MG: 5 TABLET ORAL at 16:52

## 2021-01-01 RX ADMIN — ACETAMINOPHEN 650 MG: 325 TABLET ORAL at 18:01

## 2021-01-01 RX ADMIN — MIDAZOLAM 2 MG: 1 INJECTION INTRAMUSCULAR; INTRAVENOUS at 18:44

## 2021-01-01 RX ADMIN — ANORECTAL OINTMENT: 15.7; .44; 24; 20.6 OINTMENT TOPICAL at 21:45

## 2021-01-01 RX ADMIN — INSULIN HUMAN 5 UNITS: 100 INJECTION, SOLUTION PARENTERAL at 12:24

## 2021-01-01 RX ADMIN — INSULIN GLARGINE 15 UNITS: 100 INJECTION, SOLUTION SUBCUTANEOUS at 20:32

## 2021-01-01 RX ADMIN — Medication 50 MG: at 08:16

## 2021-01-01 RX ADMIN — MICONAZOLE NITRATE: 20.6 POWDER TOPICAL at 10:24

## 2021-01-01 RX ADMIN — PIPERACILLIN AND TAZOBACTAM 3.38 G: 3; .375 INJECTION, POWDER, LYOPHILIZED, FOR SOLUTION INTRAVENOUS at 07:18

## 2021-01-01 RX ADMIN — SODIUM CHLORIDE, PRESERVATIVE FREE 10 ML: 5 INJECTION INTRAVENOUS at 01:11

## 2021-01-01 RX ADMIN — METOPROLOL TARTRATE 25 MG: 25 TABLET, FILM COATED ORAL at 20:03

## 2021-01-01 RX ADMIN — FENTANYL CITRATE 25 MCG: 0.05 INJECTION, SOLUTION INTRAMUSCULAR; INTRAVENOUS at 22:33

## 2021-01-01 RX ADMIN — INSULIN GLARGINE 10 UNITS: 100 INJECTION, SOLUTION SUBCUTANEOUS at 10:37

## 2021-01-01 RX ADMIN — PROPOFOL 50 MCG/KG/MIN: 10 INJECTION, EMULSION INTRAVENOUS at 14:57

## 2021-01-01 RX ADMIN — INSULIN LISPRO 4 UNITS: 100 INJECTION, SOLUTION INTRAVENOUS; SUBCUTANEOUS at 12:52

## 2021-01-01 RX ADMIN — SODIUM CHLORIDE, PRESERVATIVE FREE 10 ML: 5 INJECTION INTRAVENOUS at 08:20

## 2021-01-01 RX ADMIN — HYDROCORTISONE SODIUM SUCCINATE 100 MG: 100 INJECTION, POWDER, FOR SOLUTION INTRAMUSCULAR; INTRAVENOUS at 14:15

## 2021-01-01 RX ADMIN — PANTOPRAZOLE SODIUM 40 MG: 40 INJECTION, POWDER, FOR SOLUTION INTRAVENOUS at 09:45

## 2021-01-01 RX ADMIN — AMIODARONE HYDROCHLORIDE 400 MG: 200 TABLET ORAL at 00:00

## 2021-01-01 RX ADMIN — GABAPENTIN 100 MG: 100 CAPSULE ORAL at 22:32

## 2021-01-01 RX ADMIN — APIXABAN 5 MG: 5 TABLET, FILM COATED ORAL at 09:08

## 2021-01-01 RX ADMIN — PRAMIPEXOLE DIHYDROCHLORIDE 0.5 MG: 0.25 TABLET ORAL at 11:33

## 2021-01-01 RX ADMIN — FENTANYL CITRATE 25 MCG: 0.05 INJECTION, SOLUTION INTRAMUSCULAR; INTRAVENOUS at 22:35

## 2021-01-01 RX ADMIN — METOPROLOL TARTRATE 25 MG: 25 TABLET, FILM COATED ORAL at 18:58

## 2021-01-01 RX ADMIN — ROCURONIUM BROMIDE 30 MG: 10 INJECTION, SOLUTION INTRAVENOUS at 17:31

## 2021-01-01 RX ADMIN — MICONAZOLE NITRATE: 20.6 POWDER TOPICAL at 09:04

## 2021-01-01 RX ADMIN — SODIUM CHLORIDE, POTASSIUM CHLORIDE, SODIUM LACTATE AND CALCIUM CHLORIDE: 600; 310; 30; 20 INJECTION, SOLUTION INTRAVENOUS at 16:59

## 2021-01-01 RX ADMIN — Medication 50 MG: at 09:18

## 2021-01-01 RX ADMIN — PROPOFOL 15 MCG/KG/MIN: 10 INJECTION, EMULSION INTRAVENOUS at 18:22

## 2021-01-01 RX ADMIN — METOCLOPRAMIDE 5 MG: 5 TABLET ORAL at 17:40

## 2021-01-01 RX ADMIN — HYDROCORTISONE SODIUM SUCCINATE 50 MG: 100 INJECTION, POWDER, FOR SOLUTION INTRAMUSCULAR; INTRAVENOUS at 15:00

## 2021-01-01 RX ADMIN — SODIUM CHLORIDE, PRESERVATIVE FREE 10 ML: 5 INJECTION INTRAVENOUS at 08:38

## 2021-01-01 RX ADMIN — LOPERAMIDE HCL 2 MG: 1 SUSPENSION ORAL at 09:42

## 2021-01-01 RX ADMIN — Medication 125 MCG/HR: at 13:42

## 2021-01-01 RX ADMIN — Medication 10 ML: at 22:43

## 2021-01-01 RX ADMIN — FENTANYL CITRATE 50 MCG: 50 INJECTION, SOLUTION INTRAMUSCULAR; INTRAVENOUS at 16:08

## 2021-01-01 RX ADMIN — CLOTRIMAZOLE: 10 CREAM TOPICAL at 13:26

## 2021-01-01 RX ADMIN — Medication 100 MCG: at 12:48

## 2021-01-01 RX ADMIN — SODIUM CHLORIDE, POTASSIUM CHLORIDE, SODIUM LACTATE AND CALCIUM CHLORIDE: 600; 310; 30; 20 INJECTION, SOLUTION INTRAVENOUS at 02:28

## 2021-01-01 RX ADMIN — ALBUMIN (HUMAN) 25 G: 0.25 INJECTION, SOLUTION INTRAVENOUS at 12:52

## 2021-01-01 RX ADMIN — HYDROCORTISONE SODIUM SUCCINATE 50 MG: 100 INJECTION, POWDER, FOR SOLUTION INTRAMUSCULAR; INTRAVENOUS at 02:17

## 2021-01-01 RX ADMIN — ACETAMINOPHEN 650 MG: 325 TABLET ORAL at 19:43

## 2021-01-01 RX ADMIN — PIPERACILLIN SODIUM AND TAZOBACTAM SODIUM 4500 MG: 4; .5 INJECTION, POWDER, LYOPHILIZED, FOR SOLUTION INTRAVENOUS at 20:14

## 2021-01-01 RX ADMIN — HYDROCORTISONE SODIUM SUCCINATE 100 MG: 100 INJECTION, POWDER, FOR SOLUTION INTRAMUSCULAR; INTRAVENOUS at 02:01

## 2021-01-01 RX ADMIN — PIPERACILLIN AND TAZOBACTAM 3375 MG: 3; .375 INJECTION, POWDER, LYOPHILIZED, FOR SOLUTION INTRAVENOUS at 17:18

## 2021-01-01 RX ADMIN — Medication 21 MCG/MIN: at 01:42

## 2021-01-01 RX ADMIN — MINERAL OIL AND PETROLATUM: 150; 830 OINTMENT OPHTHALMIC at 08:48

## 2021-01-01 RX ADMIN — SODIUM CHLORIDE 25 ML: 9 INJECTION, SOLUTION INTRAVENOUS at 05:30

## 2021-01-01 RX ADMIN — PETROLATUM: 420 OINTMENT TOPICAL at 11:57

## 2021-01-01 RX ADMIN — Medication 2 MCG/MIN: at 01:16

## 2021-01-01 RX ADMIN — AMIODARONE HYDROCHLORIDE 200 MG: 200 TABLET ORAL at 08:17

## 2021-01-01 RX ADMIN — ALBUMIN (HUMAN) 25 G: 0.25 INJECTION, SOLUTION INTRAVENOUS at 00:17

## 2021-01-01 RX ADMIN — PIPERACILLIN AND TAZOBACTAM 3375 MG: 3; .375 INJECTION, POWDER, LYOPHILIZED, FOR SOLUTION INTRAVENOUS at 09:00

## 2021-01-01 RX ADMIN — HYDROCORTISONE SODIUM SUCCINATE 100 MG: 100 INJECTION, POWDER, FOR SOLUTION INTRAMUSCULAR; INTRAVENOUS at 12:11

## 2021-01-01 RX ADMIN — INSULIN LISPRO 1 UNITS: 100 INJECTION, SOLUTION INTRAVENOUS; SUBCUTANEOUS at 22:46

## 2021-01-01 RX ADMIN — SODIUM CHLORIDE, POTASSIUM CHLORIDE, SODIUM LACTATE AND CALCIUM CHLORIDE: 600; 310; 30; 20 INJECTION, SOLUTION INTRAVENOUS at 10:53

## 2021-01-01 RX ADMIN — SODIUM CHLORIDE 25 ML: 9 INJECTION, SOLUTION INTRAVENOUS at 06:07

## 2021-01-01 RX ADMIN — LOPERAMIDE HYDROCHLORIDE 4 MG: 2 SOLUTION ORAL at 12:00

## 2021-01-01 RX ADMIN — ASCORBIC ACID 1500 MG: 500 INJECTION, SOLUTION INTRAMUSCULAR; INTRAVENOUS; SUBCUTANEOUS at 09:49

## 2021-01-01 RX ADMIN — HEPARIN SODIUM 4170 UNITS: 1000 INJECTION INTRAVENOUS; SUBCUTANEOUS at 05:35

## 2021-01-01 RX ADMIN — Medication 50 MG: at 10:35

## 2021-01-01 RX ADMIN — PIPERACILLIN AND TAZOBACTAM 3375 MG: 3; .375 INJECTION, POWDER, LYOPHILIZED, FOR SOLUTION INTRAVENOUS at 01:05

## 2021-01-01 RX ADMIN — Medication 50 MG: at 13:26

## 2021-01-01 RX ADMIN — CEFTRIAXONE 1000 MG: 1 INJECTION, POWDER, FOR SOLUTION INTRAMUSCULAR; INTRAVENOUS at 12:48

## 2021-01-01 RX ADMIN — POTASSIUM CHLORIDE 20 MEQ: 400 INJECTION, SOLUTION INTRAVENOUS at 08:48

## 2021-01-01 RX ADMIN — SODIUM CHLORIDE, PRESERVATIVE FREE 10 ML: 5 INJECTION INTRAVENOUS at 08:04

## 2021-01-01 RX ADMIN — HYDROCORTISONE SODIUM SUCCINATE 100 MG: 100 INJECTION, POWDER, FOR SOLUTION INTRAMUSCULAR; INTRAVENOUS at 20:42

## 2021-01-01 RX ADMIN — GUAIFENESIN 400 MG: 400 TABLET, FILM COATED ORAL at 23:07

## 2021-01-01 RX ADMIN — PROPOFOL 30 MCG/KG/MIN: 10 INJECTION, EMULSION INTRAVENOUS at 08:32

## 2021-01-01 RX ADMIN — ALBUTEROL SULFATE 2.5 MG: 2.5 SOLUTION RESPIRATORY (INHALATION) at 21:49

## 2021-01-01 RX ADMIN — FENTANYL CITRATE 25 MCG: 0.05 INJECTION, SOLUTION INTRAMUSCULAR; INTRAVENOUS at 14:12

## 2021-01-01 RX ADMIN — METOCLOPRAMIDE 5 MG: 5 TABLET ORAL at 11:44

## 2021-01-01 RX ADMIN — DOXYCYCLINE 100 MG: 100 INJECTION, POWDER, LYOPHILIZED, FOR SOLUTION INTRAVENOUS at 23:20

## 2021-01-01 RX ADMIN — GUAIFENESIN 400 MG: 400 TABLET, FILM COATED ORAL at 13:30

## 2021-01-01 RX ADMIN — POTASSIUM BICARBONATE 40 MEQ: 782 TABLET, EFFERVESCENT ORAL at 09:41

## 2021-01-01 RX ADMIN — DOXYCYCLINE 100 MG: 100 INJECTION, POWDER, LYOPHILIZED, FOR SOLUTION INTRAVENOUS at 23:00

## 2021-01-01 RX ADMIN — PANTOPRAZOLE SODIUM 40 MG: 40 TABLET, DELAYED RELEASE ORAL at 06:12

## 2021-01-01 RX ADMIN — APIXABAN 5 MG: 5 TABLET, FILM COATED ORAL at 20:03

## 2021-01-01 RX ADMIN — LOPERAMIDE HYDROCHLORIDE 4 MG: 2 SOLUTION ORAL at 16:28

## 2021-01-01 RX ADMIN — Medication 10 ML: at 23:17

## 2021-01-01 RX ADMIN — HYDROCORTISONE SODIUM SUCCINATE 50 MG: 100 INJECTION, POWDER, FOR SOLUTION INTRAMUSCULAR; INTRAVENOUS at 18:13

## 2021-01-01 RX ADMIN — Medication 150 MCG/HR: at 06:06

## 2021-01-01 RX ADMIN — SODIUM CHLORIDE 1000 ML: 9 INJECTION, SOLUTION INTRAVENOUS at 16:45

## 2021-01-01 RX ADMIN — PERFLUTREN 1.65 MG: 6.52 INJECTION, SUSPENSION INTRAVENOUS at 09:51

## 2021-01-01 RX ADMIN — HYDROMORPHONE HYDROCHLORIDE 1 MG: 1 INJECTION, SOLUTION INTRAMUSCULAR; INTRAVENOUS; SUBCUTANEOUS at 10:43

## 2021-01-01 RX ADMIN — MIDODRINE HYDROCHLORIDE 15 MG: 5 TABLET ORAL at 12:06

## 2021-01-01 RX ADMIN — CHLORHEXIDINE GLUCONATE 15 ML: 1.2 RINSE ORAL at 08:35

## 2021-01-01 RX ADMIN — PETROLATUM: 420 OINTMENT TOPICAL at 20:06

## 2021-01-01 RX ADMIN — Medication 2000 UNITS: at 08:57

## 2021-01-01 RX ADMIN — HEPARIN SODIUM 4000 UNITS: 1000 INJECTION INTRAVENOUS; SUBCUTANEOUS at 16:00

## 2021-01-01 RX ADMIN — FAMOTIDINE 20 MG: 10 INJECTION INTRAVENOUS at 15:38

## 2021-01-01 RX ADMIN — ONDANSETRON 4 MG: 2 INJECTION INTRAMUSCULAR; INTRAVENOUS at 11:43

## 2021-01-01 RX ADMIN — SODIUM CHLORIDE 25 ML: 9 INJECTION, SOLUTION INTRAVENOUS at 03:00

## 2021-01-01 RX ADMIN — MIDODRINE HYDROCHLORIDE 15 MG: 5 TABLET ORAL at 06:29

## 2021-01-01 RX ADMIN — SODIUM CHLORIDE, PRESERVATIVE FREE 300 UNITS: 5 INJECTION INTRAVENOUS at 09:09

## 2021-01-01 RX ADMIN — LOPERAMIDE HYDROCHLORIDE 4 MG: 2 SOLUTION ORAL at 23:08

## 2021-01-01 RX ADMIN — PROPOFOL 10 MG: 10 INJECTION, EMULSION INTRAVENOUS at 13:17

## 2021-01-01 RX ADMIN — INSULIN LISPRO 1 UNITS: 100 INJECTION, SOLUTION INTRAVENOUS; SUBCUTANEOUS at 04:03

## 2021-01-01 RX ADMIN — SODIUM CHLORIDE: 9 INJECTION, SOLUTION INTRAVENOUS at 12:18

## 2021-01-01 RX ADMIN — SODIUM CHLORIDE, PRESERVATIVE FREE 10 ML: 5 INJECTION INTRAVENOUS at 08:46

## 2021-01-01 RX ADMIN — FUROSEMIDE 20 MG: 10 INJECTION, SOLUTION INTRAMUSCULAR; INTRAVENOUS at 12:11

## 2021-01-01 RX ADMIN — CHLORHEXIDINE GLUCONATE 15 ML: 1.2 RINSE ORAL at 21:35

## 2021-01-01 RX ADMIN — FENTANYL CITRATE 25 MCG: 0.05 INJECTION, SOLUTION INTRAMUSCULAR; INTRAVENOUS at 18:45

## 2021-01-01 RX ADMIN — HYDROCORTISONE SODIUM SUCCINATE 100 MG: 100 INJECTION, POWDER, FOR SOLUTION INTRAMUSCULAR; INTRAVENOUS at 04:35

## 2021-01-01 RX ADMIN — Medication 10 ML: at 20:24

## 2021-01-01 RX ADMIN — SODIUM CHLORIDE: 9 INJECTION, SOLUTION INTRAVENOUS at 09:33

## 2021-01-01 RX ADMIN — INSULIN LISPRO 3 UNITS: 100 INJECTION, SOLUTION INTRAVENOUS; SUBCUTANEOUS at 04:35

## 2021-01-01 RX ADMIN — ACETAMINOPHEN 650 MG: 650 SUPPOSITORY RECTAL at 16:26

## 2021-01-01 RX ADMIN — PRAMIPEXOLE DIHYDROCHLORIDE 0.5 MG: 0.25 TABLET ORAL at 20:06

## 2021-01-01 RX ADMIN — Medication 50 MG: at 09:46

## 2021-01-01 RX ADMIN — IOPAMIDOL 75 ML: 755 INJECTION, SOLUTION INTRAVENOUS at 15:31

## 2021-01-01 RX ADMIN — MIDODRINE HYDROCHLORIDE 15 MG: 5 TABLET ORAL at 12:59

## 2021-01-01 RX ADMIN — Medication 25 MCG/MIN: at 13:25

## 2021-01-01 RX ADMIN — GABAPENTIN 100 MG: 100 CAPSULE ORAL at 04:00

## 2021-01-01 RX ADMIN — GUAIFENESIN 400 MG: 400 TABLET, FILM COATED ORAL at 21:37

## 2021-01-01 RX ADMIN — ALBUTEROL SULFATE 2.5 MG: 2.5 SOLUTION RESPIRATORY (INHALATION) at 07:46

## 2021-01-01 RX ADMIN — LIDOCAINE HYDROCHLORIDE 20 MG: 20 INJECTION, SOLUTION INTRAVENOUS at 09:28

## 2021-01-01 RX ADMIN — ROCURONIUM BROMIDE 100 MG: 10 INJECTION, SOLUTION INTRAVENOUS at 05:57

## 2021-01-01 RX ADMIN — HEPARIN SODIUM 17 UNITS/KG/HR: 10000 INJECTION, SOLUTION INTRAVENOUS at 00:48

## 2021-01-01 RX ADMIN — HYDROCORTISONE SODIUM SUCCINATE 100 MG: 100 INJECTION, POWDER, FOR SOLUTION INTRAMUSCULAR; INTRAVENOUS at 20:59

## 2021-01-01 RX ADMIN — PHENYLEPHRINE HYDROCHLORIDE 100 MCG: 10 INJECTION INTRAVENOUS at 15:29

## 2021-01-01 RX ADMIN — Medication 10 MG: at 10:16

## 2021-01-01 RX ADMIN — HYDROCORTISONE SODIUM SUCCINATE 100 MG: 100 INJECTION, POWDER, FOR SOLUTION INTRAMUSCULAR; INTRAVENOUS at 03:50

## 2021-01-01 RX ADMIN — HYDROCORTISONE SODIUM SUCCINATE 100 MG: 100 INJECTION, POWDER, FOR SOLUTION INTRAMUSCULAR; INTRAVENOUS at 04:33

## 2021-01-01 RX ADMIN — HEPARIN SODIUM 8340 UNITS: 1000 INJECTION INTRAVENOUS; SUBCUTANEOUS at 03:44

## 2021-01-01 RX ADMIN — MAGNESIUM SULFATE HEPTAHYDRATE 2000 MG: 40 INJECTION, SOLUTION INTRAVENOUS at 10:12

## 2021-01-01 RX ADMIN — DOXYCYCLINE 100 MG: 100 INJECTION, POWDER, LYOPHILIZED, FOR SOLUTION INTRAVENOUS at 11:52

## 2021-01-01 RX ADMIN — FENTANYL CITRATE 25 MCG: 50 INJECTION, SOLUTION INTRAMUSCULAR; INTRAVENOUS at 16:17

## 2021-01-01 RX ADMIN — MINERAL OIL AND PETROLATUM: 150; 830 OINTMENT OPHTHALMIC at 16:17

## 2021-01-01 RX ADMIN — Medication 2000 UNITS: at 11:14

## 2021-01-01 RX ADMIN — OXYCODONE HYDROCHLORIDE AND ACETAMINOPHEN 1000 MG: 500 TABLET ORAL at 10:36

## 2021-01-01 RX ADMIN — FENTANYL CITRATE 50 MCG: 0.05 INJECTION, SOLUTION INTRAMUSCULAR; INTRAVENOUS at 14:16

## 2021-01-01 RX ADMIN — HYDROMORPHONE HYDROCHLORIDE 1 MG: 1 INJECTION, SOLUTION INTRAMUSCULAR; INTRAVENOUS; SUBCUTANEOUS at 03:40

## 2021-01-01 RX ADMIN — Medication 2000 UNITS: at 08:09

## 2021-01-01 RX ADMIN — PANTOPRAZOLE SODIUM 40 MG: 40 TABLET, DELAYED RELEASE ORAL at 10:36

## 2021-01-01 RX ADMIN — ALBUMIN (HUMAN) 25 G: 0.25 INJECTION, SOLUTION INTRAVENOUS at 05:32

## 2021-01-01 RX ADMIN — MIDODRINE HYDROCHLORIDE 10 MG: 10 TABLET ORAL at 13:10

## 2021-01-01 RX ADMIN — PIPERACILLIN AND TAZOBACTAM 3375 MG: 3; .375 INJECTION, POWDER, LYOPHILIZED, FOR SOLUTION INTRAVENOUS at 10:42

## 2021-01-01 RX ADMIN — SODIUM BICARBONATE: 84 INJECTION, SOLUTION INTRAVENOUS at 03:49

## 2021-01-01 RX ADMIN — AMIODARONE HYDROCHLORIDE 400 MG: 200 TABLET ORAL at 21:31

## 2021-01-01 RX ADMIN — LABETALOL HYDROCHLORIDE 10 MG: 5 INJECTION INTRAVENOUS at 13:08

## 2021-01-01 RX ADMIN — FENTANYL CITRATE 50 MCG: 50 INJECTION, SOLUTION INTRAMUSCULAR; INTRAVENOUS at 17:45

## 2021-01-01 RX ADMIN — MINERAL OIL AND PETROLATUM: 150; 830 OINTMENT OPHTHALMIC at 20:10

## 2021-01-01 RX ADMIN — GABAPENTIN 100 MG: 100 CAPSULE ORAL at 04:06

## 2021-01-01 RX ADMIN — GABAPENTIN 100 MG: 100 CAPSULE ORAL at 22:18

## 2021-01-01 RX ADMIN — MORPHINE SULFATE 2 MG: 2 INJECTION, SOLUTION INTRAMUSCULAR; INTRAVENOUS at 12:19

## 2021-01-01 RX ADMIN — EPINEPHRINE 5 MCG/MIN: 1 INJECTION, SOLUTION, CONCENTRATE INTRAVENOUS at 07:21

## 2021-01-01 RX ADMIN — DOXYCYCLINE 100 MG: 100 INJECTION, POWDER, LYOPHILIZED, FOR SOLUTION INTRAVENOUS at 11:24

## 2021-01-01 RX ADMIN — Medication 25 MCG/HR: at 19:01

## 2021-01-01 RX ADMIN — INSULIN LISPRO 1 UNITS: 100 INJECTION, SOLUTION INTRAVENOUS; SUBCUTANEOUS at 23:56

## 2021-01-01 RX ADMIN — Medication 50 MG: at 09:21

## 2021-01-01 RX ADMIN — HYDROCORTISONE SODIUM SUCCINATE 100 MG: 100 INJECTION, POWDER, FOR SOLUTION INTRAMUSCULAR; INTRAVENOUS at 04:19

## 2021-01-01 RX ADMIN — LOPERAMIDE HYDROCHLORIDE 4 MG: 2 SOLUTION ORAL at 22:42

## 2021-01-01 RX ADMIN — PSYLLIUM HUSK 1 PACKET: 3.4 GRANULE ORAL at 08:10

## 2021-01-01 RX ADMIN — SODIUM CHLORIDE 25 ML: 9 INJECTION, SOLUTION INTRAVENOUS at 13:15

## 2021-01-01 RX ADMIN — SODIUM CHLORIDE, PRESERVATIVE FREE 10 ML: 5 INJECTION INTRAVENOUS at 10:10

## 2021-01-01 RX ADMIN — INSULIN HUMAN 8 UNITS: 100 INJECTION, SOLUTION PARENTERAL at 18:13

## 2021-01-01 RX ADMIN — MIDAZOLAM 1 MG: 1 INJECTION INTRAMUSCULAR; INTRAVENOUS at 13:00

## 2021-01-01 RX ADMIN — PROPOFOL 30 MG: 10 INJECTION, EMULSION INTRAVENOUS at 15:46

## 2021-01-01 RX ADMIN — APIXABAN 5 MG: 5 TABLET, FILM COATED ORAL at 21:45

## 2021-01-01 RX ADMIN — PANTOPRAZOLE SODIUM 40 MG: 40 INJECTION, POWDER, FOR SOLUTION INTRAVENOUS at 08:49

## 2021-01-01 RX ADMIN — OXYCODONE 5 MG: 5 TABLET ORAL at 14:50

## 2021-01-01 RX ADMIN — GUAIFENESIN 400 MG: 400 TABLET, FILM COATED ORAL at 08:10

## 2021-01-01 RX ADMIN — CALCIUM GLUCONATE 1000 MG: 98 INJECTION, SOLUTION INTRAVENOUS at 12:29

## 2021-01-01 RX ADMIN — OXYCODONE 5 MG: 5 TABLET ORAL at 23:07

## 2021-01-01 RX ADMIN — DOXYCYCLINE 100 MG: 100 INJECTION, POWDER, LYOPHILIZED, FOR SOLUTION INTRAVENOUS at 22:39

## 2021-01-01 RX ADMIN — METOPROLOL TARTRATE 25 MG: 25 TABLET, FILM COATED ORAL at 10:52

## 2021-01-01 RX ADMIN — OXYCODONE 5 MG: 5 TABLET ORAL at 16:32

## 2021-01-01 RX ADMIN — DOXYCYCLINE HYCLATE 100 MG: 100 CAPSULE ORAL at 23:07

## 2021-01-01 RX ADMIN — INSULIN LISPRO 1 UNITS: 100 INJECTION, SOLUTION INTRAVENOUS; SUBCUTANEOUS at 08:53

## 2021-01-01 RX ADMIN — MORPHINE SULFATE 1 MG/HR: 10 INJECTION INTRAVENOUS at 16:01

## 2021-01-01 RX ADMIN — PROPOFOL 20 MCG/KG/MIN: 10 INJECTION, EMULSION INTRAVENOUS at 09:24

## 2021-01-01 RX ADMIN — SODIUM CHLORIDE, POTASSIUM CHLORIDE, SODIUM LACTATE AND CALCIUM CHLORIDE 1000 ML: 600; 310; 30; 20 INJECTION, SOLUTION INTRAVENOUS at 01:12

## 2021-01-01 RX ADMIN — INSULIN LISPRO 3 UNITS: 100 INJECTION, SOLUTION INTRAVENOUS; SUBCUTANEOUS at 11:52

## 2021-01-01 RX ADMIN — SODIUM CHLORIDE, PRESERVATIVE FREE 10 ML: 5 INJECTION INTRAVENOUS at 08:35

## 2021-01-01 RX ADMIN — SODIUM CHLORIDE, PRESERVATIVE FREE 10 ML: 5 INJECTION INTRAVENOUS at 21:35

## 2021-01-01 RX ADMIN — PSYLLIUM HUSK 1 PACKET: 3.4 GRANULE ORAL at 13:39

## 2021-01-01 RX ADMIN — INSULIN LISPRO 6 UNITS: 100 INJECTION, SOLUTION INTRAVENOUS; SUBCUTANEOUS at 16:15

## 2021-01-01 RX ADMIN — METOCLOPRAMIDE 5 MG: 5 TABLET ORAL at 13:14

## 2021-01-01 RX ADMIN — HYDROCORTISONE SODIUM SUCCINATE 50 MG: 100 INJECTION, POWDER, FOR SOLUTION INTRAMUSCULAR; INTRAVENOUS at 01:02

## 2021-01-01 RX ADMIN — KETAMINE HYDROCHLORIDE 20 MG: 10 INJECTION INTRAMUSCULAR; INTRAVENOUS at 15:33

## 2021-01-01 RX ADMIN — LOPERAMIDE HYDROCHLORIDE 4 MG: 2 SOLUTION ORAL at 06:29

## 2021-01-01 RX ADMIN — ALBUMIN (HUMAN) 25 G: 0.25 INJECTION, SOLUTION INTRAVENOUS at 17:06

## 2021-01-01 RX ADMIN — PIPERACILLIN AND TAZOBACTAM 3375 MG: 3; .375 INJECTION, POWDER, LYOPHILIZED, FOR SOLUTION INTRAVENOUS at 00:57

## 2021-01-01 RX ADMIN — FENTANYL CITRATE 50 MCG: 50 INJECTION, SOLUTION INTRAMUSCULAR; INTRAVENOUS at 02:57

## 2021-01-01 RX ADMIN — FENTANYL CITRATE 25 MCG: 0.05 INJECTION, SOLUTION INTRAMUSCULAR; INTRAVENOUS at 03:58

## 2021-01-01 RX ADMIN — GABAPENTIN 100 MG: 100 CAPSULE ORAL at 23:07

## 2021-01-01 RX ADMIN — Medication 100 MCG: at 12:38

## 2021-01-01 RX ADMIN — MINERAL OIL AND PETROLATUM: 150; 830 OINTMENT OPHTHALMIC at 11:58

## 2021-01-01 RX ADMIN — PRAMIPEXOLE DIHYDROCHLORIDE 0.5 MG: 0.25 TABLET ORAL at 08:10

## 2021-01-01 RX ADMIN — SODIUM CHLORIDE 25 ML: 9 INJECTION, SOLUTION INTRAVENOUS at 18:59

## 2021-01-01 RX ADMIN — GUAIFENESIN 400 MG: 400 TABLET, FILM COATED ORAL at 10:37

## 2021-01-01 RX ADMIN — DIPHENOXYLATE HYDROCHLORIDE AND ATROPINE SULFATE 5 ML: 2.5; .025 SOLUTION ORAL at 17:53

## 2021-01-01 RX ADMIN — DIPHENOXYLATE HYDROCHLORIDE AND ATROPINE SULFATE 5 ML: 2.5; .025 SOLUTION ORAL at 21:56

## 2021-01-01 RX ADMIN — MIDODRINE HYDROCHLORIDE 10 MG: 10 TABLET ORAL at 17:54

## 2021-01-01 RX ADMIN — MIDODRINE HYDROCHLORIDE 15 MG: 5 TABLET ORAL at 17:40

## 2021-01-01 RX ADMIN — SODIUM CHLORIDE, PRESERVATIVE FREE 10 ML: 5 INJECTION INTRAVENOUS at 08:51

## 2021-01-01 RX ADMIN — LIDOCAINE HYDROCHLORIDE 30 ML: 20 INJECTION, SOLUTION INFILTRATION; PERINEURAL at 16:18

## 2021-01-01 RX ADMIN — INSULIN LISPRO 1 UNITS: 100 INJECTION, SOLUTION INTRAVENOUS; SUBCUTANEOUS at 22:38

## 2021-01-01 RX ADMIN — SODIUM CHLORIDE, PRESERVATIVE FREE 10 ML: 5 INJECTION INTRAVENOUS at 08:16

## 2021-01-01 RX ADMIN — HEPARIN SODIUM 19 UNITS/KG/HR: 10000 INJECTION, SOLUTION INTRAVENOUS at 16:32

## 2021-01-01 RX ADMIN — AMIODARONE HYDROCHLORIDE 400 MG: 200 TABLET ORAL at 14:04

## 2021-01-01 RX ADMIN — DOXYCYCLINE 100 MG: 100 INJECTION, POWDER, LYOPHILIZED, FOR SOLUTION INTRAVENOUS at 22:41

## 2021-01-01 RX ADMIN — MIDODRINE HYDROCHLORIDE 15 MG: 5 TABLET ORAL at 06:16

## 2021-01-01 RX ADMIN — LOPERAMIDE HYDROCHLORIDE 2 MG: 2 CAPSULE ORAL at 17:48

## 2021-01-01 RX ADMIN — CEFEPIME HYDROCHLORIDE 2000 MG: 2 INJECTION, POWDER, FOR SOLUTION INTRAVENOUS at 04:03

## 2021-01-01 RX ADMIN — SODIUM CHLORIDE: 9 INJECTION, SOLUTION INTRAVENOUS at 14:57

## 2021-01-01 RX ADMIN — SODIUM CHLORIDE, PRESERVATIVE FREE 10 ML: 5 INJECTION INTRAVENOUS at 18:18

## 2021-01-01 RX ADMIN — HEPARIN SODIUM 4170 UNITS: 1000 INJECTION INTRAVENOUS; SUBCUTANEOUS at 06:29

## 2021-01-01 RX ADMIN — AMLODIPINE BESYLATE 5 MG: 5 TABLET ORAL at 08:34

## 2021-01-01 RX ADMIN — CALCIUM GLUCONATE 1000 MG: 98 INJECTION, SOLUTION INTRAVENOUS at 08:49

## 2021-01-01 RX ADMIN — ALBUTEROL SULFATE 2.5 MG: 2.5 SOLUTION RESPIRATORY (INHALATION) at 21:03

## 2021-01-01 RX ADMIN — LOPERAMIDE HYDROCHLORIDE 4 MG: 2 SOLUTION ORAL at 06:35

## 2021-01-01 RX ADMIN — HYDROCORTISONE SODIUM SUCCINATE 50 MG: 100 INJECTION, POWDER, FOR SOLUTION INTRAMUSCULAR; INTRAVENOUS at 03:52

## 2021-01-01 RX ADMIN — GABAPENTIN 100 MG: 100 CAPSULE ORAL at 11:44

## 2021-01-01 RX ADMIN — ALBUMIN (HUMAN) 25 G: 0.25 INJECTION, SOLUTION INTRAVENOUS at 11:53

## 2021-01-01 RX ADMIN — PHENYLEPHRINE HYDROCHLORIDE 50 MCG: 10 INJECTION INTRAVENOUS at 13:10

## 2021-01-01 RX ADMIN — INSULIN GLARGINE 10 UNITS: 100 INJECTION, SOLUTION SUBCUTANEOUS at 22:39

## 2021-01-01 RX ADMIN — ALBUMIN (HUMAN) 25 G: 0.25 INJECTION, SOLUTION INTRAVENOUS at 15:49

## 2021-01-01 RX ADMIN — PROPOFOL 10 MG: 10 INJECTION, EMULSION INTRAVENOUS at 13:10

## 2021-01-01 RX ADMIN — HYDROCORTISONE SODIUM SUCCINATE 50 MG: 100 INJECTION, POWDER, FOR SOLUTION INTRAMUSCULAR; INTRAVENOUS at 08:55

## 2021-01-01 RX ADMIN — MIDODRINE HYDROCHLORIDE 10 MG: 10 TABLET ORAL at 08:49

## 2021-01-01 RX ADMIN — PANTOPRAZOLE SODIUM 40 MG: 40 INJECTION, POWDER, FOR SOLUTION INTRAVENOUS at 08:03

## 2021-01-01 RX ADMIN — CHOLESTYRAMINE 8 G: 4 POWDER, FOR SUSPENSION ORAL at 11:34

## 2021-01-01 RX ADMIN — MICONAZOLE NITRATE: 20.6 POWDER TOPICAL at 08:38

## 2021-01-01 RX ADMIN — PANTOPRAZOLE SODIUM 40 MG: 40 TABLET, DELAYED RELEASE ORAL at 13:01

## 2021-01-01 RX ADMIN — HYDROCORTISONE SODIUM SUCCINATE 50 MG: 100 INJECTION, POWDER, FOR SOLUTION INTRAMUSCULAR; INTRAVENOUS at 17:22

## 2021-01-01 RX ADMIN — GUAIFENESIN 400 MG: 400 TABLET, FILM COATED ORAL at 11:13

## 2021-01-01 RX ADMIN — FENTANYL CITRATE 50 MCG: 50 INJECTION, SOLUTION INTRAMUSCULAR; INTRAVENOUS at 17:21

## 2021-01-01 RX ADMIN — INSULIN LISPRO 9 UNITS: 100 INJECTION, SOLUTION INTRAVENOUS; SUBCUTANEOUS at 16:06

## 2021-01-01 RX ADMIN — HEPARIN SODIUM 19 UNITS/KG/HR: 10000 INJECTION, SOLUTION INTRAVENOUS at 04:42

## 2021-01-01 RX ADMIN — SODIUM CHLORIDE, PRESERVATIVE FREE 10 ML: 5 INJECTION INTRAVENOUS at 20:44

## 2021-01-01 RX ADMIN — INSULIN LISPRO 3 UNITS: 100 INJECTION, SOLUTION INTRAVENOUS; SUBCUTANEOUS at 04:19

## 2021-01-01 RX ADMIN — FENTANYL CITRATE 25 MCG: 50 INJECTION, SOLUTION INTRAMUSCULAR; INTRAVENOUS at 16:06

## 2021-01-01 RX ADMIN — GUAIFENESIN 400 MG: 400 TABLET, FILM COATED ORAL at 17:47

## 2021-01-01 RX ADMIN — NYSTATIN AND TRIAMCINOLONE ACETONIDE: 100000; 1 CREAM TOPICAL at 09:05

## 2021-01-01 RX ADMIN — OXYCODONE 5 MG: 5 TABLET ORAL at 02:00

## 2021-01-01 RX ADMIN — VANCOMYCIN HYDROCHLORIDE 1000 MG: 1 INJECTION, POWDER, LYOPHILIZED, FOR SOLUTION INTRAVENOUS at 12:03

## 2021-01-01 RX ADMIN — APIXABAN 5 MG: 5 TABLET, FILM COATED ORAL at 00:54

## 2021-01-01 RX ADMIN — LOPERAMIDE HYDROCHLORIDE 4 MG: 2 SOLUTION ORAL at 23:07

## 2021-01-01 RX ADMIN — FENTANYL CITRATE 25 MCG: 0.05 INJECTION, SOLUTION INTRAMUSCULAR; INTRAVENOUS at 02:43

## 2021-01-01 RX ADMIN — MORPHINE SULFATE 2 MG: 2 INJECTION, SOLUTION INTRAMUSCULAR; INTRAVENOUS at 08:08

## 2021-01-01 RX ADMIN — PROPOFOL 150 MG: 10 INJECTION, EMULSION INTRAVENOUS at 17:21

## 2021-01-01 RX ADMIN — MICONAZOLE NITRATE: 20.6 POWDER TOPICAL at 21:44

## 2021-01-01 RX ADMIN — ALBUTEROL SULFATE 2.5 MG: 2.5 SOLUTION RESPIRATORY (INHALATION) at 21:28

## 2021-01-01 RX ADMIN — FENTANYL CITRATE 25 MCG: 0.05 INJECTION, SOLUTION INTRAMUSCULAR; INTRAVENOUS at 15:36

## 2021-01-01 RX ADMIN — VANCOMYCIN HYDROCHLORIDE 1750 MG: 5 INJECTION, POWDER, LYOPHILIZED, FOR SOLUTION INTRAVENOUS at 10:14

## 2021-01-01 RX ADMIN — FUROSEMIDE 40 MG: 10 INJECTION, SOLUTION INTRAVENOUS at 16:18

## 2021-01-01 RX ADMIN — MICONAZOLE NITRATE: 20.6 POWDER TOPICAL at 08:22

## 2021-01-01 RX ADMIN — INSULIN GLARGINE 40 UNITS: 100 INJECTION, SOLUTION SUBCUTANEOUS at 03:16

## 2021-01-01 RX ADMIN — ANORECTAL OINTMENT: 15.7; .44; 24; 20.6 OINTMENT TOPICAL at 10:20

## 2021-01-01 RX ADMIN — CHOLESTYRAMINE 8 G: 4 POWDER, FOR SUSPENSION ORAL at 22:34

## 2021-01-01 RX ADMIN — CHOLESTYRAMINE 8 G: 4 POWDER, FOR SUSPENSION ORAL at 11:15

## 2021-01-01 RX ADMIN — SODIUM CHLORIDE: 9 INJECTION, SOLUTION INTRAVENOUS at 09:45

## 2021-01-01 RX ADMIN — Medication 2000 MG: at 15:26

## 2021-01-01 RX ADMIN — INSULIN LISPRO 1 UNITS: 100 INJECTION, SOLUTION INTRAVENOUS; SUBCUTANEOUS at 02:24

## 2021-01-01 RX ADMIN — MINERAL OIL AND PETROLATUM: 150; 830 OINTMENT OPHTHALMIC at 00:15

## 2021-01-01 RX ADMIN — DIPHENOXYLATE HYDROCHLORIDE AND ATROPINE SULFATE 1 TABLET: 2.5; .025 TABLET ORAL at 17:46

## 2021-01-01 RX ADMIN — MIDODRINE HYDROCHLORIDE 15 MG: 5 TABLET ORAL at 18:49

## 2021-01-01 RX ADMIN — PANTOPRAZOLE SODIUM 40 MG: 40 TABLET, DELAYED RELEASE ORAL at 08:09

## 2021-01-01 RX ADMIN — CEFAZOLIN 2000 MG: 1 INJECTION, POWDER, FOR SOLUTION INTRAMUSCULAR; INTRAVENOUS at 12:32

## 2021-01-01 RX ADMIN — MICONAZOLE NITRATE: 20.6 POWDER TOPICAL at 21:05

## 2021-01-01 RX ADMIN — FENTANYL CITRATE 50 MCG: 0.05 INJECTION, SOLUTION INTRAMUSCULAR; INTRAVENOUS at 06:30

## 2021-01-01 RX ADMIN — DIPHENOXYLATE HYDROCHLORIDE AND ATROPINE SULFATE 1 TABLET: 2.5; .025 TABLET ORAL at 23:21

## 2021-01-01 RX ADMIN — ALBUMIN (HUMAN) 500 ML: 12.5 INJECTION, SOLUTION INTRAVENOUS at 07:20

## 2021-01-01 RX ADMIN — CHOLESTYRAMINE 4 G: 4 POWDER, FOR SUSPENSION ORAL at 08:53

## 2021-01-01 RX ADMIN — ANORECTAL OINTMENT: 15.7; .44; 24; 20.6 OINTMENT TOPICAL at 21:35

## 2021-01-01 RX ADMIN — MINERAL OIL AND PETROLATUM: 150; 830 OINTMENT OPHTHALMIC at 04:18

## 2021-01-01 RX ADMIN — ERTAPENEM SODIUM 1000 MG: 1 INJECTION, POWDER, LYOPHILIZED, FOR SOLUTION INTRAMUSCULAR; INTRAVENOUS at 22:00

## 2021-01-01 RX ADMIN — FENTANYL CITRATE 50 MCG: 50 INJECTION, SOLUTION INTRAMUSCULAR; INTRAVENOUS at 17:06

## 2021-01-01 RX ADMIN — SODIUM CHLORIDE, PRESERVATIVE FREE 10 ML: 5 INJECTION INTRAVENOUS at 08:49

## 2021-01-01 RX ADMIN — MIDODRINE HYDROCHLORIDE 15 MG: 5 TABLET ORAL at 06:35

## 2021-01-01 RX ADMIN — SODIUM CHLORIDE 10 ML: 9 INJECTION, SOLUTION INTRAMUSCULAR; INTRAVENOUS; SUBCUTANEOUS at 04:00

## 2021-01-01 RX ADMIN — SODIUM CHLORIDE 25 ML: 9 INJECTION, SOLUTION INTRAVENOUS at 20:44

## 2021-01-01 RX ADMIN — INSULIN LISPRO 2 UNITS: 100 INJECTION, SOLUTION INTRAVENOUS; SUBCUTANEOUS at 21:05

## 2021-01-01 RX ADMIN — SODIUM CHLORIDE, PRESERVATIVE FREE 10 ML: 5 INJECTION INTRAVENOUS at 22:36

## 2021-01-01 RX ADMIN — HYDROCORTISONE SODIUM SUCCINATE 100 MG: 100 INJECTION, POWDER, FOR SOLUTION INTRAMUSCULAR; INTRAVENOUS at 10:43

## 2021-01-01 RX ADMIN — SODIUM CHLORIDE, PRESERVATIVE FREE 10 ML: 5 INJECTION INTRAVENOUS at 09:45

## 2021-01-01 RX ADMIN — CHOLESTYRAMINE 8 G: 4 POWDER, FOR SUSPENSION ORAL at 08:22

## 2021-01-01 RX ADMIN — ENOXAPARIN SODIUM 120 MG: 120 INJECTION SUBCUTANEOUS at 12:49

## 2021-01-01 RX ADMIN — LOPERAMIDE HYDROCHLORIDE 4 MG: 2 SOLUTION ORAL at 20:05

## 2021-01-01 RX ADMIN — HYDROCORTISONE 15 MG: 5 TABLET ORAL at 11:34

## 2021-01-01 RX ADMIN — PHENYLEPHRINE HYDROCHLORIDE 100 MCG: 10 INJECTION INTRAVENOUS at 15:21

## 2021-01-01 RX ADMIN — SODIUM CHLORIDE, POTASSIUM CHLORIDE, SODIUM LACTATE AND CALCIUM CHLORIDE 1000 ML: 600; 310; 30; 20 INJECTION, SOLUTION INTRAVENOUS at 19:31

## 2021-01-01 RX ADMIN — MINERAL OIL AND PETROLATUM: 150; 830 OINTMENT OPHTHALMIC at 23:44

## 2021-01-01 RX ADMIN — GUAIFENESIN 400 MG: 400 TABLET, FILM COATED ORAL at 17:59

## 2021-01-01 RX ADMIN — INSULIN HUMAN 7 UNITS: 100 INJECTION, SOLUTION PARENTERAL at 08:10

## 2021-01-01 RX ADMIN — ONDANSETRON 4 MG: 2 INJECTION INTRAMUSCULAR; INTRAVENOUS at 18:12

## 2021-01-01 RX ADMIN — SODIUM BICARBONATE 50 MEQ: 84 INJECTION INTRAVENOUS at 12:40

## 2021-01-01 RX ADMIN — MINERAL OIL AND PETROLATUM: 150; 830 OINTMENT OPHTHALMIC at 12:07

## 2021-01-01 RX ADMIN — NOREPINEPHRINE BITARTRATE 100 MCG/MIN: 1 INJECTION, SOLUTION, CONCENTRATE INTRAVENOUS at 15:47

## 2021-01-01 RX ADMIN — PROMETHAZINE HYDROCHLORIDE 12.5 MG: 25 TABLET ORAL at 02:45

## 2021-01-01 RX ADMIN — Medication 200 MCG: at 10:01

## 2021-01-01 RX ADMIN — INSULIN LISPRO 6 UNITS: 100 INJECTION, SOLUTION INTRAVENOUS; SUBCUTANEOUS at 03:15

## 2021-01-01 RX ADMIN — ALBUTEROL SULFATE 2.5 MG: 2.5 SOLUTION RESPIRATORY (INHALATION) at 13:02

## 2021-01-01 RX ADMIN — NOREPINEPHRINE BITARTRATE 100 MCG/MIN: 1 INJECTION, SOLUTION, CONCENTRATE INTRAVENOUS at 10:08

## 2021-01-01 RX ADMIN — SODIUM BICARBONATE 50 MEQ: 84 INJECTION INTRAVENOUS at 12:50

## 2021-01-01 RX ADMIN — LOPERAMIDE HYDROCHLORIDE 4 MG: 2 SOLUTION ORAL at 21:42

## 2021-01-01 RX ADMIN — PIPERACILLIN AND TAZOBACTAM 3375 MG: 3; .375 INJECTION, POWDER, LYOPHILIZED, FOR SOLUTION INTRAVENOUS at 10:00

## 2021-01-01 RX ADMIN — Medication 5 MCG/MIN: at 02:12

## 2021-01-01 RX ADMIN — PRAMIPEXOLE DIHYDROCHLORIDE 0.5 MG: 0.25 TABLET ORAL at 08:58

## 2021-01-01 RX ADMIN — OXYCODONE HYDROCHLORIDE AND ACETAMINOPHEN 1000 MG: 500 TABLET ORAL at 13:30

## 2021-01-01 RX ADMIN — ROCURONIUM BROMIDE 50 MG: 10 INJECTION INTRAVENOUS at 20:52

## 2021-01-01 RX ADMIN — ALBUTEROL SULFATE 2.5 MG: 2.5 SOLUTION RESPIRATORY (INHALATION) at 08:18

## 2021-01-01 RX ADMIN — HYDROCORTISONE SODIUM SUCCINATE 50 MG: 100 INJECTION, POWDER, FOR SOLUTION INTRAMUSCULAR; INTRAVENOUS at 21:27

## 2021-01-01 RX ADMIN — PANTOPRAZOLE SODIUM 40 MG: 40 INJECTION, POWDER, FOR SOLUTION INTRAVENOUS at 08:45

## 2021-01-01 RX ADMIN — INSULIN LISPRO 4 UNITS: 100 INJECTION, SOLUTION INTRAVENOUS; SUBCUTANEOUS at 17:54

## 2021-01-01 RX ADMIN — HYDROCORTISONE SODIUM SUCCINATE 50 MG: 100 INJECTION, POWDER, FOR SOLUTION INTRAMUSCULAR; INTRAVENOUS at 12:15

## 2021-01-01 RX ADMIN — VASOPRESSIN 0.04 UNITS/MIN: 20 INJECTION INTRAVENOUS at 18:13

## 2021-01-01 RX ADMIN — SODIUM BICARBONATE: 84 INJECTION, SOLUTION INTRAVENOUS at 03:51

## 2021-01-01 RX ADMIN — SODIUM CHLORIDE, PRESERVATIVE FREE 10 ML: 5 INJECTION INTRAVENOUS at 21:02

## 2021-01-01 RX ADMIN — MINERAL OIL AND PETROLATUM: 150; 830 OINTMENT OPHTHALMIC at 16:18

## 2021-01-01 RX ADMIN — POTASSIUM BICARBONATE 20 MEQ: 782 TABLET, EFFERVESCENT ORAL at 23:07

## 2021-01-01 RX ADMIN — Medication 125 MCG/HR: at 20:54

## 2021-01-01 RX ADMIN — SODIUM CHLORIDE, PRESERVATIVE FREE 10 ML: 5 INJECTION INTRAVENOUS at 03:59

## 2021-01-01 RX ADMIN — SODIUM CHLORIDE 250 ML: 9 INJECTION, SOLUTION INTRAVENOUS at 00:52

## 2021-01-01 RX ADMIN — GABAPENTIN 100 MG: 100 CAPSULE ORAL at 12:00

## 2021-01-01 RX ADMIN — HEPARIN SODIUM 21 UNITS/KG/HR: 10000 INJECTION, SOLUTION INTRAVENOUS at 05:30

## 2021-01-01 RX ADMIN — GUAIFENESIN 400 MG: 400 TABLET, FILM COATED ORAL at 21:30

## 2021-01-01 RX ADMIN — PROPOFOL 25 MCG/KG/MIN: 10 INJECTION, EMULSION INTRAVENOUS at 16:26

## 2021-01-01 RX ADMIN — SODIUM CHLORIDE, PRESERVATIVE FREE 10 ML: 5 INJECTION INTRAVENOUS at 11:56

## 2021-01-01 RX ADMIN — HYDROCORTISONE 15 MG: 5 TABLET ORAL at 08:22

## 2021-01-01 RX ADMIN — Medication 2000 UNITS: at 10:36

## 2021-01-01 RX ADMIN — HYDROCORTISONE SODIUM SUCCINATE 100 MG: 100 INJECTION, POWDER, FOR SOLUTION INTRAMUSCULAR; INTRAVENOUS at 03:47

## 2021-01-01 RX ADMIN — PRAMIPEXOLE DIHYDROCHLORIDE 0.5 MG: 0.25 TABLET ORAL at 21:42

## 2021-01-01 RX ADMIN — Medication 125 MCG/HR: at 00:19

## 2021-01-01 RX ADMIN — PSYLLIUM HUSK 1 PACKET: 3.4 GRANULE ORAL at 21:54

## 2021-01-01 RX ADMIN — ACETAMINOPHEN 650 MG: 325 TABLET, FILM COATED ORAL at 01:11

## 2021-01-01 RX ADMIN — METOCLOPRAMIDE 5 MG: 5 TABLET ORAL at 13:01

## 2021-01-01 RX ADMIN — MAGNESIUM SULFATE HEPTAHYDRATE 2000 MG: 40 INJECTION, SOLUTION INTRAVENOUS at 08:45

## 2021-01-01 RX ADMIN — Medication 200 MCG: at 12:56

## 2021-01-01 RX ADMIN — GABAPENTIN 300 MG: 300 CAPSULE ORAL at 09:47

## 2021-01-01 RX ADMIN — MIDODRINE HYDROCHLORIDE 15 MG: 5 TABLET ORAL at 05:45

## 2021-01-01 RX ADMIN — PROPOFOL 20 MG: 10 INJECTION, EMULSION INTRAVENOUS at 13:13

## 2021-01-01 RX ADMIN — PIPERACILLIN AND TAZOBACTAM 3375 MG: 3; .375 INJECTION, POWDER, LYOPHILIZED, FOR SOLUTION INTRAVENOUS at 08:50

## 2021-01-01 RX ADMIN — GABAPENTIN 100 MG: 100 CAPSULE ORAL at 21:50

## 2021-01-01 RX ADMIN — MIDODRINE HYDROCHLORIDE 10 MG: 10 TABLET ORAL at 09:08

## 2021-01-01 RX ADMIN — FENTANYL CITRATE 25 MCG: 0.05 INJECTION, SOLUTION INTRAMUSCULAR; INTRAVENOUS at 12:38

## 2021-01-01 RX ADMIN — Medication 10 ML: at 08:48

## 2021-01-01 RX ADMIN — PIPERACILLIN AND TAZOBACTAM 3375 MG: 3; .375 INJECTION, POWDER, LYOPHILIZED, FOR SOLUTION INTRAVENOUS; PARENTERAL at 14:02

## 2021-01-01 RX ADMIN — HEPARIN SODIUM 5000 UNITS: 1000 INJECTION INTRAVENOUS; SUBCUTANEOUS at 07:57

## 2021-01-01 RX ADMIN — LOPERAMIDE HYDROCHLORIDE 2 MG: 2 CAPSULE ORAL at 21:37

## 2021-01-01 RX ADMIN — SODIUM CHLORIDE, POTASSIUM CHLORIDE, SODIUM LACTATE AND CALCIUM CHLORIDE: 600; 310; 30; 20 INJECTION, SOLUTION INTRAVENOUS at 10:57

## 2021-01-01 RX ADMIN — MICONAZOLE NITRATE: 20.6 POWDER TOPICAL at 21:45

## 2021-01-01 RX ADMIN — ALBUTEROL SULFATE 2 PUFF: 90 AEROSOL, METERED RESPIRATORY (INHALATION) at 13:39

## 2021-01-01 RX ADMIN — PIPERACILLIN AND TAZOBACTAM 3375 MG: 3; .375 INJECTION, POWDER, LYOPHILIZED, FOR SOLUTION INTRAVENOUS at 08:02

## 2021-01-01 RX ADMIN — METOCLOPRAMIDE 5 MG: 5 TABLET ORAL at 17:53

## 2021-01-01 RX ADMIN — NYSTATIN AND TRIAMCINOLONE ACETONIDE: 100000; 1 CREAM TOPICAL at 21:45

## 2021-01-01 RX ADMIN — MAGNESIUM SULFATE HEPTAHYDRATE 1000 MG: 1 INJECTION, SOLUTION INTRAVENOUS at 00:36

## 2021-01-01 RX ADMIN — MINERAL OIL AND PETROLATUM: 150; 830 OINTMENT OPHTHALMIC at 00:25

## 2021-01-01 RX ADMIN — ASCORBIC ACID 1500 MG: 500 INJECTION, SOLUTION INTRAMUSCULAR; INTRAVENOUS; SUBCUTANEOUS at 09:18

## 2021-01-01 RX ADMIN — FENTANYL CITRATE 50 MCG: 0.05 INJECTION, SOLUTION INTRAMUSCULAR; INTRAVENOUS at 08:54

## 2021-01-01 RX ADMIN — LINEZOLID 600 MG: 600 TABLET ORAL at 08:37

## 2021-01-01 RX ADMIN — SODIUM CHLORIDE 7 UNITS/HR: 9 INJECTION, SOLUTION INTRAVENOUS at 08:10

## 2021-01-01 RX ADMIN — PROPOFOL 75 MCG/KG/MIN: 10 INJECTION, EMULSION INTRAVENOUS at 09:28

## 2021-01-01 RX ADMIN — SODIUM CHLORIDE, POTASSIUM CHLORIDE, SODIUM LACTATE AND CALCIUM CHLORIDE 500 ML: 600; 310; 30; 20 INJECTION, SOLUTION INTRAVENOUS at 01:30

## 2021-01-01 RX ADMIN — AMIODARONE HYDROCHLORIDE 0.5 MG/MIN: 50 INJECTION, SOLUTION INTRAVENOUS at 18:12

## 2021-01-01 RX ADMIN — AMIODARONE HYDROCHLORIDE 400 MG: 200 TABLET ORAL at 20:04

## 2021-01-01 RX ADMIN — LOPERAMIDE HYDROCHLORIDE 4 MG: 2 SOLUTION ORAL at 17:53

## 2021-01-01 RX ADMIN — FENTANYL CITRATE 25 MCG: 0.05 INJECTION, SOLUTION INTRAMUSCULAR; INTRAVENOUS at 20:19

## 2021-01-01 RX ADMIN — PIPERACILLIN AND TAZOBACTAM 3375 MG: 3; .375 INJECTION, POWDER, LYOPHILIZED, FOR SOLUTION INTRAVENOUS at 00:37

## 2021-01-01 RX ADMIN — LINEZOLID 600 MG: 600 TABLET ORAL at 20:24

## 2021-01-01 RX ADMIN — MIDAZOLAM 0.5 MG: 1 INJECTION INTRAMUSCULAR; INTRAVENOUS at 15:31

## 2021-01-01 RX ADMIN — ALBUTEROL SULFATE 2.5 MG: 2.5 SOLUTION RESPIRATORY (INHALATION) at 11:44

## 2021-01-01 RX ADMIN — INSULIN LISPRO 3 UNITS: 100 INJECTION, SOLUTION INTRAVENOUS; SUBCUTANEOUS at 08:04

## 2021-01-01 RX ADMIN — ALBUMIN (HUMAN) 25 G: 0.25 INJECTION, SOLUTION INTRAVENOUS at 05:56

## 2021-01-01 RX ADMIN — METOCLOPRAMIDE 5 MG: 5 TABLET ORAL at 16:28

## 2021-01-01 RX ADMIN — INSULIN LISPRO 3 UNITS: 100 INJECTION, SOLUTION INTRAVENOUS; SUBCUTANEOUS at 23:16

## 2021-01-01 RX ADMIN — PHENYLEPHRINE HYDROCHLORIDE 50 MCG: 10 INJECTION INTRAVENOUS at 13:17

## 2021-01-01 RX ADMIN — INSULIN LISPRO 3 UNITS: 100 INJECTION, SOLUTION INTRAVENOUS; SUBCUTANEOUS at 20:30

## 2021-01-01 RX ADMIN — SODIUM BICARBONATE 50 MEQ: 84 INJECTION INTRAVENOUS at 00:00

## 2021-01-01 RX ADMIN — CHOLESTYRAMINE 8 G: 4 POWDER, FOR SUSPENSION ORAL at 21:42

## 2021-01-01 RX ADMIN — SODIUM CHLORIDE, PRESERVATIVE FREE 10 ML: 5 INJECTION INTRAVENOUS at 10:09

## 2021-01-01 RX ADMIN — SODIUM CHLORIDE, PRESERVATIVE FREE 10 ML: 5 INJECTION INTRAVENOUS at 08:56

## 2021-01-01 RX ADMIN — MICONAZOLE NITRATE: 20.6 POWDER TOPICAL at 16:33

## 2021-01-01 RX ADMIN — AMIODARONE HYDROCHLORIDE 150 MG: 50 INJECTION, SOLUTION INTRAVENOUS at 04:23

## 2021-01-01 RX ADMIN — INSULIN GLARGINE 15 UNITS: 100 INJECTION, SOLUTION SUBCUTANEOUS at 08:04

## 2021-01-01 RX ADMIN — INSULIN LISPRO 1 UNITS: 100 INJECTION, SOLUTION INTRAVENOUS; SUBCUTANEOUS at 22:37

## 2021-01-01 RX ADMIN — NITROGLYCERIN 0.4 MG: 0.4 TABLET, ORALLY DISINTEGRATING SUBLINGUAL at 17:47

## 2021-01-01 RX ADMIN — OXYCODONE HYDROCHLORIDE 5 MG: 5 SOLUTION ORAL at 07:48

## 2021-01-01 RX ADMIN — ALBUTEROL SULFATE 2.5 MG: 2.5 SOLUTION RESPIRATORY (INHALATION) at 15:45

## 2021-01-01 RX ADMIN — LOPERAMIDE HYDROCHLORIDE 4 MG: 2 SOLUTION ORAL at 11:45

## 2021-01-01 RX ADMIN — IOPAMIDOL 75 ML: 755 INJECTION, SOLUTION INTRAVENOUS at 18:45

## 2021-01-01 RX ADMIN — OXYCODONE 5 MG: 5 TABLET ORAL at 05:56

## 2021-01-01 RX ADMIN — SODIUM CHLORIDE, PRESERVATIVE FREE 10 ML: 5 INJECTION INTRAVENOUS at 21:06

## 2021-01-01 RX ADMIN — PROPOFOL 25 MCG/KG/MIN: 10 INJECTION, EMULSION INTRAVENOUS at 06:02

## 2021-01-01 RX ADMIN — HYDROCORTISONE SODIUM SUCCINATE 100 MG: 100 INJECTION, POWDER, FOR SOLUTION INTRAMUSCULAR; INTRAVENOUS at 12:07

## 2021-01-01 RX ADMIN — Medication 100 MCG: at 15:05

## 2021-01-01 RX ADMIN — PHENYLEPHRINE HYDROCHLORIDE 200 MCG: 10 INJECTION INTRAVENOUS at 17:30

## 2021-01-01 RX ADMIN — KETAMINE HYDROCHLORIDE 10 MG: 10 INJECTION INTRAMUSCULAR; INTRAVENOUS at 15:46

## 2021-01-01 RX ADMIN — MIDODRINE HYDROCHLORIDE 15 MG: 5 TABLET ORAL at 17:48

## 2021-01-01 RX ADMIN — PSYLLIUM HUSK 1 PACKET: 3.4 GRANULE ORAL at 22:32

## 2021-01-01 RX ADMIN — PANTOPRAZOLE SODIUM 40 MG: 40 TABLET, DELAYED RELEASE ORAL at 08:56

## 2021-01-01 RX ADMIN — HEPARIN SODIUM 4170 UNITS: 1000 INJECTION INTRAVENOUS; SUBCUTANEOUS at 06:30

## 2021-01-01 RX ADMIN — PROPOFOL 20 MCG/KG/MIN: 10 INJECTION, EMULSION INTRAVENOUS at 00:04

## 2021-01-01 RX ADMIN — MIDODRINE HYDROCHLORIDE 10 MG: 5 TABLET ORAL at 06:07

## 2021-01-01 RX ADMIN — INSULIN GLARGINE 15 UNITS: 100 INJECTION, SOLUTION SUBCUTANEOUS at 20:44

## 2021-01-01 RX ADMIN — GUAIFENESIN 400 MG: 400 TABLET, FILM COATED ORAL at 14:00

## 2021-01-01 RX ADMIN — PROPOFOL 35 MCG/KG/MIN: 10 INJECTION, EMULSION INTRAVENOUS at 23:41

## 2021-01-01 RX ADMIN — APIXABAN 5 MG: 5 TABLET, FILM COATED ORAL at 11:47

## 2021-01-01 RX ADMIN — LINEZOLID 600 MG: 600 TABLET ORAL at 09:46

## 2021-01-01 RX ADMIN — Medication 10 ML: at 09:22

## 2021-01-01 RX ADMIN — MINERAL OIL AND PETROLATUM: 150; 830 OINTMENT OPHTHALMIC at 16:07

## 2021-01-01 RX ADMIN — FENTANYL CITRATE 25 MCG: 0.05 INJECTION, SOLUTION INTRAMUSCULAR; INTRAVENOUS at 11:41

## 2021-01-01 RX ADMIN — MIDODRINE HYDROCHLORIDE 10 MG: 5 TABLET ORAL at 11:33

## 2021-01-01 RX ADMIN — PROPOFOL 25 MCG/KG/MIN: 10 INJECTION, EMULSION INTRAVENOUS at 23:45

## 2021-01-01 RX ADMIN — MIDODRINE HYDROCHLORIDE 10 MG: 10 TABLET ORAL at 13:36

## 2021-01-01 RX ADMIN — ACETAMINOPHEN 650 MG: 325 TABLET, FILM COATED ORAL at 12:49

## 2021-01-01 RX ADMIN — EPOETIN ALFA-EPBX 2730 UNITS: 3000 INJECTION, SOLUTION INTRAVENOUS; SUBCUTANEOUS at 11:51

## 2021-01-01 RX ADMIN — MIDODRINE HYDROCHLORIDE 10 MG: 5 TABLET ORAL at 13:14

## 2021-01-01 RX ADMIN — MIDAZOLAM 1 MG: 1 INJECTION INTRAMUSCULAR; INTRAVENOUS at 09:22

## 2021-01-01 RX ADMIN — ALBUMIN (HUMAN) 25 G: 0.25 INJECTION, SOLUTION INTRAVENOUS at 18:53

## 2021-01-01 RX ADMIN — Medication 25 MCG/MIN: at 19:49

## 2021-01-01 RX ADMIN — PRAMIPEXOLE DIHYDROCHLORIDE 0.5 MG: 0.25 TABLET ORAL at 22:41

## 2021-01-01 RX ADMIN — HYDROCORTISONE SODIUM SUCCINATE 100 MG: 100 INJECTION, POWDER, FOR SOLUTION INTRAMUSCULAR; INTRAVENOUS at 22:42

## 2021-01-01 RX ADMIN — FENTANYL CITRATE 50 MCG: 0.05 INJECTION, SOLUTION INTRAMUSCULAR; INTRAVENOUS at 01:10

## 2021-01-01 RX ADMIN — ALBUTEROL SULFATE 2.5 MG: 2.5 SOLUTION RESPIRATORY (INHALATION) at 15:38

## 2021-01-01 RX ADMIN — DIPHENOXYLATE HYDROCHLORIDE AND ATROPINE SULFATE 5 ML: 2.5; .025 SOLUTION ORAL at 11:16

## 2021-01-01 RX ADMIN — CHLORHEXIDINE GLUCONATE 15 ML: 1.2 RINSE ORAL at 20:45

## 2021-01-01 RX ADMIN — PIPERACILLIN AND TAZOBACTAM 3375 MG: 3; .375 INJECTION, POWDER, LYOPHILIZED, FOR SOLUTION INTRAVENOUS at 02:12

## 2021-01-01 RX ADMIN — OXYCODONE HYDROCHLORIDE AND ACETAMINOPHEN 1000 MG: 500 TABLET ORAL at 08:09

## 2021-01-01 RX ADMIN — HYDROCORTISONE SODIUM SUCCINATE 100 MG: 100 INJECTION, POWDER, FOR SOLUTION INTRAMUSCULAR; INTRAVENOUS at 11:32

## 2021-01-01 RX ADMIN — METOCLOPRAMIDE 5 MG: 5 TABLET ORAL at 12:06

## 2021-01-01 RX ADMIN — Medication 2000 UNITS: at 11:48

## 2021-01-01 RX ADMIN — DOXYCYCLINE 100 MG: 100 INJECTION, POWDER, LYOPHILIZED, FOR SOLUTION INTRAVENOUS at 23:04

## 2021-01-01 RX ADMIN — Medication 50 MG: at 11:33

## 2021-01-01 RX ADMIN — PROPOFOL 35 MCG/KG/MIN: 10 INJECTION, EMULSION INTRAVENOUS at 23:34

## 2021-01-01 RX ADMIN — FENTANYL CITRATE 50 MCG: 0.05 INJECTION, SOLUTION INTRAMUSCULAR; INTRAVENOUS at 23:18

## 2021-01-01 RX ADMIN — PANTOPRAZOLE SODIUM 40 MG: 40 INJECTION, POWDER, FOR SOLUTION INTRAVENOUS at 08:35

## 2021-01-01 RX ADMIN — DOXYCYCLINE 100 MG: 100 INJECTION, POWDER, LYOPHILIZED, FOR SOLUTION INTRAVENOUS at 12:27

## 2021-01-01 RX ADMIN — MICONAZOLE NITRATE: 20.6 POWDER TOPICAL at 20:06

## 2021-01-01 RX ADMIN — PETROLATUM: 420 OINTMENT TOPICAL at 21:28

## 2021-01-01 RX ADMIN — OXYCODONE 5 MG: 5 TABLET ORAL at 21:38

## 2021-01-01 RX ADMIN — PANTOPRAZOLE SODIUM 40 MG: 40 INJECTION, POWDER, FOR SOLUTION INTRAVENOUS at 09:48

## 2021-01-01 RX ADMIN — OXYCODONE HYDROCHLORIDE 5 MG: 5 SOLUTION ORAL at 12:18

## 2021-01-01 RX ADMIN — DIPHENOXYLATE HYDROCHLORIDE AND ATROPINE SULFATE 5 ML: 2.5; .025 SOLUTION ORAL at 17:34

## 2021-01-01 RX ADMIN — Medication 200 MCG: at 13:02

## 2021-01-01 RX ADMIN — CLOTRIMAZOLE: 10 CREAM TOPICAL at 10:08

## 2021-01-01 RX ADMIN — SODIUM CHLORIDE 1000 ML: 9 INJECTION, SOLUTION INTRAVENOUS at 03:53

## 2021-01-01 RX ADMIN — MIDODRINE HYDROCHLORIDE 15 MG: 5 TABLET ORAL at 16:52

## 2021-01-01 RX ADMIN — NYSTATIN AND TRIAMCINOLONE ACETONIDE: 100000; 1 CREAM TOPICAL at 22:32

## 2021-01-01 RX ADMIN — PRAMIPEXOLE DIHYDROCHLORIDE 0.5 MG: 0.25 TABLET ORAL at 21:50

## 2021-01-01 RX ADMIN — MORPHINE SULFATE 2 MG: 2 INJECTION, SOLUTION INTRAMUSCULAR; INTRAVENOUS at 03:22

## 2021-01-01 RX ADMIN — FENTANYL CITRATE 50 MCG: 50 INJECTION, SOLUTION INTRAMUSCULAR; INTRAVENOUS at 16:22

## 2021-01-01 RX ADMIN — SODIUM CHLORIDE, PRESERVATIVE FREE 10 ML: 5 INJECTION INTRAVENOUS at 04:01

## 2021-01-01 RX ADMIN — PIPERACILLIN AND TAZOBACTAM 3375 MG: 3; .375 INJECTION, POWDER, LYOPHILIZED, FOR SOLUTION INTRAVENOUS at 16:33

## 2021-01-01 RX ADMIN — ALBUTEROL SULFATE 2.5 MG: 2.5 SOLUTION RESPIRATORY (INHALATION) at 11:36

## 2021-01-01 RX ADMIN — FENTANYL CITRATE 50 MCG: 0.05 INJECTION, SOLUTION INTRAMUSCULAR; INTRAVENOUS at 05:05

## 2021-01-01 RX ADMIN — PANTOPRAZOLE SODIUM 40 MG: 40 INJECTION, POWDER, FOR SOLUTION INTRAVENOUS at 21:02

## 2021-01-01 RX ADMIN — PROPOFOL 30 MCG/KG/MIN: 10 INJECTION, EMULSION INTRAVENOUS at 04:44

## 2021-01-01 RX ADMIN — GABAPENTIN 100 MG: 100 CAPSULE ORAL at 11:47

## 2021-01-01 RX ADMIN — CHLORHEXIDINE GLUCONATE 15 ML: 1.2 RINSE ORAL at 08:19

## 2021-01-01 RX ADMIN — PANTOPRAZOLE SODIUM 40 MG: 40 TABLET, DELAYED RELEASE ORAL at 11:48

## 2021-01-01 RX ADMIN — OXYCODONE 5 MG: 5 TABLET ORAL at 01:39

## 2021-01-01 RX ADMIN — ALBUTEROL SULFATE 2.5 MG: 2.5 SOLUTION RESPIRATORY (INHALATION) at 17:26

## 2021-01-01 RX ADMIN — AMIODARONE HYDROCHLORIDE 200 MG: 200 TABLET ORAL at 21:02

## 2021-01-01 RX ADMIN — ANORECTAL OINTMENT: 15.7; .44; 24; 20.6 OINTMENT TOPICAL at 22:32

## 2021-01-01 RX ADMIN — HYDROCORTISONE SODIUM SUCCINATE 100 MG: 100 INJECTION, POWDER, FOR SOLUTION INTRAMUSCULAR; INTRAVENOUS at 04:36

## 2021-01-01 RX ADMIN — MICONAZOLE NITRATE: 20.6 POWDER TOPICAL at 22:38

## 2021-01-01 RX ADMIN — ALBUTEROL SULFATE 2.5 MG: 2.5 SOLUTION RESPIRATORY (INHALATION) at 12:09

## 2021-01-01 RX ADMIN — GUAIFENESIN 400 MG: 400 TABLET, FILM COATED ORAL at 14:37

## 2021-01-01 RX ADMIN — PRAMIPEXOLE DIHYDROCHLORIDE 0.5 MG: 0.25 TABLET ORAL at 21:27

## 2021-01-01 RX ADMIN — POTASSIUM CHLORIDE 20 MEQ: 400 INJECTION, SOLUTION INTRAVENOUS at 02:39

## 2021-01-01 RX ADMIN — HYDROCORTISONE SODIUM SUCCINATE 100 MG: 100 INJECTION, POWDER, FOR SOLUTION INTRAMUSCULAR; INTRAVENOUS at 20:45

## 2021-01-01 RX ADMIN — FENTANYL CITRATE 50 MCG: 50 INJECTION, SOLUTION INTRAMUSCULAR; INTRAVENOUS at 09:22

## 2021-01-01 RX ADMIN — PIPERACILLIN AND TAZOBACTAM 3375 MG: 3; .375 INJECTION, POWDER, LYOPHILIZED, FOR SOLUTION INTRAVENOUS at 06:06

## 2021-01-01 RX ADMIN — DIPHENOXYLATE HYDROCHLORIDE AND ATROPINE SULFATE 5 ML: 2.5; .025 SOLUTION ORAL at 16:51

## 2021-01-01 RX ADMIN — Medication 125 MCG/HR: at 08:48

## 2021-01-01 RX ADMIN — HYDROCORTISONE SODIUM SUCCINATE 100 MG: 100 INJECTION, POWDER, FOR SOLUTION INTRAMUSCULAR; INTRAVENOUS at 11:57

## 2021-01-01 RX ADMIN — COLLAGENASE SANTYL: 250 OINTMENT TOPICAL at 10:15

## 2021-01-01 RX ADMIN — GUAIFENESIN 400 MG: 400 TABLET, FILM COATED ORAL at 09:49

## 2021-01-01 RX ADMIN — PSYLLIUM HUSK 1 PACKET: 3.4 GRANULE ORAL at 10:53

## 2021-01-01 RX ADMIN — SODIUM CHLORIDE, POTASSIUM CHLORIDE, SODIUM LACTATE AND CALCIUM CHLORIDE 500 ML: 600; 310; 30; 20 INJECTION, SOLUTION INTRAVENOUS at 06:08

## 2021-01-01 RX ADMIN — METOPROLOL TARTRATE 25 MG: 25 TABLET, FILM COATED ORAL at 10:34

## 2021-01-01 RX ADMIN — SODIUM CHLORIDE, PRESERVATIVE FREE 10 ML: 5 INJECTION INTRAVENOUS at 08:50

## 2021-01-01 RX ADMIN — Medication 100 MCG: at 12:33

## 2021-01-01 RX ADMIN — SODIUM CHLORIDE 25 ML: 9 INJECTION, SOLUTION INTRAVENOUS at 20:06

## 2021-01-01 RX ADMIN — Medication 50 MG: at 09:50

## 2021-01-01 RX ADMIN — HYDROCODONE BITARTRATE AND ACETAMINOPHEN 1 TABLET: 5; 325 TABLET ORAL at 07:14

## 2021-01-01 RX ADMIN — VASOPRESSIN 0.04 UNITS/MIN: 20 INJECTION INTRAVENOUS at 03:45

## 2021-01-01 RX ADMIN — INSULIN LISPRO 1 UNITS: 100 INJECTION, SOLUTION INTRAVENOUS; SUBCUTANEOUS at 11:50

## 2021-01-01 RX ADMIN — SODIUM CHLORIDE, PRESERVATIVE FREE 10 ML: 5 INJECTION INTRAVENOUS at 09:09

## 2021-01-01 RX ADMIN — Medication 50 MG: at 11:48

## 2021-01-01 RX ADMIN — MINERAL OIL AND PETROLATUM: 150; 830 OINTMENT OPHTHALMIC at 04:19

## 2021-01-01 RX ADMIN — PETROLATUM: 420 OINTMENT TOPICAL at 21:34

## 2021-01-01 RX ADMIN — PRAMIPEXOLE DIHYDROCHLORIDE 0.5 MG: 0.25 TABLET ORAL at 21:31

## 2021-01-01 RX ADMIN — FENTANYL CITRATE 25 MCG: 0.05 INJECTION, SOLUTION INTRAMUSCULAR; INTRAVENOUS at 05:04

## 2021-01-01 RX ADMIN — SODIUM CHLORIDE, PRESERVATIVE FREE 10 ML: 5 INJECTION INTRAVENOUS at 08:06

## 2021-01-01 RX ADMIN — SODIUM CHLORIDE, POTASSIUM CHLORIDE, SODIUM LACTATE AND CALCIUM CHLORIDE 1000 ML: 600; 310; 30; 20 INJECTION, SOLUTION INTRAVENOUS at 20:43

## 2021-01-01 RX ADMIN — COLLAGENASE SANTYL: 250 OINTMENT TOPICAL at 08:17

## 2021-01-01 RX ADMIN — INSULIN LISPRO 1 UNITS: 100 INJECTION, SOLUTION INTRAVENOUS; SUBCUTANEOUS at 18:25

## 2021-01-01 RX ADMIN — FENTANYL CITRATE 25 MCG: 0.05 INJECTION, SOLUTION INTRAMUSCULAR; INTRAVENOUS at 19:41

## 2021-01-01 RX ADMIN — GABAPENTIN 100 MG: 100 CAPSULE ORAL at 06:22

## 2021-01-01 RX ADMIN — FENTANYL CITRATE 50 MCG: 0.05 INJECTION, SOLUTION INTRAMUSCULAR; INTRAVENOUS at 21:02

## 2021-01-01 RX ADMIN — Medication 8 MCG: at 15:27

## 2021-01-01 RX ADMIN — PANTOPRAZOLE SODIUM 40 MG: 40 INJECTION, POWDER, FOR SOLUTION INTRAVENOUS at 09:18

## 2021-01-01 RX ADMIN — PROPOFOL 10 MCG/KG/MIN: 10 INJECTION, EMULSION INTRAVENOUS at 19:01

## 2021-01-01 RX ADMIN — INSULIN GLARGINE 10 UNITS: 100 INJECTION, SOLUTION SUBCUTANEOUS at 23:56

## 2021-01-01 RX ADMIN — SODIUM CHLORIDE, PRESERVATIVE FREE 10 ML: 5 INJECTION INTRAVENOUS at 12:07

## 2021-01-01 RX ADMIN — VANCOMYCIN HYDROCHLORIDE 1750 MG: 1 INJECTION, POWDER, LYOPHILIZED, FOR SOLUTION INTRAVENOUS at 14:20

## 2021-01-01 RX ADMIN — ACETAMINOPHEN 650 MG: 325 TABLET, FILM COATED ORAL at 13:11

## 2021-01-01 RX ADMIN — METOCLOPRAMIDE 5 MG: 5 TABLET ORAL at 12:00

## 2021-01-01 RX ADMIN — SODIUM CHLORIDE, POTASSIUM CHLORIDE, SODIUM LACTATE AND CALCIUM CHLORIDE 500 ML: 600; 310; 30; 20 INJECTION, SOLUTION INTRAVENOUS at 01:20

## 2021-01-01 RX ADMIN — VASOPRESSIN 0.04 UNITS/MIN: 20 INJECTION INTRAVENOUS at 02:30

## 2021-01-01 RX ADMIN — FENTANYL CITRATE 50 MCG: 50 INJECTION, SOLUTION INTRAMUSCULAR; INTRAVENOUS at 10:27

## 2021-01-01 RX ADMIN — SODIUM CHLORIDE 1000 ML: 9 INJECTION, SOLUTION INTRAVENOUS at 00:29

## 2021-01-01 RX ADMIN — INSULIN LISPRO 2 UNITS: 100 INJECTION, SOLUTION INTRAVENOUS; SUBCUTANEOUS at 06:25

## 2021-01-01 RX ADMIN — OXYCODONE HYDROCHLORIDE AND ACETAMINOPHEN 1000 MG: 500 TABLET ORAL at 11:47

## 2021-01-01 RX ADMIN — MIDODRINE HYDROCHLORIDE 10 MG: 10 TABLET ORAL at 08:22

## 2021-01-01 RX ADMIN — LABETALOL HYDROCHLORIDE 10 MG: 5 INJECTION INTRAVENOUS at 16:44

## 2021-01-01 RX ADMIN — INSULIN GLARGINE 10 UNITS: 100 INJECTION, SOLUTION SUBCUTANEOUS at 08:52

## 2021-01-01 RX ADMIN — FENTANYL CITRATE 25 MCG: 0.05 INJECTION, SOLUTION INTRAMUSCULAR; INTRAVENOUS at 00:57

## 2021-01-01 RX ADMIN — GABAPENTIN 300 MG: 300 CAPSULE ORAL at 09:15

## 2021-01-01 RX ADMIN — MIDODRINE HYDROCHLORIDE 10 MG: 10 TABLET ORAL at 17:13

## 2021-01-01 RX ADMIN — FENTANYL CITRATE 50 MCG: 0.05 INJECTION, SOLUTION INTRAMUSCULAR; INTRAVENOUS at 02:20

## 2021-01-01 RX ADMIN — PROPOFOL 25 MCG/KG/MIN: 10 INJECTION, EMULSION INTRAVENOUS at 08:49

## 2021-01-01 RX ADMIN — HYDROCORTISONE SODIUM SUCCINATE 100 MG: 100 INJECTION, POWDER, FOR SOLUTION INTRAMUSCULAR; INTRAVENOUS at 18:18

## 2021-01-01 RX ADMIN — AMIODARONE HYDROCHLORIDE 0.5 MG/MIN: 50 INJECTION, SOLUTION INTRAVENOUS at 17:44

## 2021-01-01 RX ADMIN — HYDROCORTISONE 5 MG: 5 TABLET ORAL at 21:31

## 2021-01-01 RX ADMIN — MINERAL OIL AND PETROLATUM: 150; 830 OINTMENT OPHTHALMIC at 12:11

## 2021-01-01 RX ADMIN — FENTANYL CITRATE 25 MCG: 0.05 INJECTION, SOLUTION INTRAMUSCULAR; INTRAVENOUS at 23:06

## 2021-01-01 RX ADMIN — CHLORHEXIDINE GLUCONATE 15 ML: 1.2 RINSE ORAL at 20:35

## 2021-01-01 RX ADMIN — FENTANYL CITRATE 50 MCG: 50 INJECTION, SOLUTION INTRAMUSCULAR; INTRAVENOUS at 13:05

## 2021-01-01 RX ADMIN — HYDROCORTISONE 5 MG: 5 TABLET ORAL at 17:56

## 2021-01-01 RX ADMIN — DOXYCYCLINE HYCLATE 100 MG: 100 CAPSULE ORAL at 08:10

## 2021-01-01 RX ADMIN — ALBUTEROL SULFATE 2.5 MG: 2.5 SOLUTION RESPIRATORY (INHALATION) at 12:58

## 2021-01-01 RX ADMIN — HYDROCORTISONE SODIUM SUCCINATE 100 MG: 100 INJECTION, POWDER, FOR SOLUTION INTRAMUSCULAR; INTRAVENOUS at 00:57

## 2021-01-01 RX ADMIN — POTASSIUM CHLORIDE 20 MEQ: 1500 TABLET, EXTENDED RELEASE ORAL at 20:24

## 2021-01-01 RX ADMIN — INSULIN GLARGINE 10 UNITS: 100 INJECTION, SOLUTION SUBCUTANEOUS at 21:39

## 2021-01-01 RX ADMIN — LINEZOLID 600 MG: 600 TABLET ORAL at 09:15

## 2021-01-01 RX ADMIN — HYDROCORTISONE SODIUM SUCCINATE 100 MG: 100 INJECTION, POWDER, FOR SOLUTION INTRAMUSCULAR; INTRAVENOUS at 14:46

## 2021-01-01 RX ADMIN — FENTANYL CITRATE 25 MCG: 0.05 INJECTION, SOLUTION INTRAMUSCULAR; INTRAVENOUS at 08:17

## 2021-01-01 RX ADMIN — FENTANYL CITRATE 50 MCG: 50 INJECTION, SOLUTION INTRAMUSCULAR; INTRAVENOUS at 09:52

## 2021-01-01 RX ADMIN — Medication 10 ML: at 21:52

## 2021-01-01 RX ADMIN — Medication 2000 UNITS: at 11:35

## 2021-01-01 RX ADMIN — GUAIFENESIN 400 MG: 400 TABLET, FILM COATED ORAL at 21:50

## 2021-01-01 RX ADMIN — MIDODRINE HYDROCHLORIDE 10 MG: 5 TABLET ORAL at 18:50

## 2021-01-01 RX ADMIN — PROPOFOL 50 MCG/KG/MIN: 10 INJECTION, EMULSION INTRAVENOUS at 15:08

## 2021-01-01 RX ADMIN — HYDROCORTISONE SODIUM SUCCINATE 100 MG: 100 INJECTION, POWDER, FOR SOLUTION INTRAMUSCULAR; INTRAVENOUS at 20:06

## 2021-01-01 RX ADMIN — ACETAMINOPHEN 650 MG: 325 TABLET ORAL at 14:00

## 2021-01-01 RX ADMIN — METOCLOPRAMIDE 5 MG: 5 TABLET ORAL at 16:46

## 2021-01-01 RX ADMIN — ALBUTEROL SULFATE 2.5 MG: 2.5 SOLUTION RESPIRATORY (INHALATION) at 17:14

## 2021-01-01 RX ADMIN — CLOTRIMAZOLE: 10 CREAM TOPICAL at 20:25

## 2021-01-01 RX ADMIN — SODIUM CHLORIDE, PRESERVATIVE FREE 10 ML: 5 INJECTION INTRAVENOUS at 21:00

## 2021-01-01 RX ADMIN — PSYLLIUM HUSK 1 PACKET: 3.4 GRANULE ORAL at 17:47

## 2021-01-01 RX ADMIN — LOPERAMIDE HYDROCHLORIDE 4 MG: 2 SOLUTION ORAL at 17:48

## 2021-01-01 RX ADMIN — CLOTRIMAZOLE: 10 CREAM TOPICAL at 08:37

## 2021-01-01 RX ADMIN — OXYCODONE HYDROCHLORIDE AND ACETAMINOPHEN 1000 MG: 500 TABLET ORAL at 11:11

## 2021-01-01 RX ADMIN — PIPERACILLIN AND TAZOBACTAM 3375 MG: 3; .375 INJECTION, POWDER, LYOPHILIZED, FOR SOLUTION INTRAVENOUS at 17:55

## 2021-01-01 RX ADMIN — ETOMIDATE 20 MG: 2 INJECTION, SOLUTION INTRAVENOUS at 05:56

## 2021-01-01 RX ADMIN — PIPERACILLIN AND TAZOBACTAM 3375 MG: 3; .375 INJECTION, POWDER, LYOPHILIZED, FOR SOLUTION INTRAVENOUS at 17:01

## 2021-01-01 RX ADMIN — COLLAGENASE SANTYL: 250 OINTMENT TOPICAL at 08:47

## 2021-01-01 RX ADMIN — Medication 10 ML: at 00:08

## 2021-01-01 RX ADMIN — ANORECTAL OINTMENT: 15.7; .44; 24; 20.6 OINTMENT TOPICAL at 08:14

## 2021-01-01 RX ADMIN — APIXABAN 5 MG: 5 TABLET, FILM COATED ORAL at 11:44

## 2021-01-01 RX ADMIN — FENTANYL CITRATE 50 MCG: 0.05 INJECTION, SOLUTION INTRAMUSCULAR; INTRAVENOUS at 11:03

## 2021-01-01 RX ADMIN — GABAPENTIN 100 MG: 100 CAPSULE ORAL at 12:15

## 2021-01-01 RX ADMIN — GABAPENTIN 100 MG: 100 CAPSULE ORAL at 13:59

## 2021-01-01 RX ADMIN — MINERAL OIL AND PETROLATUM: 150; 830 OINTMENT OPHTHALMIC at 08:18

## 2021-01-01 RX ADMIN — ACETAMINOPHEN 650 MG: 325 TABLET, FILM COATED ORAL at 20:06

## 2021-01-01 RX ADMIN — PROMETHAZINE HYDROCHLORIDE 12.5 MG: 25 TABLET ORAL at 08:34

## 2021-01-01 RX ADMIN — Medication 10 ML: at 21:03

## 2021-01-01 RX ADMIN — SODIUM CHLORIDE 25 ML: 9 INJECTION, SOLUTION INTRAVENOUS at 03:38

## 2021-01-01 RX ADMIN — Medication 100 MCG: at 15:15

## 2021-01-01 RX ADMIN — HYDROCORTISONE SODIUM SUCCINATE 50 MG: 100 INJECTION, POWDER, FOR SOLUTION INTRAMUSCULAR; INTRAVENOUS at 04:00

## 2021-01-01 RX ADMIN — INSULIN GLARGINE 10 UNITS: 100 INJECTION, SOLUTION SUBCUTANEOUS at 08:50

## 2021-01-01 RX ADMIN — LIDOCAINE HYDROCHLORIDE 5 ML: 10 INJECTION, SOLUTION INFILTRATION; PERINEURAL at 03:49

## 2021-01-01 RX ADMIN — DOXYCYCLINE 100 MG: 100 INJECTION, POWDER, LYOPHILIZED, FOR SOLUTION INTRAVENOUS at 11:57

## 2021-01-01 RX ADMIN — SODIUM CHLORIDE 25 ML: 9 INJECTION, SOLUTION INTRAVENOUS at 22:26

## 2021-01-01 RX ADMIN — LOPERAMIDE HYDROCHLORIDE 2 MG: 1 SUSPENSION ORAL at 21:33

## 2021-01-01 RX ADMIN — AMIODARONE HYDROCHLORIDE 400 MG: 200 TABLET ORAL at 11:34

## 2021-01-01 RX ADMIN — METOCLOPRAMIDE 5 MG: 5 TABLET ORAL at 08:57

## 2021-01-01 RX ADMIN — LOPERAMIDE HYDROCHLORIDE 4 MG: 2 SOLUTION ORAL at 11:16

## 2021-01-01 RX ADMIN — INSULIN LISPRO 12 UNITS: 100 INJECTION, SOLUTION INTRAVENOUS; SUBCUTANEOUS at 11:52

## 2021-01-01 RX ADMIN — PSYLLIUM HUSK 1 PACKET: 3.4 GRANULE ORAL at 23:07

## 2021-01-01 RX ADMIN — DOXYCYCLINE HYCLATE 100 MG: 100 CAPSULE ORAL at 22:32

## 2021-01-01 RX ADMIN — ACETAMINOPHEN 650 MG: 325 TABLET ORAL at 23:07

## 2021-01-01 RX ADMIN — INSULIN LISPRO 9 UNITS: 100 INJECTION, SOLUTION INTRAVENOUS; SUBCUTANEOUS at 23:22

## 2021-01-01 RX ADMIN — NYSTATIN AND TRIAMCINOLONE ACETONIDE: 100000; 1 CREAM TOPICAL at 08:22

## 2021-01-01 RX ADMIN — FENTANYL CITRATE 25 MCG: 50 INJECTION, SOLUTION INTRAMUSCULAR; INTRAVENOUS at 16:22

## 2021-01-01 RX ADMIN — MIDODRINE HYDROCHLORIDE 10 MG: 5 TABLET ORAL at 16:46

## 2021-01-01 RX ADMIN — Medication 10 ML: at 04:01

## 2021-01-01 RX ADMIN — LOPERAMIDE HYDROCHLORIDE 2 MG: 2 CAPSULE ORAL at 10:36

## 2021-01-01 RX ADMIN — SODIUM CHLORIDE 25 ML: 9 INJECTION, SOLUTION INTRAVENOUS at 13:18

## 2021-01-01 RX ADMIN — INSULIN LISPRO 3 UNITS: 100 INJECTION, SOLUTION INTRAVENOUS; SUBCUTANEOUS at 11:48

## 2021-01-01 RX ADMIN — METOCLOPRAMIDE 5 MG: 5 TABLET ORAL at 17:48

## 2021-01-01 RX ADMIN — SODIUM CHLORIDE: 9 INJECTION, SOLUTION INTRAVENOUS at 23:17

## 2021-01-01 RX ADMIN — SODIUM BICARBONATE 50 MEQ: 84 INJECTION INTRAVENOUS at 10:55

## 2021-01-01 RX ADMIN — HYDRALAZINE HYDROCHLORIDE 5 MG: 20 INJECTION INTRAMUSCULAR; INTRAVENOUS at 23:14

## 2021-01-01 RX ADMIN — HEPARIN SODIUM 15 UNITS/KG/HR: 10000 INJECTION, SOLUTION INTRAVENOUS at 15:29

## 2021-01-01 RX ADMIN — PSYLLIUM HUSK 1 PACKET: 3.4 GRANULE ORAL at 22:34

## 2021-01-01 RX ADMIN — MIDAZOLAM 1 MG: 1 INJECTION INTRAMUSCULAR; INTRAVENOUS at 12:52

## 2021-01-01 RX ADMIN — GUAIFENESIN 400 MG: 400 TABLET, FILM COATED ORAL at 20:04

## 2021-01-01 RX ADMIN — LINEZOLID 600 MG: 600 TABLET ORAL at 21:45

## 2021-01-01 RX ADMIN — WATER 10 ML: 1 INJECTION INTRAMUSCULAR; INTRAVENOUS; SUBCUTANEOUS at 04:14

## 2021-01-01 RX ADMIN — Medication 200 MCG: at 09:57

## 2021-01-01 RX ADMIN — AMIODARONE HYDROCHLORIDE 0.5 MG/MIN: 50 INJECTION, SOLUTION INTRAVENOUS at 11:04

## 2021-01-01 RX ADMIN — FUROSEMIDE 40 MG: 10 INJECTION, SOLUTION INTRAVENOUS at 17:35

## 2021-01-01 RX ADMIN — FENTANYL CITRATE 100 MCG: 50 INJECTION, SOLUTION INTRAMUSCULAR; INTRAVENOUS at 20:13

## 2021-01-01 RX ADMIN — GUAIFENESIN 400 MG: 400 TABLET, FILM COATED ORAL at 13:14

## 2021-01-01 RX ADMIN — HYDROCORTISONE SODIUM SUCCINATE 100 MG: 100 INJECTION, POWDER, FOR SOLUTION INTRAMUSCULAR; INTRAVENOUS at 04:18

## 2021-01-01 RX ADMIN — Medication 125 MCG/HR: at 20:35

## 2021-01-01 RX ADMIN — SODIUM CHLORIDE: 9 INJECTION, SOLUTION INTRAVENOUS at 12:52

## 2021-01-01 RX ADMIN — Medication 10 MCG: at 09:25

## 2021-01-01 RX ADMIN — LOPERAMIDE HYDROCHLORIDE 4 MG: 2 SOLUTION ORAL at 17:40

## 2021-01-01 RX ADMIN — PANTOPRAZOLE SODIUM 40 MG: 40 INJECTION, POWDER, FOR SOLUTION INTRAVENOUS at 08:48

## 2021-01-01 RX ADMIN — LOPERAMIDE HYDROCHLORIDE 4 MG: 2 SOLUTION ORAL at 21:49

## 2021-01-01 RX ADMIN — PANTOPRAZOLE SODIUM 40 MG: 40 TABLET, DELAYED RELEASE ORAL at 09:49

## 2021-01-01 RX ADMIN — FENTANYL CITRATE 25 MCG: 0.05 INJECTION, SOLUTION INTRAMUSCULAR; INTRAVENOUS at 22:13

## 2021-01-01 RX ADMIN — SODIUM CHLORIDE, PRESERVATIVE FREE 10 ML: 5 INJECTION INTRAVENOUS at 11:08

## 2021-01-01 RX ADMIN — HEPARIN SODIUM 21 UNITS/KG/HR: 10000 INJECTION, SOLUTION INTRAVENOUS at 06:29

## 2021-01-01 RX ADMIN — ASCORBIC ACID 1500 MG: 500 INJECTION, SOLUTION INTRAMUSCULAR; INTRAVENOUS; SUBCUTANEOUS at 08:47

## 2021-01-01 RX ADMIN — SODIUM PHOSPHATE, MONOBASIC, MONOHYDRATE AND SODIUM PHOSPHATE, DIBASIC, ANHYDROUS 20 MMOL: 276; 142 INJECTION, SOLUTION INTRAVENOUS at 08:18

## 2021-01-01 RX ADMIN — PIPERACILLIN AND TAZOBACTAM 3375 MG: 3; .375 INJECTION, POWDER, LYOPHILIZED, FOR SOLUTION INTRAVENOUS at 08:51

## 2021-01-01 RX ADMIN — DOXYCYCLINE 100 MG: 100 INJECTION, POWDER, LYOPHILIZED, FOR SOLUTION INTRAVENOUS at 11:25

## 2021-01-01 RX ADMIN — PIPERACILLIN AND TAZOBACTAM 3375 MG: 3; .375 INJECTION, POWDER, LYOPHILIZED, FOR SOLUTION INTRAVENOUS at 00:26

## 2021-01-01 RX ADMIN — FENTANYL CITRATE 25 MCG: 0.05 INJECTION, SOLUTION INTRAMUSCULAR; INTRAVENOUS at 10:20

## 2021-01-01 RX ADMIN — Medication 10 ML: at 20:59

## 2021-01-01 RX ADMIN — PSYLLIUM HUSK 1 PACKET: 3.4 GRANULE ORAL at 09:41

## 2021-01-01 RX ADMIN — MIDODRINE HYDROCHLORIDE 15 MG: 5 TABLET ORAL at 16:28

## 2021-01-01 RX ADMIN — ANORECTAL OINTMENT: 15.7; .44; 24; 20.6 OINTMENT TOPICAL at 22:43

## 2021-01-01 RX ADMIN — Medication 2 G: at 09:50

## 2021-01-01 RX ADMIN — METOPROLOL TARTRATE 25 MG: 25 TABLET, FILM COATED ORAL at 09:42

## 2021-01-01 RX ADMIN — ASCORBIC ACID 1500 MG: 500 INJECTION, SOLUTION INTRAMUSCULAR; INTRAVENOUS; SUBCUTANEOUS at 08:17

## 2021-01-01 RX ADMIN — Medication: at 17:01

## 2021-01-01 RX ADMIN — ASCORBIC ACID 1500 MG: 500 INJECTION, SOLUTION INTRAMUSCULAR; INTRAVENOUS; SUBCUTANEOUS at 17:01

## 2021-01-01 RX ADMIN — SODIUM CHLORIDE, PRESERVATIVE FREE 10 ML: 5 INJECTION INTRAVENOUS at 12:11

## 2021-01-01 RX ADMIN — ALBUTEROL SULFATE 2.5 MG: 2.5 SOLUTION RESPIRATORY (INHALATION) at 07:50

## 2021-01-01 RX ADMIN — SODIUM BICARBONATE 50 MEQ: 84 INJECTION INTRAVENOUS at 10:50

## 2021-01-01 RX ADMIN — Medication 2000 UNITS: at 09:50

## 2021-01-01 RX ADMIN — LOPERAMIDE HYDROCHLORIDE 4 MG: 2 SOLUTION ORAL at 13:13

## 2021-01-01 RX ADMIN — DEXTROSE MONOHYDRATE: 50 INJECTION, SOLUTION INTRAVENOUS at 12:51

## 2021-01-01 RX ADMIN — GABAPENTIN 100 MG: 100 CAPSULE ORAL at 20:00

## 2021-01-01 RX ADMIN — MINERAL OIL AND PETROLATUM: 150; 830 OINTMENT OPHTHALMIC at 20:30

## 2021-01-01 RX ADMIN — OXYCODONE HYDROCHLORIDE 10 MG: 10 TABLET ORAL at 23:17

## 2021-01-01 RX ADMIN — DOXYCYCLINE HYCLATE 100 MG: 100 CAPSULE ORAL at 08:57

## 2021-01-01 RX ADMIN — DOXYCYCLINE 100 MG: 100 INJECTION, POWDER, LYOPHILIZED, FOR SOLUTION INTRAVENOUS at 11:48

## 2021-01-01 RX ADMIN — MICONAZOLE NITRATE: 20.6 POWDER TOPICAL at 20:26

## 2021-01-01 RX ADMIN — WATER 10 ML: 1 INJECTION INTRAMUSCULAR; INTRAVENOUS; SUBCUTANEOUS at 17:22

## 2021-01-01 RX ADMIN — Medication 100 MCG/HR: at 04:00

## 2021-01-01 RX ADMIN — Medication 25 MCG/MIN: at 15:26

## 2021-01-01 RX ADMIN — INSULIN GLARGINE 30 UNITS: 100 INJECTION, SOLUTION SUBCUTANEOUS at 09:38

## 2021-01-01 RX ADMIN — Medication 100 MCG: at 15:20

## 2021-01-01 RX ADMIN — CLOTRIMAZOLE: 10 CREAM TOPICAL at 21:04

## 2021-01-01 RX ADMIN — PROPOFOL 35 MCG/KG/MIN: 10 INJECTION, EMULSION INTRAVENOUS at 18:27

## 2021-01-01 RX ADMIN — MICONAZOLE NITRATE: 20.6 POWDER TOPICAL at 09:07

## 2021-01-01 RX ADMIN — EPOETIN ALFA-EPBX 2730 UNITS: 3000 INJECTION, SOLUTION INTRAVENOUS; SUBCUTANEOUS at 13:24

## 2021-01-01 RX ADMIN — GABAPENTIN 300 MG: 300 CAPSULE ORAL at 08:17

## 2021-01-01 RX ADMIN — HYDROCORTISONE 15 MG: 5 TABLET ORAL at 09:07

## 2021-01-01 RX ADMIN — SODIUM CHLORIDE, PRESERVATIVE FREE 10 ML: 5 INJECTION INTRAVENOUS at 09:51

## 2021-01-01 RX ADMIN — PROPOFOL 30 MG: 10 INJECTION, EMULSION INTRAVENOUS at 15:06

## 2021-01-01 RX ADMIN — ROCURONIUM BROMIDE 20 MG: 10 INJECTION, SOLUTION INTRAVENOUS at 18:05

## 2021-01-01 RX ADMIN — SODIUM CHLORIDE 0.9 MCG/KG/HR: 9 INJECTION, SOLUTION INTRAVENOUS at 07:15

## 2021-01-01 RX ADMIN — PRAMIPEXOLE DIHYDROCHLORIDE 0.5 MG: 0.25 TABLET ORAL at 23:07

## 2021-01-01 RX ADMIN — FENTANYL CITRATE 25 MCG: 0.05 INJECTION, SOLUTION INTRAMUSCULAR; INTRAVENOUS at 18:50

## 2021-01-01 RX ADMIN — DOXYCYCLINE 100 MG: 100 INJECTION, POWDER, LYOPHILIZED, FOR SOLUTION INTRAVENOUS at 11:53

## 2021-01-01 RX ADMIN — INSULIN LISPRO 6 UNITS: 100 INJECTION, SOLUTION INTRAVENOUS; SUBCUTANEOUS at 12:06

## 2021-01-01 RX ADMIN — OXYCODONE HYDROCHLORIDE 5 MG: 5 SOLUTION ORAL at 02:40

## 2021-01-01 RX ADMIN — LABETALOL HYDROCHLORIDE 10 MG: 5 INJECTION INTRAVENOUS at 19:07

## 2021-01-01 RX ADMIN — Medication 10 ML: at 08:38

## 2021-01-01 RX ADMIN — Medication 100 MCG: at 13:06

## 2021-01-01 RX ADMIN — LOPERAMIDE HYDROCHLORIDE 4 MG: 2 SOLUTION ORAL at 08:54

## 2021-01-01 RX ADMIN — HEPARIN SODIUM 21 UNITS/KG/HR: 10000 INJECTION, SOLUTION INTRAVENOUS at 17:02

## 2021-01-01 RX ADMIN — HYDROMORPHONE HYDROCHLORIDE 1 MG: 1 INJECTION, SOLUTION INTRAMUSCULAR; INTRAVENOUS; SUBCUTANEOUS at 13:54

## 2021-01-01 RX ADMIN — Medication 75 MCG/HR: at 00:05

## 2021-01-01 RX ADMIN — MAGNESIUM SULFATE HEPTAHYDRATE 2000 MG: 40 INJECTION, SOLUTION INTRAVENOUS at 08:29

## 2021-01-01 RX ADMIN — GABAPENTIN 100 MG: 100 CAPSULE ORAL at 05:45

## 2021-01-01 RX ADMIN — LOPERAMIDE HYDROCHLORIDE 2 MG: 2 SOLUTION ORAL at 22:32

## 2021-01-01 RX ADMIN — Medication 10 ML: at 09:21

## 2021-01-01 RX ADMIN — GABAPENTIN 100 MG: 100 CAPSULE ORAL at 04:38

## 2021-01-01 RX ADMIN — PROPOFOL 20 MCG/KG/MIN: 10 INJECTION, EMULSION INTRAVENOUS at 05:05

## 2021-01-01 RX ADMIN — ALBUTEROL SULFATE 2.5 MG: 2.5 SOLUTION RESPIRATORY (INHALATION) at 12:27

## 2021-01-01 RX ADMIN — FLUMAZENIL 0.3 MG: 0.1 INJECTION, SOLUTION INTRAVENOUS at 10:33

## 2021-01-01 RX ADMIN — GUAIFENESIN 400 MG: 400 TABLET, FILM COATED ORAL at 22:32

## 2021-01-01 RX ADMIN — Medication 2 MCG/MIN: at 19:01

## 2021-01-01 RX ADMIN — SODIUM CHLORIDE: 9 INJECTION, SOLUTION INTRAVENOUS at 22:49

## 2021-01-01 RX ADMIN — MINERAL OIL AND PETROLATUM: 150; 830 OINTMENT OPHTHALMIC at 16:40

## 2021-01-01 RX ADMIN — GABAPENTIN 100 MG: 100 CAPSULE ORAL at 22:31

## 2021-01-01 RX ADMIN — SODIUM CHLORIDE, POTASSIUM CHLORIDE, SODIUM LACTATE AND CALCIUM CHLORIDE 1000 ML: 600; 310; 30; 20 INJECTION, SOLUTION INTRAVENOUS at 11:04

## 2021-01-01 RX ADMIN — MINERAL OIL AND PETROLATUM: 150; 830 OINTMENT OPHTHALMIC at 20:00

## 2021-01-01 RX ADMIN — PSYLLIUM HUSK 1 PACKET: 3.4 GRANULE ORAL at 16:52

## 2021-01-01 RX ADMIN — ANORECTAL OINTMENT: 15.7; .44; 24; 20.6 OINTMENT TOPICAL at 09:02

## 2021-01-01 RX ADMIN — MIDODRINE HYDROCHLORIDE 10 MG: 10 TABLET ORAL at 12:03

## 2021-01-01 RX ADMIN — PROPOFOL 35 MCG/KG/MIN: 10 INJECTION, EMULSION INTRAVENOUS at 22:27

## 2021-01-01 RX ADMIN — SODIUM CHLORIDE 1000 ML: 9 INJECTION, SOLUTION INTRAVENOUS at 12:17

## 2021-01-01 RX ADMIN — Medication 30 MCG/MIN: at 05:57

## 2021-01-01 RX ADMIN — FENTANYL CITRATE 50 MCG: 0.05 INJECTION, SOLUTION INTRAMUSCULAR; INTRAVENOUS at 19:44

## 2021-01-01 RX ADMIN — SODIUM CHLORIDE: 9 INJECTION, SOLUTION INTRAVENOUS at 09:13

## 2021-01-01 RX ADMIN — Medication 10 ML: at 13:30

## 2021-01-01 RX ADMIN — SODIUM CHLORIDE, PRESERVATIVE FREE 10 ML: 5 INJECTION INTRAVENOUS at 20:35

## 2021-01-01 RX ADMIN — SODIUM CHLORIDE 1000 ML: 9 INJECTION, SOLUTION INTRAVENOUS at 10:55

## 2021-01-01 RX ADMIN — HYDROCORTISONE SODIUM SUCCINATE 100 MG: 100 INJECTION, POWDER, FOR SOLUTION INTRAMUSCULAR; INTRAVENOUS at 11:58

## 2021-01-01 RX ADMIN — PANTOPRAZOLE SODIUM 40 MG: 40 TABLET, DELAYED RELEASE ORAL at 11:12

## 2021-01-01 RX ADMIN — GUAIFENESIN 400 MG: 400 TABLET, FILM COATED ORAL at 14:46

## 2021-01-01 RX ADMIN — INSULIN GLARGINE 10 UNITS: 100 INJECTION, SOLUTION SUBCUTANEOUS at 21:37

## 2021-01-01 RX ADMIN — ONDANSETRON 4 MG: 2 INJECTION INTRAMUSCULAR; INTRAVENOUS at 17:43

## 2021-01-01 RX ADMIN — PROPOFOL 35 MCG/KG/MIN: 10 INJECTION, EMULSION INTRAVENOUS at 14:41

## 2021-01-01 RX ADMIN — PROPOFOL 70 MG: 10 INJECTION, EMULSION INTRAVENOUS at 14:57

## 2021-01-01 RX ADMIN — SODIUM CHLORIDE 25 ML: 9 INJECTION, SOLUTION INTRAVENOUS at 05:05

## 2021-01-01 RX ADMIN — SODIUM BICARBONATE: 84 INJECTION, SOLUTION INTRAVENOUS at 12:29

## 2021-01-01 RX ADMIN — FENTANYL CITRATE 25 MCG: 50 INJECTION, SOLUTION INTRAMUSCULAR; INTRAVENOUS at 16:04

## 2021-01-01 RX ADMIN — APIXABAN 5 MG: 5 TABLET, FILM COATED ORAL at 11:33

## 2021-01-01 RX ADMIN — PANTOPRAZOLE SODIUM 40 MG: 40 INJECTION, POWDER, FOR SOLUTION INTRAVENOUS at 08:17

## 2021-01-01 RX ADMIN — AMIODARONE HYDROCHLORIDE 0.5 MG/MIN: 50 INJECTION, SOLUTION INTRAVENOUS at 04:33

## 2021-01-01 RX ADMIN — GABAPENTIN 100 MG: 100 CAPSULE ORAL at 13:13

## 2021-01-01 RX ADMIN — DOXYCYCLINE HYCLATE 100 MG: 100 CAPSULE ORAL at 09:49

## 2021-01-01 RX ADMIN — CHLORHEXIDINE GLUCONATE 15 ML: 1.2 RINSE ORAL at 20:42

## 2021-01-01 RX ADMIN — SODIUM CHLORIDE 25 ML: 9 INJECTION, SOLUTION INTRAVENOUS at 20:00

## 2021-01-01 RX ADMIN — MIDODRINE HYDROCHLORIDE 10 MG: 10 TABLET ORAL at 16:00

## 2021-01-01 RX ADMIN — SODIUM CHLORIDE: 9 INJECTION, SOLUTION INTRAVENOUS at 14:59

## 2021-01-01 RX ADMIN — ALBUMIN (HUMAN) 100 ML: 0.25 INJECTION, SOLUTION INTRAVENOUS at 10:04

## 2021-01-01 RX ADMIN — HYDROCORTISONE SODIUM SUCCINATE 50 MG: 100 INJECTION, POWDER, FOR SOLUTION INTRAMUSCULAR; INTRAVENOUS at 21:37

## 2021-01-01 RX ADMIN — PANTOPRAZOLE SODIUM 40 MG: 40 INJECTION, POWDER, FOR SOLUTION INTRAVENOUS at 08:51

## 2021-01-01 RX ADMIN — SODIUM CHLORIDE 1000 ML: 9 INJECTION, SOLUTION INTRAVENOUS at 18:15

## 2021-01-01 RX ADMIN — FENTANYL CITRATE 50 MCG: 50 INJECTION, SOLUTION INTRAMUSCULAR; INTRAVENOUS at 16:11

## 2021-01-01 RX ADMIN — SODIUM CHLORIDE, PRESERVATIVE FREE 10 ML: 5 INJECTION INTRAVENOUS at 20:42

## 2021-01-01 RX ADMIN — ALBUTEROL SULFATE 2.5 MG: 2.5 SOLUTION RESPIRATORY (INHALATION) at 20:46

## 2021-01-01 RX ADMIN — METOPROLOL TARTRATE 25 MG: 25 TABLET, FILM COATED ORAL at 22:33

## 2021-01-01 RX ADMIN — MINERAL OIL AND PETROLATUM: 150; 830 OINTMENT OPHTHALMIC at 08:16

## 2021-01-01 RX ADMIN — SODIUM CHLORIDE, POTASSIUM CHLORIDE, SODIUM LACTATE AND CALCIUM CHLORIDE 500 ML: 600; 310; 30; 20 INJECTION, SOLUTION INTRAVENOUS at 02:30

## 2021-01-01 RX ADMIN — Medication 75 MCG/HR: at 14:30

## 2021-01-01 RX ADMIN — LOPERAMIDE HYDROCHLORIDE 2 MG: 2 SOLUTION ORAL at 09:48

## 2021-01-01 RX ADMIN — PRAMIPEXOLE DIHYDROCHLORIDE 0.5 MG: 0.25 TABLET ORAL at 21:37

## 2021-01-01 RX ADMIN — SODIUM CHLORIDE 1000 ML: 9 INJECTION, SOLUTION INTRAVENOUS at 04:45

## 2021-01-01 RX ADMIN — FENTANYL CITRATE 50 MCG: 0.05 INJECTION, SOLUTION INTRAMUSCULAR; INTRAVENOUS at 07:47

## 2021-01-01 RX ADMIN — HYDROCORTISONE SODIUM SUCCINATE 100 MG: 100 INJECTION, POWDER, FOR SOLUTION INTRAMUSCULAR; INTRAVENOUS at 10:09

## 2021-01-01 RX ADMIN — HEPARIN SODIUM 19 UNITS/KG/HR: 10000 INJECTION, SOLUTION INTRAVENOUS at 15:36

## 2021-01-01 RX ADMIN — INSULIN LISPRO 3 UNITS: 100 INJECTION, SOLUTION INTRAVENOUS; SUBCUTANEOUS at 16:39

## 2021-01-01 RX ADMIN — GABAPENTIN 100 MG: 100 CAPSULE ORAL at 03:52

## 2021-01-01 RX ADMIN — SODIUM CHLORIDE 3.56 UNITS/HR: 9 INJECTION, SOLUTION INTRAVENOUS at 05:27

## 2021-01-01 RX ADMIN — ROCURONIUM BROMIDE 50 MG: 10 INJECTION, SOLUTION INTRAVENOUS at 20:05

## 2021-01-01 RX ADMIN — SODIUM BICARBONATE 50 MEQ: 84 INJECTION INTRAVENOUS at 12:45

## 2021-01-01 RX ADMIN — FUROSEMIDE 60 MG: 10 INJECTION, SOLUTION INTRAMUSCULAR; INTRAVENOUS at 12:09

## 2021-01-01 RX ADMIN — SODIUM BICARBONATE: 84 INJECTION, SOLUTION INTRAVENOUS at 03:57

## 2021-01-01 RX ADMIN — INSULIN GLARGINE 15 UNITS: 100 INJECTION, SOLUTION SUBCUTANEOUS at 08:36

## 2021-01-01 RX ADMIN — METOCLOPRAMIDE 5 MG: 5 TABLET ORAL at 09:49

## 2021-01-01 RX ADMIN — INSULIN LISPRO 3 UNITS: 100 INJECTION, SOLUTION INTRAVENOUS; SUBCUTANEOUS at 16:18

## 2021-01-01 RX ADMIN — GABAPENTIN 100 MG: 100 CAPSULE ORAL at 22:41

## 2021-01-01 RX ADMIN — IOHEXOL 50 ML: 240 INJECTION, SOLUTION INTRATHECAL; INTRAVASCULAR; INTRAVENOUS; ORAL at 15:31

## 2021-01-01 RX ADMIN — OXYCODONE 5 MG: 5 TABLET ORAL at 17:14

## 2021-01-01 RX ADMIN — SODIUM CHLORIDE, PRESERVATIVE FREE 10 ML: 5 INJECTION INTRAVENOUS at 08:48

## 2021-01-01 RX ADMIN — Medication 10 ML: at 21:43

## 2021-01-01 RX ADMIN — FENTANYL CITRATE 25 MCG: 50 INJECTION, SOLUTION INTRAMUSCULAR; INTRAVENOUS at 15:34

## 2021-01-01 RX ADMIN — PROPOFOL 50 MCG/KG/MIN: 10 INJECTION, EMULSION INTRAVENOUS at 15:33

## 2021-01-01 RX ADMIN — PRAMIPEXOLE DIHYDROCHLORIDE 0.5 MG: 0.25 TABLET ORAL at 11:46

## 2021-01-01 RX ADMIN — ETOMIDATE 20 MG: 2 INJECTION INTRAVENOUS at 05:56

## 2021-01-01 RX ADMIN — INSULIN LISPRO 1 UNITS: 100 INJECTION, SOLUTION INTRAVENOUS; SUBCUTANEOUS at 16:53

## 2021-01-01 RX ADMIN — MINERAL OIL AND PETROLATUM: 150; 830 OINTMENT OPHTHALMIC at 11:31

## 2021-01-01 RX ADMIN — AMIODARONE HYDROCHLORIDE 400 MG: 200 TABLET ORAL at 21:50

## 2021-01-01 RX ADMIN — INSULIN LISPRO 1 UNITS: 100 INJECTION, SOLUTION INTRAVENOUS; SUBCUTANEOUS at 11:54

## 2021-01-01 RX ADMIN — DEXTROSE MONOHYDRATE 25 G: 25 INJECTION, SOLUTION INTRAVENOUS at 12:25

## 2021-01-01 RX ADMIN — INSULIN LISPRO 3 UNITS: 100 INJECTION, SOLUTION INTRAVENOUS; SUBCUTANEOUS at 08:46

## 2021-01-01 RX ADMIN — GABAPENTIN 100 MG: 100 CAPSULE ORAL at 21:37

## 2021-01-01 RX ADMIN — AMIODARONE HYDROCHLORIDE 200 MG: 200 TABLET ORAL at 21:45

## 2021-01-01 RX ADMIN — PROPOFOL 25 MCG/KG/MIN: 10 INJECTION, EMULSION INTRAVENOUS at 11:31

## 2021-01-01 RX ADMIN — METOCLOPRAMIDE 5 MG: 5 TABLET ORAL at 08:10

## 2021-01-01 RX ADMIN — HYDROMORPHONE HYDROCHLORIDE 1 MG: 1 INJECTION, SOLUTION INTRAMUSCULAR; INTRAVENOUS; SUBCUTANEOUS at 16:41

## 2021-01-01 RX ADMIN — MORPHINE SULFATE 6 MG: 8 INJECTION, SOLUTION INTRAMUSCULAR; INTRAVENOUS at 20:57

## 2021-01-01 RX ADMIN — Medication 10 ML: at 08:10

## 2021-01-01 RX ADMIN — MIDODRINE HYDROCHLORIDE 15 MG: 5 TABLET ORAL at 17:59

## 2021-01-01 RX ADMIN — OXYCODONE HYDROCHLORIDE 10 MG: 10 TABLET ORAL at 15:41

## 2021-01-01 RX ADMIN — FENTANYL CITRATE 25 MCG: 0.05 INJECTION, SOLUTION INTRAMUSCULAR; INTRAVENOUS at 05:56

## 2021-01-01 RX ADMIN — DOXYCYCLINE 100 MG: 100 INJECTION, POWDER, LYOPHILIZED, FOR SOLUTION INTRAVENOUS at 21:27

## 2021-01-01 RX ADMIN — ALBUTEROL SULFATE 2.5 MG: 2.5 SOLUTION RESPIRATORY (INHALATION) at 09:51

## 2021-01-01 RX ADMIN — CHOLESTYRAMINE 8 G: 4 POWDER, FOR SUSPENSION ORAL at 11:49

## 2021-01-01 RX ADMIN — SODIUM CHLORIDE 25 ML: 9 INJECTION, SOLUTION INTRAVENOUS at 11:31

## 2021-01-01 RX ADMIN — MEROPENEM 1000 MG: 1 INJECTION, POWDER, FOR SOLUTION INTRAVENOUS at 09:01

## 2021-01-01 RX ADMIN — POTASSIUM CHLORIDE 40 MEQ: 1500 TABLET, EXTENDED RELEASE ORAL at 11:46

## 2021-01-01 RX ADMIN — OXYCODONE HYDROCHLORIDE 10 MG: 10 TABLET ORAL at 00:00

## 2021-01-01 RX ADMIN — LOPERAMIDE HYDROCHLORIDE 4 MG: 2 SOLUTION ORAL at 16:51

## 2021-01-01 RX ADMIN — GABAPENTIN 100 MG: 100 CAPSULE ORAL at 00:00

## 2021-01-01 RX ADMIN — VASOPRESSIN 0.01 UNITS/MIN: 20 INJECTION INTRAVENOUS at 03:52

## 2021-01-01 RX ADMIN — CHOLESTYRAMINE 4 G: 4 POWDER, FOR SUSPENSION ORAL at 17:59

## 2021-01-01 RX ADMIN — MICONAZOLE NITRATE: 20.6 POWDER TOPICAL at 09:51

## 2021-01-01 RX ADMIN — PANTOPRAZOLE SODIUM 40 MG: 40 TABLET, DELAYED RELEASE ORAL at 06:02

## 2021-01-01 RX ADMIN — FENTANYL CITRATE 50 MCG: 0.05 INJECTION, SOLUTION INTRAMUSCULAR; INTRAVENOUS at 03:52

## 2021-01-01 RX ADMIN — FUROSEMIDE 60 MG: 10 INJECTION, SOLUTION INTRAMUSCULAR; INTRAVENOUS at 02:39

## 2021-01-01 RX ADMIN — DIPHENOXYLATE HYDROCHLORIDE AND ATROPINE SULFATE 5 ML: 2.5; .025 SOLUTION ORAL at 14:35

## 2021-01-01 RX ADMIN — COLLAGENASE SANTYL: 250 OINTMENT TOPICAL at 13:27

## 2021-01-01 RX ADMIN — SODIUM CHLORIDE, PRESERVATIVE FREE 10 ML: 5 INJECTION INTRAVENOUS at 13:17

## 2021-01-01 RX ADMIN — METOPROLOL TARTRATE 25 MG: 25 TABLET, FILM COATED ORAL at 21:27

## 2021-01-01 RX ADMIN — LOPERAMIDE HCL 2 MG: 1 SUSPENSION ORAL at 13:39

## 2021-01-01 RX ADMIN — ROCURONIUM BROMIDE 30 MG: 10 INJECTION, SOLUTION INTRAVENOUS at 18:44

## 2021-01-01 RX ADMIN — METOCLOPRAMIDE 5 MG: 5 TABLET ORAL at 12:22

## 2021-01-01 RX ADMIN — HEPARIN SODIUM 11.1 UNITS/KG/HR: 10000 INJECTION, SOLUTION INTRAVENOUS at 16:01

## 2021-01-01 RX ADMIN — FENTANYL CITRATE 25 MCG: 50 INJECTION, SOLUTION INTRAMUSCULAR; INTRAVENOUS at 15:38

## 2021-01-01 RX ADMIN — CALCIUM CHLORIDE 1 G: 100 INJECTION, SOLUTION INTRAVENOUS at 10:09

## 2021-01-01 RX ADMIN — SODIUM CHLORIDE 25 ML: 9 INJECTION, SOLUTION INTRAVENOUS at 11:23

## 2021-01-01 RX ADMIN — PIPERACILLIN AND TAZOBACTAM 3375 MG: 3; .375 INJECTION, POWDER, LYOPHILIZED, FOR SOLUTION INTRAVENOUS at 17:30

## 2021-01-01 RX ADMIN — ANORECTAL OINTMENT: 15.7; .44; 24; 20.6 OINTMENT TOPICAL at 09:50

## 2021-01-01 RX ADMIN — DEXTROSE MONOHYDRATE 25 G: 25 INJECTION, SOLUTION INTRAVENOUS at 12:30

## 2021-01-01 RX ADMIN — HEPARIN SODIUM 17 UNITS/KG/HR: 10000 INJECTION, SOLUTION INTRAVENOUS at 08:49

## 2021-01-01 RX ADMIN — OXYCODONE 5 MG: 5 TABLET ORAL at 06:07

## 2021-01-01 RX ADMIN — FENTANYL CITRATE 100 MCG: 0.05 INJECTION, SOLUTION INTRAMUSCULAR; INTRAVENOUS at 14:02

## 2021-01-01 RX ADMIN — MIDAZOLAM 1 MG: 1 INJECTION INTRAMUSCULAR; INTRAVENOUS at 09:28

## 2021-01-01 RX ADMIN — DOXYCYCLINE 100 MG: 100 INJECTION, POWDER, LYOPHILIZED, FOR SOLUTION INTRAVENOUS at 11:30

## 2021-01-01 RX ADMIN — METOPROLOL TARTRATE 100 MG: 50 TABLET, FILM COATED ORAL at 08:34

## 2021-01-01 RX ADMIN — MIDODRINE HYDROCHLORIDE 10 MG: 10 TABLET ORAL at 12:52

## 2021-01-01 RX ADMIN — ROCURONIUM BROMIDE 100 MG: 10 INJECTION INTRAVENOUS at 05:57

## 2021-01-01 RX ADMIN — INSULIN GLARGINE 10 UNITS: 100 INJECTION, SOLUTION SUBCUTANEOUS at 09:46

## 2021-01-01 RX ADMIN — NOREPINEPHRINE BITARTRATE 50.03 MCG/MIN: 1 INJECTION, SOLUTION, CONCENTRATE INTRAVENOUS at 06:06

## 2021-01-01 RX ADMIN — ALBUTEROL SULFATE 2.5 MG: 2.5 SOLUTION RESPIRATORY (INHALATION) at 16:07

## 2021-01-01 RX ADMIN — ALBUTEROL SULFATE 2 PUFF: 90 AEROSOL, METERED RESPIRATORY (INHALATION) at 08:44

## 2021-01-01 RX ADMIN — HYDROCORTISONE SODIUM SUCCINATE 50 MG: 100 INJECTION, POWDER, FOR SOLUTION INTRAMUSCULAR; INTRAVENOUS at 11:48

## 2021-01-01 RX ADMIN — AMIODARONE HYDROCHLORIDE 200 MG: 200 TABLET ORAL at 09:47

## 2021-01-01 RX ADMIN — IOPAMIDOL 120 ML: 755 INJECTION, SOLUTION INTRAVENOUS at 01:47

## 2021-01-01 RX ADMIN — COLLAGENASE SANTYL: 250 OINTMENT TOPICAL at 09:18

## 2021-01-01 RX ADMIN — INSULIN LISPRO 6 UNITS: 100 INJECTION, SOLUTION INTRAVENOUS; SUBCUTANEOUS at 08:51

## 2021-01-01 RX ADMIN — OXYCODONE HYDROCHLORIDE 5 MG: 5 SOLUTION ORAL at 17:16

## 2021-01-01 RX ADMIN — DOXYCYCLINE HYCLATE 100 MG: 100 CAPSULE ORAL at 21:31

## 2021-01-01 RX ADMIN — MIDODRINE HYDROCHLORIDE 15 MG: 5 TABLET ORAL at 11:37

## 2021-01-01 RX ADMIN — CHOLESTYRAMINE 4 G: 4 POWDER, FOR SUSPENSION ORAL at 22:32

## 2021-01-01 RX ADMIN — SODIUM CHLORIDE, PRESERVATIVE FREE 10 ML: 5 INJECTION INTRAVENOUS at 02:20

## 2021-01-01 RX ADMIN — PANTOPRAZOLE SODIUM 40 MG: 40 INJECTION, POWDER, FOR SOLUTION INTRAVENOUS at 21:00

## 2021-01-01 RX ADMIN — PHENYLEPHRINE HYDROCHLORIDE 50 MCG: 10 INJECTION INTRAVENOUS at 15:16

## 2021-01-01 RX ADMIN — METOPROLOL TARTRATE 5 MG: 1 INJECTION, SOLUTION INTRAVENOUS at 18:13

## 2021-01-01 RX ADMIN — ANORECTAL OINTMENT: 15.7; .44; 24; 20.6 OINTMENT TOPICAL at 21:44

## 2021-01-01 RX ADMIN — METOCLOPRAMIDE 10 MG: 5 INJECTION, SOLUTION INTRAMUSCULAR; INTRAVENOUS at 15:24

## 2021-01-01 RX ADMIN — WATER 10 ML: 1 INJECTION INTRAMUSCULAR; INTRAVENOUS; SUBCUTANEOUS at 02:18

## 2021-01-01 RX ADMIN — METOCLOPRAMIDE 5 MG: 5 TABLET ORAL at 11:49

## 2021-01-01 RX ADMIN — INSULIN LISPRO 1 UNITS: 100 INJECTION, SOLUTION INTRAVENOUS; SUBCUTANEOUS at 21:48

## 2021-01-01 RX ADMIN — PIPERACILLIN AND TAZOBACTAM 3375 MG: 3; .375 INJECTION, POWDER, LYOPHILIZED, FOR SOLUTION INTRAVENOUS at 16:40

## 2021-01-01 RX ADMIN — INSULIN GLARGINE 10 UNITS: 100 INJECTION, SOLUTION SUBCUTANEOUS at 11:48

## 2021-01-01 RX ADMIN — INSULIN GLARGINE 10 UNITS: 100 INJECTION, SOLUTION SUBCUTANEOUS at 22:13

## 2021-01-01 ASSESSMENT — PULMONARY FUNCTION TESTS
PIF_VALUE: 28
PIF_VALUE: 22
PIF_VALUE: 31
PIF_VALUE: 33
PIF_VALUE: 25
PIF_VALUE: 5
PIF_VALUE: 0
PIF_VALUE: 30
PIF_VALUE: 30
PIF_VALUE: 0
PIF_VALUE: 17
PIF_VALUE: 25
PIF_VALUE: 1
PIF_VALUE: 23
PIF_VALUE: 29
PIF_VALUE: 21
PIF_VALUE: 1
PIF_VALUE: 35
PIF_VALUE: 31
PIF_VALUE: 1
PIF_VALUE: 1
PIF_VALUE: 35
PIF_VALUE: 29
PIF_VALUE: 1
PIF_VALUE: 26
PIF_VALUE: 26
PIF_VALUE: 27
PIF_VALUE: 25
PIF_VALUE: 1
PIF_VALUE: 1
PIF_VALUE: 28
PIF_VALUE: 13
PIF_VALUE: 28
PIF_VALUE: 1
PIF_VALUE: 12
PIF_VALUE: 26
PIF_VALUE: 27
PIF_VALUE: 29
PIF_VALUE: 28
PIF_VALUE: 31
PIF_VALUE: 1
PIF_VALUE: 28
PIF_VALUE: 1
PIF_VALUE: 1
PIF_VALUE: 0
PIF_VALUE: 22
PIF_VALUE: 15
PIF_VALUE: 31
PIF_VALUE: 29
PIF_VALUE: 1
PIF_VALUE: 1
PIF_VALUE: 26
PIF_VALUE: 29
PIF_VALUE: 29
PIF_VALUE: 28
PIF_VALUE: 1
PIF_VALUE: 1
PIF_VALUE: 32
PIF_VALUE: 1
PIF_VALUE: 31
PIF_VALUE: 25
PIF_VALUE: 16
PIF_VALUE: 1
PIF_VALUE: 12
PIF_VALUE: 28
PIF_VALUE: 27
PIF_VALUE: 31
PIF_VALUE: 1
PIF_VALUE: 0
PIF_VALUE: 20
PIF_VALUE: 3
PIF_VALUE: 18
PIF_VALUE: 26
PIF_VALUE: 24
PIF_VALUE: 25
PIF_VALUE: 1
PIF_VALUE: 1
PIF_VALUE: 17
PIF_VALUE: 25
PIF_VALUE: 21
PIF_VALUE: 29
PIF_VALUE: 1
PIF_VALUE: 30
PIF_VALUE: 32
PIF_VALUE: 1
PIF_VALUE: 25
PIF_VALUE: 24
PIF_VALUE: 32
PIF_VALUE: 21
PIF_VALUE: 1
PIF_VALUE: 1
PIF_VALUE: 28
PIF_VALUE: 1
PIF_VALUE: 1
PIF_VALUE: 22
PIF_VALUE: 28
PIF_VALUE: 27
PIF_VALUE: 1
PIF_VALUE: 31
PIF_VALUE: 33
PIF_VALUE: 31
PIF_VALUE: 29
PIF_VALUE: 33
PIF_VALUE: 12
PIF_VALUE: 30
PIF_VALUE: 1
PIF_VALUE: 1
PIF_VALUE: 30
PIF_VALUE: 1
PIF_VALUE: 35
PIF_VALUE: 31
PIF_VALUE: 1
PIF_VALUE: 26
PIF_VALUE: 28
PIF_VALUE: 0
PIF_VALUE: 28
PIF_VALUE: 22
PIF_VALUE: 21
PIF_VALUE: 0
PIF_VALUE: 29
PIF_VALUE: 3
PIF_VALUE: 31
PIF_VALUE: 1
PIF_VALUE: 28
PIF_VALUE: 0
PIF_VALUE: 1
PIF_VALUE: 25
PIF_VALUE: 13
PIF_VALUE: 23
PIF_VALUE: 1
PIF_VALUE: 1
PIF_VALUE: 29
PIF_VALUE: 32
PIF_VALUE: 29
PIF_VALUE: 1
PIF_VALUE: 25
PIF_VALUE: 29
PIF_VALUE: 19
PIF_VALUE: 24
PIF_VALUE: 30
PIF_VALUE: 23
PIF_VALUE: 50
PIF_VALUE: 34
PIF_VALUE: 19
PIF_VALUE: 29
PIF_VALUE: 20
PIF_VALUE: 1
PIF_VALUE: 27
PIF_VALUE: 28
PIF_VALUE: 1
PIF_VALUE: 29
PIF_VALUE: 29
PIF_VALUE: 28
PIF_VALUE: 1
PIF_VALUE: 0
PIF_VALUE: 1
PIF_VALUE: 30
PIF_VALUE: 32
PIF_VALUE: 29
PIF_VALUE: 25
PIF_VALUE: 26
PIF_VALUE: 17
PIF_VALUE: 28
PIF_VALUE: 1
PIF_VALUE: 19
PIF_VALUE: 28
PIF_VALUE: 32
PIF_VALUE: 29
PIF_VALUE: 24
PIF_VALUE: 29
PIF_VALUE: 13
PIF_VALUE: 25
PIF_VALUE: 32
PIF_VALUE: 25
PIF_VALUE: 26
PIF_VALUE: 27
PIF_VALUE: 1
PIF_VALUE: 29
PIF_VALUE: 1
PIF_VALUE: 28
PIF_VALUE: 27
PIF_VALUE: 26
PIF_VALUE: 32
PIF_VALUE: 32
PIF_VALUE: 28
PIF_VALUE: 2
PIF_VALUE: 1
PIF_VALUE: 16
PIF_VALUE: 16
PIF_VALUE: 29
PIF_VALUE: 12
PIF_VALUE: 31
PIF_VALUE: 28
PIF_VALUE: 1
PIF_VALUE: 5
PIF_VALUE: 15
PIF_VALUE: 33
PIF_VALUE: 27
PIF_VALUE: 14
PIF_VALUE: 21
PIF_VALUE: 1
PIF_VALUE: 31
PIF_VALUE: 1
PIF_VALUE: 18
PIF_VALUE: 1
PIF_VALUE: 29
PIF_VALUE: 31
PIF_VALUE: 29
PIF_VALUE: 32
PIF_VALUE: 34
PIF_VALUE: 0
PIF_VALUE: 27
PIF_VALUE: 1
PIF_VALUE: 29
PIF_VALUE: 30
PIF_VALUE: 12
PIF_VALUE: 32
PIF_VALUE: 1
PIF_VALUE: 29
PIF_VALUE: 1
PIF_VALUE: 31
PIF_VALUE: 28
PIF_VALUE: 29
PIF_VALUE: 27
PIF_VALUE: 26
PIF_VALUE: 34
PIF_VALUE: 31
PIF_VALUE: 31
PIF_VALUE: 27
PIF_VALUE: 31
PIF_VALUE: 30
PIF_VALUE: 19
PIF_VALUE: 29
PIF_VALUE: 26
PIF_VALUE: 32
PIF_VALUE: 29
PIF_VALUE: 29
PIF_VALUE: 25
PIF_VALUE: 12
PIF_VALUE: 33
PIF_VALUE: 12
PIF_VALUE: 12
PIF_VALUE: 1
PIF_VALUE: 39
PIF_VALUE: 28
PIF_VALUE: 34
PIF_VALUE: 1
PIF_VALUE: 31
PIF_VALUE: 26
PIF_VALUE: 1
PIF_VALUE: 1
PIF_VALUE: 34
PIF_VALUE: 1
PIF_VALUE: 16
PIF_VALUE: 0
PIF_VALUE: 29
PIF_VALUE: 12
PIF_VALUE: 1
PIF_VALUE: 26
PIF_VALUE: 22
PIF_VALUE: 29
PIF_VALUE: 1
PIF_VALUE: 30
PIF_VALUE: 20
PIF_VALUE: 25
PIF_VALUE: 25
PIF_VALUE: 1
PIF_VALUE: 24
PIF_VALUE: 30
PIF_VALUE: 22
PIF_VALUE: 20
PIF_VALUE: 1
PIF_VALUE: 1
PIF_VALUE: 36
PIF_VALUE: 25
PIF_VALUE: 19
PIF_VALUE: 1
PIF_VALUE: 29
PIF_VALUE: 1
PIF_VALUE: 28
PIF_VALUE: 27
PIF_VALUE: 31
PIF_VALUE: 1
PIF_VALUE: 1
PIF_VALUE: 29
PIF_VALUE: 28
PIF_VALUE: 25
PIF_VALUE: 32
PIF_VALUE: 26
PIF_VALUE: 29
PIF_VALUE: 29
PIF_VALUE: 1
PIF_VALUE: 1
PIF_VALUE: 22
PIF_VALUE: 25
PIF_VALUE: 17
PIF_VALUE: 24
PIF_VALUE: 29
PIF_VALUE: 33

## 2021-01-01 ASSESSMENT — PAIN SCALES - GENERAL
PAINLEVEL_OUTOF10: 7
PAINLEVEL_OUTOF10: 0
PAINLEVEL_OUTOF10: 10
PAINLEVEL_OUTOF10: 10
PAINLEVEL_OUTOF10: 0
PAINLEVEL_OUTOF10: 0
PAINLEVEL_OUTOF10: 9
PAINLEVEL_OUTOF10: 5
PAINLEVEL_OUTOF10: 5
PAINLEVEL_OUTOF10: 8
PAINLEVEL_OUTOF10: 5
PAINLEVEL_OUTOF10: 0
PAINLEVEL_OUTOF10: 4
PAINLEVEL_OUTOF10: 8
PAINLEVEL_OUTOF10: 8
PAINLEVEL_OUTOF10: 10
PAINLEVEL_OUTOF10: 8
PAINLEVEL_OUTOF10: 5
PAINLEVEL_OUTOF10: 8
PAINLEVEL_OUTOF10: 10
PAINLEVEL_OUTOF10: 8
PAINLEVEL_OUTOF10: 3
PAINLEVEL_OUTOF10: 0
PAINLEVEL_OUTOF10: 0
PAINLEVEL_OUTOF10: 10
PAINLEVEL_OUTOF10: 10
PAINLEVEL_OUTOF10: 5
PAINLEVEL_OUTOF10: 10
PAINLEVEL_OUTOF10: 2
PAINLEVEL_OUTOF10: 0
PAINLEVEL_OUTOF10: 10
PAINLEVEL_OUTOF10: 0
PAINLEVEL_OUTOF10: 8
PAINLEVEL_OUTOF10: 0
PAINLEVEL_OUTOF10: 0
PAINLEVEL_OUTOF10: 7
PAINLEVEL_OUTOF10: 0
PAINLEVEL_OUTOF10: 8
PAINLEVEL_OUTOF10: 3
PAINLEVEL_OUTOF10: 10
PAINLEVEL_OUTOF10: 7
PAINLEVEL_OUTOF10: 9
PAINLEVEL_OUTOF10: 7
PAINLEVEL_OUTOF10: 10
PAINLEVEL_OUTOF10: 0
PAINLEVEL_OUTOF10: 0
PAINLEVEL_OUTOF10: 8
PAINLEVEL_OUTOF10: 8
PAINLEVEL_OUTOF10: 0
PAINLEVEL_OUTOF10: 9
PAINLEVEL_OUTOF10: 5
PAINLEVEL_OUTOF10: 0
PAINLEVEL_OUTOF10: 8
PAINLEVEL_OUTOF10: 0
PAINLEVEL_OUTOF10: 0
PAINLEVEL_OUTOF10: 9
PAINLEVEL_OUTOF10: 10
PAINLEVEL_OUTOF10: 3
PAINLEVEL_OUTOF10: 8
PAINLEVEL_OUTOF10: 0
PAINLEVEL_OUTOF10: 10
PAINLEVEL_OUTOF10: 10
PAINLEVEL_OUTOF10: 0
PAINLEVEL_OUTOF10: 0
PAINLEVEL_OUTOF10: 8
PAINLEVEL_OUTOF10: 2
PAINLEVEL_OUTOF10: 4
PAINLEVEL_OUTOF10: 10
PAINLEVEL_OUTOF10: 0
PAINLEVEL_OUTOF10: 10
PAINLEVEL_OUTOF10: 8
PAINLEVEL_OUTOF10: 10
PAINLEVEL_OUTOF10: 0
PAINLEVEL_OUTOF10: 2
PAINLEVEL_OUTOF10: 7
PAINLEVEL_OUTOF10: 0
PAINLEVEL_OUTOF10: 10
PAINLEVEL_OUTOF10: 0
PAINLEVEL_OUTOF10: 10
PAINLEVEL_OUTOF10: 7
PAINLEVEL_OUTOF10: 0
PAINLEVEL_OUTOF10: 4
PAINLEVEL_OUTOF10: 0
PAINLEVEL_OUTOF10: 3
PAINLEVEL_OUTOF10: 0
PAINLEVEL_OUTOF10: 8
PAINLEVEL_OUTOF10: 10
PAINLEVEL_OUTOF10: 6
PAINLEVEL_OUTOF10: 10
PAINLEVEL_OUTOF10: 0
PAINLEVEL_OUTOF10: 10
PAINLEVEL_OUTOF10: 9
PAINLEVEL_OUTOF10: 0
PAINLEVEL_OUTOF10: 10
PAINLEVEL_OUTOF10: 5
PAINLEVEL_OUTOF10: 0
PAINLEVEL_OUTOF10: 9
PAINLEVEL_OUTOF10: 0
PAINLEVEL_OUTOF10: 0
PAINLEVEL_OUTOF10: 10
PAINLEVEL_OUTOF10: 0
PAINLEVEL_OUTOF10: 0
PAINLEVEL_OUTOF10: 8
PAINLEVEL_OUTOF10: 0
PAINLEVEL_OUTOF10: 10
PAINLEVEL_OUTOF10: 5
PAINLEVEL_OUTOF10: 3
PAINLEVEL_OUTOF10: 10
PAINLEVEL_OUTOF10: 0
PAINLEVEL_OUTOF10: 0
PAINLEVEL_OUTOF10: 7
PAINLEVEL_OUTOF10: 0
PAINLEVEL_OUTOF10: 5
PAINLEVEL_OUTOF10: 9
PAINLEVEL_OUTOF10: 10
PAINLEVEL_OUTOF10: 0
PAINLEVEL_OUTOF10: 0
PAINLEVEL_OUTOF10: 6
PAINLEVEL_OUTOF10: 0
PAINLEVEL_OUTOF10: 10
PAINLEVEL_OUTOF10: 0
PAINLEVEL_OUTOF10: 8
PAINLEVEL_OUTOF10: 9
PAINLEVEL_OUTOF10: 10
PAINLEVEL_OUTOF10: 0
PAINLEVEL_OUTOF10: 0
PAINLEVEL_OUTOF10: 5
PAINLEVEL_OUTOF10: 0
PAINLEVEL_OUTOF10: 9
PAINLEVEL_OUTOF10: 0
PAINLEVEL_OUTOF10: 10
PAINLEVEL_OUTOF10: 4
PAINLEVEL_OUTOF10: 0
PAINLEVEL_OUTOF10: 10
PAINLEVEL_OUTOF10: 8
PAINLEVEL_OUTOF10: 7
PAINLEVEL_OUTOF10: 10
PAINLEVEL_OUTOF10: 0
PAINLEVEL_OUTOF10: 1

## 2021-01-01 ASSESSMENT — PAIN DESCRIPTION - PAIN TYPE
TYPE: SURGICAL PAIN
TYPE: SURGICAL PAIN
TYPE: ACUTE PAIN;SURGICAL PAIN
TYPE: ACUTE PAIN
TYPE: ACUTE PAIN
TYPE: SURGICAL PAIN
TYPE: SURGICAL PAIN
TYPE: ACUTE PAIN
TYPE: SURGICAL PAIN
TYPE: ACUTE PAIN
TYPE: SURGICAL PAIN;NEUROPATHIC PAIN
TYPE: SURGICAL PAIN
TYPE: CHRONIC PAIN
TYPE: ACUTE PAIN;CHRONIC PAIN
TYPE: ACUTE PAIN
TYPE: CHRONIC PAIN
TYPE: ACUTE PAIN
TYPE: ACUTE PAIN
TYPE: SURGICAL PAIN
TYPE: ACUTE PAIN
TYPE: SURGICAL PAIN
TYPE: SURGICAL PAIN
TYPE: ACUTE PAIN;SURGICAL PAIN
TYPE: SURGICAL PAIN

## 2021-01-01 ASSESSMENT — LIFESTYLE VARIABLES
SMOKING_STATUS: 1

## 2021-01-01 ASSESSMENT — PAIN DESCRIPTION - LOCATION
LOCATION: BUTTOCKS
LOCATION: LEG
LOCATION: ABDOMEN
LOCATION: ABDOMEN;COCCYX
LOCATION: ABDOMEN
LOCATION: LEG;FOOT;BUTTOCKS
LOCATION: BUTTOCKS
LOCATION: BUTTOCKS;ABDOMEN
LOCATION: ABDOMEN
LOCATION: FOOT
LOCATION: ABDOMEN;ANKLE
LOCATION: ABDOMEN
LOCATION: ABDOMEN
LOCATION: TOE (COMMENT WHICH ONE)
LOCATION: ABDOMEN
LOCATION: ABDOMEN
LOCATION: TOE (COMMENT WHICH ONE)
LOCATION: FOOT;LEG
LOCATION: BUTTOCKS
LOCATION: ABDOMEN
LOCATION: LEG
LOCATION: LEG
LOCATION: ABDOMEN

## 2021-01-01 ASSESSMENT — ENCOUNTER SYMPTOMS
ABDOMINAL PAIN: 1
SHORTNESS OF BREATH: 0
VOMITING: 0
VOMITING: 0
EYE PAIN: 0
SHORTNESS OF BREATH: 0
SINUS PRESSURE: 0
EYE PAIN: 0
ABDOMINAL PAIN: 0
BACK PAIN: 0
ABDOMINAL PAIN: 1
VOMITING: 1
ABDOMINAL PAIN: 1
COUGH: 0
SHORTNESS OF BREATH: 0
SHORTNESS OF BREATH: 0
ABDOMINAL PAIN: 1
COUGH: 0
NAUSEA: 0
SHORTNESS OF BREATH: 0
CONSTIPATION: 0
EYE REDNESS: 0
COUGH: 0
BACK PAIN: 0
ABDOMINAL PAIN: 1
PHOTOPHOBIA: 0
DIARRHEA: 1
ABDOMINAL PAIN: 0
ABDOMINAL PAIN: 1
CHEST TIGHTNESS: 0
SHORTNESS OF BREATH: 1
DIARRHEA: 0
EYE DISCHARGE: 0
NAUSEA: 0
NAUSEA: 1
TROUBLE SWALLOWING: 0
SHORTNESS OF BREATH: 0
COUGH: 0
PHOTOPHOBIA: 0
VOMITING: 0
RESPIRATORY NEGATIVE: 1
SORE THROAT: 0
BACK PAIN: 0
EYE PAIN: 0
NAUSEA: 0
WHEEZING: 0
RHINORRHEA: 0
ABDOMINAL PAIN: 1
NAUSEA: 0
ABDOMINAL PAIN: 0
ABDOMINAL DISTENTION: 0
ABDOMINAL PAIN: 0
SORE THROAT: 0
SHORTNESS OF BREATH: 0
SHORTNESS OF BREATH: 0
RESPIRATORY NEGATIVE: 1
ABDOMINAL PAIN: 0
DIARRHEA: 0
SHORTNESS OF BREATH: 0
RHINORRHEA: 0
SHORTNESS OF BREATH: 0
SHORTNESS OF BREATH: 0
CHEST TIGHTNESS: 0
APNEA: 0
EYE DISCHARGE: 0
CONSTIPATION: 0
SORE THROAT: 0
WHEEZING: 0
DIARRHEA: 1
DIARRHEA: 0
EYE REDNESS: 0
RESPIRATORY NEGATIVE: 1

## 2021-01-01 ASSESSMENT — PAIN DESCRIPTION - DESCRIPTORS
DESCRIPTORS: ACHING;DISCOMFORT
DESCRIPTORS: ACHING;DISCOMFORT
DESCRIPTORS: SHARP;SORE;DISCOMFORT
DESCRIPTORS: SHARP;CONSTANT;STABBING
DESCRIPTORS: BURNING;TENDER;SORE
DESCRIPTORS: CONSTANT;SHARP
DESCRIPTORS: DISCOMFORT;SORE
DESCRIPTORS: ACHING;DISCOMFORT
DESCRIPTORS: ACHING;CONSTANT;DISCOMFORT
DESCRIPTORS: SHARP
DESCRIPTORS: BURNING;CONSTANT
DESCRIPTORS: ACHING;DISCOMFORT
DESCRIPTORS: DISCOMFORT;SORE;TENDER
DESCRIPTORS: ACHING;CONSTANT;DISCOMFORT

## 2021-01-01 ASSESSMENT — PAIN DESCRIPTION - ONSET
ONSET: ON-GOING
ONSET: PROGRESSIVE
ONSET: ON-GOING

## 2021-01-01 ASSESSMENT — PAIN DESCRIPTION - PROGRESSION
CLINICAL_PROGRESSION: NOT CHANGED
CLINICAL_PROGRESSION: GRADUALLY IMPROVING
CLINICAL_PROGRESSION: NOT CHANGED
CLINICAL_PROGRESSION: GRADUALLY WORSENING
CLINICAL_PROGRESSION: GRADUALLY IMPROVING
CLINICAL_PROGRESSION: NOT CHANGED
CLINICAL_PROGRESSION: NOT CHANGED
CLINICAL_PROGRESSION: GRADUALLY WORSENING
CLINICAL_PROGRESSION: NOT CHANGED
CLINICAL_PROGRESSION: GRADUALLY IMPROVING
CLINICAL_PROGRESSION: NOT CHANGED
CLINICAL_PROGRESSION: NOT CHANGED
CLINICAL_PROGRESSION: GRADUALLY IMPROVING
CLINICAL_PROGRESSION: NOT CHANGED
CLINICAL_PROGRESSION: NOT CHANGED
CLINICAL_PROGRESSION: GRADUALLY IMPROVING
CLINICAL_PROGRESSION: NOT CHANGED

## 2021-01-01 ASSESSMENT — PAIN DESCRIPTION - ORIENTATION
ORIENTATION: LEFT;RIGHT
ORIENTATION: OTHER (COMMENT)
ORIENTATION: RIGHT;MID
ORIENTATION: RIGHT;LEFT
ORIENTATION: MID
ORIENTATION: RIGHT;MID
ORIENTATION: RIGHT;LEFT
ORIENTATION: MID
ORIENTATION: LEFT;RIGHT
ORIENTATION: RIGHT
ORIENTATION: RIGHT
ORIENTATION: LEFT;RIGHT
ORIENTATION: RIGHT;MID
ORIENTATION: MID
ORIENTATION: LEFT;RIGHT
ORIENTATION: RIGHT;MID
ORIENTATION: RIGHT;LEFT

## 2021-01-01 ASSESSMENT — PAIN DESCRIPTION - FREQUENCY
FREQUENCY: INTERMITTENT
FREQUENCY: CONTINUOUS
FREQUENCY: INTERMITTENT
FREQUENCY: CONTINUOUS
FREQUENCY: CONTINUOUS
FREQUENCY: INTERMITTENT
FREQUENCY: CONTINUOUS

## 2021-01-01 ASSESSMENT — PAIN DESCRIPTION - DIRECTION
RADIATING_TOWARDS: NO

## 2021-01-01 ASSESSMENT — PAIN - FUNCTIONAL ASSESSMENT
PAIN_FUNCTIONAL_ASSESSMENT: 0-10
PAIN_FUNCTIONAL_ASSESSMENT: PREVENTS OR INTERFERES SOME ACTIVE ACTIVITIES AND ADLS
PAIN_FUNCTIONAL_ASSESSMENT: PREVENTS OR INTERFERES WITH MANY ACTIVE NOT PASSIVE ACTIVITIES
PAIN_FUNCTIONAL_ASSESSMENT: PREVENTS OR INTERFERES SOME ACTIVE ACTIVITIES AND ADLS
PAIN_FUNCTIONAL_ASSESSMENT: PREVENTS OR INTERFERES WITH MANY ACTIVE NOT PASSIVE ACTIVITIES
PAIN_FUNCTIONAL_ASSESSMENT: PREVENTS OR INTERFERES SOME ACTIVE ACTIVITIES AND ADLS
PAIN_FUNCTIONAL_ASSESSMENT: PREVENTS OR INTERFERES SOME ACTIVE ACTIVITIES AND ADLS
PAIN_FUNCTIONAL_ASSESSMENT: PREVENTS OR INTERFERES WITH MANY ACTIVE NOT PASSIVE ACTIVITIES
PAIN_FUNCTIONAL_ASSESSMENT: PREVENTS OR INTERFERES SOME ACTIVE ACTIVITIES AND ADLS
PAIN_FUNCTIONAL_ASSESSMENT: PREVENTS OR INTERFERES WITH MANY ACTIVE NOT PASSIVE ACTIVITIES

## 2021-02-10 NOTE — PATIENT INSTRUCTIONS
Recommendations for today's visit  · Continue Glipizide 2.5 mg daily   · Change lantus to 65 units daily at night   · Change Humalog to 22 units with meals + sliding scale below   Blood sugar 150-200: add 3 Units of Humalog  Blood sugar 201-250 : Add 6 Units ofHumalog  Blood Sugar 251 - 300: Add 9 Units of Humalog  Blood sugar  >300: Add 12 Units of Humalog     · Check Blood sugar 4 times/day before meals and at bedtime and send us sugar log in a week      These are your blood sugar, blood pressure, cholesterol and A1c goals:  · Blood sugar fastin mg/dl to 130 mg/dl  · Blood sugar before meals: <150 mg/dl  · Peak blood sugar lower than 180 mg/dl  · A1c: between 6.5 - 7.5%    I you have any questions please call Dr. Guilherme Yadav office     Silvia Mooney MD  Endocrinologist, [facility])   [address] [facility], [address] [place] [address],[facility] [address] [id number]   Phone: [phone]  Fax: [phone]  Email: [email or web address]
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
## 2021-02-15 PROBLEM — F32.A DEPRESSION: Status: ACTIVE | Noted: 2021-01-01

## 2021-02-15 PROBLEM — K52.9 ENTEROCOLITIS: Status: ACTIVE | Noted: 2021-01-01

## 2021-02-15 PROBLEM — R73.9 HYPERGLYCEMIA: Status: ACTIVE | Noted: 2021-01-01

## 2021-02-15 PROBLEM — R10.9 ABDOMINAL PAIN: Status: ACTIVE | Noted: 2021-01-01

## 2021-02-15 PROBLEM — Z72.0 TOBACCO ABUSE: Status: ACTIVE | Noted: 2021-01-01

## 2021-02-15 PROBLEM — U07.1 COVID-19: Status: ACTIVE | Noted: 2021-01-01
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
## 2021-02-15 NOTE — ED NOTES
Bed: 23  Expected date:   Expected time:   Means of arrival:   Comments:  Sunny 13, RN  02/15/21 [id number]

## 2021-02-16 PROBLEM — Z90.49 S/P SMALL BOWEL RESECTION: Status: ACTIVE | Noted: 2021-01-01

## 2021-02-16 NOTE — CARE COORDINATION
Introduced my self and provided explanation of CM role to patient. Patient is awake, alert, and aware of current diagnosis and treatment plan including gen surg consult, blood glucose control. Patient verbalizes she resides at home alone, but adds she has a \"great support system\". She has a wheeled walker at home, has glucometer, but does not have oxygen. Patient is established with a pcp and denies any issue with retail pharmaceutical coverage. She states she is independent with her adl's, still drives her vehicle, etc.    Patient vocalizes that she would be agreeable to [facility] if warranted at discharge. Await H&P, plan of care to finalize home going needs. Will follow along with  and assist with discharge planning as necessary. Yamini Colindres.  Elizabeth, MSN, RN  [facility] Case Management  [phone]
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
## 2021-02-16 NOTE — ED NOTES
SBAR report faxed to 4th floor, verified receipt with staff.      Monika Solorzano RN  02/15/21 8407
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
## 2021-02-17 PROBLEM — K55.029 ISCHEMIC NECROSIS OF SMALL BOWEL (HCC): Status: ACTIVE | Noted: 2021-01-01

## 2021-02-17 PROBLEM — R65.21 SEPTIC SHOCK (HCC): Status: ACTIVE | Noted: 2021-01-01

## 2021-02-17 PROBLEM — J18.9 PNEUMONIA DUE TO INFECTIOUS ORGANISM: Status: ACTIVE | Noted: 2021-01-01

## 2021-02-17 PROBLEM — K55.069 SUPERIOR MESENTERIC ARTERY THROMBOSIS (HCC): Status: ACTIVE | Noted: 2021-01-01

## 2021-02-17 PROBLEM — I99.8 ISCHEMIA OF RIGHT LOWER EXTREMITY: Status: ACTIVE | Noted: 2021-01-01

## 2021-02-17 PROBLEM — A41.9 SEPTIC SHOCK (HCC): Status: ACTIVE | Noted: 2021-01-01

## 2021-02-17 PROBLEM — N17.9 AKI (ACUTE KIDNEY INJURY) (HCC): Status: ACTIVE | Noted: 2021-01-01

## 2021-02-17 PROBLEM — I74.5 THROMBOSIS OF RIGHT ILIAC ARTERY (HCC): Status: ACTIVE | Noted: 2021-01-01

## 2021-02-17 PROBLEM — K55.9 ISCHEMIC BOWEL DISEASE (HCC): Status: ACTIVE | Noted: 2021-01-01

## 2021-02-17 PROBLEM — R57.9 SHOCK (HCC): Status: ACTIVE | Noted: 2021-01-01

## 2021-02-17 NOTE — H&P
[address] [facility]  History and Physical      CHIEF COMPLAINT: [place] pain      HISTORY OF PRESENT ILLNESS:         The patient is a 60 y.o. female patient of Tomasz Allred MD  who presents with complaints of abdominal pain for 2 days to [facility] ED. Jeana Aldana Patient admits to hyperglycemia, nausea, vomiting, diarrhea. Patient has multiple episodes of nonbloody nonbilious emesis. Blood sugars also been elevated. Reported to be greater than 500. Abdominal pain slightly improved, periumbilical.  Has had a cough prior to admission with shortness of breath, no home oxygen-currently on 4 L. In [facility] ED, blood sugar 600, beta hydroxybutyrate positive, borderline DKA, was not started on insulin drip. CT lung findings concerning for COVID-19, though test negative. Patient has extensive portal venous gas. Patient was admitted to [facility] and surgery was consulted. Patient was taken to the OR and found to have extensive necrosis of the small bowel. Patient was left open and transferred to [address] [place] [address] SICU. Pt is a poor historian. History is largely obtained from chart review and discussions with staff/family as available.      Past Medical History:    Past Medical History:   Diagnosis Date    Adrenal mass ([address] 9/11/2014    adenoma, has been stable    Anxiety and depression 8/21/2016    Arthropathy of facet joint     Padron's esophagus     Dr Messi Espinosa EGD one year    Chronic back pain 3/7/2014    CMV mononucleosis     DDD (degenerative disc disease)     Fatty infiltration of liver 12/19/2016    Per CT-11/14/16    Fibromyalgia     GERD (gastroesophageal reflux disease)     Hiatal hernia     Hypertension     Hypokalemia     IBS (irritable bowel syndrome)     Impaired fasting glucose 4/11/2016    Insomnia     Low ferritin level     Lumbar radiculopathy     Mild cardiomegaly 12/19/2016    Per CT abd 11/14/16    Mild sleep apnea     PSG 4/10/17 ibuprofen (ADVIL;MOTRIN) 800 MG tablet, ibuprofen 800 mg tablet  LANTUS SOLOSTAR 100 UNIT/ML injection pen, 62 units at bed time  HUMALOG KWIKPEN 200 UNIT/ML SOPN pen, 30 units once a day  Advocate Lancets MISC,   lisinopril-hydroCHLOROthiazide (PRINZIDE;ZESTORETIC) 20-25 MG per tablet,   nabumetone (RELAFEN) 500 MG tablet, nabumetone 500 mg tablet  nystatin (MYCOSTATIN) 783855 UNIT/GM powder,   oxyCODONE-acetaminophen (PERCOCET) 5-325 MG per tablet, oxycodone-acetaminophen 5 mg-325 mg tablet  pramipexole (MIRAPEX) 1.5 MG tablet,   pregabalin (LYRICA) 200 MG capsule,   triamcinolone (KENALOG) 0.1 % lotion,   atorvastatin (LIPITOR) 10 MG tablet, Take 1 tablet by mouth daily  furosemide (LASIX) 20 MG tablet, Daily as needed for swelling (Patient taking differently: Daily)  metoprolol (LOPRESSOR) 100 MG tablet, TAKE 1 TABLET TWICE DAILY  glipiZIDE (GLUCOTROL XL) 2.5 MG extended release tablet, Take 1 tablet by mouth daily  sucralfate (CARAFATE) 1 GM tablet, Take 1 tablet by mouth 3 times daily (before meals)  Insulin Pen Needle 32G X 4 MM MISC, 1 each by Does not apply route daily  omeprazole (PRILOSEC) 40 MG delayed release capsule, Take 1 capsule by mouth daily  alogliptin (NESINA) 25 MG TABS tablet, Take 1 tablet by mouth daily  Handicap Placard MISC, by Does not apply route Patient cannot walk 200 ft without stopping to rest.   Expiration 5/1/2022  albuterol sulfate HFA (PROAIR HFA) 108 (90 Base) MCG/ACT inhaler, Inhale 2 puffs into the lungs every 6 hours as needed for Wheezing    Allergies:    Lisinopril, Procardia [nifedipine], and Metformin and related    Social History:    reports that she has been smoking cigarettes. She started smoking about 46 years ago. She has a 41.00 pack-year smoking history. She has never used smokeless tobacco. She reports current alcohol use. She reports that she does not use drugs.     Family History: family history includes Cancer in her brother and mother; Diabetes in her mother; Heart Disease in her brother; High Blood Pressure in her mother. REVIEW OF SYSTEMS:  As above in the HPI, otherwise negative    PHYSICAL EXAM:    Vitals:  BP (!) 110/51   Pulse 87   Temp 100.5 °F (38.1 °C) (Axillary)   Resp 21   Ht 5' 6\" (1.676 m)   Wt 229 lb 11.5 oz (104.2 kg)   LMP  (LMP Unknown)   SpO2 95%   BMI 37.08 kg/m²     General:  Intubated sedated mechanically ventilated   HEENT:  Normocephalic, atraumatic. Pupils equal, round, reactive to light. No scleral icterus. No conjunctival injection. Normal lips, teeth, and gums. No nasal discharge.   Neck:  Supple  Heart:  RRR, no murmurs, gallops, rubs  Lungs:  CTA bilaterally, bilat symmetrical expansion, no wheeze, rales, or rhonchi  Abdomen: Ventral incision open with wound VAC  Extremities:  No clubbing, cyanosis, or edema  Skin:  Warm and dry, no open lesions or rash  Neuro:  Cranial nerves 2-12 intact, no focal deficits  Breast: deferred  Rectal: deferred  Genitalia:  deferred    LABS:    CBC with Differential:    Lab Results   Component Value Date    WBC 13.7 02/17/2021    RBC 4.39 02/17/2021    HGB 13.2 02/17/2021    HCT 40.0 02/17/2021     02/17/2021    MCV 91.1 02/17/2021    MCH 30.1 02/17/2021    MCHC 33.0 02/17/2021    RDW 13.9 02/17/2021    SEGSPCT 46 04/12/2013    LYMPHOPCT 7.0 02/17/2021    MONOPCT 5.2 02/17/2021    MYELOPCT 1.7 02/17/2021    BASOPCT 0.1 02/17/2021    MONOSABS 0.68 02/17/2021    LYMPHSABS 0.96 02/17/2021    EOSABS 0.00 02/17/2021    BASOSABS 0.00 02/17/2021     CMP:    Lab Results   Component Value Date     02/17/2021    K 3.9 02/17/2021    K 3.8 02/16/2021    CL 99 02/17/2021    CO2 25 02/17/2021    BUN 32 02/17/2021    CREATININE 1.2 02/17/2021    GFRAA 55 02/17/2021    LABGLOM 45 02/17/2021    GLUCOSE 329 02/17/2021    GLUCOSE 88 05/03/2012    PROT 5.5 02/17/2021    LABALBU 2.1 02/17/2021    LABALBU 2.8 05/03/2012 as well as some persistent but improved pneumatosis intestinalis involving   lower abdominal and pelvic bowel loops. Portal venous gas in the liver has   resolved. 3. Occluded right common iliac artery with reconstituted flow within mid to   distal right external iliac artery and within right internal iliac artery. CT runoff of lower extremities performed concurrently. There is flow seen   throughout lower extremity arteries without evidence of stenosis. 4. Mild ascites. 5. Small amount of free air presumed postoperative   I discussed findings by phone with Dr. Drake Emery at 2:55 a.m. on 02/17/2021. CTA CHEST W CONTRAST   Final Result   Suboptimal opacification of the pulmonary arteries. No evidence of central   pulmonary embolism. The peripheral pulmonary arteries are inadequately   assessed. Moderate size area of consolidation at the left lower lobe and a small area   of consolidation at the right lower lobe which could represent atelectasis   but is concerning for multifocal airspace disease process. Scattered areas   of patchy and ground-glass opacities are noted in the bilateral lungs which   are nonspecific but could indicate an atypical/viral pneumonia      CTA ABDOMINAL AORTA W BILAT RUNOFF W CONTRAST   Final Result   1. There is a small amount of nonocclusive thrombus in proximal SMA. There   is some occlusive thrombus involving branch of the SMA. 2. There is some persistent but improved mesenteric venous gas in the pelvis   as well as some persistent but improved pneumatosis intestinalis involving   lower abdominal and pelvic bowel loops. Portal venous gas in the liver has   resolved. 3. Occluded right common iliac artery with reconstituted flow within mid to   distal right external iliac artery and within right internal iliac artery. CT runoff demonstrates flow throughout lower extremity arteries without   evidence of stenosis. 4. Mild ascites. 5. Small amount of free air presumed postoperative. I discussed findings by phone with Dr. Deepak Ann at 2:55 a.m. on 02/17/2021. XR CHEST PORTABLE   Final Result   1. Medical support devices as above. 2.  Atherosclerotic disease and mild cardiomegaly. 3.  Ill-defined opacities in the right greater than left lungs. No pleural   effusion or pneumothorax. No change from yesterday's exam.      XR CHEST PORTABLE    (Results Pending)   XR CHEST PORTABLE    (Results Pending)       ASSESSMENT:      Principal Problem:    Ischemic necrosis of small bowel (HCC)  Active Problems:    Tobacco abuse    Superior mesenteric artery thrombosis (HCC)    Thrombosis of right iliac artery (HCC)    Ischemia of right lower extremity    DM (diabetes mellitus), type 2, uncontrolled ([address]    Shock ([address]  Resolved Problems:    * No resolved hospital problems.  *      PLAN:    52-year-old female history of tobacco abuse, type 2 diabetes admitted initially to [facility] with small bowel necrosis status post ex lap extensive small bowel excision left open 2/16 transferred to SICU at [facility] to the [address] [place] [address] today for right lower extremity thrombectomy and fasciotomies    Admitted to SICU  Intubated sedated mechanically ventilated   IV fluid resuscitation  VasopressorsLevophed wean-as tolerated  IV antibiotics  Insulin drip  Heparin drip  Monitor labs closely  Critical care consult appreciated  Surgery consult appreciated   vascular consult appreciated      Electronically signed by Adrianna Bradley MD on 2/17/2021 at 7:16 AM
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
## 2021-02-17 NOTE — CONSULTS
Surgical Intensive Care Unit  Daily Progress Note  Date of admission:  2/16/2021  Reason for ICU transfer: Status post exploratory laparotomy, small bowel resection, left in discontinuity with open abdomen and ABThera 2/16/2021    Patient is a 61-year-old female with a past medical history of diabetes and hypertension who presented on 2/16/2021 to [facility].  Per her H&P she was having about 2 days of abdominal pain. The pain started out of nowhere and was very sharp and sudden. Since that time pain is been progressing and she is beginning more nauseated and has vomited multiple times. Blood glucose in the emergency department was in the 500s. Surgery was consulted and CT abdomen pelvis with IV and oral contrast was obtained. CT abdomen pelvis showed evidence of portal venous gas. She was taken to the OR for exploratory laparotomy. Inside the abdomen, extensive necrosis of the small bowel was found. The small bowel was taken from about 90 cm distal to the ligament of Treitz all the way to the distal ileum. Patient still has intact ileocecal valve. Patient was left open, ABThera was placed, and decision was made to transfer to [facility] for surgical critical care management. NEUROLOGIC: Pupils equal round and reactive, not following commands on heavy sedation  SKIN:  no bruising or bleeding    ASSESSMENT / PLAN:  · Neuro:  Pain -fentanyl GTT  · CV:  -  Monitor hemodynamics. Arterial line. Levophed gtt. Heparin gtt. · Pulm:  -  Monitor RR, SpO2  · GI:  N.p.o.  · Renal:  -  Monitor UOP  · ID:   -  Monitor for SIRS. IV Zosyn  · Endo:  -  Monitor glucose. Lantus 40 nightly, high dose sliding scale insulin  · MSK:       Bowel regimen:   DVT proph: Hep gtt., SCDs  GI proph:  Protonix   Glucose protocol: Denied SSI  Mouth/eye care: Per unit protocol  Bates:   Day 1, keep in place for critical care monitoring of fluid balance. CVC sites:  Right IJ CVC, Day # 0  Ancillary consults: Vascular, general surgery  Family Update: Family updated by ICU team 2/17 a.m. Hospital course:  2/17/21  -transferred to [facility] SICU for critical management status post ex lap, small bowel resection, left in discontinuity with open abdomen and ABThera vacuum drainage. Patient found to have right common iliac thrombus and thrombus in his SMA. Patient placed on heparin drip, vascular consulted.   Plans for OR in the morning to 2/17 for right lower extremity thrombectomy and fasciotomy

## 2021-02-17 NOTE — ADDENDUM NOTE
Addendum  created 02/17/21 1054 by Vane Cameron MD    Clinical Note Signed, Review and Sign - Ready for Procedure, Review and Sign - Signed

## 2021-02-17 NOTE — PROGRESS NOTES
Called access center back regarding transfer and bed assignment-  Stated they were waiting on admit order for transfer.   Order placed at this time

## 2021-02-17 NOTE — ANESTHESIA POSTPROCEDURE EVALUATION
Department of Anesthesiology  Postprocedure Note    Patient: Gloria Corrales  MRN: [id number]  YOB: 1955  Date of evaluation: 2/17/2021  Time:  1:29 AM     Procedure Summary     Date: 02/16/21 Room / Location: [address]    Anesthesia Start: 0233 Anesthesia Stop: 1847    Procedure: DIAGNOSTIC LAPAROSCOPY, CONVERTED TO LAPAROTOMY WITH SMALL BOWEL RESECTION, WITH APPLICATION OF ABTHERA WOUND VAC (N/A Abdomen) Diagnosis: (-)    Surgeons: Jaqui Rico MD Responsible Provider: Mark Pace MD    Anesthesia Type: general ASA Status: 3 - Emergent          Anesthesia Type: general    Brock Phase I: Brock Score: 7    Brock Phase II:      Last vitals: Reviewed and per EMR flowsheets. Anesthesia Post Evaluation    Patient location during evaluation: bedside  Patient participation: complete - patient cannot participate  Level of consciousness: sedated and ventilated  Airway patency: patent  Complications: no  Cardiovascular status: blood pressure returned to baseline  Respiratory status: ventilator  Comments:  To ICU transfer

## 2021-02-17 NOTE — PLAN OF CARE
Problem: Non-Violent Restraints  Goal: No harm/injury to patient while restraints in use  Outcome: Met This Shift     Problem: Non-Violent Restraints  Goal: Patient's dignity will be maintained  Outcome: Met This Shift     Problem: Non-Violent Restraints  Goal: Removal from restraints as soon as assessed to be safe  Outcome: Not Met This Shift

## 2021-02-17 NOTE — PROGRESS NOTES
Radiology Procedure Waiver   Name: Carter Mooney  : 1955  MRN: [id number]    Date:  21    Time: 1:27 AM EST    Benefits of immediately proceeding with Radiology exam(s) outweigh the risks or are not indicated as specified below and therefore the following is/are being waived:    [] Pregnancy test   [] Patients LMP on-time and regular.   [] Patient had Tubal Ligation or has other Contraception Device. [] Patient  is Menopausal or Premenarcheal.    [] Patient had Full or Partial Hysterectomy. [] Protocol for Iodine allergy    [] MRI Questionnaire     [x] BUN/Creatinine   [] Patient age w/no hx of renal dysfunction. [] Patient on Dialysis. [] Recent Normal Labs.   Electronically signed by Tomas Gupta MD on 21 at 1:27 AM EST
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
## 2021-02-17 NOTE — PROCEDURES
Time out: Immediately prior to procedure a \"time out\" was called to verify the correct patient, procedure, equipment, support staff and site/side marked as required. Preparation: Patient was prepped and draped in the usual sterile fashion. Indications: multiple ABGs and hemodynamic monitoring  Location: left radial  Anesthesia: local infiltration    Anesthesia:  Local Anesthetic: lidocaine 1% with epinephrine  Anesthetic total: 3 mL    Sedation:  Patient sedated: yes  Sedatives: fentanyl and propofol  Vitals: Vital signs were monitored during sedation.     Needle gauge: 18  Seldinger technique: Seldinger technique used  Number of attempts: 1  Post-procedure: line sutured and dressing applied  Patient tolerance: patient tolerated the procedure well with no immediate complications          Guadalupe Petersen MD  2/16/2021

## 2021-02-17 NOTE — CARE COORDINATION
Care Coordination: Met with daughter Alex Sims at bedside, explained my role. She expressed understanding of current course of treatment and plan of care. Pt lives alone in a senior high rise apt with an elevator. She uses a ww or cane for ambulation. Pt is independent in adl's,including driving. She is a diabetic and has a glucometer. No hx of Hhc or rehab. Will follow pt's progress and follow up with Rocio and pt as she stabilizes to determine post hospital needs.

## 2021-02-17 NOTE — OP NOTE
Operative Note      Patient: Gemma Merchant  YOB: 1955  MRN: [id number]    Date of Procedure: 2/17/2021    Pre-Op Diagnosis: Right Common Iliac Thrombus, RLE acute limb ischemia    Post-Op Diagnosis: Same       Procedure(s):  Iliac Thrombectomy with Right Lower Extremity Fasciotomy    Surgeon(s):  Mary Kay Jordan MD    Assistant:   Resident: Wiley Burnham MD     Findings: Extensive right common iliac thrombus. The profunda and superficial femoral vessel after several passes demonstrated no evidence of thrombus. See the body of the operative note      Anesthesia: General    Estimated Blood Loss (mL): less than 175     Complications: None    Specimens:   ID Type Source Tests Collected by Time Destination   A : RIGHT ILIAC THROMBUS Tissue Tissue SURGICAL PATHOLOGY Mary Kay Jordan MD 2/17/2021 9172        Implants:  * No implants in log *      Drains:   Negative Pressure Wound Therapy Abdomen Medial;Upper (Active)   Wound Type Surgical 02/17/21 0400   Dressing Type Other (Comment) 02/17/21 0400   Cycle Continuous 02/17/21 0400   Target Pressure (mmHg) 125 02/17/21 0400   Dressing Status Clean;Dry; Intact 02/17/21 0400   Drainage Amount Scant 02/17/21 0400   Drainage Description Serosanguinous 02/17/21 0600   Output (ml) 200 ml 02/17/21 0600   Santa-wound Assessment Dry; Intact 02/17/21 0400       NG/OG/NJ/NE Tube Nasogastric 18 fr Right mouth (Active)   Surrounding Skin Dry; Intact 02/16/21 1840   Securement device Yes 02/17/21 0600   Status Suction-low intermittent 02/17/21 0600   NG/OG/NJ/NE External Measurement (cm) 60 cm 02/17/21 0400   Drainage Appearance Brown 02/17/21 0400   Output (mL) 400 ml 02/17/21 0600       Urethral Catheter 16 fr (Active) Catheter Indications Need for fluid management in critically ill patients in a critical care setting not able to be managed by other means such as BSC with hat, bedpan, urinal, condom catheter, or short term intermittent urethral catherization 02/17/21 0400   Site Assessment No urethral drainage 02/17/21 0400   Urine Color Yellow 02/17/21 0400   Urine Appearance Clear 02/17/21 0400   Output (mL) 38 mL 02/17/21 7544       Findings: right femoral cutdown, right common iliac thrombectomy, right lower extremity fasciotomy    Detailed Description of Procedure: The patient was identified and the procedure was confirmed. The Bilateral legs and abdomen was prepped and draped in the usual sterile fashion. A vertical skin incision was made at the right groin after identifying the pubic symphysis and anterior superior iliac spine. It was carried down through the subcutaneous tissue. Dissection continued through the femoral fascia and down to expose the common femoral, superficial femoral, and profunda, vessel loop control obtained. Identified superficial veins were divided between silk ties. Patient was already on heparin drip and ACTs were monitored throughout the case and appropriate doses of heparin were given. Vessel clamps placed and transverse arteriotomy was made in the common femoral artery. #4 and a #5 Jin catheter passed proximally and a thrombus was noted. Inflow was noted to be good after multiple passes. The 3 jin passed into profunda, and no thrombus noted. Excellent back bleeding noted. # 3 and the #4 jin passed down sfa and no thrombus removed. There was no thrombus noted in any of the passes in the SFA or profunda. Good back bleeding noted from sfa. Arteriotomy was closed with 6-0 prolene. Closure was completed and flow was restored after flushing and back bleeding. Flow through the vessels was confirmed with the handheld Doppler probe. The foot was evaluated with the handheld doppler probe and there was noted to be doppler signals present in the foot. A medial incision was made 2 cm posterior to medial edge of the tibia. Care was taken to preserve the saphenous vein and retract it anteriorly. The underlying fascia was opened to release the superficial posterior compartment. The Soleus was bluntly dissected of the edge of the tibia. The neurovascular bundle was identified noting access to the deep posterior compartment. A lateral incision was made 2 cm anterior to the edge of the fibula. Incision was made until fascia was exposed. Care was taken to try and avoid injury to the lesser saphenous vein and the peroneal nerve. The intramuscular septum was incised w/ electrocautery and continued caudally and cranially for release of the anterior compartment. The lateral compartment incision is made in line with the fibular shaft . The deep peroneal nerve was identified confirming access to the anterior and lateral compartments. Hemostasis was obtained. The incisions were irrigated with saline solution. For the groin, the tissue was approximated with running than interrupted 2-0, 3-0 Vicryl sutures in a layered fashion. The skin was closed with a subcuticular monocryl stitch and Dermabond was applied to the skin incision in the operating room. The fasciotomy sites were covered with saline soaked kerlix, gauze, ABDs, dry kerlix and ACE wrap. At the conclusion of the case we noted DP/PT signals to the bilateral feet. Needle, sponge, and instrument counts were reported as correct. The patient tolerated the procedure and was transferred to the intensive care unit.     Amira Mckoy      Electronically signed by Aimra Mckoy MD on 2/17/2021 at 9:39 AM     Matheus Peterson MD.

## 2021-02-17 NOTE — FLOWSHEET NOTE
Patient will reach for lines and tubes, needing to be redirected much of the time. Attempting to provide calm environment, but patient still assessed to be a risk of harm to self. Family aware for need for restraints for safety. Will continue to monitor for earliest removal capability.

## 2021-02-17 NOTE — ANESTHESIA POSTPROCEDURE EVALUATION
Department of Anesthesiology  Postprocedure Note    Patient: Tyree Danielson  MRN: [id number]  YOB: 1955  Date of evaluation: 2/17/2021  Time:  10:21 AM     Procedure Summary     Date: 02/17/21 Room / Location: [facility] OR 03 / [facility]    Anesthesia Start: 1827 Anesthesia Stop: 0970    Procedure: Iliac Thrombectomy with Right Lower Extremity Fasciotomy (Right ) Diagnosis: (SMA Thrombus Right Common Iliac Thrombus)    Surgeons: Sydney Hollingsworth MD Responsible Provider:     Anesthesia Type: general ASA Status: 3 - Emergent          Anesthesia Type: general    Brock Phase I:      Brock Phase II:      Last vitals: Reviewed and per EMR flowsheets.        Anesthesia Post Evaluation    Patient location during evaluation: ICU  Patient participation: complete - patient participated  Level of consciousness: awake and alert  Airway patency: patent  Nausea & Vomiting: no nausea and no vomiting  Complications: no  Cardiovascular status: hemodynamically stable and blood pressure returned to baseline  Respiratory status: acceptable  Hydration status: euvolemic

## 2021-02-17 NOTE — CONSULTS
 UTI (urinary tract infection)     Vitamin D deficiency 1/6/2016       Past Surgical History:   Procedure Laterality Date    CHOLECYSTECTOMY      COLONOSCOPY  5/27/2016    Dr Gao Senior ECHO Tina Yobani  5/3/2012         EMG  5/19/2015    Dr Diana Salinas, radiculopathy    ESOPHAGUS SURGERY  08/26/2016    Dr Cade-radiofrequency ablation   Maverick Johnston  4/2015    medial branch blocks, Dr. Bridget Ivan POLYSOMNOGRAPHY  04/10/2017    Dr Soo Bach sleep apnea AHI-8    POLYSOMNOGRAPHY  05/15/2017    TONSILLECTOMY      UPPER GASTROINTESTINAL ENDOSCOPY  5/27/2016    Dr Gao Senior UPPER GASTROINTESTINAL ENDOSCOPY  07/14/2016    Dr Cade-Padron's       Medications Prior to Admission:    Prior to Admission medications    Medication Sig Start Date End Date Taking? Authorizing Provider   Alcohol Swabs (GLOBAL ALCOHOL PREP EASE) 70 % PADS  1/26/21   Historical Provider, MD   amLODIPine (NORVASC) 5 MG tablet amlodipine 5 mg tablet    Historical Provider, MD   benzonatate (TESSALON) 100 MG capsule benzonatate 100 mg capsule    Historical Provider, MD   cholestyramine (QUESTRAN) 4 g packet cholestyramine (with sugar) 4 gram oral powder   Take 1 scoop 3 times a day by oral route before meals for 30 days.     Historical Provider, MD   diazePAM (VALIUM) 5 MG tablet  11/24/20   Historical Provider, MD   diclofenac (CATAFLAM) 50 MG tablet diclofenac potassium 50 mg tablet    Historical Provider, MD   escitalopram (LEXAPRO) 10 MG tablet escitalopram 10 mg tablet    Historical Provider, MD   FREESTYLE LITE strip  1/26/21   Historical Provider, MD   hydrALAZINE (APRESOLINE) 25 MG tablet hydralazine 25 mg tablet    Historical Provider, MD   ibuprofen (ADVIL;MOTRIN) 800 MG tablet ibuprofen 800 mg tablet    Historical Provider, MD   LANTUS SOLOSTAR 100 UNIT/ML injection pen 62 units at bed time 1/18/21   Historical Provider, MD HUMALOG KWIKPEN 200 UNIT/ML SOPN pen 30 units once a day 1/14/21   Historical Provider, MD   [facility] Lanc[facility] [address] [facility]  1/26/21   Historical Provider, MD   lisinopril-hydroCHLOROthiazide (PRINZIDE;ZESTORETIC) 20-25 MG per tablet  12/17/20   Historical Provider, MD   nabumetone (RELAFEN) 500 MG tablet nabumetone 500 mg tablet    Historical Provider, MD   nystatin (MYCOSTATIN) 093463 UNIT/GM powder  11/19/20   Historical Provider, MD   oxyCODONE-acetaminophen (PERCOCET) 5-325 MG per tablet oxycodone-acetaminophen 5 mg-325 mg tablet    Historical Provider, MD   pramipexole (MIRAPEX) 1.5 MG tablet  1/14/21   Historical Provider, MD   pregabalin (LYRICA) 200 MG capsule  1/28/21   Historical Provider, MD   triamcinolone (KENALOG) 0.1 % lotion  1/14/21   Historical Provider, MD   atorvastatin (LIPITOR) 10 MG tablet Take 1 tablet by mouth daily 10/8/19   Arielle Mealing, DO   furosemide (LASIX) 20 MG tablet Daily as needed for swelling  Patient taking differently: Daily 10/8/19   Arielle Mealing, DO   metoprolol (LOPRESSOR) 100 MG tablet TAKE 1 TABLET TWICE DAILY 10/8/19   Arielle Mealing, DO   glipiZIDE (GLUCOTROL XL) 2.5 MG extended release tablet Take 1 tablet by mouth daily 10/8/19   Arielle Mealing, DO   sucralfate (CARAFATE) 1 GM tablet Take 1 tablet by mouth 3 times daily (before meals) 2/15/19   Edie Patel PA-C   Insulin Pen Needle 32G X 4 MM MISC 1 each by Does not apply route daily 2/15/19   Edie Patel PA-C   omeprazole (PRILOSEC) 40 MG delayed release capsule Take 1 capsule by mouth daily 5/30/18   Historical Provider, MD   alogliptin (NESINA) 25 MG TABS tablet Take 1 tablet by mouth daily 6/7/18   Arielle Mealing, DO   Handicap Placard MISC by Does not apply route Patient cannot walk 200 ft without stopping to rest.    Expiration 5/1/2022 5/10/18   Arielle Mealing, DO albuterol sulfate HFA (PROAIR HFA) 108 (90 Base) MCG/ACT inhaler Inhale 2 puffs into the lungs every 6 hours as needed for Wheezing 12/26/17   Radha Yanez PA-C       Allergies   Allergen Reactions    Lisinopril Swelling    Procardia [Nifedipine] Anaphylaxis    Metformin And Related      Severe diarrhea       Family History   Problem Relation Age of Onset    Diabetes Mother     High Blood Pressure Mother     Cancer Mother         BREAST    Cancer Brother         THROAT    Heart Disease Brother        Social History     Tobacco Use    Smoking status: Current Every Day Smoker     Packs/day: 1.00     Years: 41.00     Pack years: 41.00     Types: Cigarettes     Start date: 11/10/1974    Smokeless tobacco: Never Used    Tobacco comment: started at 26 and smoked up to 3 ppd   Substance Use Topics    Alcohol use: Yes     Alcohol/week: 0.0 standard drinks     Comment: occasionally    Drug use: No         Review of Systems   General ROS: sedated    PHYSICAL EXAM:    Vitals:    02/17/21 0500   BP:    Pulse: 87   Resp: 18   Temp:    SpO2: 96%       BP 96/65   Pulse 83   Temp 100 °F (37.8 °C)   Resp 16   Ht 5' 6\" (1.676 m)   Wt 229 lb 11.5 oz (104.2 kg)   LMP  (LMP Unknown)   SpO2 96%   BMI 37.08 kg/m²   CONSTITUTIONAL: Intubated, sedated  Was following commands off sedation.   EYES:  lids and lashes normal, sclera clear and conjunctiva normal  ENT:  normocepalic, without obvious abnormality, external ears without lesions  NECK:  supple, symmetrical, trachea midline,no jugular venous distension, no carotid bruits, right IJ CVC  HEMATOLOGIC/LYMPHATICS:  no cervical lymphadenopathy  LUNGS: On ventilator  CARDIOVASCULAR: Sinus tach  ABDOMEN:  soft, distended, tenderness noted, Aorta is not palpable  SKIN: Right lower extremity mottling  EXTREMITIES:   Withdraws to pain x4 extremities  2+ radial pulses bilaterally  Right lower extremity: Right leg/foot cold mottled, bilateral lower extremity excoriations LABS:    CBC  Recent Labs     02/16/21  2315   WBC 9.7   HGB 15.3   HCT 45.9        BMP  Recent Labs     02/16/21  2315      K 4.0      CO2 24   BUN 37*   CREATININE 1.4*   CALCIUM 8.6     Liver Function  Recent Labs     02/15/21  1643 02/15/21  1643 02/16/21  2315   LIPASE 56  --   --    BILITOT 0.7   < > 1.1   AST 19   < > 11   ALT 8   < > 5   ALKPHOS 71   < > 54   PROT 7.7   < > 6.5   LABALBU 3.2*   < > 2.8*    < > = values in this interval not displayed. No results for input(s): LACTATE in the last 72 hours. No results for input(s): INR, PTT in the last 72 hours.     Invalid input(s): PT    RADIOLOGY    Ct Abdomen Pelvis W Iv Contrast Additional Contrast? Oral    Result Date: 2/16/2021 EXAMINATION: CT OF THE ABDOMEN AND PELVIS WITH CONTRAST 2/16/2021 3:34 pm TECHNIQUE: CT of the abdomen and pelvis was performed with the administration of intravenous contrast. Multiplanar reformatted images are provided for review. Dose modulation, iterative reconstruction, and/or weight based adjustment of the mA/kV was utilized to reduce the radiation dose to as low as reasonably achievable. COMPARISON: 02/15/2021. HISTORY: ORDERING SYSTEM PROVIDED HISTORY: portal venous gas, severe abd pain TECHNOLOGIST PROVIDED HISTORY: Additional Contrast?->Oral Reason for exam:->portal venous gas, severe abd pain FINDINGS: The lung bases demonstrate persistent multifocal infiltrates without change concerning for multifocal pneumonia/COVID-19. There is mild coronary artery calcification. There is persistent but slightly improved portal venous air in the liver. Gallbladder is absent. Spleen, and the pancreas appear normal.  Bilateral adrenal masses are identified with the larger 1 on the right side measuring 2.2 x 3.1 cm. Kidneys are normal.  There is severe atherosclerotic plaque involving the aorta and iliac arteries with a subtotal occlusion of right common iliac artery. Degenerative changes are identified in the lumbar spine. There is extensive superior mesenteric venous air in the upper abdomen. Dilated fluid-filled proximal small bowel loops measuring up to 3.5 cm are identified with the pneumatosis intestinalis of the small bowel. Bowel loops appear somewhat thickened and inflamed. Severe enteritis or ischemic bowel are suspected. The edema inflammation may be causing low-grade bowel obstruction. The distal small bowel loops and colon are collapsed. Pelvis. Bladder is distended. There is diverticulosis of colon which is collapsed. Small amount of inflammatory ascites is present in the pelvis. Improving portal venous air with persistent but slightly improved mesenteric venous air. There is dilated edematous proximal small bowel loops with the pneumatosis intestinalis of small bowel which may be due to is severe inflammation/enteritis or ischemia. Inflammation may be causing edema and low-grade bowel obstruction. Inflammatory ascites is also present in the pelvis. Persistent multifocal infiltrates in lung bases concerning for viral infection. Subtotal occlusion or right common iliac artery The findings were telephoned to licensed caregiver    Xr Chest Portable    Result Date: 2/16/2021  EXAMINATION: ONE XRAY VIEW OF THE CHEST 2/16/2021 10:54 pm COMPARISON: Chest series from February 15, 2021 HISTORY: ORDERING SYSTEM PROVIDED HISTORY: central line placement TECHNOLOGIST PROVIDED HISTORY: Reason for exam:->central line placement What reading provider will be dictating this exam?->CRC FINDINGS: Right internal jugular central venous catheter with distal tip projecting in the midthoracic spine. Endotracheal tube terminates in the midthoracic trachea. Enteric tube extends subdiaphragmatic with distal tip and side port projecting over the stomach bubble. Atherosclerotic disease and mild cardiomegaly. Pulmonary vascularity appears within normal limits. There are ill-defined opacities in the right greater than left lungs. Relative sparing of the left mid and upper lung. No pleural effusion or pneumothorax. Osseous and thoracic soft tissue structures demonstrate no acute findings. Cholecystectomy clips in the right upper quadrant. 1.  Medical support devices as above. 2.  Atherosclerotic disease and mild cardiomegaly. 3.  Ill-defined opacities in the right greater than left lungs. No pleural effusion or pneumothorax.   No change from yesterday's exam.    Cta Abdominal Aorta W Bilat Runoff W Contrast    Result Date: 2/17/2021 EXAMINATION: CTA OF THE AORTA WITH LOWER EXTREMITY RUNOFF 2/17/2021 1:40 am TECHNIQUE: CTA of the pelvis and bilateral lower extremities was performed after the administration of intravenous contrast.   Multiplanar reformatted images are provided for review. MIP images are provided for review. Dose modulation, iterative reconstruction, and/or weight based adjustment of the mA/kV was utilized to reduce the radiation dose to as low as reasonably achievable. COMPARISON: 02/16/2021 HISTORY: ORDERING SYSTEM PROVIDED HISTORY: Cold left foot TECHNOLOGIST PROVIDED HISTORY: Reason for exam:->Cold left foot What reading provider will be dictating this exam?->CRC FINDINGS: Nonvascular Lower Chest: There is some lower lobe consolidation bilaterally. There is no pleural fluid. Organs: Portal venous gas previously seen in the liver has resolved. The liver, spleen, pancreas and kidneys demonstrate no significant abnormality. Adrenal gland masses are unchanged. GI/Bowel: There is no evidence of bowel obstruction. [de-identified] of previously seen pneumatosis intestinalis has resolved. There is probably minimal residual involving a pelvic bowel loop. There is some adjacent mesenteric venous gas although diminished as compared to prior. There is sigmoid diverticulosis but no diverticulitis. Pelvis: There is a Bates catheter in the bladder. Air in the bladder was likely introduced by the catheter. There is no abnormal pelvic mass. Previously seen pelvic ascites is nearly resolved. Peritoneum/Retroperitoneum: There is mild ascites in the upper abdomen around the liver and spleen. Free intraperitoneal air is presumably postoperative. Bones/Soft Tissues: Surgical abdominal wound present anteriorly. No acute bony abnormality seen. VASCULAR There are aortoiliac atherosclerotic calcifications. There is no abdominal aortic aneurysm. Celiac trunk and axis are patent without stenosis.  The SMA demonstrates a small amount of nonocclusive thrombus proximally. There is occlusive thrombus within branch of SMA extending to the right. There is some mural thrombus and noncalcified plaque in the distal abdominal aorta. There is occlusion of the right common iliac artery extending into proximal external iliac artery. There is reconstituted flow in mid to distal external iliac artery. There is also reconstituted flow in the right internal iliac artery. Left iliac artery is patent. Runoff images of the lower extremities demonstrate patent lower extremity arteries bilaterally. Flow to the right lower extremity is mildly diminished as compared to the left. Flow is visualized down to the level of the ankles/feet. 1. There is a small amount of nonocclusive thrombus in proximal SMA. There is some occlusive thrombus involving branch of the SMA. 2. There is some persistent but improved mesenteric venous gas in the pelvis as well as some persistent but improved pneumatosis intestinalis involving lower abdominal and pelvic bowel loops. Portal venous gas in the liver has resolved. 3. 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 CT runoff demonstrates flow throughout lower extremity arteries without evidence of stenosis. 4. Mild ascites. 5. Small amount of free air presumed postoperative. I discussed findings by phone with Dr. Leanne Kumar at 2:55 a.m. on 02/17/2021.     Cta Chest W Contrast    Result Date: 2/17/2021 EXAMINATION: CTA OF THE CHEST 2/17/2021 1:40 am TECHNIQUE: CTA of the chest was performed after the administration of intravenous contrast.  Multiplanar reformatted images are provided for review. MIP images are provided for review. Dose modulation, iterative reconstruction, and/or weight based adjustment of the mA/kV was utilized to reduce the radiation dose to as low as reasonably achievable. COMPARISON: None. HISTORY: ORDERING SYSTEM PROVIDED HISTORY: rule out PE TECHNOLOGIST PROVIDED HISTORY: Reason for exam:->rule out PE What reading provider will be dictating this exam?->CRC FINDINGS: An endotracheal and enteric tubes are noted in place and are appropriately positioned. Pulmonary Arteries: Pulmonary arteries are inadequately opacified due to suboptimal contrast bolus timing. No evidence of central pulmonary embolism. The peripheral pulmonary arteries are inadequately assessed. The main pulmonary artery is normal in caliber. Mediastinum: No evidence of mediastinal lymphadenopathy. The heart and pericardium demonstrate no acute abnormality. There is no acute abnormality of the thoracic aorta. Lungs/pleura: There is a moderate size area of consolidation at the left lower lobe and a small area of consolidation at the right lower lobe which could represent atelectasis but is concerning for multifocal airspace disease process. Scattered areas of patchy and ground-glass opacities are noted in the bilateral lungs which are nonspecific but could indicate an atypical/viral pneumonia. Upper Abdomen: Limited images of the upper abdomen are unremarkable. Soft Tissues/Bones: No acute bone or soft tissue abnormality. EXAMINATION: CTA OF THE ABDOMEN AND PELVIS WITH CONTRAST 2/17/2021 1:40 am: TECHNIQUE: CTA of the abdomen and pelvis was performed with the administration of intravenous contrast. Multiplanar reformatted images are provided for review. MIP images are provided for review. Dose modulation, iterative reconstruction, and/or weight based adjustment of the mA/kV was utilized to reduce the radiation dose to as low as reasonably achievable. COMPARISON: CT abdomen and pelvis  02/16/2021 HISTORY: ORDERING SYSTEM PROVIDED HISTORY: SMA thrombus? TECHNOLOGIST PROVIDED HISTORY: Reason for exam:->SMA thrombus? What reading provider will be dictating this exam?->CRC FINDINGS: CT/CTA ABDOMEN: At the proximal aspect of the SMA there is a small amount of nonocclusive thrombus. More distally there is a branch of SMA which appears occluded. The celiac trunk and axis are patent without stenosis. Renal arteries are patent without stenosis. There is calcified and noncalcified plaque in the distal abdominal aorta. There is a small amount of free air present which is likely postsurgical. There is a small collection of fluid in the left upper quadrant measuring 4.5 cm. There is no portal venous gas in the liver. There is a minimal amount of ascites seen around the liver and spleen. There are no findings of intestinal obstruction. The appendix is normal. 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 CT runoff of the lower extremities which was performed concurrently demonstrates flow throughout lower extremity arteries. There is a small amount of mesenteric gas seen in the lower abdomen/pelvis although diminished. Pneumatosis intestinalis involving bowel loops in the lower abdomen/pelvis also persists but has diminished. There is a Bates catheter in the bladder.   Air in the bladder is likely introduced by Bates catheter. There is mild diverticulosis of the sigmoid colon. There is no acute diverticulitis seen. Previously seen pelvic ascites has improved. 1. There is a small amount of nonocclusive thrombus in proximal SMA. There is some occlusive thrombus involving branch of the SMA. 2. There is some persistent but improved mesenteric venous gas in the pelvis as well as some persistent but improved pneumatosis intestinalis involving lower abdominal and pelvic bowel loops. Portal venous gas in the liver has resolved. 3. 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 CT runoff of lower extremities performed concurrently. There is flow seen throughout lower extremity arteries without evidence of stenosis. 4. Mild ascites. 5. Small amount of free air presumed postoperative I discussed findings by phone with Dr. Lyssa Pedro at 2:55 a.m. on 02/17/2021. ASSESSMENT:  60 y.o. female Status post exploratory laparotomy, small bowel resection, left in discontinuity with open abdomen and ABThera 2/16/2021. Patient found to have thrombus/embolus to his SMA and right common iliac.     PLAN:    Continue resuscitation in the surgical ICU  Plan for second look laparotomy 2/17 in the afternoon  Continue IV antibiotics    Electronically signed by Ivonne Reyna MD on 2/17/21 at 5:31 AM EST

## 2021-02-17 NOTE — PROGRESS NOTES
[facility]  SURGICAL INTENSIVE CARE UNIT (SICU)  ATTENDING PHYSICIAN CRITICAL CARE PROGRESS NOTE     I have examined the patient, reviewed the record,and discussed the case with the APN/  Resident. I have reviewed all relevant labs and imaging data. The following summarizes my clinical findings and independent assessment. Date of admission:  2/16/2021    CC: Peritonitis, open abdomen PNA     HOSPITAL COURSE:    2/16/2021 to [facility]. [address] glucose in the emergency department was in the [address] [place] [address] Road.  Surgery was consulted and CT abdomen pelvis with IV and oral contrast was obtained.  Ex Lap abdomen, extensive necrosis of the small bowel was found.  The small bowel was taken from about 90 cm distal to the ligament of Treitz all the way to the distal ileum.  Patient still has intact ileocecal valve.  Patient was left open, ABThera was placed. CTAs nonocclusive proximal SMA thrombus and a proximal branch of the SMA occluded with no flow.  It also showed extensive common iliac thrombus.  Heparin drip was started and vascular surgery took her for a right common iliac thrombectomy        New Imaging Reviewed:   Chest Xray ETT in good position , enteric under the diaphragm , mulitfocal PNA- bilateral patchy infiltrats      Physical Exam:  Physical Exam  Constitutional:       Comments: Somnolent but wakes and follows commands   HENT:      Head: Normocephalic. Eyes:      Extraocular Movements: Extraocular movements intact. Pupils: Pupils are equal, round, and reactive to light. Cardiovascular:      Rate and Rhythm: Normal rate. Pulmonary:      Effort: Pulmonary effort is normal. No respiratory distress. Abdominal:      General: There is distension. Tenderness: There is abdominal tenderness ( appropriate). There is no guarding. Comments: obese   Musculoskeletal:      Comments: RLE wrapped , right groin incision c/d/i Biphasic DP bilaterally    Skin:     General: Skin is warm. Assessment   Principal Problem:    Ischemic necrosis of small bowel (HCC)  Active Problems:    Tobacco abuse    Superior mesenteric artery thrombosis (HCC)    Thrombosis of right iliac artery (HCC)    Ischemia of right lower extremity    DM (diabetes mellitus), type 2, uncontrolled ([facility] [address]    Shock ([facility] [address]  Resolved Problems:    * No resolved hospital problems. *      Plan   GI: NPO , NGT suction , Open abdomen ,  Discontinuity extensive SBR   Neuro: Propofol and fentanyl   Renal: MEG  140 cc LR, Monitor Urine Output, Daily CBC,BMP, Mg,Phos, ionized Ca   Musculoskeletal: WBAT all extremities , Right LE fasciotomies and Thrombectomy  AM-PAC Score when able  Pulmonary: Aggressive pulmonary hygiene , ABG Daily  Mechanical Ventilation  Mode: AC   VT:500 ( decrease to 400)    Rate:16  PEEP:8  FiO2: 40 PF ratio   144, Monitor RR and Maintain SpO2 > 92%  ID: Zosyn, Doxycycline  intra-abdominal process and atypical PNA)     Heme: No indication for Transfusion   Cardiac: Monitor Hemodynamics Septic Shock on Levophed   Endocrine:  Insulin gtt     DVT Prophylaxis: PCDs, SQ Lovenox therapeutic Heparin   Ulcer Prophylaxis: PPI Intubated for >48 hours   Tubes and Lines: Continue Central Line, Continue Bates for critical care management , Continue Arterial Line , Continue ETT and Continue NGT/OGT   Seizure proph:     No Indication  Ancillary consults:   Internal Medicine , PT/OT  and General Surgery    Family Update:         As available   CODE Status:       Full Code    Dispo: SICU     Gemma Roles, MD    Critical Care: 35 minutes evaluating and managing patient with Respiratory Failure , Septic Shock, Intestinal discontinuity, Open Abdomen and At risk for further deterioration and death.
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
## 2021-02-17 NOTE — BRIEF OP NOTE
Brief Postoperative Note      Patient: Nora Acevedo  YOB: 1955  MRN: [id number]    Date of Procedure: 2/17/2021    Pre-Op Diagnosis: Right Common Iliac Thrombus w/ acute limb ischemia    Post-Op Diagnosis: Same       Procedure(s):  Iliac Thrombectomy with Right Lower Extremity Fasciotomy    Surgeon(s):  Dania Li MD    Assistant:  Resident: Katt Park MD    Anesthesia: General    Estimated Blood Loss (mL): less than 939     Complications: None    Specimens:   ID Type Source Tests Collected by Time Destination   A : RIGHT ILIAC THROMBUS Tissue Tissue SURGICAL PATHOLOGY Dania Li MD 2/17/2021 3500        Implants:  * No implants in log *      Drains:   Negative Pressure Wound Therapy Abdomen Medial;Upper (Active)   Wound Type Surgical 02/17/21 0400   Dressing Type Other (Comment) 02/17/21 0400   Cycle Continuous 02/17/21 0400   Target Pressure (mmHg) 125 02/17/21 0400   Dressing Status Clean;Dry; Intact 02/17/21 0400   Drainage Amount Scant 02/17/21 0400   Drainage Description Serosanguinous 02/17/21 0600   Output (ml) 200 ml 02/17/21 0600   Santa-wound Assessment Dry; Intact 02/17/21 0400       NG/OG/NJ/NE Tube Nasogastric 18 fr Right mouth (Active)   Surrounding Skin Dry; Intact 02/16/21 1840   Securement device Yes 02/17/21 0600   Status Suction-low intermittent 02/17/21 0600   NG/OG/NJ/NE External Measurement (cm) 60 cm 02/17/21 0400   Drainage Appearance Brown 02/17/21 0400   Output (mL) 400 ml 02/17/21 0600       Urethral Catheter 16 fr (Active)   Catheter Indications Need for fluid management in critically ill patients in a critical care setting not able to be managed by other means such as BSC with hat, bedpan, urinal, condom catheter, or short term intermittent urethral catherization 02/17/21 0400   Site Assessment No urethral drainage 02/17/21 0400   Urine Color Yellow 02/17/21 0400   Urine Appearance Clear 02/17/21 0400   Output (mL) 38 mL 02/17/21 6201 Findings: right femoral cutdown, right common iliac thrombectomy, right lower extremity fasciotomy    Electronically signed by Pauline Corcoran MD on 2/17/2021 at 9:37 AM

## 2021-02-18 NOTE — FLOWSHEET NOTE
ET Nurse (Initial consult) [id number]  Admit Date: 2/16/2021  9:32 PM    Reason for consult:  New Ileostomy    Significant history: Per Surgical note     Pre-Op Diagnosis: bowel ischemia     Post-Op Diagnosis: Same       Procedure(s):  Ileocecectomy with creation of end jejunostomy and abdominal wall closure    Stoma assessment:       02/18/21 1315   Ileostomy   Placement Date: 02/17/21   Pre-existing: No  Inserted by: Dr. Strong Marly   Stomal Appliance 1 piece; Intact   Stool Color Red;Yellow   Stool Appearance Watery       Plan:   Will follow    Garrick Pablo 2/18/2021 1:36 PM

## 2021-02-18 NOTE — FLOWSHEET NOTE
Pt continues to pull at lines and tubes when unrestrained, educated on safety. Bilateral wrist restraints continued at this time.

## 2021-02-18 NOTE — PLAN OF CARE
Problem: Non-Violent Restraints  Goal: No harm/injury to patient while restraints in use  2/18/2021 1707 by Gregory Guardado RN  Outcome: Met This Shift     Problem: Non-Violent Restraints  Goal: Patient's dignity will be maintained  2/18/2021 1707 by Gregory Guardado RN  Outcome: Met This Shift     Problem: Non-Violent Restraints  Goal: Removal from restraints as soon as assessed to be safe  2/18/2021 1707 by Gregory Guardado RN  Outcome: Not Met This Shift

## 2021-02-18 NOTE — PROGRESS NOTES
Subjective: Intubated sedated mechanically ventilated   Family at bedside all questions answered     Objective:    /75   Pulse 100   Temp 100.9 °F (38.3 °C) (Oral)   Resp 24   Ht 5' 6\" (1.676 m)   Wt 259 lb 14.8 oz (117.9 kg)   LMP  (LMP Unknown)   SpO2 94%   BMI 41.95 kg/m²     24HR INTAKE/OUTPUT:      Intake/Output Summary (Last 24 hours) at 2/18/2021 0901  Last data filed at 2/18/2021 0800  Gross per 24 hour   Intake 9002 ml   Output 1867 ml   Net 7135 ml       General appearance: Intubated sedated mechanically ventilated   Neck: FROM, supple   Lungs: Clear bilaterally no wheezes, no rhonchi, no crackles  CV: RRR, no MRGs; normal carotid upstroke and amplitude without Bruits  Abdomen: :inc cdi +ostomy ; no masses or HSM  Extremities: No edema, no cyanosis, no clubbing  Skin: Intact no rash, no lesions, no ulcers    Psych: Alert and oriented normal affect  Neuro: Nonfocal  Most Recent Labs  Lab Results   Component Value Date    WBC 15.5 (H) 02/18/2021    HGB 12.0 02/18/2021    HCT 36.4 02/18/2021     02/18/2021     02/18/2021    K 4.0 02/18/2021     02/18/2021    CREATININE 1.4 (H) 02/18/2021    BUN 32 (H) 02/18/2021    CO2 20 (L) 02/18/2021    GLUCOSE 137 (H) 02/18/2021    ALT 5 02/18/2021    AST 21 02/18/2021    INR 1.3 02/18/2021    TSH 2.500 11/27/2017    LABA1C 13.1 (H) 02/17/2021    LABMICR <12.0 01/31/2017     Recent Labs     02/18/21  0640   MG 1.9     Lab Results   Component Value Date    CALCIUM 7.6 (L) 02/18/2021    PHOS 2.3 (L) 02/18/2021        XR CHEST PORTABLE   Final Result   Marked worsening of bilateral pulmonary infiltrates      XR CHEST PORTABLE   Final Result   1. No interval change in the multifocal bilateral pulmonary infiltrates more   prominent within the right lung. CTA ABDOMEN PELVIS W CONTRAST   Final Result   1. There is a small amount of nonocclusive thrombus in proximal SMA. There   is some occlusive thrombus involving branch of the SMA. 2. There is some persistent but improved mesenteric venous gas in the pelvis   as well as some persistent but improved pneumatosis intestinalis involving   lower abdominal and pelvic bowel loops. Portal venous gas in the liver has   resolved. 3. Occluded right common iliac artery with reconstituted flow within mid to   distal right external iliac artery and within right internal iliac artery. CT runoff of lower extremities performed concurrently. There is flow seen   throughout lower extremity arteries without evidence of stenosis. 4. Mild ascites. 5. Small amount of free air presumed postoperative   I discussed findings by phone with Dr. Adriana Noyola at 2:55 a.m. on 02/17/2021. CTA CHEST W CONTRAST   Final Result   Suboptimal opacification of the pulmonary arteries. No evidence of central   pulmonary embolism. The peripheral pulmonary arteries are inadequately   assessed. Moderate size area of consolidation at the left lower lobe and a small area   of consolidation at the right lower lobe which could represent atelectasis   but is concerning for multifocal airspace disease process. Scattered areas   of patchy and ground-glass opacities are noted in the bilateral lungs which   are nonspecific but could indicate an atypical/viral pneumonia      CTA ABDOMINAL AORTA W BILAT RUNOFF W CONTRAST   Final Result   1. There is a small amount of nonocclusive thrombus in proximal SMA. There   is some occlusive thrombus involving branch of the SMA. 2. There is some persistent but improved mesenteric venous gas in the pelvis   as well as some persistent but improved pneumatosis intestinalis involving   lower abdominal and pelvic bowel loops. Portal venous gas in the liver has   resolved. 3. Occluded right common iliac artery with reconstituted flow within mid to   distal right external iliac artery and within right internal iliac artery. CT runoff demonstrates flow throughout lower extremity arteries without   evidence of stenosis. 4. Mild ascites. 5. Small amount of free air presumed postoperative. I discussed findings by phone with Dr. Jessica Dumont at 2:55 a.m. on 02/17/2021. XR CHEST PORTABLE   Final Result   1. Medical support devices as above. 2.  Atherosclerotic disease and mild cardiomegaly. 3.  Ill-defined opacities in the right greater than left lungs. No pleural   effusion or pneumothorax. No change from yesterday's exam.      XR CHEST PORTABLE    (Results Pending)       Assessment    Principal Problem:    Ischemic necrosis of small bowel (HCC)  Active Problems:    Tobacco abuse    Superior mesenteric artery thrombosis (HCC)    Thrombosis of right iliac artery (HCC)    Ischemia of right lower extremity    DM (diabetes mellitus), type 2, uncontrolled ([address]    Shock ([address]    MEG (acute kidney injury) ([address]    Septic shock ([address]    Pneumonia due to infectious organism    Ischemic bowel disease ([address]  Resolved Problems:    * No resolved hospital problems.  *      Plan:  73-year-old female history of tobacco abuse, type 2 diabetes admitted [facility] to [facility]  transferred to SICU at [address] [place] [address]  with small bowel necrosis status post ex lap extensive small bowel excision left open 2/16, closed 2/17 sp right lower extremity thrombectomy and fasciotomies 2/17     Admitted to SICU  Intubated sedated mechanically ventilated   IV fluid resuscitation  VasopressorsLevophed wean-as tolerated  IV antibiotics  Insulin drip  Heparin drip  Monitor labs closely- add CK  Critical care consult appreciated  Surgery consult appreciated   Vascular consult appreciated    Electronically signed by Tish Aceves MD on 2/18/2021 at 9:01 AM

## 2021-02-18 NOTE — FLOWSHEET NOTE
Pt returned from OR - bilateral wrist restraints re initiated due to patient pulling at lines and tubes.

## 2021-02-18 NOTE — PROGRESS NOTES
Vascular Surgery Progress Note    CC: Acute RLE ischemia    HISTORY:  The patient is intubated and sedated. Daughter at bedside.     IMPRESSION:  POD # 1 s/p R iliac thrombectomy w/ fasciotomy    PLAN:   -continue frequent nv checks  -continue heparin drip  -will begin daily dressing changes tomorrow  -will likely close fasciotomy in next couple of days    Patient Active Problem List   Diagnosis Code    Hypertension I10    Fibromyalgia M79.7    IBS (irritable bowel syndrome) K58.9    GERD (gastroesophageal reflux disease) K21.9    Cigarette nicotine dependence without complication V59.874    Restless leg syndrome G25.81    Chronic back pain M54.9, G89.29    Adrenal mass (HCC) E27.8    DDD (degenerative disc disease) [id number]    Mild valvular heart disease I38    Vitamin D deficiency E55.9    Low ferritin level R79.0    Padron's esophagus K22.70    Peripheral edema R60.9    Post traumatic stress disorder (PTSD) F43.10    Recurrent major depressive disorder (HCC) F33.9    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    Fatty infiltration of liver K76.0    Mild cardiomegaly I51.7    Orthostatic hypotension dysautonomic syndrome (HCC) G90.3    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, G63    Mild sleep apnea G47.30    Tubular adenoma of colon D12.6    Hyperlipidemia associated with type 2 diabetes mellitus (HCC) E11.69, E78.5    Hyperglycemia R73.9    COVID-19 U07.1    Enterocolitis K52.9    Abdominal pain R10.9    Depression F32.9    Tobacco abuse Z72.0    S/P small bowel resection Z90.49    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

## 2021-02-18 NOTE — ANESTHESIA POSTPROCEDURE EVALUATION
Department of Anesthesiology  Postprocedure Note    Patient: Aurelio Hollis  MRN: [id number]  YOB: 1955  Date of evaluation: 2/17/2021  Time:  11:32 PM     Procedure Summary     Date: 02/17/21 Room / Location: [address] [place] [id number] 20 / [facility]    Anesthesia Start: 2004 Anesthesia Stop: 2111    Procedure: RIGHT ILLEOSTOMY AND RIGHT COLECTOMY (N/A Abdomen) Diagnosis: (/)    Surgeons: Christian Blancas MD Responsible Provider: Clair Rivera MD    Anesthesia Type: general ASA Status: 4          Anesthesia Type: general    Brock Phase I: Brock Score: 7    Brock Phase II:      Last vitals: Reviewed and per EMR flowsheets.        Anesthesia Post Evaluation    Patient location during evaluation: ICU  Patient participation: complete - patient cannot participate  Level of consciousness: sedated and ventilated  Airway patency: patent  Nausea & Vomiting: no nausea and no vomiting  Complications: no  Cardiovascular status: hemodynamically stable and blood pressure returned to baseline  Respiratory status: acceptable, ventilator and intubated  Hydration status: euvolemic

## 2021-02-18 NOTE — PROGRESS NOTES
Comments: RLE wrapped , right groin incision c/d/i Biphasic DP bilaterally , Right Great toe ischemic    Skin:     General: Skin is warm. Assessment   Principal Problem:    Ischemic necrosis of small bowel (HCC)  Active Problems:    Tobacco abuse    Superior mesenteric artery thrombosis (HCC)    Thrombosis of right iliac artery (HCC)    Ischemia of right lower extremity    DM (diabetes mellitus), type 2, uncontrolled ([facility]    Shock ([facility]    MEG (acute kidney injury) ([facility]    Septic shock ([facility]    Pneumonia due to infectious organism    Ischemic bowel disease ([facility]  Resolved Problems:    * No resolved hospital problems. *      Plan   GI: NPO , NGT suction , End ileostomy   Neuro: Propofol and fentanyl   Renal: MEG  Slightly worse 1.4 , lactate normal, 140 cc LR, Monitor Urine Output, Daily CBC,BMP, Mg,Phos, ionized Ca   Musculoskeletal: WBAT all extremities , Right LE fasciotomies and Thrombectomy  AM-PAC Score when able monitor right great toe   Pulmonary: Aggressive pulmonary hygiene , ABG Daily  Mechanical Ventilation  Mode: AC   VT: 400)  Rate:16  PEEP:8  FiO2: 40 PF ratio   135, Monitor RR and Maintain SpO2 > 92%  ID: Zosyn, Doxycycline  intra-abdominal process and atypical PNA Treat total 7 days   Heme: No indication for Transfusion   Cardiac: Monitor Hemodynamics Septic Shock on Levophed and vaso start stress dose steroids .  Will hget hematology consult for evaluation for  hypercoagulable disorder - SMA/ Iliac thrombus Echo pending   Endocrine:  Insulin gtt since starting steroids will hold off transitioning for 24 more hours      DVT Prophylaxis: PCDs, therapeutic Heparin   Ulcer Prophylaxis: PPI Intubated for >48 hours   Tubes and Lines: Continue Central Line, Continue Bates for critical care management , Continue Arterial Line , Continue ETT and Continue NGT/OGT   Seizure proph:     No Indication  Ancillary consults:   Internal Medicine , PT/OT  and General Surgery Family Update:         As available   CODE Status:       Full Code    Dispo: REGINA Ayala MD    Critical Care: 35 minutes evaluating and managing patient with Respiratory Failure , Septic Shock, Intestinal discontinuity, Open Abdomen and At risk for further deterioration and death.

## 2021-02-18 NOTE — PLAN OF CARE
Problem: Non-Violent Restraints  Goal: No harm/injury to patient while restraints in use  2/17/2021 2023 by Miles Ramirez RN  Outcome: Completed     Problem: Non-Violent Restraints  Goal: Patient's dignity will be maintained  2/17/2021 2023 by Miles Ramirez RN  Outcome: Completed     Problem: Non-Violent Restraints  Goal: Removal from restraints as soon as assessed to be safe  2/17/2021 2023 by Miles Ramirez RN  Outcome: Completed

## 2021-02-18 NOTE — PROGRESS NOTES
GENERAL SURGERY  DAILY PROGRESS NOTE  2/18/2021    Subjective: Take back for ileocecectomy with end ostomy  About 120 cm of small bowel remain  Remains on vasopressors and is intubated. minimally responsive to voice this morning     Objective:  BP (!) 152/91   Pulse 101   Temp 100.9 °F (38.3 °C) (Oral)   Resp 22   Ht 5' 6\" (1.676 m)   Wt 259 lb 14.8 oz (117.9 kg)   LMP  (LMP Unknown)   SpO2 96%   BMI 41.95 kg/m²     GENERAL:  Intubated and sedated   HEAD: Normocephalic, atraumatic  LUNGS:  Intubated ACVC 16/400/40%/ 8  CARDIOVASCULAR:  Mildly tachycardic   ABDOMEN:  Soft, distended, and tender. dressing c/d/i Ostomy injected/ purple small amount of bowel sweat and blood in bag  EXTREMITIES: No edema or swelling  SKIN: Warm and dry    Assessment/Plan:  60 y.o. female with abdominal pain, portal venous gas.     Status post take back with end ostomy  Continue supportive care  NPO, IVF   appreciate ICU care   Still on Levo and vaso   PPI  NG tube to intermittent suction   Heparin for DVT PPX     Electronically signed by Erin Robertson DO on 2/18/2021 at 11:53 AM

## 2021-02-18 NOTE — CONSULTS
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 showed nonocclusive proximal SMA thrombus and a proximal branch of the SMA occluded with no flow.  It also showed extensive common iliac thrombus. - Status post iliac thrombectomy with right lower extremity fasciotomy done yesterday 2/17 w/ vascular  - S/p Ileocecectomy with creation of end jejunostomy and abdominal wall closure  - Continues on heparin drip  - She is a smoker which is a risk factor for both venous and arterial thrombosis. Agree with checking for hypercoagulable state due to extensive arterial thrombosis  - Check APLAs and thrombophilia panel  - Pt remains critically ill, on 2 pressors    Thank you for this consult. Please call with further questions or concerns      JOHN Crabtree - CNP  Electronically signed 2/18/2021 at 11:47 AM  Pt seen and examined.  Note updated

## 2021-02-18 NOTE — PLAN OF CARE
Problem: Non-Violent Restraints  Goal: No harm/injury to patient while restraints in use  2/17/2021 2023 by Kathy Osei RN  Outcome: Met This Shift     Problem: Non-Violent Restraints  Goal: Patient's dignity will be maintained  2/17/2021 2023 by Kathy Osei RN  Outcome: Met This Shift     Problem: Non-Violent Restraints  Goal: Removal from restraints as soon as assessed to be safe  2/17/2021 2023 by Kathy Osei RN  Outcome: Not Met This Shift
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
## 2021-02-18 NOTE — PLAN OF CARE
Problem: Non-Violent Restraints  Goal: No harm/injury to patient while restraints in use  2/18/2021 0938 by Yris Maldonado RN  Outcome: Met This Shift     Problem: Non-Violent Restraints  Goal: Patient's dignity will be maintained  2/18/2021 0938 by Yris Maldonado RN  Outcome: Met This Shift     Problem: Non-Violent Restraints  Goal: Removal from restraints as soon as assessed to be safe  2/18/2021 0938 by Yris Maldonado RN  Outcome: Not Met This Shift

## 2021-02-18 NOTE — OP NOTE
Operative Note      Patient: Elder Teixeira  YOB: 1955  MRN: [id number]    Date of Procedure: 2/17/2021    Pre-Op Diagnosis: bowel ischemia    Post-Op Diagnosis: Same       Procedure(s):  Ileocecectomy with creation of end jejunostomy and abdominal wall closure    Surgeon(s):  Ana Laura Mcmahon MD    Assistant:   Resident: Yamel Singer MD    Anesthesia: General    Estimated Blood Loss (mL): 50    Complications: None    Specimens: terminal ileum and cecum    Implants:none      Drains: none    Findings: patchy necrosis of terminal ileum, which was resected with cecum. 120cm remaining small bowel with end jejunostomy and long Darrin's. History: This is a 60year old female who underwent emergent extensive small bowel resection yesterday for intestinal ischemia. She was left in discontinuity with abthera vac and transferred to [facility] SICU postoperatively, with plans for second look today. Overnight was found to have critical limb ischemia in addition to SMA thrombus, and underwent right iliac thrombectomy and right lower extremity fasciotomy this morning with Vascular surgery. Second look laparotomy was recommended today to her daughter, and depending on the viability of the bowel she may have reanastomosis or creation of end ostomy. Risks, benefits, alternatives were discussed with her daughter and consent was obtained. Detailed description of procedure: The heparin drip was continued until just prior to surgery. The patient was brought to the operating room and positioned supine on the OR table. The patient was already on IV zosyn which was continued. General anesthesia was continued as per anesthesia record. A time out was called and agreed on by all present. The vac was removed and the abdomen was prepped with betadine and draped in the usual fashion. The vac sponge sponge was removed, reopening the previous laparotomy. Serosanguinous ascites was suctioned away. The terminal ileum was then inspected and there was patchy necrosis. Because of her ongoing pressor requirement and the necrosis, decision was made to resect the remaining terminal ileum and cecum, and to create an end enterostomy. The cecum was mobilized using cautery and bovie to divide its lateral attachments. There was an area of demarcation of dusky cecum to viable right colon where we transected with a SUMMER 75 blue load stapler. The mesocolon was taken down with Ligasure, and the ileocolic pedicle was ligated with 0 silk. The terminal ileum and cecum were passed off the field. The remaining proximal small bowel was viable and measured to be 120 cm. The distal small bowel and its mesentery was mobilized while preserving vascular pedicle for end ostomy, knowing this was complicated by her very large body habitus and 4 inch thick abdominal wall. A disc of skin and subcutaneous fat was excised from the right mid abdomen down to anterior rectus sheath for the ostomy. The anterior rectus was incised vertically, muscle fibers were spread, and posterior sheath was also incised, allowing two fingers to pass easily through the hole. The distal small bowel was brought up through the abdominal wall without twisting and grasped with Austen Sake. The midline fascia was closed with running 0 looped PDS. Next, the ostomy was matured. The staple line was excised with cautery, mucosa was dark but viable and there was reassuring bleeding from the cut edges. Ostomy was matured with 3-0 Vicryl sutures without Brooking stitches due to thickness of abdominal wall. The ostomy was digitized and patent. An ostomy appliance was placed and the midline wound was packed with saline moistened kerlix and covered with ABD. Needle, sponge, and instrument counts were correct. Dr. Opal Montiel was present for the procedure. DISPOSITION: The patient was kept intubated and returned to SICU in critical condition. She remained on a stable dose of levophed as she had been preoperatively.      Electronically signed by Jack Fishman MD on 2/17/2021 at 10:04 PM

## 2021-02-18 NOTE — PROGRESS NOTES
Vascular Surgery Progress Note    Pt is being seen in f/u today regarding right common iliac thrombectomy, right lower extremity fasciotomy on 2/17. Subjective  Pt s/e. Remains on vasopressors and is intubated. Vascular exam has been stable overnight. Current Medications:    vasopressin (Septic Shock) infusion 0.04 Units/min (02/18/21 1005)    lactated ringers 140 mL/hr at 02/18/21 1057    norepinephrine 30 mcg/min (02/18/21 0557)    heparin (PORCINE) Infusion 15 Units/kg/hr (02/18/21 0000)    insulin 2.79 Units/hr (02/18/21 1005)    dextrose      propofol 35 mcg/kg/min (02/18/21 0956)    sodium chloride 25 mL (02/18/21 1121)    fentaNYL 5 mcg/ml in 0.9%  ml infusion 125 mcg/hr (02/18/21 0840)      perflutren lipid microspheres, heparin (porcine), heparin (porcine), glucose, dextrose, glucagon (rDNA), dextrose, acetaminophen **OR** acetaminophen, bisacodyl, [DISCONTINUED] promethazine **OR** ondansetron, sodium chloride flush    doxycycline (VIBRAMYCIN) IV  100 mg Intravenous Q12H    sodium chloride flush  10 mL Intravenous 2 times per day    chlorhexidine  15 mL Mouth/Throat BID    albuterol  2.5 mg Nebulization 4x daily    pantoprazole  40 mg Intravenous Daily    And    sodium chloride (PF)  10 mL Intravenous Daily    piperacillin-tazobactam  3,375 mg Intravenous Q8H        PHYSICAL EXAM:    BP (!) 152/91   Pulse 101   Temp 100.9 °F (38.3 °C) (Oral)   Resp 22   Ht 5' 6\" (1.676 m)   Wt 259 lb 14.8 oz (117.9 kg)   LMP  (LMP Unknown)   SpO2 96%   BMI 41.95 kg/m²     Intake/Output Summary (Last 24 hours) at 2/18/2021 1131  Last data filed at 2/18/2021 1000  Gross per 24 hour   Intake 7902 ml   Output 1707 ml   Net 6195 ml          Gen:  In bed, intubated, appears critically ill  CVS: Tachycardic, on vasopressors  Resp: Intubated, ventilated  Abd: Soft, distended R LE: Right groin incision is clean dry and intact with Dermabond in place without signs of ecchymosis or surrounding erythema, femoral pulse 2+, fasciotomy sites are dressed with Ace bandage, DP and PT signals are appreciated via Doppler  L LE: Left femoral pulses 2+, multiphasic DP and PT signals    LABS:    Lab Results   Component Value Date    WBC 15.5 (H) 02/18/2021    HGB 12.0 02/18/2021    HCT 36.4 02/18/2021     02/18/2021    PROTIME 14.7 (H) 02/18/2021    INR 1.3 02/18/2021    APTT 74.9 (H) 02/18/2021    K 4.0 02/18/2021    BUN 32 (H) 02/18/2021    CREATININE 1.4 (H) 02/18/2021       A/P  60 y.o. female status post right common iliac thrombectomy and right lower extremity fasciotomies. Remains on heparin drip. Vascular exam is stable. We will change the fasciotomy site dressings today. She remains critically ill on vasopressors. Continue to monitor vascular exam.  Page with any questions or concerns.       Court Fought

## 2021-02-19 NOTE — PROGRESS NOTES
[facility]  SURGICAL INTENSIVE CARE UNIT (SICU)  ATTENDING PHYSICIAN CRITICAL CARE PROGRESS NOTE     I have examined the patient, reviewed the record,and discussed the case with the APN/  Resident. I have reviewed all relevant labs and imaging data. The following summarizes my clinical findings and independent assessment. Date of admission:  2/16/2021    CC: Peritonitis, open abdomen PNA     HOSPITAL COURSE:    2/16/2021- 17  to [address] glucose in the emergency department was in the [address] [place] [address] [address].  Surgery was consulted and CT abdomen pelvis with IV and oral contrast was obtained.  Ex Lap abdomen, extensive necrosis of the small bowel was found.  The small bowel was taken from about 90 cm distal to the ligament of Treitz all the way to the distal ileum.  Patient still has intact ileocecal valve.  Patient was left open, ABThera was placed. CTAs nonocclusive proximal SMA thrombus and a proximal branch of the SMA occluded with no flow.  It also showed extensive common iliac thrombus.  Heparin drip was started and vascular surgery took her for a right common iliac thrombectomy    2/18 Hypotensive requiring multiple fluid bolus, ostomy alex   2/19 Decreasing pressor requirement received bolus IVF, ostomy pink     New Imaging Reviewed:   Chest Xray ETT in good position , enteric under the diaphragm , mulitfocal PNA- bilateral patchy infiltrats ( fluid overload)      Physical Exam:  Physical Exam  Constitutional:       Comments: Somnolent but wakes and follows commands   HENT:      Head: Normocephalic. Eyes:      Extraocular Movements: Extraocular movements intact. Pupils: Pupils are equal, round, and reactive to light. Cardiovascular:      Rate and Rhythm: Normal rate. Pulmonary:      Effort: Pulmonary effort is normal. No respiratory distress. Abdominal:      General: There is distension. Tenderness: There is abdominal tenderness ( appropriate). There is no guarding. Comments: Obese, dressing c/d/i Ostomy pink today bowel sweat in the bag    Musculoskeletal:      Comments: RLE wrapped , right groin incision c/d/i Biphasic DP/PT bilaterally , Right Great toe ischemic    Skin:     General: Skin is warm. Assessment   Principal Problem:    Ischemic necrosis of small bowel (HCC)  Active Problems:    Tobacco abuse    Superior mesenteric artery thrombosis (HCC)    Thrombosis of right iliac artery (HCC)    Ischemia of right lower extremity    DM (diabetes mellitus), type 2, uncontrolled ([address]    Shock ([address]    MEG (acute kidney injury) ([facility]    Septic shock ([address]    Pneumonia due to infectious organism    Ischemic bowel disease ([facility]  Resolved Problems:    * No resolved hospital problems. *      Plan   GI: NPO , NGT suction , End ileostomy   Neuro: Propofol and fentanyl    Renal: MEG  Cr improving 1.0 , lactate normal, 140 cc LR, Monitor Urine Output, Daily CBC,BMP, Mg,Phos, ionized Ca replace K Phos Ca  Musculoskeletal: WBAT all extremities , Right LE fasciotomies ( to be closed on 2/22)  and Thrombectomy  AM-PAC Score when able monitor right great toe   Pulmonary: Aggressive pulmonary hygiene , ABG Daily  Mechanical Ventilation  Mode: AC   VT: 400 Rate:16  PEEP:8  FiO2: 60 PF ratio   149, Monitor RR and Maintain SpO2 > 92%  ID: Zosyn, Doxycycline  intra-abdominal process and atypical PNA Treat total 7 days   Heme: No indication for Transfusion   hematology consult for evaluation for  hypercoagulable disorder - SMA/ Iliac thrombus   Cardiac: Monitor Hemodynamics Septic Shock on Levophed and vaso ( will turn vaso off today then wean levophed as able) .  Echo pending   Endocrine:  Stop insulin gtt start lantus 10U BID with high dose SSI     DVT Prophylaxis: PCDs, therapeutic Heparin ( eventually will need to switch to a more perminent oral option)   Ulcer Prophylaxis: PPI Intubated for >48 hours Tubes and Lines: Continue [employer], Continue Bates for critical care management , Continue Arterial Line , Continue ETT and Continue NGT/OGT   Seizure proph:     No Indication  Ancillary consults:   Internal Medicine , PT/OT  and General Surgery  Heme   Family Update:         As available   CODE Status:       Full Code    Dispo: REGINA Roman MD    Critical Care: 35 minutes evaluating and managing patient with Respiratory Failure , Septic Shock, Intestinal discontinuity, Open Abdomen and At risk for further deterioration and death.

## 2021-02-19 NOTE — PROGRESS NOTES
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 multifocal airspace disease process. Scattered areas of patchy and ground-glass opacities are noted in the bilateral lungs which are nonspecific but could indicate an atypical/viral pneumonia. Upper Abdomen: Limited images of the upper abdomen are unremarkable. Soft Tissues/Bones: No acute bone or soft tissue abnormality. Suboptimal opacification of the pulmonary arteries. No evidence of central pulmonary embolism. The peripheral pulmonary arteries are inadequately assessed. Moderate size area of consolidation at the left lower lobe and a small area of consolidation at the right lower lobe which could represent atelectasis but is concerning for multifocal airspace disease process.   Scattered areas of patchy and ground-glass opacities are noted in the bilateral lungs which are nonspecific but could indicate an atypical/viral pneumonia    Cta Abdomen Pelvis W Contrast    Result Date: 2/17/2021  EXAMINATION: CTA OF THE ABDOMEN AND PELVIS WITH CONTRAST 2/17/2021 1:40 am: TECHNIQUE: CTA of the abdomen and pelvis was performed with the administration of intravenous contrast. Multiplanar reformatted images are provided for review. MIP images are provided for review. Dose modulation, iterative reconstruction, and/or weight based adjustment of the mA/kV was utilized to reduce the radiation dose to as low as reasonably achievable. COMPARISON: CT abdomen and pelvis  02/16/2021 HISTORY: ORDERING SYSTEM PROVIDED HISTORY: SMA thrombus? TECHNOLOGIST PROVIDED HISTORY: Reason for exam:->SMA thrombus? What reading provider will be dictating this exam?->CRC FINDINGS: CT/CTA ABDOMEN: At the proximal aspect of the SMA there is a small amount of nonocclusive thrombus. More distally there is a branch of SMA which appears occluded. The celiac trunk and axis are patent without stenosis. Renal arteries are patent without stenosis. There is calcified and noncalcified plaque in the distal abdominal aorta. There is a small amount of free air present which is likely postsurgical. There is a small collection of fluid in the left upper quadrant measuring 4.5 cm. There is no portal venous gas in the liver. There is a minimal amount of ascites seen around the liver and spleen. There are no findings of intestinal obstruction. The appendix is normal. 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 CT runoff of the lower extremities which was performed concurrently demonstrates flow throughout lower extremity arteries. There is a small amount of mesenteric gas seen in the lower abdomen/pelvis although diminished. Pneumatosis intestinalis involving bowel loops in the lower abdomen/pelvis also persists but has diminished. There is a Bates catheter in the bladder. Air in the bladder is likely introduced by Bates catheter. There is mild diverticulosis of the sigmoid colon. There is no acute diverticulitis seen.   Previously seen pelvic ascites has improved. 1. There is a small amount of nonocclusive thrombus in proximal SMA. There is some occlusive thrombus involving branch of the SMA. 2. There is some persistent but improved mesenteric venous gas in the pelvis as well as some persistent but improved pneumatosis intestinalis involving lower abdominal and pelvic bowel loops. Portal venous gas in the liver has resolved. 3. 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 CT runoff of lower extremities performed concurrently. There is flow seen throughout lower extremity arteries without evidence of stenosis. 4. Mild ascites. 5. Small amount of free air presumed postoperative I discussed findings by phone with Dr. Vikash Neves at 2:55 a.m. on 02/17/2021. ASSESSMENT/PLAN :  72-year-old female  Diabetes, HTN, HLD,obesity  SMA thrombus, iliac thrombus    -CTA of the chest, abdomen, and pelvis with bilateral lower extremity runoff showed nonocclusive proximal SMA thrombus and a proximal branch of the SMA occluded with no flow.  It also showed extensive common iliac thrombus. - Status post iliac thrombectomy with right lower extremity fasciotomy done yesterday 2/17 w/ vascular  - S/p Ileocecectomy with creation of end jejunostomy and abdominal wall closure  - Continues on heparin drip  - She is a smoker which is a risk factor for both venous and arterial thrombosis. Agree with checking for hypercoagulable state due to extensive arterial thrombosis  - Check APLAs and thrombophilia panel  - Pt remains critically ill, on 2 pressors    Thank you for this consult.  Please call with further questions or concerns    2/19/21  - CBC with continuing leukocytosis and anemia hgb 10.  - DRVVT negative   - APLAs and thrombophilia panel pending.   - Continues on heparin drip  - Continues on IV antibiotics   - Plan for right lower extremity fasciotomy closure on 2/22 JOHN Kidd - CORRINE  Electronically signed 2/19/2021 at 11:55 AM  Pt seen and examined.  Note updated  Pacheco Spain MD

## 2021-02-19 NOTE — PROGRESS NOTES
Vascular Surgery Progress Note    Pt is being seen in f/u today regarding right common iliac thrombectomy, right lower extremity fasciotomy on 2/17. Subjective  Pt s/e. Remains on vasopressors and is intubated. Vascular exam has been stable overnight. Current Medications:    vasopressin (Septic Shock) infusion 0.03 Units/min (02/19/21 0600)    lactated ringers 140 mL/hr at 02/18/21 1758    norepinephrine 9 mcg/min (02/19/21 0219)    heparin (PORCINE) Infusion 17 Units/kg/hr (02/19/21 0630)    insulin 0.59 Units/hr (02/19/21 0659)    dextrose      propofol 35 mcg/kg/min (02/19/21 0418)    sodium chloride 25 mL (02/19/21 0607)    fentaNYL 5 mcg/ml in 0.9%  ml infusion 125 mcg/hr (02/19/21 0019)      perflutren lipid microspheres, heparin (porcine), heparin (porcine), glucose, dextrose, glucagon (rDNA), dextrose, acetaminophen **OR** acetaminophen, bisacodyl, [DISCONTINUED] promethazine **OR** ondansetron, sodium chloride flush    hydrocortisone sodium succinate PF  100 mg Intravenous Q8H    artificial tears   Both Eyes Q4H    doxycycline (VIBRAMYCIN) IV  100 mg Intravenous Q12H    sodium chloride flush  10 mL Intravenous 2 times per day    chlorhexidine  15 mL Mouth/Throat BID    albuterol  2.5 mg Nebulization 4x daily    pantoprazole  40 mg Intravenous Daily    And    sodium chloride (PF)  10 mL Intravenous Daily    piperacillin-tazobactam  3,375 mg Intravenous Q8H        PHYSICAL EXAM:    BP (!) 166/63   Pulse 61   Temp 97.5 °F (36.4 °C) (Axillary)   Resp 19   Ht 5' 6\" (1.676 m)   Wt 265 lb 14 oz (120.6 kg)   LMP  (LMP Unknown)   SpO2 96%   BMI 42.91 kg/m²     Intake/Output Summary (Last 24 hours) at 2/19/2021 0726  Last data filed at 2/19/2021 0700  Gross per 24 hour   Intake 7484 ml   Output 1400 ml   Net 6084 ml          Gen:  In bed, intubated, appears critically ill  CVS: Tachycardic, on vasopressors  Resp: Intubated, ventilated  Abd: Soft, distended R LE: Right groin incision is clean dry and intact with Dermabond in place without signs of ecchymosis or surrounding erythema, femoral pulse 2+, fasciotomy sites are dressed with Ace bandage, multiphasic DP and PT signals  L LE: Left femoral pulses 2+, multiphasic DP and PT signals                LABS:    Lab Results   Component Value Date    WBC 14.8 (H) 02/19/2021    HGB 10.0 (L) 02/19/2021    HCT 30.1 (L) 02/19/2021     02/19/2021    PROTIME 12.9 (H) 02/19/2021    INR 1.1 02/19/2021    APTT 46.8 (H) 02/19/2021    K 3.6 02/19/2021    BUN 31 (H) 02/19/2021    CREATININE 1.0 02/19/2021       A/P  60 y.o. female status post right common iliac thrombectomy and right lower extremity fasciotomies. Remains on heparin drip. Vascular exam is stable. Will do dressing changes daily w/ xeroform, kerlix, and ACE. Will plan for closure of fasciotomy sites on Monday. Page with any questions or concerns. Jenny Fleischer     Pt s/e. Agree with above. RLE: Great toe and second toe with bluish discoloration; slightly worse than yesterday. Fasc sites with healthy tissue present. DP/PT multiphasic, groin incision c/d/i no hematoma. LLE: Palpable fem pulse. Strong multiphasic DP/PT    RLE wrap taken down and redressed with xeroform, abd pads, kerlix and ace wrap.  Continue daily; plan for OR for fasc closure Monday 2/22    Pt evaluated with Dr Jori Stoll PA-C

## 2021-02-19 NOTE — PLAN OF CARE
Problem: Falls - Risk of:  Goal: Will remain free from falls  Outcome: Met This Shift  Goal: Absence of physical injury  Outcome: Met This Shift     Problem: Skin Integrity:  Goal: Will show no infection signs and symptoms  Outcome: Met This Shift  Goal: Absence of new skin breakdown  Outcome: Met This Shift     Problem: Non-Violent Restraints  Goal: Removal from restraints as soon as assessed to be safe  2/19/2021 1140 by Katelyn Martinez RN  Outcome: Not Met This Shift  2/19/2021 0518 by Jennifer Arzola RN  Outcome: Not Met This Shift  Goal: No harm/injury to patient while restraints in use  2/19/2021 1140 by Katelyn Martinez RN  Outcome: Met This Shift  2/19/2021 0518 by Jennifer Arzola RN  Outcome: Met This Shift  Goal: Patient's dignity will be maintained  Outcome: Met This Shift  Plan of care discussed with patient/family. Patient/family incorporated into plan of care.

## 2021-02-19 NOTE — PROGRESS NOTES
Subjective: Intubated sedated mechanically ventilated   dscd w nursing    Objective:    /75   Pulse 79   Temp 98.1 °F (36.7 °C) (Axillary)   Resp 21   Ht 5' 6\" (1.676 m)   Wt 265 lb 14 oz (120.6 kg)   LMP  (LMP Unknown)   SpO2 96%   BMI 42.91 kg/m²     24HR INTAKE/OUTPUT:      Intake/Output Summary (Last 24 hours) at 2/19/2021 1117  Last data filed at 2/19/2021 1100  Gross per 24 hour   Intake 7484 ml   Output 1350 ml   Net 6134 ml       General appearance: Intubated sedated mechanically ventilated   Neck: FROM, supple   Lungs: Clear bilaterally no wheezes, no rhonchi, no crackles  CV: RRR, no MRGs; normal carotid upstroke and amplitude without Bruits  Abdomen: :inc cdi +ostomy ; no masses or HSM  Extremities: No edema, no cyanosis, no clubbing  Skin: Intact no rash, no lesions, no ulcers    Psych: Alert and oriented normal affect  Neuro: Nonfocal  Most Recent Labs  Lab Results   Component Value Date    WBC 14.8 (H) 02/19/2021    HGB 10.0 (L) 02/19/2021    HCT 30.1 (L) 02/19/2021     02/19/2021     02/19/2021    K 3.6 02/19/2021     02/19/2021    CREATININE 1.0 02/19/2021    BUN 31 (H) 02/19/2021    CO2 23 02/19/2021    GLUCOSE 114 (H) 02/19/2021    ALT 6 02/19/2021    AST 20 02/19/2021    INR 1.1 02/19/2021    TSH 2.500 11/27/2017    LABA1C 13.1 (H) 02/17/2021    LABMICR <12.0 01/31/2017     Recent Labs     02/19/21  0505   MG 1.9     Lab Results   Component Value Date    CALCIUM 7.6 (L) 02/19/2021    PHOS 2.1 (L) 02/19/2021        XR CHEST PORTABLE   Final Result   1. Slight radiographic improvement of aeration of the peripheries both   lungs, more significant bi-apically, although severe, bilateral perihilar,   mixed alveolar/interstitial patchy infiltrates persist.   2.  Otherwise, no significant change. .      XR CHEST PORTABLE   Final Result   Marked worsening of bilateral pulmonary infiltrates      XR CHEST PORTABLE   Final Result   1.  No interval change in the multifocal bilateral pulmonary infiltrates more   prominent within the right lung. CTA ABDOMEN PELVIS W CONTRAST   Final Result   1. There is a small amount of nonocclusive thrombus in proximal SMA. There   is some occlusive thrombus involving branch of the SMA. 2. There is some persistent but improved mesenteric venous gas in the pelvis   as well as some persistent but improved pneumatosis intestinalis involving   lower abdominal and pelvic bowel loops. Portal venous gas in the liver has   resolved. 3. Occluded right common iliac artery with reconstituted flow within mid to   distal right external iliac artery and within right internal iliac artery. CT runoff of lower extremities performed concurrently. There is flow seen   throughout lower extremity arteries without evidence of stenosis. 4. Mild ascites. 5. Small amount of free air presumed postoperative   I discussed findings by phone with Dr. Gonzales Lindsey at 2:55 a.m. on 02/17/2021. CTA CHEST W CONTRAST   Final Result   Suboptimal opacification of the pulmonary arteries. No evidence of central   pulmonary embolism. The peripheral pulmonary arteries are inadequately   assessed. Moderate size area of consolidation at the left lower lobe and a small area   of consolidation at the right lower lobe which could represent atelectasis   but is concerning for multifocal airspace disease process. Scattered areas   of patchy and ground-glass opacities are noted in the bilateral lungs which   are nonspecific but could indicate an atypical/viral pneumonia      CTA ABDOMINAL AORTA W BILAT RUNOFF W CONTRAST   Final Result   1. There is a small amount of nonocclusive thrombus in proximal SMA. There   is some occlusive thrombus involving branch of the SMA. 2. There is some persistent but improved mesenteric venous gas in the pelvis   as well as some persistent but improved pneumatosis intestinalis involving   lower abdominal and pelvic bowel loops.   Portal venous gas in the liver has   resolved. 3. Occluded right common iliac artery with reconstituted flow within mid to   distal right external iliac artery and within right internal iliac artery. CT runoff demonstrates flow throughout lower extremity arteries without   evidence of stenosis. 4. Mild ascites. 5. Small amount of free air presumed postoperative. I discussed findings by phone with Dr. Yamilka Baxter at 2:55 a.m. on 02/17/2021. XR CHEST PORTABLE   Final Result   1. Medical support devices as above. 2.  Atherosclerotic disease and mild cardiomegaly. 3.  Ill-defined opacities in the right greater than left lungs. No pleural   effusion or pneumothorax. No change from yesterday's exam.      XR CHEST PORTABLE    (Results Pending)       Assessment    Principal Problem:    Ischemic necrosis of small bowel (HCC)  Active Problems:    Tobacco abuse    Superior mesenteric artery thrombosis (HCC)    Thrombosis of right iliac artery (HCC)    Ischemia of right lower extremity    DM (diabetes mellitus), type 2, uncontrolled ([address]    Shock ([address]    MEG (acute kidney injury) ([address]    Septic shock ([address]    Pneumonia due to infectious organism    Ischemic bowel disease ([address]  Resolved Problems:    * No resolved hospital problems.  *      Plan:  60-year-old female history of tobacco abuse, type 2 diabetes admitted initially to [facility]  transferred to SICU at [address] [place] [address]  with small bowel necrosis status post ex lap extensive small bowel excision left open 2/16, closed 2/17 sp right lower extremity thrombectomy and fasciotomies 2/17     Admitted to SICU  Intubated sedated mechanically ventilated   IV fluid resuscitation  VasopressorsLevophed wean-as tolerated  IV antibiotics-- Zosyn/Doxy  Insulin drip-->Basal/SSI  Heparin drip  Monitor labs closely- add CK  Critical care consult appreciated  Surgery consult appreciated   Vascular consult appreciated  Heme/Onc Consult appreciated     Electronically signed by Santos Jean Baptiste MD on 2/19/2021 at 11:17 AM

## 2021-02-19 NOTE — CARE COORDINATION
Transition of Care- POD#2-Ileocecectomy with creation of end jejunostomy and abdominal wall closure and Iliac thrombectomy with right lower extremity fasciotomy. Patient is currently sedated, intubated on mechanical ventialtion , Levophed gtt, Vasopressin gtt continue, patient still in bilateral wrist restraints. Per daughter Jeffrey Rounds patient was independent ADL's PTA. CM/SW following clinical progression to determine discharge needs. PT/OT to evaluate when medically stable.      Sara GARCIAN, RN  VY   [phone]

## 2021-02-19 NOTE — PROGRESS NOTES
GENERAL SURGERY  DAILY PROGRESS NOTE  2/19/2021    Subjective: Take back for ileocecectomy with end ostomy  right lower extremity fasciotomy on 2/17  Somnolent this morning  Remains on levo  Ostomy injected/ purple in color, but improved from yesterday       Objective:  BP (!) 142/75   Pulse 74   Temp 97.5 °F (36.4 °C) (Axillary)   Resp 25   Ht 5' 6\" (1.676 m)   Wt 265 lb 14 oz (120.6 kg)   LMP  (LMP Unknown)   SpO2 97%   BMI 42.91 kg/m²     GENERAL:  Intubated and sedated, Somnolent  HEAD: Normocephalic, atraumatic  LUNGS:  Intubated ACVC 16/400/40%/ 8  CARDIOVASCULAR:  Mildly tachycardic   ABDOMEN:  Soft, distended, and tender. dressing c/d/i Ostomy injected/ purple small amount of bowel sweat and blood in bag. Abdominal wound is packed with wet to dry with kerlix   EXTREMITIES: No edema or swelling  SKIN: Warm and dry    Assessment/Plan:  60 y.o. female with abdominal pain, portal venous gas S/p Ex Lap abdomen and SBR 2/16, Take back for ileocecectomy with end ostomy 2/17 and right lower extremity fasciotomy on 2/17. Status post take back with end ostomy  Continue supportive care  NPO, IVF   appreciate ICU care   Continue dressing changes  Plan for right lower extremity fasciotomy closure on 2/22    PPI  NG tube to intermittent suction   Heparin for DVT PPX     Electronically signed by Aicha Ibarra DO on 2/19/2021 at 7:02 AM     Attending Note:    Patient seen/examined 2/19/2021. Agree with above assessment and plan.

## 2021-02-19 NOTE — PLAN OF CARE
Problem: Non-Violent Restraints  Goal: Removal from restraints as soon as assessed to be safe  2/19/2021 1850 by Kenya Dominguez RN  Outcome: Not Met This Shift  2/19/2021 1140 by Kenya Dominguez RN  Outcome: Not Met This Shift  2/19/2021 0518 by Phillip Vaughn RN  Outcome: Not Met This Shift  Goal: No harm/injury to patient while restraints in use  2/19/2021 1850 by Kenya Dominguez RN  Outcome: Met This Shift  2/19/2021 1140 by Kenya Dominguez RN  Outcome: Met This Shift  2/19/2021 0518 by Phillip Vaughn RN  Outcome: Met This Shift  Goal: Patient's dignity will be maintained  2/19/2021 1850 by Kenya Dominguez RN  Outcome: Met This Shift  2/19/2021 1140 by Kenya Dominguez RN  Outcome: Met This Shift   Plan of care discussed with patient/family. Patient/family incorporated into plan of care. Wava Prim

## 2021-02-19 NOTE — PLAN OF CARE
Problem: Non-Violent Restraints  Goal: No harm/injury to patient while restraints in use  2/19/2021 0518 by Sebas Padilla RN  Outcome: Met This Shift  2/18/2021 1707 by Albertina Aguilar RN  Outcome: Met This Shift  Goal: Patient's dignity will be maintained  2/18/2021 1707 by Albertina Aguilar RN  Outcome: Met This Shift

## 2021-02-20 NOTE — PLAN OF CARE
Problem: Non-Violent Restraints  Goal: Removal from restraints as soon as assessed to be safe  2/20/2021 1731 by Almaz Hernandez RN  Outcome: Not Met This Shift  2/20/2021 1033 by Almaz Hernandez RN  Outcome: Not Met This Shift  Goal: No harm/injury to patient while restraints in use  2/20/2021 1731 by Almaz Hernandez RN  Outcome: Met This Shift  2/20/2021 1033 by Almaz Hernandez RN  Outcome: Met This Shift  Goal: Patient's dignity will be maintained  2/20/2021 1731 by Almaz Hernandez RN  Outcome: Met This Shift  2/20/2021 1033 by Almaz Hernandez RN  Outcome: Met This Shift   Plan of care discussed with patient/family. Patient/family incorporated into plan of care. Milton Ferreira

## 2021-02-20 NOTE — PLAN OF CARE
Problem: Non-Violent Restraints  Goal: No harm/injury to patient while restraints in use  2/20/2021 0213 by Prashanth Magallon RN  Outcome: Met This Shift  2/19/2021 1850 by Donta Thorne RN  Outcome: Met This Shift  Goal: Patient's dignity will be maintained  2/19/2021 1850 by Donta Thorne RN  Outcome: Met This Shift

## 2021-02-20 NOTE — FLOWSHEET NOTE
This RN received a phone call from a [place], listed in chart as domestic partner, who claims to be patient's . He is looking for information on patient and her status. I explained to him for privacy purposes, I could not give much information over the phone other than she was here and she is stable. He states that he spoke with Yordan Perez, the patient's best friend, who is Power of  over patient and was just told about patient being in the hospital. I told him he would then have to talk to POA to obtain any further information on patient. He hangs up and calls Yordan Perez. I call daughter listed in chart as next of kin, who states, yesterday, other siblings have not been around her mother for \"quite some time. \" Even though she was youngest in the family, she was to be the decision maker because of this \"astrangement. \" Armen Stahls states that Jose Alejandro Chavez and patient are in fact not , and that the only other person other than herself and Yordan Perez, there should be no one trying to call and receive information on the patient. I notified her that this was the first of staff hearing about \"Lilian\" and asked for her telephone number, as Jose Alejandro Chavez claims she is POA. [phone] was given and permission to call her and see if she indeed did have POA paperwork drawn up that maybe the daughter Armen Holguin did not know about. I call Gilford Scheserafinsaul who states that she is the adopted daughter, not the best friend. I ask if there is any legal paperwork of her being listed as the POA, she replies with, Angel Bradshaw tried to commit suicide, before, we had some paperwork done. I asked if it was legally documented and if so, if the hospital could obtain paperwork proof of this. She states, \"when it comes down to it, we make decisions as a family, me and Candsam. \" I explained that if she had legal paperwork, I would need proof of this in paper form for the patient's chart, then she could defer the decisions to [facility] ([facility] if she did not want to make those decisions. She states, \"i'm just going to say [facility] is the decision maker and leave it at that. \" Again, I reiterate that if there is legal paperwork to prove anything with her as the POA, we would need that for legality purposes. She states, \"i'm just going to say [facility] is the decision maker and I'll leave it at that. \"     [facility] comes to bedside within minutes of call with Gilford Scheuermann, explained to her all of the above statements and conversations. Mayuri places a code for visitation and information for privacy purposes of the patient, as she states patient and Ayesha Sol never had a legitimate ceremony and that they have not been together. Mayuri states \"when mom tried to commit suicide, we just wrote something out on paperwork and had mom sign it, saying that if anything happened to her, me and Gilford Scheuermann were to be the decision makers. \" She did accept to put Gilford Scheserafinsaul in chart as an emergency contact and Ayesha Sol was removed as Domestic Partner.

## 2021-02-20 NOTE — PLAN OF CARE
Problem: Falls - Risk of:  Goal: Will remain free from falls  Description: Will remain free from falls  Outcome: Met This Shift  Goal: Absence of physical injury  Description: Absence of physical injury  Outcome: Met This Shift     Problem: Skin Integrity:  Goal: Absence of new skin breakdown  Description: Absence of new skin breakdown  Outcome: Met This Shift     Problem: Non-Violent Restraints  Goal: No harm/injury to patient while restraints in use  2/20/2021 0213 by Arianna Cross RN  Outcome: Met This Shift  2/19/2021 1850 by Ben Hdz RN  Outcome: Met This Shift  Goal: Patient's dignity will be maintained  2/19/2021 1850 by Ben Hdz RN  Outcome: Met This Shift     Problem: Falls - Risk of:  Goal: Will remain free from falls  Description: Will remain free from falls  Outcome: Met This Shift  Goal: Absence of physical injury  Description: Absence of physical injury  Outcome: Met This Shift     Problem: Skin Integrity:  Goal: Absence of new skin breakdown  Description: Absence of new skin breakdown  Outcome: Met This Shift     Problem: Non-Violent Restraints  Goal: No harm/injury to patient while restraints in use  2/20/2021 0213 by Arianna Cross RN  Outcome: Met This Shift  2/19/2021 1850 by Ben Hdz RN  Outcome: Met This Shift  Goal: Patient's dignity will be maintained  2/19/2021 1850 by Ben Hdz RN  Outcome: Met This Shift

## 2021-02-20 NOTE — PROGRESS NOTES
[facility]  SURGICAL INTENSIVE CARE UNIT (SICU)  ATTENDING PHYSICIAN CRITICAL CARE PROGRESS NOTE     I have examined the patient, reviewed the record,and discussed the case with the APN/  Resident. I have reviewed all relevant labs and imaging data. The following summarizes my clinical findings and independent assessment. Date of admission:  2/16/2021    CC: Peritonitis, open abdomen PNA     HOSPITAL COURSE:    2/16/2021- 17  to [address] glucose in the emergency department was in the [address] [place] [address] [address].  Surgery was consulted and CT abdomen pelvis with IV and oral contrast was obtained.  Ex Lap abdomen, extensive necrosis of the small bowel was found.  The small bowel was taken from about 90 cm distal to the ligament of Treitz all the way to the distal ileum.  Patient still has intact ileocecal valve.  Patient was left open, ABThera was placed. CTAs nonocclusive proximal SMA thrombus and a proximal branch of the SMA occluded with no flow.  It also showed extensive common iliac thrombus.  Heparin drip was started and vascular surgery took her for a right common iliac thrombectomy    2/18 Hypotensive requiring multiple fluid bolus, ostomy alex   2/19 Decreasing pressor requirement received bolus IVF, ostomy pink   2/20 Weaning Levophed     New Imaging Reviewed:   Chest Xray ETT in good position , enteric under the diaphragm , mulitfocal PNA- bilateral patchy infiltrats ( fluid overload- worsening )      Physical Exam:  Physical Exam  Constitutional:       Comments: Somnolent but wakes and follows commands   HENT:      Head: Normocephalic. Eyes:      Extraocular Movements: Extraocular movements intact. Pupils: Pupils are equal, round, and reactive to light. Cardiovascular:      Rate and Rhythm: Normal rate. Pulmonary:      Effort: Pulmonary effort is normal. No respiratory distress. Abdominal:      General: There is distension. Tenderness:  There is abdominal tenderness ( appropriate). There is no guarding. Comments: Obese, dressing c/d/i Ostomy purple today bowel sweat in the bag    Musculoskeletal:      Comments: RLE wrapped , right groin incision c/d/i Biphasic DP/PT bilaterally , Right Great toe ischemic    Skin:     General: Skin is warm. Assessment   Principal Problem:    Ischemic necrosis of small bowel (HCC)  Active Problems:    Tobacco abuse    Superior mesenteric artery thrombosis (HCC)    Thrombosis of right iliac artery (HCC)    Ischemia of right lower extremity    DM (diabetes mellitus), type 2, uncontrolled ([address]    Shock ([address]    MEG (acute kidney injury) ([address]    Septic shock ([address]    Pneumonia due to infectious organism    Ischemic bowel disease ([address]  Resolved Problems:    * No resolved hospital problems.  *      Plan   GI: Start Tube feeds  , NGT suction , End ileostomy   Neuro: Propofol and fentanyl    Renal: MEG  Cr 1.1 , lactate normal, No IVF , 40mg Lasix and BMP at 8pm Monitor Urine Output, Daily CBC,BMP, Mg,Phos, ionized Ca replace K Phos Ca  Musculoskeletal: WBAT all extremities , Right LE fasciotomies ( to be closed on 2/22)  and Thrombectomy for Right iliac thrombosis   AM-PAC Score when able,  monitor right great toe ischemia   Pulmonary: Aggressive pulmonary hygiene , ABG Daily  Mechanical Ventilation  Mode: AC   VT: 400 Rate:16  PEEP:8  FiO2: 60 PF ratio   170, Monitor RR and Maintain SpO2 > 92%  ID: Zosyn, Doxycycline  intra-abdominal process and atypical PNA Treat total 7 days   Heme: No indication for Transfusion   Hematology on for  consult for evaluation for  hypercoagulable disorder - SMA/ Iliac thrombus   Cardiac: Monitor Hemodynamics Septic Shock  Resolving off Levophed and vaso  Echo pending   Endocrine:  15U BID with high dose SSI     DVT Prophylaxis: PCDs, therapeutic Heparin ( eventually will need to switch to a more perminent oral option)   Ulcer Prophylaxis: PPI Intubated for >48 hours   Tubes and Lines: [employer], Continue Bates for critical care management , Continue Arterial Line , Continue ETT and Continue NGT/OGT   Seizure proph:     No Indication  Ancillary consults:   Internal Medicine , PT/OT  and General Surgery  Heme   Family Update:         As available   CODE Status:       Full Code    Dispo: SICU     Reyna Tapia MD    Critical Care: 35 minutes evaluating and managing patient with Respiratory Failure , Septic Shock, Intestinal discontinuity, Open Abdomen and At risk for further deterioration and death.

## 2021-02-20 NOTE — PROGRESS NOTES
Subjective: Intubated sedated mechanically ventilated   dscd w nursing    Objective:    BP (!) 114/43   Pulse 74   Temp 97.2 °F (36.2 °C) (Axillary)   Resp 18   Ht 5' 6\" (1.676 m)   Wt 273 lb 9.5 oz (124.1 kg)   LMP  (LMP Unknown)   SpO2 96%   BMI 44.16 kg/m²     24HR INTAKE/OUTPUT:      Intake/Output Summary (Last 24 hours) at 2/20/2021 1114  Last data filed at 2/20/2021 1100  Gross per 24 hour   Intake 2531 ml   Output 2525 ml   Net 6 ml       General appearance: Intubated sedated mechanically ventilated   Neck: FROM, supple   Lungs: Clear bilaterally no wheezes, no rhonchi, no crackles  CV: RRR, no MRGs; normal carotid upstroke and amplitude without Bruits  Abdomen: :inc cdi +ostomy ; no masses or HSM  Extremities: No edema, no cyanosis, no clubbing  Skin: Intact no rash, no lesions, no ulcers    Psych: Alert and oriented normal affect  Neuro: Nonfocal  Most Recent Labs  Lab Results   Component Value Date    WBC 11.1 02/20/2021    HGB 8.5 (L) 02/20/2021    HCT 25.7 (L) 02/20/2021     02/20/2021     02/20/2021    K 3.9 02/20/2021     02/20/2021    CREATININE 1.1 (H) 02/20/2021    BUN 28 (H) 02/20/2021    CO2 24 02/20/2021    GLUCOSE 121 (H) 02/20/2021    ALT 6 02/20/2021    AST 20 02/20/2021    INR 1.0 02/20/2021    TSH 2.500 11/27/2017    LABA1C 13.1 (H) 02/17/2021    LABMICR <12.0 01/31/2017     Recent Labs     02/20/21  0545   MG 2.2     Lab Results   Component Value Date    CALCIUM 8.2 (L) 02/20/2021    PHOS 2.2 (L) 02/20/2021        XR CHEST PORTABLE   Final Result   1. Prominent bilateral pulmonary infiltrates consistent with pneumonia,   worsened as of yesterday. 2.  The life-support lines and tubes are well positioned. XR CHEST PORTABLE   Final Result   1.   Slight radiographic improvement of aeration of the peripheries both   lungs, more significant bi-apically, although severe, bilateral perihilar,   mixed alveolar/interstitial patchy infiltrates persist.   2. Otherwise, no significant change. .      XR CHEST PORTABLE   Final Result   Marked worsening of bilateral pulmonary infiltrates      XR CHEST PORTABLE   Final Result   1. No interval change in the multifocal bilateral pulmonary infiltrates more   prominent within the right lung. CTA ABDOMEN PELVIS W CONTRAST   Final Result   1. There is a small amount of nonocclusive thrombus in proximal SMA. There   is some occlusive thrombus involving branch of the SMA. 2. There is some persistent but improved mesenteric venous gas in the pelvis   as well as some persistent but improved pneumatosis intestinalis involving   lower abdominal and pelvic bowel loops. Portal venous gas in the liver has   resolved. 3. Occluded right common iliac artery with reconstituted flow within mid to   distal right external iliac artery and within right internal iliac artery. CT runoff of lower extremities performed concurrently. There is flow seen   throughout lower extremity arteries without evidence of stenosis. 4. Mild ascites. 5. Small amount of free air presumed postoperative   I discussed findings by phone with Dr. Tian Escamilla at 2:55 a.m. on 02/17/2021. CTA CHEST W CONTRAST   Final Result   Suboptimal opacification of the pulmonary arteries. No evidence of central   pulmonary embolism. The peripheral pulmonary arteries are inadequately   assessed. Moderate size area of consolidation at the left lower lobe and a small area   of consolidation at the right lower lobe which could represent atelectasis   but is concerning for multifocal airspace disease process. Scattered areas   of patchy and ground-glass opacities are noted in the bilateral lungs which   are nonspecific but could indicate an atypical/viral pneumonia      CTA ABDOMINAL AORTA W BILAT RUNOFF W CONTRAST   Final Result   1. There is a small amount of nonocclusive thrombus in proximal SMA. There   is some occlusive thrombus involving branch of the SMA. 2. There is some persistent but improved mesenteric venous gas in the pelvis   as well as some persistent but improved pneumatosis intestinalis involving   lower abdominal and pelvic bowel loops. Portal venous gas in the liver has   resolved. 3. Occluded right common iliac artery with reconstituted flow within mid to   distal right external iliac artery and within right internal iliac artery. CT runoff demonstrates flow throughout lower extremity arteries without   evidence of stenosis. 4. Mild ascites. 5. Small amount of free air presumed postoperative. I discussed findings by phone with Dr. Welby Leyden at 2:55 a.m. on 02/17/2021. XR CHEST PORTABLE   Final Result   1. Medical support devices as above. 2.  Atherosclerotic disease and mild cardiomegaly. 3.  Ill-defined opacities in the right greater than left lungs. No pleural   effusion or pneumothorax. No change from yesterday's exam.      XR CHEST PORTABLE    (Results Pending)       Assessment    Principal Problem:    Ischemic necrosis of small bowel (HCC)  Active Problems:    Tobacco abuse    Superior mesenteric artery thrombosis (HCC)    Thrombosis of right iliac artery (HCC)    Ischemia of right lower extremity    DM (diabetes mellitus), type 2, uncontrolled ([address]    Shock ([address]    MEG (acute kidney injury) ([address]    Septic shock ([address]    Pneumonia due to infectious organism    Ischemic bowel disease ([address]  Resolved Problems:    * No resolved hospital problems.  *      Plan:  50-year-old female history of tobacco abuse, type 2 diabetes admitted initially to [facility]  transferred to SICU at [facility]  with small bowel necrosis status post ex lap extensive small bowel excision left open 2/16,-->closed 2/17 sp right lower extremity thrombectomy and fasciotomies 2/17     Admitted to SICU  Intubated sedated mechanically ventilated   IV fluid resuscitation completed  VasopressorsLevophed wean-as tolerated--off  IV antibiotics-- Zosyn/Doxy  Insulin drip-->Basal/SSI  Heparin drip  Monitor labs closely  Nutritiontube feedstrickle  Critical care consult appreciated  Surgery consult appreciated   Vascular consult appreciated  Heme/Onc Consult appreciated     Electronically signed by Jagruti Suarez MD on 2/20/2021 at 11:14 AM

## 2021-02-21 NOTE — PROGRESS NOTES
Vascular Surgery Progress Note    Pt is being seen in f/u today regarding Right iliac thrombectomy with RLE fasciotomy on 2/17    Subjective  Pt s/e. No events overnight. Current Medications:    heparin (PORCINE) Infusion 19 Units/kg/hr (02/21/21 0442)    dextrose      propofol 15 mcg/kg/min (02/21/21 1210)    sodium chloride 25 mL (02/21/21 1131)    fentaNYL 5 mcg/ml in 0.9%  ml infusion 125 mcg/hr (02/21/21 1200)      labetalol, perflutren lipid microspheres, heparin (porcine), heparin (porcine), glucose, dextrose, glucagon (rDNA), dextrose, acetaminophen **OR** acetaminophen, bisacodyl, [DISCONTINUED] promethazine **OR** ondansetron, sodium chloride flush    insulin lispro  0-18 Units Subcutaneous Q4H    insulin glargine  15 Units Subcutaneous BID    hydrocortisone sodium succinate PF  100 mg Intravenous Q8H    artificial tears   Both Eyes Q4H    doxycycline (VIBRAMYCIN) IV  100 mg Intravenous Q12H    sodium chloride flush  10 mL Intravenous 2 times per day    chlorhexidine  15 mL Mouth/Throat BID    albuterol  2.5 mg Nebulization 4x daily    pantoprazole  40 mg Intravenous Daily    And    sodium chloride (PF)  10 mL Intravenous Daily    piperacillin-tazobactam  3,375 mg Intravenous Q8H        PHYSICAL EXAM:    BP (!) 191/79   Pulse 87   Temp 98 °F (36.7 °C) (Axillary)   Resp 16   Ht 5' 6\" (1.676 m)   Wt 268 lb 15.4 oz (122 kg)   LMP  (LMP Unknown)   SpO2 94%   BMI 43.41 kg/m²     Intake/Output Summary (Last 24 hours) at 2/21/2021 1223  Last data filed at 2/21/2021 1200  Gross per 24 hour   Intake 2771 ml   Output 2212 ml   Net 559 ml          GENERAL:  Intubated and sedated, Somnolent  HEAD: Normocephalic, atraumatic  LUNGS:  Intubated ACVC   CARDIOVASCULAR:  RR  ABDOMEN:  Soft, distended, and tender. dressing c/d/i Ostomy dark pink with good stool output this morning.  Abdominal wound is packed with wet to dry with kerlix EXTREMITIES: RLE groin 2+.   Weak biphasic DP/PT, incisions CDI  SKIN: Warm and dry    LABS:    Lab Results   Component Value Date    WBC 10.1 02/21/2021    HGB 8.6 (L) 02/21/2021    HCT 26.1 (L) 02/21/2021     02/21/2021    PROTIME 11.2 02/21/2021    INR 1.0 02/21/2021    APTT 52.5 (H) 02/21/2021    K 4.4 02/21/2021    BUN 33 (H) 02/21/2021    CREATININE 1.1 (H) 02/21/2021       A/P  60 y.o. female Right iliac thrombectomy with RLE fasciotomy on 2/17    Appreciate ICU management  Continue heparin gtt for now  Plan for fasciotomy closure tomorrow  Monitor R great toe ischemia    Electronically signed by Jeancarlos Sanchez DO on 2/21/2021 at 12:24 PM

## 2021-02-21 NOTE — PROGRESS NOTES
The pt was suctioned prior to extubation . The pt was extubated to a aerosol treatment with no stridor.

## 2021-02-21 NOTE — PROGRESS NOTES
Comprehensive Nutrition Assessment    Type and Reason for Visit:  Initial(ICU LOS)    Nutrition Recommendations/Plan: Continue NPO, Modify Tube Feeding for optiaml GI tolerance     Semi-Elemental (Vital AF) @ 10 ml/hr trickle feed. Goal rate 40 ml/hr + 1 protein modular BID. At goal provides 960 ml tv, 1152 kcald, 72 gm pro (1352 kcals & 124 gm pro w/ mod), 779 ml free water  Propofol providing additional 414 kcals *note  on 2/20 draw    Nutrition Assessment:  Pt at nutritional risk d/t inability to consume po 2/2 intubation transferred from [facility] s/p ex lap 2nd look SBR/ileostomy for bowel necrosis complicated by limb ischemia s/p thrombectomy w RLE fasciotomy. Multiple surgical wounds. EN support initiated, will provide updated rec & continue to monitor. Malnutrition Assessment:  Malnutrition Status: At risk for malnutrition   Context:  Acute Illness     Findings of the 6 clinical characteristics of malnutrition:  Energy Intake:  50% or less of estimated energy requirements for 5 or more days  Weight Loss:  Unable to assess(no wt hx on file)     Body Fat Loss:  Unable to assess   Muscle Mass Loss:  Unable to assess  Fluid Accumulation:  No significant fluid accumulation   Strength:  Not Performed    Estimated Daily Nutrient Needs:  Energy (kcal):  PS3B 1690; 8173-9092; Weight Used for Energy Requirements:  Admission     Protein (g):  105-130(1.8-2.2 g/kg);  Weight Used for Protein Requirements:  Ideal        Fluid (ml/day):  per critical care; Method Used for Fluid Requirements:         Nutrition Related Findings:  Pt intubated/sedated, hypotension improved MAP WNL, s/p ex lap 2nd look/ileostomy (abd closed 2/17), N/V PTA, active BS, abd distention, +I/O's 16L, +1/+2 edema, NGT w/ trickle feed      Wounds:  Multiple, Surgical Incision, Open Wounds       Current Nutrition Therapies:    Current Tube Feeding (TF) Orders:  · Feeding Route: Nasogastric  · Formula: Standard without Fiber · Schedule: Continuous(5 ml/hr= 120 ml tv)  · Water Flushes: 30 ml q 6 hr=  · Current TF & Flush Orders Provides: appears running @ 10ml/hr per doc flow  · Goal TF & Flush Orders Provides: @5 ml/hr= 144 kcals, 7 gm pro, 98 ml free water    Anthropometric Measures:  · Height: 5' 6\" (167.6 cm)  · Current Body Weight: 229 lb (103.9 kg)(2/17 admit wt as CBW elevated d/t +fluids)   · Admission Body Weight: 229 lb (103.9 kg)(2/17 first measured)    · Usual Body Weight: (UTO no longterm wt hx on file)     · Ideal Body Weight: 130 lbs; % Ideal Body Weight 176.2 %   · BMI: 37 BMI Categories: Obese Class 2 (BMI 35.0 -39.9)       Nutrition Diagnosis:   · Inadequate oral intake related to impaired respiratory function as evidenced by NPO or clear liquid status due to medical condition, nutrition support - enteral nutrition, intubation    Nutrition Interventions:   Nutrition Education/Counseling:  Education not indicated   Coordination of Nutrition Care:  Continue to monitor while inpatient    Goals: Tolerate trickle feed/ advance to goal as able       Nutrition Monitoring and Evaluation:   Food/Nutrient Intake Outcomes:  Diet Advancement/Tolerance, Enteral Nutrition Intake/Tolerance  Physical Signs/Symptoms Outcomes:  Biochemical Data, Nutrition Focused Physical Findings, Skin, Weight, GI Status, Nausea or Vomiting, Fluid Status or Edema, Hemodynamic Status     Discharge Planning:     Too soon to determine     Electronically signed by London Vuong RD, GOVIND on 2/21/21 at 10:43 AM EST    Contact: Ext [phone]

## 2021-02-21 NOTE — PROGRESS NOTES
When unrestrained, pt reaches for lines and tubes and is at high risk for harm. Pt is unable to be verbally redirected and remains in bilat soft wrist restraints at this time for pt safety. Will continue to monitor and educate as able.

## 2021-02-21 NOTE — PLAN OF CARE
Problem: Falls - Risk of:  Goal: Will remain free from falls  Description: Will remain free from falls  2/21/2021 0247 by Alfonso Barreto RN  Outcome: Met This Shift  Goal: Absence of physical injury  Description: Absence of physical injury  2/21/2021 0247 by Alfonso Barreto RN  Outcome: Met This Shift     Problem: Skin Integrity:  Goal: Will show no infection signs and symptoms  Description: Will show no infection signs and symptoms  Outcome: Met This Shift  Goal: Absence of new skin breakdown  Description: Absence of new skin breakdown  2/21/2021 0931 by Malathi Garcia RN  Outcome: Met This Shift  2/21/2021 0247 by Alfonso Barreto RN  Outcome: Met This Shift     Problem: Non-Violent Restraints  Goal: No harm/injury to patient while restraints in use  2/21/2021 0931 by Malathi Garcia RN  Outcome: Met This Shift  2/21/2021 0244 by Alfonso Barreto RN  Outcome: Met This Shift  Goal: Patient's dignity will be maintained  2/21/2021 0931 by Malathi Garcia RN  Outcome: Met This Shift  2/21/2021 0244 by Alfonso Barreto RN  Outcome: Met This Shift     Problem: Non-Violent Restraints  Goal: Removal from restraints as soon as assessed to be safe  2/21/2021 0931 by Malathi Garcia RN  Outcome: Not Met This Shift  2/21/2021 0244 by Alfonso Barreto RN  Outcome: Not Met This Shift

## 2021-02-21 NOTE — PLAN OF CARE
Problem: Falls - Risk of:  Goal: Will remain free from falls  Description: Will remain free from falls  Outcome: Met This Shift  Goal: Absence of physical injury  Description: Absence of physical injury  Outcome: Met This Shift     Problem: Skin Integrity:  Goal: Absence of new skin breakdown  Description: Absence of new skin breakdown  Outcome: Met This Shift     Problem: Non-Violent Restraints  Goal: No harm/injury to patient while restraints in use  2/21/2021 0244 by Fuad Wallace RN  Outcome: Met This Shift  2/20/2021 1731 by Almaz Hernandez RN  Outcome: Met This Shift  Goal: Patient's dignity will be maintained  2/21/2021 0244 by Fuad Wallace RN  Outcome: Met This Shift  2/20/2021 1731 by Almaz Hernandez RN  Outcome: Met This Shift

## 2021-02-21 NOTE — PLAN OF CARE
Problem: Non-Violent Restraints  Goal: No harm/injury to patient while restraints in use  2/21/2021 0244 by Phillip Vaughn RN  Outcome: Met This Shift  2/20/2021 1731 by Kenya Dominguez RN  Outcome: Met This Shift  Goal: Patient's dignity will be maintained  2/21/2021 0244 by Phillip Vaughn RN  Outcome: Met This Shift  2/20/2021 1731 by Kenya Dominguez RN  Outcome: Met This Shift

## 2021-02-21 NOTE — PROGRESS NOTES
[facility]  SURGICAL INTENSIVE CARE UNIT (SICU)  ATTENDING PHYSICIAN CRITICAL CARE PROGRESS NOTE     I have examined the patient, reviewed the record,and discussed the case with the APN/  Resident. I have reviewed all relevant labs and imaging data. The following summarizes my clinical findings and independent assessment. Date of admission:  2/16/2021    CC: Peritonitis, open abdomen PNA     HOSPITAL COURSE:    2/16/2021- 17  to [address] glucose in the emergency department was in the [address] [place] [address] [address].  Surgery was consulted and CT abdomen pelvis with IV and oral contrast was obtained.  Ex Lap abdomen, extensive necrosis of the small bowel was found.  The small bowel was taken from about 90 cm distal to the ligament of Treitz all the way to the distal ileum.  Patient still has intact ileocecal valve.  Patient was left open, ABThera was placed. CTAs nonocclusive proximal SMA thrombus and a proximal branch of the SMA occluded with no flow.  It also showed extensive common iliac thrombus.  Heparin drip was started and vascular surgery took her for a right common iliac thrombectomy    2/18 Hypotensive requiring multiple fluid bolus, ostomy alex   2/19 Decreasing pressor requirement received bolus IVF, ostomy pink   2/20 Weaning Levophed   2/21 No overnight issues diuresed overnight     New Imaging Reviewed:   Chest Xray ETT in good position , enteric under the diaphragm , mulitfocal PNA- bilateral patchy infiltrats ( fluid overload- stable vs slightly improved  )      Physical Exam:  Physical Exam  Constitutional:       Comments: Somnolent but wakes and follows commands   HENT:      Head: Normocephalic. Eyes:      Extraocular Movements: Extraocular movements intact. Pupils: Pupils are equal, round, and reactive to light. Cardiovascular:      Rate and Rhythm: Normal rate. Pulmonary:      Effort: Pulmonary effort is normal. No respiratory distress.    Abdominal:      General: There is distension. Tenderness: There is abdominal tenderness ( appropriate). There is no guarding. Comments: Obese, dressing c/d/i Ostomy purple succus in the bag    Musculoskeletal:      Comments: RLE wrapped , right groin incision c/d/i Biphasic DP/PT bilaterally , Right Great toe ischemic    Skin:     General: Skin is warm. Assessment   Principal Problem:    Ischemic necrosis of small bowel (HCC)  Active Problems:    Tobacco abuse    Superior mesenteric artery thrombosis (HCC)    Thrombosis of right iliac artery (HCC)    Ischemia of right lower extremity    DM (diabetes mellitus), type 2, uncontrolled ([address]    Shock ([address]    MEG (acute kidney injury) ([address]    Septic shock ([address]    Pneumonia due to infectious organism    Ischemic bowel disease ([address]  Resolved Problems:    * No resolved hospital problems.  *      Plan   GI: Hold tube feeds possible extubation  , End ileostomy therefore hold on bowel regimen   Neuro: Propofol and fentanyl    Renal: MEG  Cr 1.1 stable , lactate normal, No IVF , 60mg Lasix again today possible re-dose later pending response,  Monitor Urine Output, Daily CBC,BMP, Mg,Phos, ionized Ca   Musculoskeletal: WBAT all extremities , Right LE fasciotomies ( to be closed on 2/22)  and Thrombectomy for Right iliac thrombosis   AM-PAC Score when able,  monitor right great toe ischemia   Pulmonary: Aggressive pulmonary hygiene , ABG Daily  Mechanical Ventilation  Mode: AC   VT: 400 Rate:16  PEEP:8  FiO2: 60 PF ratio   169 attempt PS , Monitor RR and Maintain SpO2 > 92%   SBT and possible extubation   ID: Zosyn, Doxycycline  intra-abdominal process and atypical PNA Treat total 7 days   Heme: No indication for Transfusion   Hematology on for  consult for evaluation for  hypercoagulable disorder - SMA/ Iliac thrombus   Cardiac: Monitor Hemodynamics Septic Shock  Resolving  On 1mcg levophed  Echo pending   Endocrine:  15U BID with high dose SSI     DVT Prophylaxis: PCDs, therapeutic Heparin (

## 2021-02-21 NOTE — PROGRESS NOTES
GENERAL SURGERY  DAILY PROGRESS NOTE  2/21/2021    Subjective:  Sleepy this am but arousable on vent. No issues overnight. Tolerating trickle TFs. Ostomy 75cc bilious     Objective:  BP (!) 191/79   Pulse 87   Temp 98 °F (36.7 °C) (Axillary)   Resp 16   Ht 5' 6\" (1.676 m)   Wt 268 lb 15.4 oz (122 kg)   LMP  (LMP Unknown)   SpO2 94%   BMI 43.41 kg/m²     GENERAL:  Intubated and sedated, Somnolent  HEAD: Normocephalic, atraumatic  LUNGS:  Intubated ACVC 16/400/50%/ 8  CARDIOVASCULAR:  RR  ABDOMEN:  Soft, distended, and tender. dressing c/d/i Ostomy dark pink with good stool output this morning. Abdominal wound is packed with wet to dry with kerlix   EXTREMITIES: RLE groin 2+. Weak biphasic DP/PT, incisions CDI  SKIN: Warm and dry    Assessment/Plan:  60 y.o. female with abdominal pain, portal venous gas S/p Ex Lap abdomen and SBR 2/16, Take back for ileocecectomy with end ostomy 2/17 and Right iliac thrombectomy with RLE fasciotomy on 2/17. Continue trickle tube feeds today  Monitor ostomy  Appreciate PV recommendations- sp R iliac thrombectomy with four compartment fasciotomies. On heparin gtt, plan for closure tomorrow  Appreciate ICU care    Electronically signed by Jaylan Meek DO on 2/21/2021 at 12:19 PM     The patient was seen and examined and the chart was reviewed. I agree with the assessment and plan.

## 2021-02-21 NOTE — PLAN OF CARE
Problem: Falls - Risk of:  Goal: Will remain free from falls  Description: Will remain free from falls  2/21/2021 1646 by Mark Becker RN  Outcome: Met This Shift  2/21/2021 0247 by Phillip Vaughn RN  Outcome: Met This Shift  Goal: Absence of physical injury  Description: Absence of physical injury  2/21/2021 1646 by Mark Becker RN  Outcome: Met This Shift  2/21/2021 0247 by Phillip Vaughn RN  Outcome: Met This Shift     Problem: Skin Integrity:  Goal: Will show no infection signs and symptoms  Description: Will show no infection signs and symptoms  Outcome: Met This Shift  Goal: Absence of new skin breakdown  Description: Absence of new skin breakdown  2/21/2021 0931 by Mark Becker RN  Outcome: Met This Shift  2/21/2021 0247 by Phillip Vaughn RN  Outcome: Met This Shift     Problem: Non-Violent Restraints  Goal: No harm/injury to patient while restraints in use  Outcome: Met This Shift  Goal: Patient's dignity will be maintained  Outcome: Met This Shift     Problem: Bowel/Gastric:  Goal: Complications related to the disease process, condition or treatment will be avoided or minimized  Outcome: Met This Shift     Problem: Inadequate oral food/beverage intake (NI-2.1)  Goal: Food and/or Nutrient Delivery  Description: Individualized approach for food/nutrient provision. 2/21/2021 1042 by Zula Burkitt, RD, LD  Outcome: Met This Shift     Problem: Non-Violent Restraints  Goal: Removal from restraints as soon as assessed to be safe  Outcome: Not Met This Shift     Problem:  Bowel/Gastric:  Goal: Will be independent with ostomy care  Outcome: Not Met This Shift

## 2021-02-21 NOTE — PROGRESS NOTES
Subjective: Intubated sedated mechanically ventilated   dscd w nursing    Objective:    BP (!) 160/75   Pulse 65   Temp 97.1 °F (36.2 °C) (Axillary)   Resp 16   Ht 5' 6\" (1.676 m)   Wt 268 lb 15.4 oz (122 kg)   LMP  (LMP Unknown)   SpO2 96%   BMI 43.41 kg/m²     24HR INTAKE/OUTPUT:      Intake/Output Summary (Last 24 hours) at 2/21/2021 2725  Last data filed at 2/21/2021 0800  Gross per 24 hour   Intake 2571 ml   Output 2177 ml   Net 394 ml       General appearance: Intubated sedated mechanically ventilated   Neck: FROM, supple   Lungs: Clear bilaterally no wheezes, no rhonchi, no crackles  CV: RRR, no MRGs; normal carotid upstroke and amplitude without Bruits  Abdomen: :inc cdi +ostomy ; no masses or HSM  Extremities: No edema, no cyanosis, no clubbing  Skin: Intact no rash, no lesions, no ulcers    Psych: Alert and oriented normal affect  Neuro: Nonfocal  Most Recent Labs  Lab Results   Component Value Date    WBC 10.1 02/21/2021    HGB 8.6 (L) 02/21/2021    HCT 26.1 (L) 02/21/2021     02/21/2021     02/21/2021    K 4.4 02/21/2021     (H) 02/21/2021    CREATININE 1.1 (H) 02/21/2021    BUN 33 (H) 02/21/2021    CO2 28 02/21/2021    GLUCOSE 121 (H) 02/21/2021    ALT 9 02/21/2021    AST 28 02/21/2021    INR 1.0 02/21/2021    TSH 2.500 11/27/2017    LABA1C 13.1 (H) 02/17/2021    LABMICR <12.0 01/31/2017     Recent Labs     02/21/21  0510   MG 2.3     Lab Results   Component Value Date    CALCIUM 8.3 (L) 02/21/2021    PHOS 3.2 02/21/2021        XR CHEST PORTABLE   Final Result   Mild worsening of bilateral airspace opacities in left lower lung may   represent ongoing pneumonia. Lines and tubes are unchanged      XR CHEST PORTABLE   Final Result   1. Prominent bilateral pulmonary infiltrates consistent with pneumonia,   worsened as of yesterday. 2.  The life-support lines and tubes are well positioned. XR CHEST PORTABLE   Final Result   1.   Slight radiographic improvement of aeration of the peripheries both   lungs, more significant bi-apically, although severe, bilateral perihilar,   mixed alveolar/interstitial patchy infiltrates persist.   2.  Otherwise, no significant change. .      XR CHEST PORTABLE   Final Result   Marked worsening of bilateral pulmonary infiltrates      XR CHEST PORTABLE   Final Result   1. No interval change in the multifocal bilateral pulmonary infiltrates more   prominent within the right lung. CTA ABDOMEN PELVIS W CONTRAST   Final Result   1. There is a small amount of nonocclusive thrombus in proximal SMA. There   is some occlusive thrombus involving branch of the SMA. 2. There is some persistent but improved mesenteric venous gas in the pelvis   as well as some persistent but improved pneumatosis intestinalis involving   lower abdominal and pelvic bowel loops. Portal venous gas in the liver has   resolved. 3. Occluded right common iliac artery with reconstituted flow within mid to   distal right external iliac artery and within right internal iliac artery. CT runoff of lower extremities performed concurrently. There is flow seen   throughout lower extremity arteries without evidence of stenosis. 4. Mild ascites. 5. Small amount of free air presumed postoperative   I discussed findings by phone with Dr. Es Preston at 2:55 a.m. on 02/17/2021. CTA CHEST W CONTRAST   Final Result   Suboptimal opacification of the pulmonary arteries. No evidence of central   pulmonary embolism. The peripheral pulmonary arteries are inadequately   assessed. Moderate size area of consolidation at the left lower lobe and a small area   of consolidation at the right lower lobe which could represent atelectasis   but is concerning for multifocal airspace disease process.   Scattered areas   of patchy and ground-glass opacities are noted in the bilateral lungs which   are nonspecific but could indicate an atypical/viral pneumonia      CTA ABDOMINAL AORTA W BILAT RUNOFF W CONTRAST   Final Result   1. There is a small amount of nonocclusive thrombus in proximal SMA. There   is some occlusive thrombus involving branch of the SMA. 2. There is some persistent but improved mesenteric venous gas in the pelvis   as well as some persistent but improved pneumatosis intestinalis involving   lower abdominal and pelvic bowel loops. Portal venous gas in the liver has   resolved. 3. Occluded right common iliac artery with reconstituted flow within mid to   distal right external iliac artery and within right internal iliac artery. CT runoff demonstrates flow throughout lower extremity arteries without   evidence of stenosis. 4. Mild ascites. 5. Small amount of free air presumed postoperative. I discussed findings by phone with Dr. Monserrat Ch at 2:55 a.m. on 02/17/2021. XR CHEST PORTABLE   Final Result   1. Medical support devices as above. 2.  Atherosclerotic disease and mild cardiomegaly. 3.  Ill-defined opacities in the right greater than left lungs. No pleural   effusion or pneumothorax. No change from yesterday's exam.      XR CHEST PORTABLE    (Results Pending)       Assessment    Principal Problem:    Ischemic necrosis of small bowel (HCC)  Active Problems:    Tobacco abuse    Superior mesenteric artery thrombosis (HCC)    Thrombosis of right iliac artery (HCC)    Ischemia of right lower extremity    DM (diabetes mellitus), type 2, uncontrolled ([address]    Shock ([address]    MEG (acute kidney injury) ([address]    Septic shock ([address]    Pneumonia due to infectious organism    Ischemic bowel disease ([address]  Resolved Problems:    * No resolved hospital problems.  *      Plan:  64-year-old female history of tobacco abuse, type 2 diabetes admitted initially to [facility]  transferred to SICU at [address] [place] [address]  with small bowel necrosis status post ex lap extensive small bowel excision left open 2/16,-->closed 2/17 sp right lower extremity thrombectomy and fasciotomies 2/17     Admitted to SICU Intubated sedated mechanically ventilated   IV fluid resuscitation completed  VasopressorsLevophed wean-as tolerated--off  IV antibiotics-- Zosyn/Doxy  Insulin drip-->Basal/SSI  Heparin drip  Monitor labs closely  Nutritiontube feedstrickle  Critical care consult appreciated  Surgery consult appreciated   Vascular consult appreciated  Heme/Onc Consult appreciated     Electronically signed by Mindy Hollingsworth MD on 2/21/2021 at 9:06 AM

## 2021-02-22 NOTE — PROGRESS NOTES
represent ongoing pneumonia. Lines and tubes are unchanged      XR CHEST PORTABLE   Final Result   1. Prominent bilateral pulmonary infiltrates consistent with pneumonia,   worsened as of yesterday. 2.  The life-support lines and tubes are well positioned. XR CHEST PORTABLE   Final Result   1. Slight radiographic improvement of aeration of the peripheries both   lungs, more significant bi-apically, although severe, bilateral perihilar,   mixed alveolar/interstitial patchy infiltrates persist.   2.  Otherwise, no significant change. .      XR CHEST PORTABLE   Final Result   Marked worsening of bilateral pulmonary infiltrates      XR CHEST PORTABLE   Final Result   1. No interval change in the multifocal bilateral pulmonary infiltrates more   prominent within the right lung. CTA ABDOMEN PELVIS W CONTRAST   Final Result   1. There is a small amount of nonocclusive thrombus in proximal SMA. There   is some occlusive thrombus involving branch of the SMA. 2. There is some persistent but improved mesenteric venous gas in the pelvis   as well as some persistent but improved pneumatosis intestinalis involving   lower abdominal and pelvic bowel loops. Portal venous gas in the liver has   resolved. 3. Occluded right common iliac artery with reconstituted flow within mid to   distal right external iliac artery and within right internal iliac artery. CT runoff of lower extremities performed concurrently. There is flow seen   throughout lower extremity arteries without evidence of stenosis. 4. Mild ascites. 5. Small amount of free air presumed postoperative   I discussed findings by phone with Dr. Lyssa Pedro at 2:55 a.m. on 02/17/2021. CTA CHEST W CONTRAST   Final Result   Suboptimal opacification of the pulmonary arteries. No evidence of central   pulmonary embolism. The peripheral pulmonary arteries are inadequately   assessed. Moderate size area of consolidation at the left lower lobe and a small area   of consolidation at the right lower lobe which could represent atelectasis   but is concerning for multifocal airspace disease process. Scattered areas   of patchy and ground-glass opacities are noted in the bilateral lungs which   are nonspecific but could indicate an atypical/viral pneumonia      CTA ABDOMINAL AORTA W BILAT RUNOFF W CONTRAST   Final Result   1. There is a small amount of nonocclusive thrombus in proximal SMA. There   is some occlusive thrombus involving branch of the SMA. 2. There is some persistent but improved mesenteric venous gas in the pelvis   as well as some persistent but improved pneumatosis intestinalis involving   lower abdominal and pelvic bowel loops. Portal venous gas in the liver has   resolved. 3. Occluded right common iliac artery with reconstituted flow within mid to   distal right external iliac artery and within right internal iliac artery. CT runoff demonstrates flow throughout lower extremity arteries without   evidence of stenosis. 4. Mild ascites. 5. Small amount of free air presumed postoperative. I discussed findings by phone with Dr. Jasmin Osborn at 2:55 a.m. on 02/17/2021. XR CHEST PORTABLE   Final Result   1. Medical support devices as above. 2.  Atherosclerotic disease and mild cardiomegaly. 3.  Ill-defined opacities in the right greater than left lungs. No pleural   effusion or pneumothorax.   No change from yesterday's exam.      XR CHEST PORTABLE    (Results Pending)       Assessment    Principal Problem:    Ischemic necrosis of small bowel (HCC)  Active Problems:    Tobacco abuse    Superior mesenteric artery thrombosis (HCC)    Thrombosis of right iliac artery (HCC)    Ischemia of right lower extremity    DM (diabetes mellitus), type 2, uncontrolled ([address]    Shock ([address]    MEG (acute kidney injury) ([address]    Septic shock ([address]    Pneumonia due to infectious organism Ischemic bowel disease [address]  Resolved Problems:    * No resolved hospital problems. *      Plan:  72-year-old female history of tobacco abuse, type 2 diabetes admitted initially to [facility]  transferred to SICU at [address] [place] [address]  with small bowel necrosis status post ex lap extensive small bowel excision left open 2/16,-->closed 2/17 sp right lower extremity thrombectomy and fasciotomies 2/17.   Status post fasciotomy closure 2/22     Admitted to SICU  Extubated 2/21  IV fluid resuscitation completed  Solu-Cortef weaned as tolerated  VasopressorsLevophed wean-as tolerated--off  IV antibiotics-- Zosyn/Doxy  Insulin drip-->Basal/SSI  Heparin drip  Monitor labs closely  Nutritiontube feedstrickle  Critical care consult appreciated  Surgery consult appreciated   Vascular consult appreciated  Heme/Onc Consult appreciated     Electronically signed by Adam Castro MD on 2/22/2021 at 10:03 AM

## 2021-02-22 NOTE — PROGRESS NOTES
Patient now s/p fasciotomy site closure at RLE. Palpable DP pulse at RLE is 2+ at conclusion of case. Received local block by anesthesia prior to case start. Her motor exam at the right lower extremity is expected to be impaired for a few hours post-operatively.       Francisca Zaragoza

## 2021-02-22 NOTE — PROGRESS NOTES
[facility]  SURGICAL INTENSIVE CARE UNIT    CRITICAL CARE ATTENDING PROGRESS NOTE    I have examined the patient, reviewed the record, and discussed the case with the APN/  Resident. I have reviewed all relevant labs and imaging data. Please refer to the  APN/ resident's note. I agree with the  assessment and plan with the following corrections/ additions. The following summarizes my clinical findings and independent assessment. CC: Ischemic gut    Subjective: Patient complains of severe pain along her abdomen    HOSPITAL COURSE:  2/16 ex lap small bowel resection open abdomen. Transfer to [facility]  2/17 Second Look, ilealcecectomy me with small bowel resection and end jejunostomy  RLE thrombectomy and right lower extremity fasciotomy  2/21 extubated      EXAM:  Awake and alert  Tachycardic  Lungs coarse  Abdomen is soft appropriately tender incision open packed ileostomy pink and functional      ASSESSMENT:  Principal Problem:    Ischemic necrosis of small bowel ([address]  Active Problems:    Tobacco abuse    Superior mesenteric artery thrombosis (HCC)    Thrombosis of right iliac artery (HCC)    Ischemia of right lower extremity    DM (diabetes mellitus), type 2, uncontrolled ([address]    Shock ([address]    MEG (acute kidney injury) ([address]    Septic shock ([address]    Pneumonia due to infectious organism    Ischemic bowel disease ([address]  Resolved Problems:    * No resolved hospital problems. *       PLAN:  Sedation/ Pain: Oxycodone as needed, will add Dilaudid as needed, scheduled Tylenol     Tachypnea not due to agitation. Due to pain from exploratory surgery. Stop Precedex drip. Pulmonary: Acute respiratory insufficiencyextubated 2/21continue aggressive pulmonary toilet    GI: Status post small bowel resection, end jejunostomy--restart diet after surgery    FEN: Creatinine 1.1monitor for now     ID: zosyn--last dose 2/23.  Doxycycline --stop on 2/24 for atypical pneumonia     Right lower extremity fasciotomyOR closure today with vascular  Status post thrombectomy  Continue heparin drip    Endo: Monitor Blood Sugars. Target blood glucose less than 180 in the ICU. Stress dose steroidson hydrocortisone 100 mg every 8h    DVT prophylaxis--SCDS, heparin drip  GI Prophylaxis-- PPI  Lines--right IJ triple-lumen catheter2/16  CODE: FULL     DISPOSITION-Continue ICU Care    Critical care time exclusive of teaching and procedures = 35 min     Pt needs continuous ICU monitoring because the patient is at risk for deterioration from a SMA thrombus, ischemic gut, fasciotomies standpoint. Tad Meigs, MD, FACS  2/22/2021  12:52 PM    NOTE: This report was transcribed using voice recognition software. Every effort was made to ensure accuracy; however, inadvertent computerized transcription errors may be present.
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
## 2021-02-22 NOTE — PROGRESS NOTES
ABDOMEN:  Soft, distended, and tender. dressing c/d/i Ostomy dark pink with good stool output this morning. Abdominal wound is packed with wet to dry with kerlix   EXTREMITIES: RLE groin 2+. Incision at right groin is clean dry and intact with Dermabond; biphasic popliteal/DP/PT signals, fasciotomy sites are clean dry and intact, right first and second toes with dusky ischemic changes, third through fifth toes at right lower extremity with normal capillary refill      Lateral aspect      medial aspect      LABS:    Lab Results   Component Value Date    WBC 12.8 (H) 02/22/2021    HGB 9.2 (L) 02/22/2021    HCT 29.1 (L) 02/22/2021     02/22/2021    PROTIME 11.5 02/22/2021    INR 1.0 02/22/2021    APTT 49.1 (H) 02/22/2021    K 4.1 02/22/2021    BUN 39 (H) 02/22/2021    CREATININE 1.1 (H) 02/22/2021       A/P  60 y.o. female Right iliac thrombectomy with RLE fasciotomy on 2/17    Appreciate ICU management  Continue heparin gtt for now  OR today for fasciotomy site closure at right lower extremity  Monitor ischemic changes at right great toe and second toe, expectant management    Electronically signed by Kimberly Contreras MD on 2/22/2021 at 6:38 AM      This patient was seen and examined. Overall she is doing well the skin is nice and loose. The compartments are all soft. And she has a palpable dorsalis pedis pulse and a multiphasic posterior tibial signal.  The great toe is ischemic most likely secondary to embolization. Were planning fasciotomy closure. Risk benefits and alternatives have been discussed with the patient preoperatively. She understands and wishes to proceed. This is a late entry. This patient was seen and examined at the bedside with the staff in the ICU.     Gilbert Crespo

## 2021-02-22 NOTE — ANESTHESIA PROCEDURE NOTES
Peripheral Block    Patient location during procedure: pre-op  Staffing  Performed: anesthesiologist   Anesthesiologist: Nita Snellen, MD  Preanesthetic Checklist  Completed: patient identified, IV checked, site marked, risks and benefits discussed, surgical consent, monitors and equipment checked, pre-op evaluation, timeout performed, anesthesia consent given, oxygen available and patient being monitored  Peripheral Block  Patient position: supine  Prep: ChloraPrep  Patient monitoring: cardiac monitor, continuous pulse ox and IV access  Block type: Sciatic and Saphenous  Laterality: right  Injection technique: single-shot  Guidance: ultrasound guided  Provider prep: mask and sterile gloves  Needle  Needle type: combined needle/nerve stimulator   Needle localization: ultrasound guidance  Assessment  Injection assessment: negative aspiration for heme, no paresthesia on injection and local visualized surrounding nerve on ultrasound  Paresthesia pain: none  Slow fractionated injection: yes  Hemodynamics: stable  Additional Notes  20cc of 2% PF lidocaine injected surrounding right sciatic nerve at popliteal fossa and 10cc of 2% lidocaine injected surrounding right saphenous nerve.   Medications Administered  Lidocaine injection 2%, 30 mL  Reason for block: primary anesthetic

## 2021-02-22 NOTE — OP NOTE
Operative Note      Patient: Nela Blackmon  YOB: 1955  MRN: [id number]    Date of procedure: 2/22/2021    Preoperative diagnosis: Is right lower extremity limb ischemia status post revascularization with an embolectomy followed by 4 compartment fasciotomy    Postoperative diagnosis: Same    Anesthesia: Regional, local and MAC    Findings: Healthy skin, adipose tissue and viable muscle no evidence of necrosis    Estimated blood loss: Less than 10 cc    Surgeon: Soni Cardoso MD    Assistant: Escobar Carl    Procedure:  #1 lower extremity fasciotomy closure    Description of the procedure: After risk benefits and alternatives were discussed with the patient she understood and wished to proceed. The day the procedure she is brought to the operating room placed in supine position. Preoperatively she received a regional block. She was then prepped and draped in a sterile sterile fashion. A timeout is taken verify the person the operative site as well as procedure. At this point the compartments are very soft. the subcutaneous tissue was nice and healthy. There was good bleeding from the skin edges. The muscle appeared to be viable. She also had a palpable pulse. See preoperative physical exam    At this point the skin edges and subcutaneous tissue were brought together with 3-0 nylon sutures in a vertical mattress style fashion. At the end of the procedure everything is accounted for the patient tolerated procedure well. With a palpable pulse examination. Drains:   NG/OG/NJ/NE Tube Nasogastric 18 fr Right mouth (Active)   Surrounding Skin Dry; Intact 02/21/21 1800   Securement device Yes 02/21/21 1800   Status Clamped 02/22/21 0400   Placement Verified by X-Ray (Initial) 02/21/21 1800   NG/OG/NJ/NE External Measurement (cm) 60 cm 02/21/21 1800   Drainage Appearance Other (Comment) 02/20/21 0800   Tube Feeding Standard with Fiber 02/21/21 1800   Tube Feeding Status Continuous 02/21/21 1800   Rate/Schedule 10 mL/hr 02/21/21 1800   Tube Feeding Intake (mL) 0 ml 02/22/21 1400   Free Water Flush (mL) 30 mL 02/22/21 1400   Free Water Rate 30ml q6 02/21/21 1800   Residual Volume (ml) 50 ml 02/20/21 2230   Output (mL) 100 ml 02/20/21 0530       Ileostomy (Active)   Stomal Appliance 1 piece;Clean 02/21/21 1600   Stoma  Assessment ROB 02/22/21 1400   Mucocutaneous Junction Intact 02/22/21 1400   Peristomal Assessment Clean; Intact 02/22/21 1400   Treatment Bag change 02/19/21 0835   Stool Color Brown 02/22/21 1400   Stool Appearance Watery 02/22/21 1400   Stool Amount Medium 02/22/21 1400   Output (mL) 400 ml 02/22/21 1400       Urethral Catheter 16 fr (Active)   Catheter Indications Need for fluid management in critically ill patients in a critical care setting not able to be managed by other means such as BSC with hat, bedpan, urinal, condom catheter, or short term intermittent urethral catherization 02/22/21 1400   Site Assessment No urethral drainage 02/22/21 1400   Urine Color Yellow 02/22/21 1400   Urine Appearance Clear 02/22/21 1400   Output (mL) 100 mL 02/22/21 1400       [REMOVED] Negative Pressure Wound Therapy Abdomen Medial;Upper (Removed)   Wound Type Surgical 02/17/21 2200   Dressing Type Other (Comment) 02/17/21 2200   Cycle Continuous 02/17/21 2200   Target Pressure (mmHg) 125 02/17/21 2200   Dressing Status Clean;Dry; Intact 02/17/21 2200   Drainage Amount Scant 02/17/21 2200   Drainage Description Serosanguinous 02/17/21 2200   Output (ml) 200 ml 02/17/21 1356   Santa-wound Assessment Dry; Intact 02/17/21 2200       [REMOVED] Colostomy RLQ (Removed)   Stomal Appliance 1 piece 02/18/21 0000   Stoma  Assessment Moist 02/18/21 1000   Mucocutaneous Junction Intact 02/18/21 1000   Peristomal Assessment Intact 02/18/21 1000   Stool Appearance Other (Comment) 02/18/21 1000   Output (mL) 100 ml 02/18/21 0600           Electronically signed by Olivia Grant MD on 2/22/2021 at 4:27 PM

## 2021-02-22 NOTE — PROGRESS NOTES
GENERAL SURGERY  DAILY PROGRESS NOTE  2/22/2021    Subjective:  Extubated last night. Awake and alert. States pain is 5/10. Going to OR with vascular surgery for closure of fasciotomy today. Objective:  BP (!) 168/76   Pulse 69   Temp 99.4 °F (37.4 °C) (Axillary)   Resp 26   Ht 5' 6\" (1.676 m)   Wt 260 lb 12.9 oz (118.3 kg)   LMP  (LMP Unknown)   SpO2 94%   BMI 42.09 kg/m²     GENERAL:  Intubated   HEAD: Normocephalic, atraumatic  LUNGS:  Tachypneic  CARDIOVASCULAR:  RR  ABDOMEN:  Soft, distended, and tender. dressing c/d/i. Ostomy dark pink with good stool output this morning. Abdominal wound is packed with wet to dry with kerlix   EXTREMITIES: RLE wrapped in ACE bandage  SKIN: Warm and dry    Assessment/Plan:  60 y.o. female with abdominal pain, portal venous gas S/p Ex Lap abdomen and SBR 2/16, Take back for ileocecectomy with end ostomy 2/17 and Right iliac thrombectomy with RLE fasciotomy on 2/17.    -Patient going to OR with Vascular Surgery for fasciotomy closure today  -Monitor ostomy  -Appreciate PV recommendations- sp R iliac thrombectomy with four compartment fasciotomies.  On heparin gtt, plan for closure today  -Appreciate ICU care    Electronically signed by Tonia Staples DO on 2/22/2021 at 8:27 AM

## 2021-02-23 NOTE — ANESTHESIA POSTPROCEDURE EVALUATION
Department of Anesthesiology  Postprocedure Note    Patient: Gloria Corrales  MRN: [id number]  YOB: 1955  Date of evaluation: 2/22/2021  Time:  9:14 PM     Procedure Summary     Date: 02/22/21 Room / Location: [facility] OR 03 / [facility]    Anesthesia Start: 1651 Anesthesia Stop: 1640    Procedure: RIGHT LOWER EXTREMITY FASCIOTOMY CLOSURE (Right Leg Lower) Diagnosis: (.)    Surgeons: Akhil Cao MD Responsible Provider: Pj Pisano MD    Anesthesia Type: general ASA Status: 4          Anesthesia Type: general    Brock Phase I: Brock Score: 9    Brock Phase II:      Last vitals: Reviewed and per EMR flowsheets.        Anesthesia Post Evaluation    Patient location during evaluation: PACU  Patient participation: complete - patient participated  Level of consciousness: awake and alert  Airway patency: patent  Nausea & Vomiting: no nausea and no vomiting  Complications: no  Cardiovascular status: hemodynamically stable  Respiratory status: acceptable  Hydration status: euvolemic

## 2021-02-23 NOTE — PLAN OF CARE
Problem: Falls - Risk of:  Goal: Will remain free from falls  Description: Will remain free from falls  Outcome: Met This Shift  Goal: Absence of physical injury  Description: Absence of physical injury  Outcome: Met This Shift     Problem: Skin Integrity:  Goal: Will show no infection signs and symptoms  Description: Will show no infection signs and symptoms  Outcome: Met This Shift  Goal: Absence of new skin breakdown  Description: Absence of new skin breakdown  Outcome: Met This Shift     Problem:  Bowel/Gastric:  Goal: Complications related to the disease process, condition or treatment will be avoided or minimized  Outcome: Met This Shift  Goal: Will be independent with ostomy care  Outcome: Met This Shift     Problem: Pain:  Goal: Pain level will decrease  Description: Pain level will decrease  Outcome: Met This Shift  Goal: Control of acute pain  Description: Control of acute pain  Outcome: Met This Shift  Goal: Control of chronic pain  Description: Control of chronic pain  Outcome: Met This Shift

## 2021-02-23 NOTE — PROGRESS NOTES
opacities with a possible small   left pleural effusion. XR CHEST PORTABLE   Final Result   Mild worsening of bilateral airspace opacities in left lower lung may   represent ongoing pneumonia. Lines and tubes are unchanged      XR CHEST PORTABLE   Final Result   1. Prominent bilateral pulmonary infiltrates consistent with pneumonia,   worsened as of yesterday. 2.  The life-support lines and tubes are well positioned. XR CHEST PORTABLE   Final Result   1. Slight radiographic improvement of aeration of the peripheries both   lungs, more significant bi-apically, although severe, bilateral perihilar,   mixed alveolar/interstitial patchy infiltrates persist.   2.  Otherwise, no significant change. .      XR CHEST PORTABLE   Final Result   Marked worsening of bilateral pulmonary infiltrates      XR CHEST PORTABLE   Final Result   1. No interval change in the multifocal bilateral pulmonary infiltrates more   prominent within the right lung. CTA ABDOMEN PELVIS W CONTRAST   Final Result   1. There is a small amount of nonocclusive thrombus in proximal SMA. There   is some occlusive thrombus involving branch of the SMA. 2. There is some persistent but improved mesenteric venous gas in the pelvis   as well as some persistent but improved pneumatosis intestinalis involving   lower abdominal and pelvic bowel loops. Portal venous gas in the liver has   resolved. 3. Occluded right common iliac artery with reconstituted flow within mid to   distal right external iliac artery and within right internal iliac artery. CT runoff of lower extremities performed concurrently. There is flow seen   throughout lower extremity arteries without evidence of stenosis. 4. Mild ascites. 5. Small amount of free air presumed postoperative   I discussed findings by phone with Dr. Drake Emery at 2:55 a.m. on 02/17/2021. CTA CHEST W CONTRAST   Final Result   Suboptimal opacification of the pulmonary arteries.   No evidence of central   pulmonary embolism. The peripheral pulmonary arteries are inadequately   assessed. Moderate size area of consolidation at the left lower lobe and a small area   of consolidation at the right lower lobe which could represent atelectasis   but is concerning for multifocal airspace disease process. Scattered areas   of patchy and ground-glass opacities are noted in the bilateral lungs which   are nonspecific but could indicate an atypical/viral pneumonia      CTA ABDOMINAL AORTA W BILAT RUNOFF W CONTRAST   Final Result   1. There is a small amount of nonocclusive thrombus in proximal SMA. There   is some occlusive thrombus involving branch of the SMA. 2. There is some persistent but improved mesenteric venous gas in the pelvis   as well as some persistent but improved pneumatosis intestinalis involving   lower abdominal and pelvic bowel loops. Portal venous gas in the liver has   resolved. 3. Occluded right common iliac artery with reconstituted flow within mid to   distal right external iliac artery and within right internal iliac artery. CT runoff demonstrates flow throughout lower extremity arteries without   evidence of stenosis. 4. Mild ascites. 5. Small amount of free air presumed postoperative. I discussed findings by phone with Dr. Kaylene Silva at 2:55 a.m. on 02/17/2021. XR CHEST PORTABLE   Final Result   1. Medical support devices as above. 2.  Atherosclerotic disease and mild cardiomegaly. 3.  Ill-defined opacities in the right greater than left lungs. No pleural   effusion or pneumothorax.   No change from yesterday's exam.      XR CHEST PORTABLE    (Results Pending)       Assessment    Principal Problem:    Ischemic necrosis of small bowel (HCC)  Active Problems:    Tobacco abuse    Superior mesenteric artery thrombosis (HCC)    Thrombosis of right iliac artery (HCC)    Ischemia of right lower extremity    DM (diabetes mellitus), type 2, uncontrolled ([facility] Shock ([facility] [address]    MEG (acute kidney injury) ([facility] [address]    Septic shock ([facility] [address]    Pneumonia due to infectious organism    Ischemic bowel disease ([facility] [address]  Resolved Problems:    * No resolved hospital problems. *      Plan:  71-year-old female history of tobacco abuse, type 2 diabetes admitted initially to [facility]  transferred to SICU at [address] [place] [address]  with small bowel necrosis status post ex lap extensive small bowel excision left open 2/16,-->closed 2/17 sp right lower extremity thrombectomy and fasciotomies 2/17.   Status post fasciotomy closure 2/22     Admitted to SICU  Extubated 2/21  IV fluid resuscitation completed  Solu-Cortef weaned as tolerated  VasopressorsLevophed wean-as tolerated--off  IV antibiotics-- Zosyn/Doxy  Insulin drip-->Basal/SSI  Heparin drip completed  Monitor labs closely  Nutritiontube feedstrickle  Critical care consult appreciated  Surgery consult appreciated   Vascular consult appreciated  Heme/Onc Consult appreciated   LTAC today    Electronically signed by Mary Holland MD on 2/23/2021 at 12:17 PM

## 2021-02-23 NOTE — PROGRESS NOTES
GENERAL SURGERY  DAILY PROGRESS NOTE  2/23/2021    Subjective:  Underwent fasciotomy closure yesterday. States her stomach is good, no pain. Objective:  BP (!) 170/68   Pulse 82   Temp 98.3 °F (36.8 °C) (Oral)   Resp 20   Ht 5' 6\" (1.676 m)   Wt 260 lb (117.9 kg)   LMP  (LMP Unknown)   SpO2 95%   BMI 41.97 kg/m²     GENERAL: Awake, alert, in no acute distress  HEAD: Normocephalic, atraumatic  LUNGS:  Tachypneic  CARDIOVASCULAR:  RR  ABDOMEN:  Soft, distended, and tender. dressing c/d/i. Ostomy dark pink with good stool output this morning. Abdominal wound is packed with wet to dry with kerlix   EXTREMITIES: RLE wrapped in ACE bandage  SKIN: Warm and dry    Assessment/Plan:  60 y.o. female with abdominal pain, portal venous gas S/p Ex Lap abdomen and SBR 2/16, Take back for ileocecectomy with end ostomy 2/17 and Right iliac thrombectomy with RLE fasciotomy on 2/17.  Fasciotomy closure on 2/22    -Monitor ostomy  -Appreciate PV recommendations- sp R iliac thrombectomy with four compartment fasciotomies and subsequent closure, on heparin gtt  -Appreciate ICU care    Electronically signed by Mavis Aguilar DO on 2/23/2021 at 8:32 AM

## 2021-02-23 NOTE — PROGRESS NOTES
[facility]  SURGICAL INTENSIVE CARE UNIT    CRITICAL CARE ATTENDING PROGRESS NOTE    I have examined the patient, reviewed the record, and discussed the case with the APN/  Resident. I have reviewed all relevant labs and imaging data. Please refer to the  APN/ resident's note. I agree with the  assessment and plan with the following corrections/ additions. The following summarizes my clinical findings and independent assessment. CC: Ischemic gut    Subjective: Patient underwent right lower extremity fasciotomy closure yesterday. She remains on 15 L high flow nasal cannula    HOSPITAL COURSE:  2/16 ex lap small bowel resection open abdomen. Transfer to [facility]  2/17 Second Look, ilealcecectomy me with small bowel resection and end jejunostomy  RLE thrombectomy and right lower extremity fasciotomy  2/21 extubated  2/22 right lower extremity fasciotomy closed by vascular    EXAM:  On 15 L high flow nasal cannula  Awake and alert  Tachycardic  Lungs coarse  Abdomen is soft appropriately tender incision open packed ileostomy pink and functional      ASSESSMENT:  Principal Problem:    Ischemic necrosis of small bowel ([address]  Active Problems:    Tobacco abuse    Superior mesenteric artery thrombosis (HCC)    Thrombosis of right iliac artery (HCC)    Ischemia of right lower extremity    DM (diabetes mellitus), type 2, uncontrolled ([address]    Shock ([address]    MEG (acute kidney injury) ([address]    Septic shock ([address]    Pneumonia due to infectious organism    Ischemic bowel disease ([address]  Resolved Problems:    * No resolved hospital problems.  *       PLAN:  Sedation/ Pain: Oxycodone as needed,   Dilaudid as needed, scheduled Tylenol       Pulmonary: Acute respiratory insufficiencyextubated 2/21continue aggressive pulmonary toilet  On 15 L high flow nasal cannulaneed to wean oxygen      Cardiovascular: History of Toprol  100 mg twice daily--we will start at low dose 25 mg twice daily    GI: Status post small bowel resection, end jejunostomy--on general diet. High output jejunostomy-2.6 L over 24 hours  Will will start Metamucil 1 packet 3 times a day and loperamide 2 mg 3 times daily    FEN: Creatinine 1monitor for now  Patient diuresing through her jejunostomy as well as her kidneys.  -2 L over 24 hours, overall 12 L positive  We will need to monitor her electrolytes given her high output ostomy. ID: Antibiotics end today     Right lower extremity fasciotomyfasciotomy closed on 2/22 by vascular  Status post thrombectomy  Continue heparin drip for now. Endo: Monitor Blood Sugars. Target blood glucose less than 180 in the ICU. Stress dose steroidson hydrocortisone 100 mg every 8h--we will start tapering steroids  We will also cut back the Lantus from 15 twice daily to 10 twice daily    DVT prophylaxis--SCDS, heparin drip  GI Prophylaxis-- PPI  Lines--right IJ triple-lumen catheter2/16  CODE: FULL     DISPOSITION-Continue ICU Care    Critical care time exclusive of teaching and procedures =32 min     Pt needs continuous ICU monitoring because the patient is at risk for deterioration from a SMA thrombus, ischemic gut, respiratory insufficiency standpoint. Flores Aggarwal MD, FACS  2/23/2021  8:55 AM    NOTE: This report was transcribed using voice recognition software. Every effort was made to ensure accuracy; however, inadvertent computerized transcription errors may be present.

## 2021-02-23 NOTE — DISCHARGE SUMMARY
Physician Discharge Summary     Patient ID:  Tyree Danielson  [id number]  60 y.o.  1955    Admit date: 2/16/2021    Discharge date and time:  2/23/2021    Discharge Diagnoses: Principal Problem:    Ischemic necrosis of small bowel ([facility] [address]  Active Problems:    Tobacco abuse    Superior mesenteric artery thrombosis (HCC)    Thrombosis of right iliac artery (HCC)    Ischemia of right lower extremity    DM (diabetes mellitus), type 2, uncontrolled ([facility] [address]    Shock ([facility] [address]    MEG (acute kidney injury) ([facility] [address]    Septic shock ([facility] [address]    Pneumonia due to infectious organism    Ischemic bowel disease ([facility] [address]  Resolved Problems:    * No resolved hospital problems. *      Consults: IP CONSULT TO DIETITIAN  IP CONSULT TO INTERNAL MEDICINE  IP CONSULT TO HEM/ONC  IP CONSULT TO PODIATRY    Procedures: See below    Hospital Course: 80-year-old female history of tobacco abuse, type 2 diabetes admitted initially to [facility]  transferred to SICU at [address] [place] [address]  with small bowel necrosis status post ex lap extensive small bowel excision left open 2/16,-->closed 2/17 sp right lower extremity thrombectomy and fasciotomies 2/17.   Status post fasciotomy closure 2/22     Admitted to SICU  Extubated 2/21  IV fluid resuscitation completed  Solu-Cortef weaned as tolerated  VasopressorsLevophed wean-as tolerated--off  IV antibiotics-- Zosyn/Doxy  Insulin drip-->Basal/SSI  Heparin drip completed  Monitor labs closely  Nutritiontube feedstrickle  Critical care consult appreciated  Surgery consult appreciated   Vascular consult appreciated  Heme/Onc Consult appreciated   LTAC today    Discharge Exam:  See progress note from today    Condition:  Stable    Disposition: LTAC    Patient Instructions:   Current Discharge Medication List      START taking these medications    Details   albuterol (PROVENTIL) (2.5 MG/3ML) 0.083% nebulizer solution Take 3 mLs by nebulization 4 times daily  Qty: 120 each, Refills: 3 bisacodyl (DULCOLAX) 10 MG suppository Place 1 suppository rectally daily as needed for Constipation  Qty:        oxyCODONE (ROXICODONE) 5 MG immediate release tablet Take 1 tablet by mouth every 6 hours as needed for Pain for up to 3 days.   Qty: 12 tablet, Refills: 0    Comments: Reduce doses taken as pain becomes manageable  Associated Diagnoses: S/P small bowel resection      loperamide (IMODIUM) 1 MG/7.5ML LIQD solution Take 15 mLs by mouth 3 times daily  Qty: 150 mL, Refills: 0      psyllium (KONSYL) 28.3 % PACK Take 1 packet by mouth 3 times daily  Qty: 30 each      hydrocortisone sodium succinate PF (SOLU-CORTEF) 100 MG injection Infuse 1 mL intravenously every 8 hours for 3 days  Qty: 40 each      enoxaparin (LOVENOX) 120 MG/0.8ML injection Inject 0.8 mLs into the skin 2 times daily  Qty:  , Refills: 3      insulin glargine (LANTUS) 100 UNIT/ML injection vial Inject 10 Units into the skin 2 times daily  Qty: 1 vial, Refills: 3         CONTINUE these medications which have CHANGED    Details   metoprolol tartrate (LOPRESSOR) 25 MG tablet Take 1 tablet by mouth 2 times daily  Qty: 60 tablet, Refills: 3         CONTINUE these medications which have NOT CHANGED    Details   escitalopram (LEXAPRO) 10 MG tablet escitalopram 10 mg tablet      hydrALAZINE (APRESOLINE) 25 MG tablet hydralazine 25 mg tablet      nabumetone (RELAFEN) 500 MG tablet nabumetone 500 mg tablet      pregabalin (LYRICA) 200 MG capsule       atorvastatin (LIPITOR) 10 MG tablet Take 1 tablet by mouth daily  Qty: 30 tablet, Refills: 0      sucralfate (CARAFATE) 1 GM tablet Take 1 tablet by mouth 3 times daily (before meals)  Qty: 90 tablet, Refills: 5    Associated Diagnoses: Gastroesophageal reflux disease without esophagitis      omeprazole (PRILOSEC) 40 MG delayed release capsule Take 1 capsule by mouth daily         STOP taking these medications       Alcohol Swabs (GLOBAL ALCOHOL PREP EASE) 70 % PADS Comments:   Reason for Stopping: amLODIPine (NORVASC) 5 MG tablet Comments:   Reason for Stopping:         benzonatate (TESSALON) 100 MG capsule Comments:   Reason for Stopping:         cholestyramine (QUESTRAN) 4 g packet Comments:   Reason for Stopping:         diazePAM (VALIUM) 5 MG tablet Comments:   Reason for Stopping:         diclofenac (CATAFLAM) 50 MG tablet Comments:   Reason for Stopping:         FREESTYLE LITE strip Comments:   Reason for Stopping:         ibuprofen (ADVIL;MOTRIN) 800 MG tablet Comments:   Reason for Stopping:         LANTUS SOLOSTAR 100 UNIT/ML injection pen Comments:   Reason for Stopping:         HUMALOG KWIKPEN 200 UNIT/ML SOPN pen Comments:   Reason for Stopping:         Advocate Lancets MISC Comments:   Reason for Stopping:         lisinopril-hydroCHLOROthiazide (PRINZIDE;ZESTORETIC) 20-25 MG per tablet Comments:   Reason for Stopping:         nystatin (MYCOSTATIN) 382084 UNIT/GM powder Comments:   Reason for Stopping:         oxyCODONE-acetaminophen (PERCOCET) 5-325 MG per tablet Comments:   Reason for Stopping:         pramipexole (MIRAPEX) 1.5 MG tablet Comments:   Reason for Stopping:         triamcinolone (KENALOG) 0.1 % lotion Comments:   Reason for Stopping:         furosemide (LASIX) 20 MG tablet Comments:   Reason for Stopping:         glipiZIDE (GLUCOTROL XL) 2.5 MG extended release tablet Comments:   Reason for Stopping:         Insulin Pen Needle 32G X 4 MM MISC Comments:   Reason for Stopping:         alogliptin (NESINA) 25 MG TABS tablet Comments:   Reason for Stopping:         Handicap Placard MISC Comments:   Reason for Stopping:         albuterol sulfate HFA (PROAIR HFA) 108 (90 Base) MCG/ACT inhaler Comments:   Reason for Stopping:             Activity: activity as tolerated  Diet: regular diet    Follow-up with PCP after LTAC, vascular, surgery, Note that over 30 minutes was spent in preparing discharge papers, discussing discharge with patient, staff, consultants, medication review, arranging follow up, etc.    Signed:  Vikash Mike MD  2/23/2021  5:19 PM

## 2021-02-23 NOTE — PLAN OF CARE
Problem: Falls - Risk of:  Goal: Will remain free from falls  Description: Will remain free from falls  2/23/2021 0027 by Josh Reining  Outcome: Met This Shift  2/22/2021 1931 by Josh Reining  Outcome: Met This Shift  Goal: Absence of physical injury  Description: Absence of physical injury  2/23/2021 0027 by Josh Reining  Outcome: Met This Shift  2/22/2021 1931 by Josh Reining  Outcome: Met This Shift     Problem: Skin Integrity:  Goal: Will show no infection signs and symptoms  Description: Will show no infection signs and symptoms  2/23/2021 0027 by Josh Reining  Outcome: Met This Shift  2/22/2021 1931 by Josh Reining  Outcome: Met This Shift  Goal: Absence of new skin breakdown  Description: Absence of new skin breakdown  2/23/2021 0027 by Josh Reining  Outcome: Met This Shift  2/22/2021 1931 by Josh Reining  Outcome: Met This Shift     Problem:  Bowel/Gastric:  Goal: Complications related to the disease process, condition or treatment will be avoided or minimized  2/23/2021 0027 by Josh Reining  Outcome: Met This Shift  2/22/2021 1931 by Josh Reining  Outcome: Met This Shift  Goal: Will be independent with ostomy care  2/23/2021 0027 by Josh Reining  Outcome: Met This Shift  2/22/2021 1931 by Josh Reining  Outcome: Met This Shift     Problem: Pain:  Goal: Pain level will decrease  Description: Pain level will decrease  2/23/2021 0027 by Josh Reining  Outcome: Met This Shift  2/22/2021 1931 by Josh Reining  Outcome: Met This Shift  Goal: Control of acute pain  Description: Control of acute pain  2/23/2021 0027 by Josh Reining  Outcome: Met This Shift  2/22/2021 1931 by Josh Reining  Outcome: Met This Shift  Goal: Control of chronic pain  Description: Control of chronic pain  2/23/2021 0027 by Josh Reining  Outcome: Met This Shift  2/22/2021 1931 by Josh Reining  Outcome: Met This Shift

## 2021-02-23 NOTE — FLOWSHEET NOTE
ET Nurse(follow up) [id number]   Admit Date: 2/16/2021  9:32 PM    Reason for consult:  New ileostomy    Stoma assessment:     02/23/21 0915   Ileostomy Ileostomy   Placement Date: 02/17/21   Pre-existing: No  Inserted by: Dr. Reid Santa  Ileostomy Type: Ileostomy   Stomal Appliance 1 piece; Changed   Stoma  Assessment Protrudes; Moist;Red   Mucocutaneous Junction Intact   Peristomal Assessment Intact   Treatment Bag change  (skin care)   Stool Color Brown   Stool Appearance Watery   Output (mL) 150 ml   patient alert today    Plan:    Pouch changed  Will start teaching once out of intensive care    Mihaela Murray 2/23/2021 9:38 AM

## 2021-02-23 NOTE — CARE COORDINATION
[facility] has obtained ins auth. They will have room 4200a available at 4pm today. Informed pt and left Vm  with daughter Rocio. Call n-n to ext [phone].

## 2021-02-23 NOTE — DISCHARGE SUMMARY
Physician Discharge Summary     Patient ID:  Antoine Marcelo  [id number]  2 y.o.  1955    Admit date: 2/16/2021    Discharge date and time: No discharge date for patient encounter. Admitting Physician: Teofilo Poole MD     Admission Diagnoses: Ischemic bowel disease [facility]) [K55.9]    Discharge Diagnoses: Principal Problem:    Ischemic necrosis of small bowel ([facility]  Active Problems:    Tobacco abuse    Superior mesenteric artery thrombosis (HCC)    Thrombosis of right iliac artery (HCC)    Ischemia of right lower extremity    DM (diabetes mellitus), type 2, uncontrolled ([facility]    Shock ([facility]    MEG (acute kidney injury) ([facility]    Septic shock ([facility]    Pneumonia due to infectious organism    Ischemic bowel disease ([facility]  Resolved Problems:    * No resolved hospital problems. *      Admission Condition: serious    Discharged Condition: stable    Indication for Admission: Abdominal pain    Hospital Course/Procedures/Operation/treatments:    Patient was admitted after having an SMA thrombus for which she was peritoneal.  She was taken to the operating room for small bowel resection with ileocecectomy and end jejunostomy. She was found to have 120 cm of small bowel remaining. She is also seen by vascular surgery for which she had a right femoral cutdown with thrombectomy and fasciotomy of the right lower extremity. On 2/22 she was taken back to the operating room for closure of her fasciotomies. Her antibiotics and 2/23. She was transitioned from heparin drip to therapeutic Lovenox. She remained on 15 L nasal cannula but was [facility] and discharged on 2/23  She is also seen by podiatry for concern for embolus to left big toe without plans for surgery at this time.     Consults:   IP CONSULT TO DIETITIAN  IP CONSULT TO INTERNAL MEDICINE  IP CONSULT TO HEM/ONC  IP CONSULT TO PODIATRY    Significant Diagnostic Studies:   Ct Abdomen Pelvis W Iv Contrast Additional Contrast? Oral    Result Date: 2/16/2021  EXAMINATION: CT OF THE ABDOMEN AND PELVIS WITH CONTRAST 2/16/2021 3:34 pm TECHNIQUE: CT of the abdomen and pelvis was performed with the administration of intravenous contrast. Multiplanar reformatted images are provided for review. Dose modulation, iterative reconstruction, and/or weight based adjustment of the mA/kV was utilized to reduce the radiation dose to as low as reasonably achievable. COMPARISON: 02/15/2021. HISTORY: ORDERING SYSTEM PROVIDED HISTORY: portal venous gas, severe abd pain TECHNOLOGIST PROVIDED HISTORY: Additional Contrast?->Oral Reason for exam:->portal venous gas, severe abd pain FINDINGS: The lung bases demonstrate persistent multifocal infiltrates without change concerning for multifocal pneumonia/COVID-19. There is mild coronary artery calcification. There is persistent but slightly improved portal venous air in the liver. Gallbladder is absent. Spleen, and the pancreas appear normal.  Bilateral adrenal masses are identified with the larger 1 on the right side measuring 2.2 x 3.1 cm. Kidneys are normal.  There is severe atherosclerotic plaque involving the aorta and iliac arteries with a subtotal occlusion of right common iliac artery. Degenerative changes are identified in the lumbar spine. There is extensive superior mesenteric venous air in the upper abdomen. Dilated fluid-filled proximal small bowel loops measuring up to 3.5 cm are identified with the pneumatosis intestinalis of the small bowel. Bowel loops appear somewhat thickened and inflamed. Severe enteritis or ischemic bowel are suspected. The edema inflammation may be causing low-grade bowel obstruction. The distal small bowel loops and colon are collapsed. Pelvis. Bladder is distended. There is diverticulosis of colon which is collapsed. Small amount of inflammatory ascites is present in the pelvis. Improving portal venous air with persistent but slightly improved mesenteric venous air.   There is thoracic soft tissue structures demonstrate no acute findings. Cholecystectomy clips in the right upper quadrant. 1.  Medical support devices as above. 2.  Atherosclerotic disease and mild cardiomegaly. 3.  Ill-defined opacities in the right greater than left lungs. No pleural effusion or pneumothorax. No change from yesterday's exam.    Cta Abdominal Aorta W Bilat Runoff W Contrast    Result Date: 2/17/2021  EXAMINATION: CTA OF THE AORTA WITH LOWER EXTREMITY RUNOFF 2/17/2021 1:40 am TECHNIQUE: CTA of the pelvis and bilateral lower extremities was performed after the administration of intravenous contrast.   Multiplanar reformatted images are provided for review. MIP images are provided for review. Dose modulation, iterative reconstruction, and/or weight based adjustment of the mA/kV was utilized to reduce the radiation dose to as low as reasonably achievable. COMPARISON: 02/16/2021 HISTORY: ORDERING SYSTEM PROVIDED HISTORY: Cold left foot TECHNOLOGIST PROVIDED HISTORY: Reason for exam:->Cold left foot What reading provider will be dictating this exam?->CRC FINDINGS: Nonvascular Lower Chest: There is some lower lobe consolidation bilaterally. There is no pleural fluid. Organs: Portal venous gas previously seen in the liver has resolved. The liver, spleen, pancreas and kidneys demonstrate no significant abnormality. Adrenal gland masses are unchanged. GI/Bowel: There is no evidence of bowel obstruction. [de-identified] of previously seen pneumatosis intestinalis has resolved. There is probably minimal residual involving a pelvic bowel loop. There is some adjacent mesenteric venous gas although diminished as compared to prior. There is sigmoid diverticulosis but no diverticulitis. Pelvis: There is a Bates catheter in the bladder. Air in the bladder was likely introduced by the catheter. There is no abnormal pelvic mass. Previously seen pelvic ascites is nearly resolved.  Peritoneum/Retroperitoneum: There is mild 2/17/2021 1:40 am TECHNIQUE: CTA of the chest was performed after the administration of intravenous contrast.  Multiplanar reformatted images are provided for review. MIP images are provided for review. Dose modulation, iterative reconstruction, and/or weight based adjustment of the mA/kV was utilized to reduce the radiation dose to as low as reasonably achievable. COMPARISON: None. HISTORY: ORDERING SYSTEM PROVIDED HISTORY: rule out PE TECHNOLOGIST PROVIDED HISTORY: Reason for exam:->rule out PE What reading provider will be dictating this exam?->CRC FINDINGS: An endotracheal and enteric tubes are noted in place and are appropriately positioned. Pulmonary Arteries: Pulmonary arteries are inadequately opacified due to suboptimal contrast bolus timing. No evidence of central pulmonary embolism. The peripheral pulmonary arteries are inadequately assessed. The main pulmonary artery is normal in caliber. Mediastinum: No evidence of mediastinal lymphadenopathy. The heart and pericardium demonstrate no acute abnormality. There is no acute abnormality of the thoracic aorta. Lungs/pleura: There is a moderate size area of consolidation at the left lower lobe and a small area of consolidation at the right lower lobe which could represent atelectasis but is concerning for multifocal airspace disease process. Scattered areas of patchy and ground-glass opacities are noted in the bilateral lungs which are nonspecific but could indicate an atypical/viral pneumonia. Upper Abdomen: Limited images of the upper abdomen are unremarkable. Soft Tissues/Bones: No acute bone or soft tissue abnormality. Suboptimal opacification of the pulmonary arteries. No evidence of central pulmonary embolism. The peripheral pulmonary arteries are inadequately assessed.  Moderate size area of consolidation at the left lower lobe and a small area of consolidation at the right lower lobe which could represent atelectasis but is concerning for multifocal airspace disease process. Scattered areas of patchy and ground-glass opacities are noted in the bilateral lungs which are nonspecific but could indicate an atypical/viral pneumonia    Cta Abdomen Pelvis W Contrast    Result Date: 2/17/2021  EXAMINATION: CTA OF THE ABDOMEN AND PELVIS WITH CONTRAST 2/17/2021 1:40 am: TECHNIQUE: CTA of the abdomen and pelvis was performed with the administration of intravenous contrast. Multiplanar reformatted images are provided for review. MIP images are provided for review. Dose modulation, iterative reconstruction, and/or weight based adjustment of the mA/kV was utilized to reduce the radiation dose to as low as reasonably achievable. COMPARISON: CT abdomen and pelvis  02/16/2021 HISTORY: ORDERING SYSTEM PROVIDED HISTORY: SMA thrombus? TECHNOLOGIST PROVIDED HISTORY: Reason for exam:->SMA thrombus? What reading provider will be dictating this exam?->CRC FINDINGS: CT/CTA ABDOMEN: At the proximal aspect of the SMA there is a small amount of nonocclusive thrombus. More distally there is a branch of SMA which appears occluded. The celiac trunk and axis are patent without stenosis. Renal arteries are patent without stenosis. There is calcified and noncalcified plaque in the distal abdominal aorta. There is a small amount of free air present which is likely postsurgical. There is a small collection of fluid in the left upper quadrant measuring 4.5 cm. There is no portal venous gas in the liver. There is a minimal amount of ascites seen around the liver and spleen. There are no findings of intestinal obstruction. The appendix is normal. 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 CT runoff of the lower extremities which was performed concurrently demonstrates flow throughout lower extremity arteries.  There is a small amount of mesenteric gas seen Constipation  Qty:        oxyCODONE (ROXICODONE) 5 MG immediate release tablet Take 1 tablet by mouth every 6 hours as needed for Pain for up to 3 days. Qty: 12 tablet, Refills: 0    Comments: Reduce doses taken as pain becomes manageable  Associated Diagnoses: S/P small bowel resection      loperamide (IMODIUM) 1 MG/7.5ML LIQD solution Take 15 mLs by mouth 3 times daily  Qty: 150 mL, Refills: 0      psyllium (KONSYL) 28.3 % PACK Take 1 packet by mouth 3 times daily  Qty: 30 each      hydrocortisone sodium succinate PF (SOLU-CORTEF) 100 MG injection Infuse 1 mL intravenously every 8 hours for 3 days  Qty: 40 each      enoxaparin (LOVENOX) 120 MG/0.8ML injection Inject 0.8 mLs into the skin 2 times daily  Qty:  , Refills: 3      insulin glargine (LANTUS) 100 UNIT/ML injection vial Inject 10 Units into the skin 2 times daily  Qty: 1 vial, Refills: 3              Details   metoprolol tartrate (LOPRESSOR) 25 MG tablet Take 1 tablet by mouth 2 times daily  Qty: 60 tablet, Refills: 3              Details   escitalopram (LEXAPRO) 10 MG tablet escitalopram 10 mg tablet      hydrALAZINE (APRESOLINE) 25 MG tablet hydralazine 25 mg tablet      nabumetone (RELAFEN) 500 MG tablet nabumetone 500 mg tablet      pregabalin (LYRICA) 200 MG capsule       atorvastatin (LIPITOR) 10 MG tablet Take 1 tablet by mouth daily  Qty: 30 tablet, Refills: 0      sucralfate (CARAFATE) 1 GM tablet Take 1 tablet by mouth 3 times daily (before meals)  Qty: 90 tablet, Refills: 5    Associated Diagnoses: Gastroesophageal reflux disease without esophagitis      omeprazole (PRILOSEC) 40 MG delayed release capsule Take 1 capsule by mouth daily                   SURGERY DISCHARGE INSTRUCTIONS    You may be drowsy or lightheaded after receiving sedation or anesthesia. A responsible person should be with you for the next 24 hours. · FOLLOW UP: Call office to schedule follow-up appointment in 2 weeks. · DIET: Advance your diet as tolerated.   You will need to be vigilant on your ostomy output as this can cause renal failure if you do not stay hydrated. You will need to replace what you lose from your ostomy with oral rehydration. ·     · ACTIVITY: Rest today. Increase activity gradually. No driving for 1 day. No driving while on prescription pain medication. No heavy lifting for 4 weeks - nothing over 10 lbs, or heavier than a milk jug.    · SHOWER/BATHING: Okay to shower in 24 hours after procedure. No tub bathing, swimming or soaking     · WOUND CARE: You have wound care teaching for your ostomy. You will also need to have wet-to-dry dressings for your midline wound    · MEDICATIONS: Take as prescribed. You may take over the counter ibuprofen or tylenol for pain as directed, limit total amount of acetaminophen (tylenol) to 3 grams per 24-hour period. Okay to resume anticoagulant medication after 24hrs. You may experience constipation while taking pain medication, you may take over the counter stool softeners (Docusate/Colace or Senokot-S) as directed. SPECIAL INSTRUCTIONS:   Call physician if they or any other problems occur:  - Call the office if you have a fever over >101F, or if your incision becomes red, tender, or drains more than a small amount of clear fluid  - Fever over 101°    - Redness, swelling, hardness or warmth at the operative site  - Unrelieved nausea    - Foul smelling or cloudy drainage at the operative site   - Unrelieved pain    - Blood soaked dressing (Some oozing may be normal)    Also see JARED        Follow up:   Gurvinder Brink MD  [address] [place] [address]  [phone]    Schedule an appointment as soon as possible for a visit in 2 weeks  For wound re-check    Stephane Walton MD  [address] [place] [address].   [place] [address]  [phone]    Schedule an appointment as soon as possible for a visit  For wound re-check    Frederick Vallejo [address] [facility]  [phone] Iam Gama MD  [facility].   [facility] [address]  [phone]             Signed:  Electronically signed by Juanita Carvajal MD on 2/23/21 at 12:13 PM EST

## 2021-02-23 NOTE — CONSULTS
COMPARTMENT DECOMPRESSION Right 2/22/2021    RIGHT LOWER EXTREMITY FASCIOTOMY CLOSURE performed by Walker Love MD at [address]  5/27/2016    Dr Anshu Emery  5/3/2012         EMG  5/19/2015    Dr Sharri Anand, radiculopathy    ESOPHAGUS SURGERY  08/26/2016    Dr Yennifer Suarez ablation   Tish Bulla N/A 2/16/2021    DIAGNOSTIC LAPAROSCOPY, CONVERTED TO LAPAROTOMY WITH SMALL BOWEL RESECTION, WITH APPLICATION OF ABTHERA WOUND VAC performed by Emigdio Romero MD at [email or web address]  4/2015    [facility], Dr. Penny Ambrocio POLYSOMNOGRAPHY  04/10/2017    Dr Terrance Olvera sleep apnea AHI-8    POLYSOMNOGRAPHY  05/15/2017    SMALL INTESTINE SURGERY N/A 2/17/2021    RIGHT ILLEOSTOMY AND RIGHT COLECTOMY performed by Emigdio Romero MD at [address]  5/27/2016    Dr Kelsey Srinivasan  07/14/2016    Dr Parker Sánchez Right 2/17/2021    Iliac Thrombectomy with Right Lower Extremity Fasciotomy performed by Walker Love MD at [facility] OR       Family History:  Family History   Problem Relation Age of Onset    Diabetes Mother     High Blood Pressure Mother     Cancer Mother         BREAST    Cancer Brother         THROAT    Heart Disease Brother        Allergies: Allergies   Allergen Reactions    Lisinopril Swelling    Procardia [Nifedipine] Anaphylaxis    Metformin And Related      Severe diarrhea       I reviewed the patient's past medical and surgical history, Medications and Allergies.       Current Facility-Administered Medications:     oxyCODONE (ROXICODONE) immediate release tablet 5 mg, 5 mg, Oral, Q4H PRN **OR** oxyCODONE HCl (OXY-IR) immediate release tablet 10 mg, 10 mg, Oral, Q4H PRN, Ling Medeiros MD, 10 mg at 02/23/21 0803    HYDROmorphone (DILAUDID) injection 1 mg, 1 mg, Intravenous, Q4H PRN, Alireza Carter MD, 1 mg at 02/23/21 1043    loperamide (IMODIUM) 1 MG/7.5ML solution 2 mg, 2 mg, Oral, TID, Tatianna العراقي MD, 2 mg at 02/23/21 0942    psyllium (KONSYL) 28.3 % packet 1 packet, 1 packet, Oral, TID, Tatianna العراقي MD, 1 packet at 02/23/21 0941    hydrocortisone sodium succinate PF (SOLU-CORTEF) injection 50 mg, 50 mg, Intravenous, Q8H, Tatianna العراقي MD    insulin glargine (LANTUS) injection vial 10 Units, 10 Units, Subcutaneous, BID, Tatianna العراقي MD    potassium bicarb-citric acid (EFFER-K) effervescent tablet 40 mEq, 40 mEq, Oral, BID, Tatianna العراقي MD, 40 mEq at 02/23/21 0941    metoprolol tartrate (LOPRESSOR) tablet 25 mg, 25 mg, Oral, BID, Tatianna العراقي MD, 25 mg at 02/23/21 1578    enoxaparin (LOVENOX) injection 120 mg, 1 mg/kg, Subcutaneous, BID, Tatianna العراقي MD    acetaminophen (TYLENOL) tablet 650 mg, 650 mg, Oral, Q6H, 650 mg at 02/23/21 0802 **OR** [DISCONTINUED] acetaminophen (TYLENOL) suppository 650 mg, 650 mg, Rectal, Q6H PRN, Edgar May MD, 650 mg at 02/17/21 1626    labetalol (NORMODYNE;TRANDATE) injection 10 mg, 10 mg, Intravenous, Q30 Min PRN, Dorene Shepherd MD, 10 mg at 02/21/21 1907    nitroGLYCERIN (NITROSTAT) SL tablet 0.4 mg, 0.4 mg, Sublingual, Q5 Min PRN, Dorene Shepherd MD, 0.4 mg at 02/21/21 1747    insulin lispro (HUMALOG) injection vial 0-18 Units, 0-18 Units, Subcutaneous, Q4H, Gemma Pelayo MD, 3 Units at 02/23/21 0804    glucose (GLUTOSE) 40 % oral gel 15 g, 15 g, Oral, PRN, Edgar May MD    dextrose 50 % IV solution, 12.5 g, Intravenous, PRN, Edgar May MD    glucagon (rDNA) injection 1 mg, 1 mg, Intramuscular, PRN, Edgar May MD    dextrose 5 % solution, 100 mL/hr, Intravenous, PRN, Edgar May MD    doxycycline (VIBRAMYCIN) 100 mg in dextrose 5 % 100 mL IVPB, 100 mg, Intravenous, Q12H, Gemma Pelayo MD, Stopped at 02/22/21 3208    bisacodyl (DULCOLAX) suppository 10 mg, 10 mg, Rectal, Daily PRN, Rome Olivas MD    [DISCONTINUED] promethazine (PHENERGAN) tablet 12.5 mg, 12.5 mg, Oral, Q6H PRN **OR** ondansetron (ZOFRAN) injection 4 mg, 4 mg, Intravenous, Q6H PRN, Rome Olivas MD, 4 mg at 02/21/21 2304    sodium chloride flush 0.9 % injection 10 mL, 10 mL, Intravenous, 2 times per day, Rome Olivas MD, 10 mL at 02/23/21 0806    sodium chloride flush 0.9 % injection 10 mL, 10 mL, Intravenous, PRN, Rome Olivas MD, 10 mL at 02/22/21 0220    0.9 % sodium chloride infusion, 25 mL, Intravenous, Q8H, Rome Olivas MD, Stopped at 02/23/21 0428    albuterol (PROVENTIL) nebulizer solution 2.5 mg, 2.5 mg, Nebulization, 4x daily, Rome Olivas MD, 2.5 mg at 02/23/21 0904    pantoprazole (PROTONIX) injection 40 mg, 40 mg, Intravenous, Daily, 40 mg at 02/23/21 0803 **AND** sodium chloride (PF) 0.9 % injection 10 mL, 10 mL, Intravenous, Daily, Rome Olivas MD, 10 mL at 02/23/21 0804    piperacillin-tazobactam (ZOSYN) 3,375 mg in dextrose 5 % 100 mL IVPB extended infusion (mini-bag), 3,375 mg, Intravenous, Q8H, Devan Johnston MD, Last Rate: 25 mL/hr at 02/23/21 0802, 3,375 mg at 02/23/21 0802     REVIEW OF SYSTEMS:    10 point review of systems is negative unless otherwise noted below:  History obtained from chart review        PHYSICAL EXAM:       Vitals:    02/23/21 0900 02/23/21 0904 02/23/21 0941 02/23/21 1000   BP:   (!) 130/50    Pulse: 78  80 87   Resp: 26   26   Temp:    98 °F (36.7 °C)   TempSrc:    Axillary   SpO2: 98% 97%  95%   Weight:       Height:           VASCULAR:  DP and PT pulses are palpable. CFT within normal limits to toes 3-5 right foot. Warm to cool from the tibial tuberosity to the distal aspect of the digits dorsally. Hair growth absent to the distal aspects dorsally.   NEUROLOGIC: Deferred, patient unable to follow along  DERM:  Skin is intact and well hydrated to the bilateral lower extremities. No rashes lesions or ulcers. Toes 1-2 on the right foot are cyanotic, dusky appearance.   MUSCULOSKELETAL: 4/5 for all pedal muscle groups         LABS:  CBC with Differential:    Lab Results   Component Value Date    WBC 13.9 02/23/2021    RBC 3.07 02/23/2021    HGB 9.0 02/23/2021    HCT 28.3 02/23/2021     02/23/2021    MCV 92.2 02/23/2021    MCH 29.3 02/23/2021    MCHC 31.8 02/23/2021    RDW 14.1 02/23/2021    SEGSPCT 46 04/12/2013    METASPCT 0.9 02/23/2021    LYMPHOPCT 9.6 02/23/2021    PROMYELOPCT 0.9 02/23/2021    MONOPCT 5.2 02/23/2021    MYELOPCT 0.9 02/23/2021    BASOPCT 0.2 02/23/2021    MONOSABS 0.70 02/23/2021    LYMPHSABS 1.39 02/23/2021    EOSABS 0.00 02/23/2021    BASOSABS 0.00 02/23/2021     Hemoglobin/Hematocrit:    Lab Results   Component Value Date    HGB 9.0 02/23/2021    HCT 28.3 02/23/2021       Scheduled Meds:   loperamide  2 mg Oral TID    psyllium  1 packet Oral TID    hydrocortisone sodium succinate PF  50 mg Intravenous Q8H    insulin glargine  10 Units Subcutaneous BID    potassium bicarb-citric acid  40 mEq Oral BID    metoprolol tartrate  25 mg Oral BID    enoxaparin  1 mg/kg Subcutaneous BID    acetaminophen  650 mg Oral Q6H    insulin lispro  0-18 Units Subcutaneous Q4H    doxycycline (VIBRAMYCIN) IV  100 mg Intravenous Q12H    sodium chloride flush  10 mL Intravenous 2 times per day    albuterol  2.5 mg Nebulization 4x daily    pantoprazole  40 mg Intravenous Daily    And    sodium chloride (PF)  10 mL Intravenous Daily    piperacillin-tazobactam  3,375 mg Intravenous Q8H       Continuous Infusions:   dextrose      sodium chloride Stopped (02/23/21 0428)       PRN Meds:.oxyCODONE **OR** oxyCODONE, HYDROmorphone, labetalol, nitroGLYCERIN, glucose, dextrose, glucagon (rDNA), dextrose, bisacodyl, [DISCONTINUED] promethazine **OR** ondansetron, sodium chloride flush    DIAGNOSTICS:  Echo Complete    Result Date: 2/22/2021 Transthoracic Echocardiography Report (TTE)  Demographics   Patient Name       CHIOMA Carmen Gender               Female   Medical Record     [id number]       Room Number          [place]  Number   Account #          [de-identified]      Procedure Date       02/22/2021   Corporate ID                      Ordering Physician   Kostas Fuchs MD   Accession Number   [id number]     Referring Physician   Date of Birth      1955     [address]   Age                60 year(s)     Interpreting         Ana Valladares MD                                    Physician                                     Any Other  Procedure Type of Study   TTE procedure:Echo Complete W/Doppler & Color Flow. Procedure Date Date: 02/22/2021 Start: 09:24 AM Study Location: Portable Technical Quality: Adequate visualization Indications:Hypotension. Patient Status: Routine Height: 66 inches Weight: 280 pounds BSA: 2.31 m^2 BMI: 45.19 kg/m^2 Rhythm: Within normal limits BP: 150/71 mmHg  Findings   Left Ventricle  Left ventricle size is normal.  Mild concentric left ventricular hypertrophy. Normal left ventricular systolic function. Ejection fraction is visually estimated at > 60%. Right Ventricle  Normal right ventricular size and function (TAPSE 2.8 cm). Left Atrium  Normal sized left atrium by volume index. Right Atrium  Normal sized right atrium. Mitral Valve  Physiologic and/or trace mitral regurgitation. No evidence of hemodynamically significant mitral stenosis. Tricuspid Valve  Physiologic and/or trace tricuspid regurgitation. Aortic Valve  No evidence of hemodynamically significant aortic regurgitation or  stenosis. Pulmonic Valve  The pulmonic valve was not well visualized. Pericardial Effusion  No evidence of a hemodynamically significant pericardial effusion. Aorta  Aortic root dimension within normal limits. Conclusions   Summary  Normal left ventricular systolic function.   Ejection fraction is visually estimated at > 60%. Normal right ventricular size and function (TAPSE 2.8 cm). Signature   ----------------------------------------------------------------  Electronically signed by Jacquelin Kaur MD(Interpreting  physician) on 02/22/2021 03:54 PM  ----------------------------------------------------------------  M-Mode/2D Measurements & Calculations   LV Diastolic    LV Systolic Dimension: 2.8  AV Cusp Separation: 2.1 cmLA  Dimension: 4.4  cm                          Dimension: 3.9 cmAO Root  cm              LV Volume Diastolic: 83.5   Dimension: 3.4 cm  LV FS:36.4 %    ml  LV PW           LV Volume Systolic: 30 ml  Diastolic: 1.1  LV EDV/LV EDV Index: 86.2  cm              ml/37 ml/m^2LV ESV/LV ESV   RV Diastolic Dimension: 2.6 cm  Septum          Index: 30 SE/75YG/ m^2  Diastolic: 1.2  EF Calculated: 65.2 %  cm              LV Mass Index: 78 l/min*m^2 LA volume/Index: 59.7 ml                                              /25.83ml/m^2  LV Mass: 179.96                             RA Area: 13 cm^2  g               LVOT: 2 cm  Doppler Measurements & Calculations   MV Peak E-Wave:    AV Peak Velocity: 1.77 m/s      LVOT Peak Velocity:  1.18 m/s           AV Peak Gradient: 12.56 mmHg    1.04 m/s  MV Peak A-Wave:    AV Mean Velocity: 1.2 m/s       LVOT Mean Velocity:  1.15 m/s           AV Mean Gradient: 6.3 mmHg      0.76 m/s  MV E/A Ratio: 1.03 AV VTI: 32.2 cm                 LVOT Peak Gradient: 4.3  MV Peak Gradient:  AV Area (Continuity):2.38 cm^2  mmHgLVOT Mean Gradient:  4.8 mmHg                                           2.5 mmHg  MV Mean Gradient:  LVOT VTI: 24.4 cm  2.1 mmHg           IVRT: 36.9 msec  MV Mean Velocity:  0.67 m/s           Pulm. Vein A Reversal  MV Deceleration    Duration:101.5 msec  Time: 205.6 msec   Pulm. Vein D Velocity:0.65      PV Peak Velocity: 1.28  MV P1/2t: 70.3     m/sPulm.  Vein A Reversal        m/s  msec               Velocity:0.33 m/s               PV Peak Gradient: 6.55  MVA by PHT:3.13    Pulm. Vein S Velocity: 0.62 m/s mmHg  cm^2                                               PV Mean Velocity: 0.96  MV Area                                            m/s  (continuity): 2.5                                  PV Mean Gradient: 4  cm^2                                               mmHg  MV E' Septal  Velocity: 0.08 m/s  MV E' Lateral  Velocity: 10 m/s  [id number]    Ct Abdomen Pelvis W Iv Contrast Additional Contrast? Oral    Result Date: 2/16/2021  EXAMINATION: CT OF THE ABDOMEN AND PELVIS WITH CONTRAST 2/16/2021 3:34 pm TECHNIQUE: CT of the abdomen and pelvis was performed with the administration of intravenous contrast. Multiplanar reformatted images are provided for review. Dose modulation, iterative reconstruction, and/or weight based adjustment of the mA/kV was utilized to reduce the radiation dose to as low as reasonably achievable. COMPARISON: 02/15/2021. HISTORY: ORDERING SYSTEM PROVIDED HISTORY: portal venous gas, severe abd pain TECHNOLOGIST PROVIDED HISTORY: Additional Contrast?->Oral Reason for exam:->portal venous gas, severe abd pain FINDINGS: The lung bases demonstrate persistent multifocal infiltrates without change concerning for multifocal pneumonia/COVID-19. There is mild coronary artery calcification. There is persistent but slightly improved portal venous air in the liver. Gallbladder is absent. Spleen, and the pancreas appear normal.  Bilateral adrenal masses are identified with the larger 1 on the right side measuring 2.2 x 3.1 cm. Kidneys are normal.  There is severe atherosclerotic plaque involving the aorta and iliac arteries with a subtotal occlusion of right common iliac artery. Degenerative changes are identified in the lumbar spine. There is extensive superior mesenteric venous air in the upper abdomen. suspected. Organs: Extensive portal venous gas noted in the liver and throughout the abdomen. Unremarkable spleen, pancreas, and right kidney. A 0.8 cm angiomyolipoma in the lateral cortex of the left kidney. Stable adrenal nodules bilaterally measuring up to 3 cm. These have been previously characterized as adenomas. GI/Bowel: Status post cholecystectomy. Multiple fluid-filled small bowel loops in the mid to lower abdomen which are slightly dilated. Findings may be due to acute enteritis or developing partial small bowel obstruction. Distal colonic diverticulosis. No acute diverticulitis. Suggestion of prior appendectomy. Pelvis: Status post hysterectomy. No adnexal mass. Grossly unremarkable urinary bladder. Peritoneum/Retroperitoneum: No free air or free fluid. No adenopathy. Vascular calcification with no abdominal aortic aneurysm. Bones/Soft Tissues: Status post L4 laminectomy with L4-L5 posterior pedicle screw fusion. Multilevel lumbar spondylosis, most prominent at L2-L3. Mild osteoarthritis of the bilateral hip joints. Multifocal pneumonia bilaterally. COVID-19 highly suspected. Extensive portal venous gas, which may be due to bowel ischemia, infectious/inflammatory enterocolitis, iatrogenic gastric and bowel dilatation, there are trauma, or less likely perforated peptic ulcer. Multiple fluid-filled and slightly dilated small bowel loops in the mid to lower abdomen, which may be due to acute enteritis. Distal colonic diverticulosis. No acute diverticulitis. Kingsley Huang Chest Portable    Result Date: 2/23/2021  EXAMINATION: ONE X-RAY VIEW OF THE CHEST 2/23/2021 7:03 am COMPARISON: 02/22/2021 HISTORY: ORDERING SYSTEM PROVIDED HISTORY:  Intubated TECHNOLOGIST PROVIDED HISTORY: Reason for exam:  Intubated What reading provider will be dictating this exam?  CRC FINDINGS: Multifocal bilateral pulmonary infiltrates are noted. The findings are unchanged.  The support lines and tubes are unchanged in position. The heart is enlarged. 1. No interval change in the appearance of the chest. 2. Significant multifocal bilateral pulmonary infiltrates are unchanged when compared with the prior studies. Xr Chest Portable    Result Date: 2/22/2021  EXAMINATION: ONE XRAY VIEW OF THE CHEST 2/22/2021 7:03 am COMPARISON: 2/21/2021 HISTORY: ORDERING SYSTEM PROVIDED HISTORY: intubated TECHNOLOGIST PROVIDED HISTORY: Reason for exam:->intubated What reading provider will be dictating this exam?->CRC FINDINGS: The support lines and tubes are unchanged in position. Multifocal bilateral pulmonary infiltrates are noted. There is a small left pleural effusion. 1. No interval change in extensive multifocal bilateral pulmonary infiltrates. 2. Small left pleural effusion. Xr Chest Portable    Result Date: 2/22/2021  EXAMINATION: ONE XRAY VIEW OF THE CHEST 2/22/2021 12:21 am COMPARISON: Multiple priors, most recent from earlier today HISTORY: ORDERING SYSTEM PROVIDED HISTORY: hypoxia TECHNOLOGIST PROVIDED HISTORY: Reason for exam:->hypoxia What reading provider will be dictating this exam?->CRC FINDINGS: Since the prior examination, the endotracheal tube has been removed. The enteric tube extends below the diaphragm, its tip not confidently visualized. A right internal jugular vein catheter is unchanged in position. There are persistent, grossly unchanged bilateral airspace opacities, predominantly in a perihilar distribution. Difficult to exclude small left pleural effusion. No evidence of a pneumothorax. Grossly unchanged patchy bilateral airspace opacities with a possible small left pleural effusion.     Xr Chest Portable    Result Date: 2/21/2021  EXAMINATION: ONE XRAY VIEW OF THE CHEST 2/21/2021 7:12 am COMPARISON: Chest radiograph February 20, 2021 HISTORY: ORDERING SYSTEM PROVIDED HISTORY: intubated TECHNOLOGIST PROVIDED HISTORY: Reason for exam:->intubated What reading provider will be dictating this exam?->CRC enteric tubes appear not significantly changed. There has been slight radiographic improvement of aeration of the peripheries both lungs, more significant bi-apically, although severe, bilateral perihilar, mixed alveolar/interstitial patchy infiltrates persist. There is probable moderate/severe central pulmonary vascular congestion, and at least borderline cardiomegaly, all partially obscured by the above processes. No other clinically-significant changes are noted. .    1. Slight radiographic improvement of aeration of the peripheries both lungs, more significant bi-apically, although severe, bilateral perihilar, mixed alveolar/interstitial patchy infiltrates persist. 2.  Otherwise, no significant change. Steph Prado Chest Portable    Result Date: 2/18/2021  EXAMINATION: ONE XRAY VIEW OF THE CHEST 2/18/2021 7:08 am COMPARISON: Comparison is dated February 17, 2021 HISTORY: ORDERING SYSTEM PROVIDED HISTORY: intubated TECHNOLOGIST PROVIDED HISTORY: Reason for exam:->intubated What reading provider will be dictating this exam?->CRC FINDINGS: Marked increase in diffuse bilateral pulmonary infiltrates with airspace disease. No pleural effusion identified. ET and OG tubes appear in satisfactory position. There is a right IJ central venous catheter with tip at the cavoatrial junction    Marked worsening of bilateral pulmonary infiltrates    Xr Chest Portable    Result Date: 2/17/2021  EXAMINATION: ONE XRAY VIEW OF THE CHEST 2/17/2021 12:01 am COMPARISON: Previous CT of the thorax of 02/17/2021 HISTORY: ORDERING SYSTEM PROVIDED HISTORY: intubated TECHNOLOGIST PROVIDED HISTORY: Reason for exam:->intubated FINDINGS: There is no interval change in the multifocal bilateral pulmonary infiltrates more prominent within the right lung. The support lines and tubes are unchanged in position. There is no pleural effusion. 1. No interval change in the multifocal bilateral pulmonary infiltrates more prominent within the right lung. Xr Chest Portable    Result Date: 2/16/2021  EXAMINATION: ONE XRAY VIEW OF THE CHEST 2/16/2021 10:54 pm COMPARISON: Chest series from February 15, 2021 HISTORY: ORDERING SYSTEM PROVIDED HISTORY: central line placement TECHNOLOGIST PROVIDED HISTORY: Reason for exam:->central line placement What reading provider will be dictating this exam?->CRC FINDINGS: Right internal jugular central venous catheter with distal tip projecting in the midthoracic spine. Endotracheal tube terminates in the midthoracic trachea. Enteric tube extends subdiaphragmatic with distal tip and side port projecting over the stomach bubble. Atherosclerotic disease and mild cardiomegaly. Pulmonary vascularity appears within normal limits. There are ill-defined opacities in the right greater than left lungs. Relative sparing of the left mid and upper lung. No pleural effusion or pneumothorax. Osseous and thoracic soft tissue structures demonstrate no acute findings. Cholecystectomy clips in the right upper quadrant. 1.  Medical support devices as above. 2.  Atherosclerotic disease and mild cardiomegaly. 3.  Ill-defined opacities in the right greater than left lungs. No pleural effusion or pneumothorax. No change from yesterday's exam.    Xr Chest Portable    Result Date: 2/15/2021  EXAMINATION: ONE XRAY VIEW OF THE CHEST 2/15/2021 4:45 pm COMPARISON: 08/31/2016 HISTORY: ORDERING SYSTEM PROVIDED HISTORY: shortness of breath TECHNOLOGIST PROVIDED HISTORY: Reason for exam:->shortness of breath FINDINGS: There are bilateral ground-glass densities and focal consolidative changes of the lungs. There is no effusion or pneumothorax. The cardiomediastinal silhouette is stable and mildly enlarged. The osseous structures are stable. Bilateral ground-glass densities and focal consolidative changes of the lungs concerning for multifocal pneumonia.     Cta Abdominal Aorta W Bilat Runoff W Contrast    Result Date: 2/17/2021  EXAMINATION: CTA OF THE AORTA WITH LOWER EXTREMITY RUNOFF 2/17/2021 1:40 am TECHNIQUE: CTA of the pelvis and bilateral lower extremities was performed after the administration of intravenous contrast.   Multiplanar reformatted images are provided for review. MIP images are provided for review. Dose modulation, iterative reconstruction, and/or weight based adjustment of the mA/kV was utilized to reduce the radiation dose to as low as reasonably achievable. COMPARISON: 02/16/2021 HISTORY: ORDERING SYSTEM PROVIDED HISTORY: Cold left foot TECHNOLOGIST PROVIDED HISTORY: Reason for exam:->Cold left foot What reading provider will be dictating this exam?->CRC FINDINGS: Nonvascular Lower Chest: There is some lower lobe consolidation bilaterally. There is no pleural fluid. Organs: Portal venous gas previously seen in the liver has resolved. The liver, spleen, pancreas and kidneys demonstrate no significant abnormality. Adrenal gland masses are unchanged. GI/Bowel: There is no evidence of bowel obstruction. [de-identified] of previously seen pneumatosis intestinalis has resolved. There is probably minimal residual involving a pelvic bowel loop. There is some adjacent mesenteric venous gas although diminished as compared to prior. There is sigmoid diverticulosis but no diverticulitis. Pelvis: There is a Bates catheter in the bladder. Air in the bladder was likely introduced by the catheter. There is no abnormal pelvic mass. Previously seen pelvic ascites is nearly resolved. Peritoneum/Retroperitoneum: There is mild ascites in the upper abdomen around the liver and spleen. Free intraperitoneal air is presumably postoperative. Bones/Soft Tissues: Surgical abdominal wound present anteriorly. No acute bony abnormality seen. VASCULAR There are aortoiliac atherosclerotic calcifications. There is no abdominal aortic aneurysm. Celiac trunk and axis are patent without stenosis. The SMA demonstrates a small amount of nonocclusive thrombus proximally. There is occlusive thrombus within branch of SMA extending to the right. There is some mural thrombus and noncalcified plaque in the distal abdominal aorta. There is occlusion of the right common iliac artery extending into proximal external iliac artery. There is reconstituted flow in mid to distal external iliac artery. There is also reconstituted flow in the right internal iliac artery. Left iliac artery is patent. Runoff images of the lower extremities demonstrate patent lower extremity arteries bilaterally. Flow to the right lower extremity is mildly diminished as compared to the left. Flow is visualized down to the level of the ankles/feet. 1. There is a small amount of nonocclusive thrombus in proximal SMA. There is some occlusive thrombus involving branch of the SMA. 2. There is some persistent but improved mesenteric venous gas in the pelvis as well as some persistent but improved pneumatosis intestinalis involving lower abdominal and pelvic bowel loops. Portal venous gas in the liver has resolved. 3. 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 CT runoff demonstrates flow throughout lower extremity arteries without evidence of stenosis. 4. Mild ascites. 5. Small amount of free air presumed postoperative. I discussed findings by phone with Dr. Rain Loaiza at 2:55 a.m. on 02/17/2021. Cta Chest W Contrast    Result Date: 2/17/2021  EXAMINATION: CTA OF THE CHEST 2/17/2021 1:40 am TECHNIQUE: CTA of the chest was performed after the administration of intravenous contrast.  Multiplanar reformatted images are provided for review. MIP images are provided for review. Dose modulation, iterative reconstruction, and/or weight based adjustment of the mA/kV was utilized to reduce the radiation dose to as low as reasonably achievable. COMPARISON: None.  HISTORY: ORDERING SYSTEM PROVIDED HISTORY: rule out PE TECHNOLOGIST PROVIDED HISTORY: Reason for exam:->rule out PE What reading provider will be dictating this exam?->CRC FINDINGS: An endotracheal and enteric tubes are noted in place and are appropriately positioned. Pulmonary Arteries: Pulmonary arteries are inadequately opacified due to suboptimal contrast bolus timing. No evidence of central pulmonary embolism. The peripheral pulmonary arteries are inadequately assessed. The main pulmonary artery is normal in caliber. Mediastinum: No evidence of mediastinal lymphadenopathy. The heart and pericardium demonstrate no acute abnormality. There is no acute abnormality of the thoracic aorta. Lungs/pleura: There is a moderate size area of consolidation at the left lower lobe and a small area of consolidation at the right lower lobe which could represent atelectasis but is concerning for multifocal airspace disease process. Scattered areas of patchy and ground-glass opacities are noted in the bilateral lungs which are nonspecific but could indicate an atypical/viral pneumonia. Upper Abdomen: Limited images of the upper abdomen are unremarkable. Soft Tissues/Bones: No acute bone or soft tissue abnormality. Suboptimal opacification of the pulmonary arteries. No evidence of central pulmonary embolism. The peripheral pulmonary arteries are inadequately assessed. Moderate size area of consolidation at the left lower lobe and a small area of consolidation at the right lower lobe which could represent atelectasis but is concerning for multifocal airspace disease process.   Scattered areas of patchy and ground-glass opacities are noted in the bilateral lungs which are nonspecific but could indicate an atypical/viral pneumonia    Cta Abdomen Pelvis W Contrast    Result Date: 2/17/2021  EXAMINATION: CTA OF THE ABDOMEN AND PELVIS WITH CONTRAST 2/17/2021 1:40 am: TECHNIQUE: CTA of the abdomen and pelvis was performed with the administration of intravenous contrast. Multiplanar reformatted images are provided for review. MIP images are provided for review. Dose modulation, iterative reconstruction, and/or weight based adjustment of the mA/kV was utilized to reduce the radiation dose to as low as reasonably achievable. COMPARISON: CT abdomen and pelvis  02/16/2021 HISTORY: ORDERING SYSTEM PROVIDED HISTORY: SMA thrombus? TECHNOLOGIST PROVIDED HISTORY: Reason for exam:->SMA thrombus? What reading provider will be dictating this exam?->CRC FINDINGS: CT/CTA ABDOMEN: At the proximal aspect of the SMA there is a small amount of nonocclusive thrombus. More distally there is a branch of SMA which appears occluded. The celiac trunk and axis are patent without stenosis. Renal arteries are patent without stenosis. There is calcified and noncalcified plaque in the distal abdominal aorta. There is a small amount of free air present which is likely postsurgical. There is a small collection of fluid in the left upper quadrant measuring 4.5 cm. There is no portal venous gas in the liver. There is a minimal amount of ascites seen around the liver and spleen. There are no findings of intestinal obstruction. The appendix is normal. 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 CT runoff of the lower extremities which was performed concurrently demonstrates flow throughout lower extremity arteries. There is a small amount of mesenteric gas seen in the lower abdomen/pelvis although diminished. Pneumatosis intestinalis involving bowel loops in the lower abdomen/pelvis also persists but has diminished. There is a Bates catheter in the bladder. Air in the bladder is likely introduced by Bates catheter. There is mild diverticulosis of the sigmoid colon. There is no acute diverticulitis seen. Previously seen pelvic ascites has improved.     1. There is a small amount of nonocclusive thrombus in proximal SMA. There is some occlusive thrombus involving branch of the SMA. 2. There is some persistent but improved mesenteric venous gas in the pelvis as well as some persistent but improved pneumatosis intestinalis involving lower abdominal and pelvic bowel loops. Portal venous gas in the liver has resolved. 3. 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 CT runoff of lower extremities performed concurrently. There is flow seen throughout lower extremity arteries without evidence of stenosis. 4. Mild ascites. 5. Small amount of free air presumed postoperative I discussed findings by phone with Dr. Monserrat Ch at 2:55 a.m. on 02/17/2021. Assessment  1. S/p right lower extremity thrombectomy with fasciotomy on 2/17/21  2. 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 - Reviewed patient's recent lab results and pertinent diagnostic imaging.  - Reviewed ancillary service notes. - Vascular following  - No surgical intervention at this time, will continue to watch for final demarcation  - Discussed patient with Dr. Julius Fisher DPM.  - Will continue to follow patient while they are in-house. Edith Arias DPM PGY-2  Cell: [phone]    Electronically signed by Amanda Moncada DPM on 2/23/2021 at 11:22 AM     Patient evaluated, discussed with resident in detail. Agree with findings, treatment plan as outlined. Any changes made by me as deemed necessary.

## 2021-02-23 NOTE — PROGRESS NOTES
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 ABDOMEN: At the proximal aspect of the SMA there is a small amount of nonocclusive thrombus. More distally there is a branch of SMA which appears occluded. The celiac trunk and axis are patent without stenosis. Renal arteries are patent without stenosis. There is calcified and noncalcified plaque in the distal abdominal aorta. There is a small amount of free air present which is likely postsurgical. There is a small collection of fluid in the left upper quadrant measuring 4.5 cm. There is no portal venous gas in the liver. There is a minimal amount of ascites seen around the liver and spleen. There are no findings of intestinal obstruction. The appendix is normal. 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 CT runoff of the lower extremities which was performed concurrently demonstrates flow throughout lower extremity arteries. There is a small amount of mesenteric gas seen in the lower abdomen/pelvis although diminished. Pneumatosis intestinalis involving bowel loops in the lower abdomen/pelvis also persists but has diminished. There is a Bates catheter in the bladder.   Air in the bladder is likely introduced by Bates catheter. There is mild diverticulosis of the sigmoid colon. There is no acute diverticulitis seen. Previously seen pelvic ascites has improved. 1. There is a small amount of nonocclusive thrombus in proximal SMA. There is some occlusive thrombus involving branch of the SMA. 2. There is some persistent but improved mesenteric venous gas in the pelvis as well as some persistent but improved pneumatosis intestinalis involving lower abdominal and pelvic bowel loops. Portal venous gas in the liver has resolved. 3. 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 CT runoff of lower extremities performed concurrently. There is flow seen throughout lower extremity arteries without evidence of stenosis. 4. Mild ascites. 5. Small amount of free air presumed postoperative I discussed findings by phone with Dr. Rain Loaiza at 2:55 a.m. on 02/17/2021. ASSESSMENT/PLAN :  46-year-old female  Diabetes, HTN, HLD,obesity  SMA thrombus, iliac thrombus    -CTA of the chest, abdomen, and pelvis with bilateral lower extremity runoff showed nonocclusive proximal SMA thrombus and a proximal branch of the SMA occluded with no flow.  It also showed extensive common iliac thrombus. - Status post iliac thrombectomy with right lower extremity fasciotomy done yesterday 2/17 w/ vascular  - S/p Ileocecectomy with creation of end jejunostomy and abdominal wall closure  - Continues on heparin drip  - She is a smoker which is a risk factor for both venous and arterial thrombosis. Agree with checking for hypercoagulable state due to extensive arterial thrombosis  - Check APLAs and thrombophilia panel  - Pt remains critically ill, on 2 pressors    Thank you for this consult.  Please call with further questions or concerns    2/19/21  - CBC with continuing leukocytosis and anemia hgb 10.  - DRVVT negative - APLAs and thrombophilia panel pending.   - Continues on heparin drip  - Continues on IV antibiotics   - Plan for right lower extremity fasciotomy closure on 2/22 2/23/21  - APLAs appear negative, though anti-cardiolipin IgA was the only one sent from this sub class  - Thrombophilia panel pending  - Transitioned to lovenox in anticipation of DC to [facility] [address], [place]  Electronically signed 2/23/2021 at 3:03 PM

## 2021-02-23 NOTE — DISCHARGE INSTR - COC
Continuity of Care Form    Patient Name: Aurelio Hollis   :  1955  MRN:  [id number]    Admit date:  2021  Discharge date:  2021    Code Status Order: Full Code   Advance Directives:      Admitting Physician:  Julissa David MD  PCP: Cindy Copeland MD    Discharging Nurse: [facility]/Room#: 9648/9055-Y  Discharging Unit Phone Number:   Emergency Contact:   Extended Emergency Contact Information  Primary Emergency Contact: [address] Phone: [phone]  Relation: Child    Past Surgical History:  Past Surgical History:   Procedure Laterality Date    ANTERIOR COMPARTMENT DECOMPRESSION Right 2021    RIGHT LOWER EXTREMITY FASCIOTOMY CLOSURE performed by Olegario Elliott MD at [facility]  2016    Dr Victorino Villalba  5/3/2012         EMG  2015    Dr Dom Lyon, radiculopathy    ESOPHAGUS SURGERY  2016    [facility] Keegan Sheffield [facility]    DIAGNOSTIC LAPAROSCOPY, CONVERTED TO LAPAROTOMY WITH SMALL BOWEL RESECTION, WITH APPLICATION OF ABTHERA WOUND VAC performed by Christian Blancas MD at [facility]  2015    [facility], Dr. Lashell Howard POLYSOMNOGRAPHY  04/10/2017    Dr Marlyn Ivy sleep apnea AHI-8    POLYSOMNOGRAPHY  05/15/2017    SMALL INTESTINE SURGERY N/A 2021    RIGHT ILLEOSTOMY AND RIGHT COLECTOMY performed by Christian Blancas MD at [address] [place] [address] ENDOSCOPY  2016    Dr Mae Ast  2016    Dr Francis Cast Right 2021    Iliac Thrombectomy with Right Lower Extremity Fasciotomy performed by Olegario Elliott MD at [address] [place] [address]       Immunization History:   Immunization History   Administered Date(s) Administered  Influenza Virus Vaccine 11/10/2015    Influenza, Karolina Cool, IM, (6 mo and older Fluzone, Flulaval, Fluarix and 3 yrs and older Afluria) 11/27/2017    Pneumococcal Polysaccharide ([id number]) 02/09/2011    Tdap (Boostrix, Adacel) 08/21/2013       Active Problems:  Patient Active Problem List   Diagnosis Code    Hypertension I10    Fibromyalgia M79.7    IBS (irritable bowel syndrome) K58.9    GERD (gastroesophageal reflux disease) K21.9    Cigarette nicotine dependence without complication Y99.078    Restless leg syndrome G25.81    Chronic back pain M54.9, G89.29    Adrenal mass (HCC) E27.8    DDD (degenerative disc disease) [id number]    Mild valvular heart disease I38    Vitamin D deficiency E55.9    Low ferritin level R79.0    Padron's esophagus K22.70    Peripheral edema R60.9    Post traumatic stress disorder (PTSD) F43.10    Recurrent major depressive disorder (HCC) F33.9    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    Fatty infiltration of liver K76.0    Mild cardiomegaly I51.7    Orthostatic hypotension dysautonomic syndrome (HCC) G90.3    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, G63    Mild sleep apnea G47.30    Tubular adenoma of colon D12.6    Hyperlipidemia associated with type 2 diabetes mellitus (HCC) E11.69, E78.5    Hyperglycemia R73.9    COVID-19 U07.1    Enterocolitis K52.9    Abdominal pain R10.9    Depression F32.9    Tobacco abuse Z72.0    S/P small bowel resection Z90.49    Ischemic necrosis of small bowel ([address] K55.029    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    Pneumonia due to infectious organism J18.9    Ischemic bowel disease ([facility] K55.9 Isolation/Infection:   Isolation            No Isolation          Patient Infection Status       Infection Onset Added Last Indicated Last Indicated By Review Planned Expiration Resolved Resolved By    None active    Resolved    COVID-19 Rule Out 02/16/21 02/16/21 02/16/21 Covid-19 Ambulatory (Ordered)   02/17/21 Rule-Out Test Resulted    COVID-19 Rule Out 02/15/21 02/15/21 02/15/21 COVID-19, Rapid (Ordered)   02/15/21 Rule-Out Test Resulted            Nurse Assessment:  Last Vital Signs: BP (!) 130/50   Pulse 87   Temp 98 °F (36.7 °C) (Axillary)   Resp 26   Ht 5' 6\" (1.676 m)   Wt 260 lb (117.9 kg)   LMP  (LMP Unknown)   SpO2 98%   BMI 41.97 kg/m²     Last documented pain score (0-10 scale): Pain Level: 7  Last Weight:   Wt Readings from Last 1 Encounters:   02/23/21 260 lb (117.9 kg)     Mental Status:  oriented and alert    IV Access:  - Central Venous Catheter  - site  Right internal Juggular, insertion date: 2/16/2021    Nursing Mobility/ADLs:  Walking   Dependent  Transfer  Dependent  Bathing  Dependent  Dressing  Dependent  Toileting  Dependent  Feeding  Dependent  Med Admin  Dependent  Med Delivery   whole    Wound Care Documentation and Therapy:  Wound 02/17/21 Abdomen Mid (Active)   Dressing Status Intact; Clean;Dry 02/23/21 1000   Dressing/Treatment Moist to dry 02/23/21 1000   Dressing Change Due 02/24/21 02/23/21 1000   Drainage Description Serosanguinous 02/20/21 0515   Odor Mild 02/21/21 0500   Number of days: 6        Elimination:  Continence:   · Bowel: Ileostomy  · Bladder: Bates In place  Urinary Catheter:  Insertion Date: 2/16/21   Colostomy/Ileostomy/Ileal Conduit: Yes  [REMOVED] Colostomy RLQ-Stomal Appliance: 1 piece  Ileostomy Ileostomy-Stomal Appliance: 1 piece, Changed  [REMOVED] Colostomy RLQ-Stoma  Assessment: Moist  Ileostomy Ileostomy-Stoma  Assessment: Unable to assess  [REMOVED] Colostomy RLQ-Mucocutaneous Junction: Intact  Ileostomy Ileostomy-Mucocutaneous Junction: Intact [REMOVED] Colostomy RLQ-Peristomal Assessment: Intact  Ileostomy Ileostomy-Peristomal Assessment: Clean, Intact  Ileostomy Ileostomy-Treatment: Bag change(skin care)  [REMOVED] Colostomy RLQ-Stool Appearance: Other (Comment)(bowel sweat)  Ileostomy Ileostomy-Stool Appearance: Watery  Ileostomy Ileostomy-Stool Color: Brown  Ileostomy Ileostomy-Stool Amount: Medium  [REMOVED] Colostomy RLQ-Output (mL): 100 ml  Ileostomy Ileostomy-Output (mL): 125 ml    Date of Last BM: 2/23/21    Intake/Output Summary (Last 24 hours) at 2/23/2021 1156  Last data filed at 2/23/2021 1000  Gross per 24 hour   Intake 2928.42 ml   Output 4465 ml   Net -1536.58 ml     I/O last 3 completed shifts: In: 2698.4 [P.O.:1470; I.V.:868.4; NG/GT:60; IV Piggyback:300]  Out: 2761 [VEYGQ:0668; Stool:2650]    Safety Concerns: At Risk for Falls    Impairments/Disabilities:      None    Nutrition Therapy:  Current Nutrition Therapy:   - Oral Diet:  Carb Control 4 carbs/meal (1800kcals/day)    Routes of Feeding: Oral  Liquids: Thin Liquids  Daily Fluid Restriction: no  Last Modified Barium Swallow with Video (Video Swallowing Test): not done    Treatments at the Time of Hospital Discharge:   Respiratory Treatments: ***  Oxygen Therapy:  is on oxygen at 10 L/min per nasal cannula.   Ventilator:    - No ventilator support    Rehab Therapies: Physical Therapy and Occupational Therapy  Weight Bearing Status/Restrictions: No weight bearing restirctions  Other Medical Equipment (for information only, NOT a DME order):  hospital bed  Other Treatments: ***    Patient's personal belongings (please select all that are sent with patient):  Jewelry 2 Rings in plastic cup    RN SIGNATURE:  Electronically signed by Jana Briggs RN on 2/23/21 at 3:46 PM EST    CASE MANAGEMENT/SOCIAL WORK SECTION    Inpatient Status Date: 2/16/2021  Readmission Risk Assessment Score:  Readmission Risk              Risk of Unplanned Readmission:        22 Discharging to Facility/ Agency   · Name: [facility]  · Address:  · Phone:  · Fax:    Dialysis Facility (if applicable)   · Name:  · Address:  · Dialysis Schedule:  · Phone:  · Fax:    / signature: Kiara Lilly Rn    PHYSICIAN SECTION    Prognosis: Guarded    Condition at Discharge: Stable    Rehab Potential (if transferring to Rehab): Fair    Recommended Labs or Other Treatments After Discharge: Must continue therapeutic anticoagulation     Physician Certification: I certify the above information and transfer of Gómez Dumont  is necessary for the continuing treatment of the diagnosis listed and that she requires LTAC for unknown days. Update Admission H&P: Patient was admitted after having an SMA thrombus for which she was peritoneal.  She was taken to the operating room for small bowel resection with ileocecectomy and end jejunostomy. She was found to have 120 cm of small bowel remaining. She is also seen by vascular surgery for which she had a right femoral cutdown with thrombectomy and fasciotomy of the right lower extremity. On 2/22 she was taken back to the operating room for closure of her fasciotomies. Her antibiotics and 2/23. She was transitioned from heparin drip to therapeutic Lovenox.   She remained on 15 L nasal cannula but [facility] and [facility] on 2/23    PHYSICIAN SIGNATURE:  Electronically signed by Nathaly Harden MD on 2/23/21 at 11:59 AM EST

## 2021-02-23 NOTE — PROGRESS NOTES
Physical Therapy  Physical Therapy Initial Assessment     Name: Shamika Christensen  : 1955  MRN: [id number]    Referring Provider:  Zully Carlson MD    Date of Service: 2021    Evaluating PT:  Jl Francis PT, DPT [id number]    Room #:  3137/3693-A  Diagnosis:  Ischemic bowel perfortation  Precautions: Falls, O2, ischemic R great toe - will need WB status when appropriate for OOB  Procedure/Surgery:   DIAGNOSTIC LAPAROSCOPY, CONVERTED TO LAPAROTOMY WITH SMALL BOWEL RESECTION, WITH APPLICATION OF ABTHERA WOUND VAC;  Iliac Thrombectomy with Right Lower Extremity Fasciotomy, Ileocecectomy with creation of end jejunostomy and abdominal wall closure;  lower extremity fasciotomy closure  PMHx/PSHx:  DM, Fibromyalgia, HTN  Equipment Needs:  TBD    SUBJECTIVE:    Pt lives alone in a 4th floor apartment with level entry and then elevator access. Pt ambulated with [employer] mostly and was independent PTA. OBJECTIVE:   Initial Evaluation  Date: 21 Treatment Short Term/ Long Term   Goals   AM-PAC 6 Clicks      Was pt agreeable to Eval/treatment? Yes     Does pt have pain?  6/10 B foot pain     Bed Mobility  Rolling: MaxA  Supine to sit: MaxA x 2 with HOB elevated  Sit to supine: MaxA x 2  Scooting: MaxA  Richard   Transfers Sit to stand: NT  Stand to sit: NT  Stand pivot: NT  Richard with AAD   Ambulation   NT  >50 feet with Richard with AAD   Stair negotiation: ascended and descended NT  >2 steps with 1 rail ModA   ROM BUE:  Defer to OT note  BLE:  WFL     Strength BUE:  Defer to OT note  BLE:  3-/5 grossly   Increase by 1/3 MMT grade   Balance Sitting EOB:  MaxA to Richard  Dynamic Standing:  NT  Sitting EOB:  Independent  Dynamic Standing:  Richard with AAD     Pt is A & O x 2 (self and place)  CAM-ICU: NT  RASS: -1 to 0  Sensation:  L foot paresthesias    Edema:  None    Vitals:  Heart Rate at rest 81 bpm Heart Rate post session 90 bpm   SpO2 at rest 94% SpO2 post session 90% Blood Pressure at rest 149/59 mmHg Blood Pressure post session 174/66 mmHg     Functional Status Score-Intensive Care Unit (FSS-ICU)   Rolling 2/7   Supine to sit transfer 1/7   Unsupported sitting  2/7   Sit to stand transfers -/7   Ambulation -/7   Total  5/35       Therapeutic Exercises:  NA    Patient education  Pt educated on safety    Patient response to education:   Pt verbalized understanding Pt demonstrated skill Pt requires further education in this area   x x x     ASSESSMENT:    Comments:  RN reported pt was stable for session. Pt was in bed upon arrival, agreeable to initial evaluation. Pt required increased assistance for all mobility. Increased pain reported with activity which limited session. Pt demonstrated a strong posterior lean initially but improved with time. Pt completed AROM of BLEs. Further activity deferred due to pt's pain, fatigue and ischemic R great toe - will need WB status. Pt was left in bed with all needs met and call light in reach. All lines remained intact. Assistance of two required due to acuity of medical condition, complex medical lines management as well as deconditioning. Treatment:  Patient practiced and was instructed in the following treatment:    ? Bed mobility training - pt given verbal and tactile cues to facilitate proper sequencing and safety during rolling and supine>sit as well as provided with physical assistance to complete task   ? Sitting EOB for >10 minutes for upright tolerance, postural awareness and BLE ROM  ? Skilled positioning - Pt placed in bed to comfort with pillows utilized to facilitate upright posture, joint and skin integrity, and interaction with environment. ? Non-pharmacological treatment and prevention of ICU delirium - Pt oriented to date, time, time of day, place, and situation as well as provided with visual and auditory stimuli in order to improve cognition and combat effects of ICU delirium.       Pt's/ family goals 1. Return home    Patient and or family understand(s) diagnosis, prognosis, and plan of care. yes    PLAN OF CARE:    Current Treatment Recommendations     [x] Strengthening     [] ROM   [x] Balance Training   [x] Endurance Training   [x] Transfer Training   [x] Gait Training   [x] Stair Training   [x] Positioning   [x] Safety and Education Training   [x] Patient/Caregiver Education   [] HEP  [] Other     Frequency of treatments: 2-5x/week x 1-2 weeks. Time in  1000  Time out  1035    Total Treatment Time  30 minutes     Evaluation Time includes thorough review of current medical information, gathering information on past medical history/social history and prior level of function, completion of standardized testing/informal observation of tasks, assessment of data and education on plan of care and goals.     CPT codes:  [] Low Complexity PT evaluation 50312  [x] Moderate Complexity PT evaluation 21722  [] High Complexity PT evaluation 31418  [] PT Re-evaluation 40327  [] Gait training 70882 - minutes  [] Manual therapy 48620 - minutes  [x] Therapeutic activities 06541 30 minutes  [] Therapeutic exercises 74663 - minutes  [] Neuromuscular reeducation 24046 - minutes     Josseline Lake, PT, DPT  [id number]

## 2021-02-23 NOTE — PROGRESS NOTES
Occupational Therapy  Occupational Therapy Initial Evaluation   Date:2021  Patient Name: Gloria Corrales  MRN: [id number]  : 1955  Room: [address] [place] [address]-A    Referring Provider: Collette Scull, MD    Evaluating OT: Evie Blood OTR/L #[id number]    AM-PAC Daily Activity Raw Score:     Recommended Adaptive Equipment: TBD     Diagnosis: Ischemic bowel disease ([facility] [address] [K55.9]    Surgery/Procedure:   DIAGNOSTIC LAPAROSCOPY, CONVERTED TO LAPAROTOMY WITH SMALL BOWEL RESECTION, WITH APPLICATION OF ABTHERA WOUND VAC 21; Iliac Thrombectomy with Right Lower Extremity Fasciotomy 21; RIGHT ILLEOSTOMY AND RIGHT COLECTOMY 21;  RIGHT LOWER EXTREMITY FASCIOTOMY CLOSURE 21    Pertinent Medical History: adrenal mass, anxiety and depression, DDD, fibromyalgia, GERD, HTN, hypokalemia, IBS, lumbar radiculopathy, type 2 DM,    Precautions:  Falls, O2, ischemic R great toe-will need WB status when appropriate for OOB activity      Home Living: Pt lives alone  in a senior high rise apartment on the 4th floor with no step(s) to enter and 0 rail(s); elevator access  Bathroom setup: walk-in shower w/ shower chair  Equipment owned: ww, cane    Prior Level of Function: Independent with ADLs; Independent with IADLs. ww for ambulation; SPC prn    Driving: Yes    Pain Level: pt c/o 6/10 B foot pain  this session; RLE pain with movement; re-positioned for comfort; educated pt on pain management      Cognition: A&O: Grossly 2/4(oriented to self and \"hospital\")   Follows 1 step commands w/ increased cueing for initiation/sequencing of task.     Memory: fair   Comprehension fair-   Problem solving: fair-   Judgement/safety: fair-               Communication skills:            Vision: wfl               Glasses: no                                                   Hearing: wfl     RASS: 0   CAM-ICU: (NT) Delirium    UE Assessment:  Hand Dominance: Right []  Left []     ROM Strength STM goal: PRN RUE  Proximally: limited AROM; PROM WFL  Distally: limited AROM; AAROM WFL  Proximally: 2/5  Distally: 3-/5    Impaired fmc 4-/5     LUE Proximally: limited AROM; PROM WFL  Distally: limited AROM; AAROM WFL Proximally: 2/5  Distally: 3-/5    Impaired fmc 4-/5       Sensation: Pt c/o numbness/tingling in L foot (chronic)  Tone: WNL   Edema: unremarkable      Functional Assessment   Initial Eval Status  Date: 2/23/21 Treatment Status  Date: STG=LTG  5-14  days    Feeding Max A (simulated)                        Set-up  while seated up in chair to increase activity tolerance        Grooming Max A ([place] assist provided to wash face)                        Min. A   while seated in bedside chair     UB dressing Max A                         Min. A       LB dressing Dep  Mod. A   using AE as needed for safe reach/ energy conservation     Bathing Dep                        Mod. A   using AE as needed for safe reach/ energy conservation       Toileting Dep                        Mod. A     Bed Mobility  Supine to sit: Max A x2 (HOB elevated)    Sit to supine: Max A x2                        Mod. A  in prep of ADL tasks & transfers   Functional Transfers Sit to stand: NT    Stand to sit: NT    Unable to safely complete                        Mod. A  sit<>stand/functional bathroom transfers using AD/DME as needed for balance and safety   Functional Mobility NT                       Mod. A   functional/bathroom mobility using AD as needed & demonstrating good safety     Balance Sitting:     Static: Max A>Min. A    Dynamic:Max A>Min. A    (cueing/assistance for upright posture)  Standing: NT  Independent dynamic sitting balance; mod. A dynamic standing balance  during ADL tasks & transfers   Endurance/Activity Tolerance   Poor+ tolerance with light activity.  Limited d/t fatigue/pain  Fair+  tolerance with moderate activity/self care routine   Visual/  Perceptual   WFL                       Vitals: HR at rest: 82 bpm HR at end of session: 88 bpm   Spo2 at rest:95% Spo2 at end of session 90%   BP at rest:149/58 mmHg BP at end of session 156/64 mmHg       Treatment:   · Bed mobility: Facilitated bed mobility with cues for proper body mechanics and sequencing to prepare for ADL completion. HOB elevated to assist in transfer. Assist of 2 for safety d/t pt's medical complexity and line management. · ADL completion: Self-care retraining for the above-mentioned ADLs; training on proper hand placement, safety technique, sequencing, and energy conservation techniques. [facility] assist provided. · Postural Balance: Sitting balance retraining to improve righting reactions with postural changes during ADLS. · Cognitive/Perceptual training: retraining exercises to improve attention, mentation, and spatial awareness for ADLs & transfers. · Skilled positioning: Proper positioning to improve interaction with environment, overall functioning and decrease/prevent edema and contractures. · Delirium Prevention/Management: Implementation of non-pharmacologic interventions for delirium prevention incorporated throughout session to patient. Including environmental and sensory modifications to decrease patient's internal distraction caused by delirium, and improve overall mentation.   · Education: PLB, rest breaks, importance of OOB activity Comments: OK from RN to see patient. Upon arrival, patient lying in bed. Pt demo fair+ tolerance with fair understanding of education/techniques. At end of session, patient lying in bed. Call light within reach, all lines and tubes intact. Pt instructed on use of call light for assistance and fall prevention. Line management and environmental modifications made prior to and end of session to ensure patient safety and to increase efficiency of session. Skilled monitoring of HR, O2 saturation, blood pressure and patient's response to activity performed throughout session. Overall, pt presents with decreased activity tolerance, dynamic balance, functional mobility limiting completion of ADLs and safe return home. Pt can benefit from continued skilled OT to increase safety and functional independence. Evaluation Complexity: Mod    · History: Expanded chart review of consults, imaging, and psychosocial history related to current functional performance. · Exam: 5+ performance deficits identified limiting functional independence and safe return home   · Assistance/Modification: Min/mod assistance or modifications required to perform tasks. May have comorbidities that affect occupational performance.     Assessment of current deficits   Functional mobility [x]  ADLs [x] Strength [x]  Cognition [x]  Functional transfers  [x] IADLs [x] Safety Awareness [x]  Endurance [x]  Fine Motor Coordination [x] Balance [x] Vision/perception [] Sensation []   Gross Motor Coordination [] ROM [x] Delirium [x]                  Communication []    Plan of Care: 1-3 days/week for 1-2 weeks PRN   Instruction/training on adapted ADL techniques and AE recommendations to increase functional independence within precautions  Training on energy conservation strategies/techniques to improve independence/tolerance for self-care routine Functional transfer/mobility training/DME recommendations for increased independence, safety, and fall prevention  Patient/Family education to increase follow through with safety techniques and functional independence  Recommendation of environmental modifications for increased safety with functional transfers/mobility and ADLs  Cognitive retraining/development of therapeutic activities to improve problem solving, judgement, memory, and attention for increased safety/participation in ADL/IADL tasks  Sensory re-education to improve body/limb awareness, maintain/improve skin integrity, and improve hand/UE motor function  Splinting/positioning for increased function, prevention of contractures, and improve skin integrity  Therapeutic exercise to improve motor endurance, ROM, and functional strength for ADLs/functional transfers  Therapeutic activities to facilitate/challenge dynamic balance, stand tolerance, fine motor dexterity/in-hand manipulation for increased independence with ADLs  Delirium prevention/treatment  Positioning to improve functional independence    Rehab Potential: Good for established goals     Patient / Family Goal: not stated      Patient and/or family were instructed on functional diagnosis, prognosis/goals and OT plan of care. Demonstrated fair understanding.       Time In: 10:10  Time Out: 10:45  Total Treatment Time: 25 minutes    Min Units   OT Eval Low 43392       OT Eval Medium 97166  x 1   OT Eval High 13423       OT Re-Eval 59747       Therapeutic Ex 63739       Therapeutic Activities 81044  15  1   ADL/Self Care 88442  10  1   Orthotic Management 23861       Neuro Re-Ed 41366       Non-Billable Time Evaluation Time includes thorough review of current medical information, gathering information on past medical history/social history and prior level of function, completion of standardized testing/informal observation of tasks, assessment of data and education on plan of care and goals.     Curtis Delgadillo, OTR/L #[id number]

## 2021-03-12 PROBLEM — T81.31XD SURGICAL WOUND DEHISCENCE, SUBSEQUENT ENCOUNTER: Chronic | Status: ACTIVE | Noted: 2021-01-01

## 2021-03-12 PROBLEM — I70.25 ATHEROSCLEROSIS OF NATIVE ARTERY OF EXTREMITY WITH ULCERATION (HCC): Chronic | Status: ACTIVE | Noted: 2021-01-01

## 2021-03-12 PROBLEM — L98.492 ULCER OF ABDOMEN WALL WITH FAT LAYER EXPOSED (HCC): Chronic | Status: ACTIVE | Noted: 2021-01-01

## 2021-03-18 PROBLEM — I96 GANGRENE OF TOE OF RIGHT FOOT (HCC): Status: ACTIVE | Noted: 2021-01-01

## 2021-03-18 PROBLEM — I70.209 ATHEROSCLEROSIS OF NATIVE ARTERY OF EXTREMITY (HCC): Status: ACTIVE | Noted: 2021-01-01

## 2021-03-20 NOTE — OP NOTE
Operative Note      Patient: Nela Blackmon  YOB: 1955  MRN: [id number]    Date of Procedure: 3/20/2021    Pre-Op Diagnosis: GANGRENE    Post-Op Diagnosis: Same       Procedure(s):  RIGHT PARTIAL HALLUX AMPUTATION    Surgeon(s):  Josephine Perry DPM    Assistant:   Resident: Paris Driscoll MD    Anesthesia: Monitor Anesthesia Care    Estimated Blood Loss (mL): Minimal    Complications: None    Specimens:   ID Type Source Tests Collected by Time Destination   1 : BONE CULTURE RIGHT DISTAL PHALANX Bone Bone CULTURE, ANAEROBIC, CULTURE, FUNGUS, GRAM STAIN, CULTURE, SURGICAL, CULTURE WITH SMEAR, ACID FAST BACILLIUS Josephine Perry DPM 3/20/2021 1515    A : DISTAL RIGHT GREAT TOE Tissue Tissue SURGICAL PATHOLOGY Josephine Perry DPM 3/20/2021 1512        Implants:  * No implants in log *      Drains:   Ileostomy Ileostomy (Active)   Stomal Appliance 1 piece; Changed 02/23/21 0915   Stoma  Assessment ROB 02/23/21 1400   Mucocutaneous Junction Intact 02/23/21 1400   Peristomal Assessment Clean; Intact 02/23/21 1400   Treatment Bag change 02/23/21 0915   Stool Color Brown 02/23/21 1400   Stool Appearance Watery 02/23/21 1400   Stool Amount Medium 02/23/21 1400   Output (mL) 125 ml 02/23/21 1000       Urethral Catheter 16 fr (Active)   Catheter Indications Need for fluid management in critically ill patients in a critical care setting not able to be managed by other means such as BSC with hat, bedpan, urinal, condom catheter, or short term intermittent urethral catherization 02/23/21 1400   Site Assessment No urethral drainage 02/23/21 1400   Urine Color Yellow 02/23/21 1400   Urine Appearance Clear 02/23/21 1400   Output (mL) 125 mL 02/23/21 1500       [REMOVED] Negative Pressure Wound Therapy Abdomen Medial;Upper (Removed)   Wound Type Surgical 02/17/21 2200   Dressing Type Other (Comment) 02/17/21 2200   Cycle Continuous 02/17/21 2200   Target Pressure (mmHg) 125 02/17/21 2200   Dressing Status Clean;Dry; Intact 02/17/21 2200   Drainage Amount Scant 02/17/21 2200   Drainage Description Serosanguinous 02/17/21 2200   Output (ml) 200 ml 02/17/21 1356   Santa-wound Assessment Dry; Intact 02/17/21 2200       [REMOVED] NG/OG/NJ/NE Tube Nasogastric 18 fr Right mouth (Removed)   Surrounding Skin Dry; Intact 02/22/21 2200   Securement device Yes 02/22/21 2200   Status Clamped 02/22/21 2200   Placement Verified by X-Ray (Initial) 02/22/21 2200   NG/OG/NJ/NE External Measurement (cm) 60 cm 02/22/21 2200   Drainage Appearance Serous;Green 02/22/21 2200   Tube Feeding Standard with Fiber 02/21/21 1800   Tube Feeding Status Continuous 02/21/21 1800   Rate/Schedule 10 mL/hr 02/21/21 1800   Tube Feeding Intake (mL) 0 ml 02/22/21 1400   Free Water Flush (mL) 30 mL 02/22/21 1400   Free Water Rate 30ml q6 02/21/21 1800   Residual Volume (ml) 50 ml 02/20/21 2230   Output (mL) 100 ml 02/20/21 0530       [REMOVED] Colostomy RLQ (Removed)   Stomal Appliance 1 piece 02/18/21 0000   Stoma  Assessment Moist 02/18/21 1000   Mucocutaneous Junction Intact 02/18/21 1000   Peristomal Assessment Intact 02/18/21 1000   Stool Appearance Other (Comment) 02/18/21 1000   Output (mL) 100 ml 02/18/21 0600       Findings:     Interval note: Patient brought to the OR for the above-noted procedure. Physical exam demonstrated gangrenous changes of the right great toe IPJ distally. The toe is without any erythema, edema, drainage, odor or fluctuance. Vascular with intact pedal pulses. Risks discussed with patient in detail including but not limited to delayed healing, dehiscence, chronic pain, limited use, more proximal type amputation. No guarantees made, patient fully understood all still wished to proceed. Detailed Description of Procedure: Following satisfactory preop evaluation patient was brought to the OR remained on transfer cart. Anesthesia per anesthesia department. 0.5% Marcaine plain utilized for local anesthesia regional block type fashion. Right foot was then prepped and draped in usual sterile manner. Attention was directed to the right foot where utilizing a 15 blade an incision was made proximal to the gangrenous changes and carried down to bone and IPJ. Soft tissue released from the distal aspect of the proximal phalanx. Power saw passed in surgical field proximal phalanx was transected proximal to the head. Distal aspect of the great toe was passed from surgical field. Wound was then irrigated with normal saline. There was some bleeding noted. The wound itself was clean without any purulence or odor. Skin was reapproximated utilizing 3-0 Ethilon and single erupted type fashion. Good closure was achieved with no tension noted on the wound edges. Dry sterile dressing. No complications encountered. Patient tolerated anesthesia and procedure well. Transferred to PACU for further monitoring.     Electronically signed by Lisa Kaye DPM on 3/20/2021 at 3:40 PM
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
## 2021-03-20 NOTE — ANESTHESIA POSTPROCEDURE EVALUATION
Department of Anesthesiology  Postprocedure Note    Patient: Adam Root  MRN: [id number]  YOB: 1955  Date of evaluation: 3/20/2021  Time:  4:16 PM     Procedure Summary     Date: 03/20/21 Room / Location: [facility] OR 06 / [facility]    Anesthesia Start: 1450 Anesthesia Stop: 2312    Procedure: RIGHT PARTIAL HALLUX AMPUTATION (Right Foot) Diagnosis: (GANGRENE)    Surgeons: Yvonne Aguilar DPM Responsible Provider: Ba Montes MD    Anesthesia Type: MAC ASA Status: 3          Anesthesia Type: MAC    Brock Phase I: Brock Score: 10    Brock Phase II:      Last vitals: Reviewed and per EMR flowsheets.        Anesthesia Post Evaluation    Patient location during evaluation: PACU  Patient participation: complete - patient participated  Level of consciousness: awake  Pain score: 0  Airway patency: patent  Nausea & Vomiting: no nausea  Complications: no  Cardiovascular status: hemodynamically stable  Respiratory status: acceptable  Hydration status: stable

## 2021-03-24 PROBLEM — T81.30XA DEHISCENCE OF FASCIA: Status: ACTIVE | Noted: 2021-01-01

## 2021-03-29 PROBLEM — Z99.2 ENCOUNTER REGARDING VASCULAR ACCESS FOR DIALYSIS FOR ESRD (HCC): Chronic | Status: ACTIVE | Noted: 2021-01-01

## 2021-03-29 PROBLEM — N18.6 ENCOUNTER REGARDING VASCULAR ACCESS FOR DIALYSIS FOR ESRD (HCC): Chronic | Status: ACTIVE | Noted: 2021-01-01

## 2021-03-29 NOTE — PROGRESS NOTES
Cally 37 PRE-ADMISSION TESTING GENERAL INSTRUCTIONS- [facility]-phone number:[phone]    GENERAL INSTRUCTIONS  [x] Antibacterial Soap shower Night before and/or AM of Surgery    [x] Nothing by mouth after midnight, including gum, candy, mints, or water. [x] You may brush your teeth, gargle, but do NOT swallow water. [x]No smoking, chewing tobacco, illegal drugs, or alcohol within 24 hours of your surgery. [x] Jewelry, valuables or body piercing's should not be brought to the hospital. All body and/or tongue piercing's must be removed prior to arriving to hospital.  ALL hair pins must be removed. [x] Do not wear makeup, lotions, powders, deodorant. Nail polish as directed by the nurse. [x] Arrange transportation with a responsible adult  to and from the hospital. If you do not have a responsible adult  to transport you, you will need to make arrangements with a medical transportation company (i.e. [employer]. A Uber/taxi/bus is not appropriate unless you are accompanied by a responsible adult ). Arrange for someone to be with you for the remainder of the day and for 24 hours after your procedure due to having had anesthesia. [x] Use inhalers the morning of surgery and bring with you to hospital.           EDUCATION INSTRUCTIONS:        [x] Incentive Spirometry,coughing & deep breathing exercises reviewed. [x]Medication information sheet(s)   [x]Fluoroscopy-Xray used in surgery reviewed with patient. Educational pamphlet placed in chart. [x]Pain: Post-op pain is normal and to be expected. You will be asked to rate your pain from 0-10(a zero is not acceptable-education is needed). Your post-op pain goal is:  [x] Ask your nurse for your pain medication. MEDICATION INSTRUCTIONS:   [x]Bring a complete list of your medications, please write the last time you took the medicine, give this list to the nurse.    [x] Take the following medications the morning of surgery with 1-2 ounces of water: SEE LIST  [] Stop herbal supplements and vitamins 5 days before your surgery. [x] DO NOT take any diabetic medicine the morning of surgery. Follow instructions for insulin the day before surgery. [x] If you are diabetic and your blood sugar is low or you feel symptomatic, you may drink 1-2 ounces of apple juice or take a glucose tablet. The morning of your procedure, you may call the pre-op area if you have concerns about your blood sugar [phone]. [x] Use your inhalers the morning of surgery. Bring your emergency inhaler with you day of surgery. [x] Follow physician instructions regarding any blood thinners you may be taking. WHAT TO EXPECT:  [x] The day of surgery you will be greeted and checked in by the [facility].  In addition, you will be registered in the [facility] by a Patient Access Representative. Please bring your photo ID and insurance card. A nurse will greet you in accordance to the time you are needed in the pre-op area to prepare you for surgery. Please do not be discouraged if you are not greeted in the order you arrive as there are many variables that are involved in patient preparation. Your patience is greatly appreciated as you wait for your nurse. Please bring in items such as: books, magazines, newspapers, electronics, or any other items  to occupy your time in the waiting area. [x]  Delays may occur with surgery and staff will make a sincere effort to keep you informed of delays. If any delays occur with your procedure, we apologize ahead of time for your inconvenience as we recognize the value of your time.

## 2021-03-29 NOTE — PROGRESS NOTES
PER [facility] PATIENTS NURSE IN [facility]. PATIENT IS NOT IN ISOLATION, DOES NOT HAVE A TRACH . ROSALINDA IS ALERT AND ORIENTED x 3 AND SIGNS HER OWN PERMITS. PATIENT HAS NO OPEN OR DRAINING WOUNDS. INSTRUCTIONS AND MEDICATION LIST FAXED TO [facility].

## 2021-03-30 NOTE — ANESTHESIA POSTPROCEDURE EVALUATION
Department of Anesthesiology  Postprocedure Note    Patient: Sherwin Cruz  MRN: [id number]  YOB: 1955  Date of evaluation: 3/31/2021  Time:  7:55 AM     Procedure Summary     Date: 03/30/21 Room / Location: [facility] 04 / [facility]    Anesthesia Start: 1218 Anesthesia Stop:     Procedure: [address] HEMODIALYSIS---PT IN [facility]----- (N/A ) Diagnosis: (CHRONIC RENAL FAILURE)    Surgeons: Bev Crowley MD Responsible Provider: Abdiel Castellon MD    Anesthesia Type: MAC ASA Status: 3          Anesthesia Type: MAC    Brock Phase I: Brock Score: 10    Brock Phase II:      Last vitals: Reviewed and per EMR flowsheets.        Anesthesia Post Evaluation    Patient location during evaluation: PACU  Patient participation: complete - patient participated  Level of consciousness: awake  Pain score: 3  Airway patency: patent  Nausea & Vomiting: no nausea and no vomiting  Complications: no  Cardiovascular status: blood pressure returned to baseline  Respiratory status: acceptable  Hydration status: euvolemic

## 2021-03-30 NOTE — ANESTHESIA PRE PROCEDURE
Department of Anesthesiology  Preprocedure Note       Name:  Jordi Stoll   Age:  60 y.o.  :  1955                                          MRN:  [id number]         Date:  3/30/2021      Surgeon: Laura Manriquez):  Elmer Long MD    Procedure: Procedure(s):  CATHETER INSERTION TEMPORARY HEMODIALYSIS---PT IN [facility]-----    Medications prior to admission:   Prior to Admission medications    Medication Sig Start Date End Date Taking? Authorizing Provider   midodrine (PROAMATINE) 5 MG tablet Take 10 mg by mouth 3 times daily   Yes Historical Provider, MD   nystatin-triamcinolone (MYCOLOG II) 571884-2.6 UNIT/GM-% cream Apply topically 2 times daily Apply topically 4 times daily. Yes Historical Provider, MD   Ascorbic Acid (VITAMIN C) 1000 MG tablet Take 1,000 mg by mouth daily    Historical Provider, MD   gabapentin (NEURONTIN) 100 MG capsule Take 100 mg by mouth every 8 hours.     Historical Provider, MD   guaiFENesin 400 MG tablet Take 400 mg by mouth 3 times daily    Historical Provider, MD   insulin lispro (HUMALOG) 100 UNIT/ML injection vial Inject into the skin 4 times daily (before meals and nightly) Sliding scale    Historical Provider, MD   metoclopramide (REGLAN) 5 MG tablet Take 5 mg by mouth 3 times daily (with meals)    Historical Provider, MD   pantoprazole (PROTONIX) 40 MG tablet Take 40 mg by mouth daily    Historical Provider, MD   pramipexole (MIRAPEX) 0.5 MG tablet Take 0.5 mg by mouth 2 times daily    Historical Provider, MD   Cholecalciferol (VITAMIN D) 50 MCG (2000 UT) CAPS capsule Take 1 capsule by mouth daily    Historical Provider, MD   zinc sulfate (ZINCATE) 220 (50 Zn) MG capsule Take 220 mg by mouth daily    Historical Provider, MD   acetaminophen (TYLENOL) 325 MG tablet Take 650 mg by mouth every 6 hours as needed for Pain    Historical Provider, MD   albuterol (PROVENTIL) (2.5 MG/3ML) 0.083% nebulizer solution Take 3 mLs by nebulization 4 times daily 21   [facility] Restless leg syndrome 8/17/2012    Tobacco abuse     Tubular adenoma of colon     Type 2 diabetes mellitus without complication ([facility] [address] 5/95/3863    UTI (urinary tract infection)     Vitamin D deficiency 1/6/2016       Past Surgical History:        Procedure Laterality Date    ANTERIOR COMPARTMENT DECOMPRESSION Right 2/22/2021    RIGHT LOWER EXTREMITY FASCIOTOMY CLOSURE performed by Walker Love MD at [facility]  5/27/2016    Dr Anshu Emery  5/3/2012         EMG  5/19/2015    Dr Juliet Quinteros, radiculopathy    ESOPHAGUS SURGERY  08/26/2016    Dr Yennifer Suarez ablation   Latonia Pema Right 3/20/2021    RIGHT PARTIAL HALLUX AMPUTATION performed by Yesenia Ge DPM at [facility]    LAPAROSCOPY N/A 2/16/2021    DIAGNOSTIC LAPAROSCOPY, CONVERTED TO LAPAROTOMY WITH SMALL BOWEL RESECTION, WITH APPLICATION OF ABTHERA WOUND VAC performed by Emigdio Romero MD at [address] [facility] [address]  4/2015    [facility] branch angela, Dr. Penny Ambrocio POLYSOMNOGRAPHY  04/10/2017    Dr Terrance Olvera sleep apnea AHI-8    POLYSOMNOGRAPHY  05/15/2017    SMALL INTESTINE SURGERY N/A 2/17/2021    RIGHT ILLEOSTOMY AND RIGHT COLECTOMY performed by Emigdio Romero MD at [address]  [facility] ENDOSCOPY  5/27/2016    Dr Dawkins Postal  07/14/2016    Dr Parker Sánchez Right 2/17/2021    Iliac Thrombectomy with Right Lower Extremity Fasciotomy performed by Walker Love MD at [address] [facility] History:    Social History     Tobacco Use    Smoking status: Current Every Day Smoker     Packs/day: 1.00     Years: 41.00     Pack years: 41.00     Types: Cigarettes     Start date: 11/10/1974    Smokeless tobacco: Never Used    Tobacco comment: started at 26 and smoked up to 3 ppd   Substance Use Topics    Alcohol use:  Yes     Alcohol/week: 0.0 standard drinks     Comment: occasionally                                Ready to quit: Not Answered  Counseling given: Not Answered  Comment: started at 19 and smoked up to 3 ppd      Vital Signs (Current):   Vitals:    03/29/21 1433   Weight: 260 lb (117.9 kg)   Height: 5' 6\" (1.676 m)                                              BP Readings from Last 3 Encounters:   03/20/21 137/65   03/20/21 (!) 103/51   02/23/21 (!) 147/60       NPO Status:                                                                                 BMI:   Wt Readings from Last 3 Encounters:   03/29/21 260 lb (117.9 kg)   02/23/21 260 lb (117.9 kg)   02/16/21 230 lb 4.8 oz (104.5 kg)     Body mass index is 41.97 kg/m². CBC:   Lab Results   Component Value Date    WBC 9.2 03/28/2021    RBC 2.65 03/28/2021    HGB 7.4 03/28/2021    HCT 24.7 03/29/2021    MCV 86.0 03/28/2021    RDW 15.6 03/28/2021     03/28/2021       CMP:   Lab Results   Component Value Date     03/30/2021    K 5.7 03/30/2021    K 3.8 02/21/2021    CL 87 03/30/2021    CO2 19 03/30/2021     03/30/2021    CREATININE 5.0 03/30/2021    GFRAA 11 03/30/2021    LABGLOM 9 03/30/2021    GLUCOSE 102 03/30/2021    GLUCOSE 88 05/03/2012    PROT 6.1 03/14/2021    CALCIUM 8.7 03/30/2021    BILITOT 0.4 03/14/2021    ALKPHOS 59 03/14/2021    AST 8 03/14/2021    ALT <5 03/14/2021       POC Tests: No results for input(s): POCGLU, POCNA, POCK, POCCL, POCBUN, POCHEMO, POCHCT in the last 72 hours.     Coags:   Lab Results   Component Value Date    PROTIME 12.4 02/23/2021    PROTIME 12.8 05/02/2012    INR 1.1 02/23/2021    APTT 61.2 02/23/2021       HCG (If Applicable):   Lab Results   Component Value Date    PREGTESTUR Neg 08/30/2016        ABGs:   Lab Results   Component Value Date    PO2ART 164.1 02/17/2021    EOA4FED 43.7 02/17/2021    IGP8PFM 24.1 02/17/2021    R6ECKAWP 95.4 05/02/2012        Type & Screen (If Applicable):  No results found for: LABABO, LABRH    Drug/Infectious Status (If Applicable):  No results found for: HIV, HEPCAB    COVID-19 Screening (If Applicable):   Lab Results   Component Value Date    COVID19 Not Detected 03/12/2021           Anesthesia Evaluation    Airway: Mallampati: III  TM distance: >3 FB   Neck ROM: full  Mouth opening: > = 3 FB Dental:    (+) edentulous      Pulmonary:   (+) pneumonia:  sleep apnea:                             Cardiovascular:    (+) hypertension:,                   Neuro/Psych:   (+) neuromuscular disease:, psychiatric history:            GI/Hepatic/Renal:   (+) hiatal hernia, GERD:, liver disease:, renal disease: ARF,           Endo/Other:    (+) DiabetesType II DM, , .                 Abdominal:           Vascular:   + PVD, aortic or cerebral, . Anesthesia Plan      MAC     ASA 3       Induction: intravenous. MIPS: Postoperative opioids intended and Prophylactic antiemetics administered. Gavin Lobo MD   3/30/2021        Pt seen, examined, chart reviewed, plan discussed.   Bibiana Jackson  3/30/2021  11:24 AM
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
## 2021-03-30 NOTE — OP NOTE
Operative Note      Patient: David Romero  YOB: 1955  MRN: [id number]    Date of Procedure: 3/30/2021    Pre-Op Diagnosis: Acute RENAL FAILURE    Post-Op Diagnosis: Same       Procedure  US guided access of left femoral vein  FLuoroscopic guided  20 cm temp hd catheter placement    Surgeon(s):  Gerhard Arnold MD    Assistant:   * No surgical staff found *    Anesthesia: Monitor Anesthesia Care    Estimated Blood Loss (mL): Minimal    Complications: None    Specimens:   * No specimens in log *    Implants:  * No implants in log *      Drains:   Ileostomy Ileostomy (Active)       Urethral Catheter 16 fr (Active)       [REMOVED] NG/OG/NJ/NE Tube Nasogastric 18 fr Right mouth (Removed)       Findings: stenosis of left iliac vein    DESCRIPTION OF PROCEDURE: The patient was identified and the procedure was confirmed. The Left neck, chest, and catheter and the left groins were prepped and draped in the usual sterile fashion. Next, 1% lidocaine mixed with 0.25% Marcaine was used for local anesthesia. The Left common femoral vein was identified under US noted to be patent, compressible and  and was percutaneously entered under US guidance. Image documented. A guidewire was advanced into the inferior vena cava under fluoroscopic guidance. Fluoro was used as the wire was initially not tracking and it was felt there was likely a stenosis in left iliac vein. A small incision was made around the wire. Dilators and than temporary HD catheter was inserted. Catheter litzy and flushed without problem. Sutured into place using silk suture and caps placed. Dressing applied. Needle, sponge, and instrument counts were reported as correct x2. The patient tolerated the procedure and was transferred to the recovery area in satisfactory condition.     Mitali Velasquez MD      Electronically signed by Gerhard Arnold MD on 3/30/2021 at 1:30 PM
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
## 2021-03-31 NOTE — PROGRESS NOTES
Floor Called, nurse to nurse given. Spoke with Jose De Jesus Serrato . Patients test results review, VS reported to receiving nurse. Any and all important information regarding patient disclosed.

## 2021-04-01 NOTE — ANESTHESIA POSTPROCEDURE EVALUATION
Department of Anesthesiology  Postprocedure Note    Patient: Ike Pierre  MRN: [id number]  YOB: 1955  Date of evaluation: 4/1/2021  Time:  7:39 AM     Procedure Summary     Date: 03/31/21 Room / Location: [facility] OR 06 / [facility]    Anesthesia Start: 7852 Anesthesia Stop: 4475    Procedure: CATHETER INSERTION,TEMPORAY  HEMODIALYSIS, NEEDS FLURO, PT IN [facility], AVAILABLE ANYTIME (N/A ) Diagnosis: (DIALYSIS)    Surgeons: Courtney Langston MD Responsible Provider: Pema Shaffer MD    Anesthesia Type: MAC ASA Status: 3          Anesthesia Type: MAC    Brock Phase I: Brock Score: 10    Brock Phase II:      Last vitals: Reviewed and per EMR flowsheets.        Anesthesia Post Evaluation    Patient location during evaluation: PACU  Patient participation: complete - patient participated  Level of consciousness: awake  Pain score: 0  Airway patency: patent  Nausea & Vomiting: no nausea  Complications: no  Cardiovascular status: hemodynamically stable  Respiratory status: acceptable  Hydration status: stable

## 2021-04-02 NOTE — ANESTHESIA POSTPROCEDURE EVALUATION
Department of Anesthesiology  Postprocedure Note    Patient: Aj Leo  MRN: [id number]  YOB: 1955  Date of evaluation: 4/2/2021  Time:  5:20 PM     Procedure Summary     Date: 04/02/21 Room / Location: [facility]; [facility]    Anesthesia Start: 6656 Anesthesia Stop: 6048    Procedure: SEY TRACEY Diagnosis:     Scheduled Providers: JOHN Gonzalez - CRNA; Sukhwinder Lopez MD Responsible Provider: Raghav Colbert MD    Anesthesia Type: MAC ASA Status: 4          Anesthesia Type: MAC    Brock Phase I:      Brock Phase II:      Last vitals: Reviewed and per EMR flowsheets.        Anesthesia Post Evaluation    Patient location during evaluation: PACU  Patient participation: complete - patient participated  Level of consciousness: awake and alert  Airway patency: patent  Nausea & Vomiting: no nausea and no vomiting  Complications: no  Cardiovascular status: hemodynamically stable and blood pressure returned to baseline  Respiratory status: acceptable  Hydration status: euvolemic
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
## 2021-04-05 NOTE — OP NOTE
Operative Note      Patient: Ángel Knight  YOB: 1955  MRN: [id number]    Date of Procedure: 4/5/2021    Pre-Op Diagnosis: renal failure    Post-Op Diagnosis: Same       Procedure(s):  CATHETER INSERTION HEMODIALYSIS WITH LEFT FEMORAL TEMPORARY HEMODIALYSIS ACCESS removal     Surgeon(s):  Jacquelin Lindsay MD    Assistant:   Resident: Munira Arreola DO    Anesthesia: Monitor Anesthesia Care    Estimated Blood Loss (mL): Minimal    Complications: None    Specimens:   * No specimens in log *    Implants:  Implant Name Type Inv. Item Serial No.  Lot No. LRB No. Used Action   CATHETER HD PRECRV 15.5 FRX32 CM LT DL BASIC SET TITAN HD  CATHETER HD PRECRV 15.5 FRX32 CM LT DL BASIC SET TITAN HD  [employer] [id number] Left 1 Implanted         Drains:   Ileostomy Ileostomy (Active)       Urethral Catheter 16 fr (Active)       [REMOVED] NG/OG/NJ/NE Tube Nasogastric 18 fr Right mouth (Removed)       Findings: Left tesio dialysis catheter placement with removal of Left Femoral temporary dialysis catheter     Detailed Description of Procedure: The patient was identified and the procedure was confirmed. The right and left neck and chest were prepped and draped in the usual sterile fashion. Then, 1% lidocaine mixed with 0.25% Marcaine was used for local anesthesia. The left internal jugular vein was percutaneously entered x2 and guidewires were advanced into the superior vena cava under fluoroscopic guidance. A small incision was made between the wires. The introducers were inserted over the wires and the catheters were advanced through the introducers. The tips were positioned in the superior vena cava, right atrial junction under fluoroscopic guidance. The catheters were pulled through subcutaneous tunnels to exit inferior chest stab incisions. The catheters were cut to the appropriate length and the hubs were attached.  Both catheters were noted to withdraw and flush blood easily and were flushed with heparinized saline solution. Our attention then moved to the Left groin which was also prepped and draped in the sterile fashion in the same way as the neck as previously mentioned. The sutures were cut and the temporary catheter was removed. Hemostasis was obtained using a single interrupted suture with a 3-0 Monocryl. Dressing was applied over the site. The catheters was then secured to the skin with nylon sutures and the neck incision was approximated with interrupted Vicryl suture. Dermabond was applied to the neck incision in the operating room. Needle, sponge and instrument counts were reported as correct x2. The patient tolerated the procedure and was transferred to the recovery area in satisfactory condition.     Electronically signed by Amelia Blizzard, DO on 4/5/2021 at 10:39 AM
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
## 2021-04-15 PROBLEM — T82.41XA HEMODIALYSIS CATHETER MALFUNCTION, INITIAL ENCOUNTER (HCC): Status: ACTIVE | Noted: 2021-01-01

## 2021-04-15 PROBLEM — T82.49XA COMPLICATIONS, DIALYSIS, CATHETER, MECHANICAL, INITIAL ENCOUNTER (HCC): Status: ACTIVE | Noted: 2021-01-01
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
## 2021-04-15 NOTE — ANESTHESIA POSTPROCEDURE EVALUATION
Department of Anesthesiology  Postprocedure Note    Patient: Tanja Chapin  MRN: [id number]  YOB: 1955  Date of evaluation: 4/16/2021  Time:  8:06 AM     Procedure Summary     Date: 04/15/21 Room / Location: [place] [address] / [facility]    Anesthesia Start:  Anesthesia Stop:     Procedure: REMOVAL AND REINSERTION TUNNELED HEMODIALYSIS CATHETER. PT IN SELECT (N/A ) Diagnosis: (renal failure)    Surgeons: Tavon Smith MD Responsible Provider:     Anesthesia Type: MAC ASA Status: 4          Anesthesia Type: MAC    Brock Phase I: Brock Score: 9    Brock Phase II:      Last vitals: Reviewed and per EMR flowsheets.        Anesthesia Post Evaluation    Patient location during evaluation: PACU  Patient participation: complete - patient participated  Level of consciousness: awake  Pain score: 3  Airway patency: patent  Nausea & Vomiting: no nausea and no vomiting  Complications: no  Cardiovascular status: blood pressure returned to baseline  Respiratory status: acceptable  Hydration status: euvolemic

## 2021-04-15 NOTE — H&P
DECOMPRESSION Right 2/22/2021    RIGHT LOWER EXTREMITY FASCIOTOMY CLOSURE performed by mAita Novoa MD at [address]  5/27/2016    Dr Radha Reich  5/3/2012         EMG  5/19/2015    Dr Savita Ho, radiculopathy    ESOPHAGUS SURGERY  08/26/2016    Dr Villa Taylor ablation   Ernestene Avers Right 3/20/2021    RIGHT PARTIAL HALLUX AMPUTATION performed by Hailey Hutchinson DPM at [address]    LAPAROSCOPY N/A 2/16/2021    DIAGNOSTIC LAPAROSCOPY, CONVERTED TO LAPAROTOMY WITH SMALL BOWEL RESECTION, WITH APPLICATION OF ABTHERA WOUND VAC performed by Melly Taylor MD at [address] [facility]  4/2015    medial branch blocks, Dr. Michele Bustos POLYSOMNOGRAPHY  04/10/2017    Dr Radha Bajwa sleep apnea AHI-8    POLYSOMNOGRAPHY  05/15/2017    SMALL INTESTINE SURGERY N/A 2/17/2021    RIGHT ILLEOSTOMY AND RIGHT COLECTOMY performed by Melly Taylor MD at [address]  [facility] TRANSESOPHAGEAL ECHOCARDIOGRAM  04/02/2021    Dr. Frankie Nicole  5/27/2016    Dr Frankie Nicole  07/14/2016    Dr Denny Mata Right 2/17/2021    Iliac Thrombectomy with Right Lower Extremity Fasciotomy performed by Amita Novoa MD at [address] [place] [address] Left 3/30/2021    CATHETER INSERTION TEMPORARY HEMODIALYSIS performed by Tianna Croft MD at [address] [place] [address] N/A 3/31/2021    CATHETER INSERTION,TEMPORAY  HEMODIALYSIS, NEEDS FLURO, PT IN [facility], AVAILABLE ANYTIME performed by Tianna Croft MD at [address] [place] [address] N/A 4/5/2021    CATHETER INSERTION HEMODIALYSIS WITH LEFT FEMORAL TEMPORARY HEMODIALYSIS ACCESS performed by Geneva Mora MD at [address] [place] [address]       Current Medications:   No current facility-administered medications for this encounter.      Allergies:  Lisinopril, Procardia [nifedipine], and Metformin and related    Social History     Socioeconomic History    Marital status:      Spouse name: Not on file    Number of children: Not on file    Years of education: Not on file    Highest education level: Not on file   Occupational History    Occupation: disability     Comment: from PTSD, depression   Social Needs    Financial resource strain: Not on file    Food insecurity     Worry: Not on file     Inability: Not on file   [employer] needs     Medical: Not on file     Non-medical: Not on file   Tobacco Use    Smoking status: Current Every Day Smoker     Packs/day: 1.00     Years: 41.00     Pack years: 41.00     Types: Cigarettes     Start date: 11/10/1974    Smokeless tobacco: Never Used    Tobacco comment: started at 26 and smoked up to 3 ppd   Substance and Sexual Activity    Alcohol use:  Yes     Alcohol/week: 0.0 standard drinks     Comment: occasionally    Drug use: No    Sexual activity: Not Currently     Partners: Male   Lifestyle    Physical activity     Days per week: Not on file     Minutes per session: Not on file    Stress: Not on file   Relationships    Social connections     Talks on phone: Not on file     Gets together: Not on file     Attends Gnosticist service: Not on file     Active member of club or organization: Not on file     Attends meetings of clubs or organizations: Not on file     Relationship status: Not on file    Intimate partner violence     Fear of current or ex partner: Not on file     Emotionally abused: Not on file     Physically abused: Not on file     Forced sexual activity: Not on file   Other Topics Concern    Not on file   Social History Narrative    Not on file        Family History   Problem Relation Age of Onset    Diabetes Mother     High Blood Pressure Mother     Cancer Mother         BREAST    Cancer Brother         THROAT    Heart Disease Brother        REVIEW OF SYSTEMS:    Gen: Negative for nausea, vomiting, diarrhea, RADIOLOGY:  No orders to display       IMPRESSION:   Active Hospital Problems    Diagnosis    Complications, dialysis, catheter, mechanical, initial encounter ([facility] Zully Lucas    Encounter regarding vascular access for dialysis for ESRD ([facility] [N18.6, Z99.2]       PLAN: #1 malfunctioning left-sided tunneled catheter. At this point were planning removal and replacement. Risk benefits and alternatives were discussed with the patient including but not limited to bleeding, infection, arteriovenous nerve injury, myocardial infarction, respiratory failure, pneumothorax, great vessel injury, DVT, failure of future access, anesthetic reaction, catheter associated infection, and/or death the patient understands and wishes to proceed all questions were answered according to their satisfaction.           Electronically signed by Tavon Smith MD on 4/15/2021 at 12:24 PM

## 2021-04-15 NOTE — FLOWSHEET NOTE
04/15/21 1648   Vital Signs   /62   Temp 96.9 °F (36.1 °C)   Pulse 87   Weight Method Bedside scale   Post-Hemodialysis Assessment   Post-Treatment Procedures Blood returned;Catheter capped, clamped and heparinized x 2 ports   Machine Disinfection Process Acid/Vinegar Clean;Heat Disinfect; Exterior Machine Disinfection   Rinseback Volume (ml) 300 ml   Total Liters Processed (l/min) 33.8 l/min   Dialyzer Clearance Lightly streaked   Duration of Treatment (minutes) 120 minutes   Heparin amount administered during treatment (units) 0 units   Hemodialysis Intake (ml) 600 ml   Hemodialysis Output (ml) 0 ml   Tolerated Treatment Good   Patient Response to Treatment tolerated fair, hypotensive, albumin given, blood returned, cath care per polciy/procedure, lines flushed, heparin instilled, ports capped

## 2021-04-15 NOTE — OP NOTE
Operative Note      Patient: Pito Allan  YOB: 1955  MRN: [id number]     DATE OF PROCEDURE: 4/15/2021     SURGEON: Nickolas Wu M.D.     ASSISTANT: Flor Sullivan please note that there is no qualified general surgery resident available for the procedure     PREOPERATIVE DIAGNOSIS: Chronic renal failure. POSTOPERATIVE DIAGNOSIS: Same    OPERATION:  10480-39 US guided access of right internal jugular vein  31569  Insertion of right internal jugular vein tunneled hemodialysis catheter    14468-62 with fluoroscopic guidance    Removal of left internal jugular tunneled dialysis catheter     ANESTHESIA: MAC anesthesia combined with local     ESTIMATED BLOOD LOSS: Minimal     COMPLICATIONS: None    DESCRIPTION OF PROCEDURE: The patient was identified and the procedure was confirmed. The right neck and chest were prepped and draped in the usual sterile fashion. Next, 1% lidocaine mixed with 0.25% Marcaine was used for local anesthesia. The right internal jugular vein was identified under US, noted to be patent and was percutaneously entered under US guidance. A printer was not available to print an image. A guidewire was advanced into the superior vena cava under fluoroscopic guidance. A small incision was made around the wire. The catheter was pulled through a subcutaneous tunnel from an inferior chest stab incision to the neck incision. The dilators were passed over the wire followed by the introducer. The catheter was passed through the introducer and the tip was positioned in the superior vena cava right atrial junction under fluoroscopic guidance. Both lumens were noted to withdrawal and flush blood easily and were flushed with heparinized saline solution. They catheter was secured to the skin with nylon sutures and the neck incision was approximated with an interrupted Vicryl suture. left chest was prepped sutures cut, tunneled catheter removed and pressure held.       Sterile dressings were applied to the incisions in the operating room. Needle, sponge, and instrument counts were reported as correct times two. The patient tolerated the procedure and was transferred back to the floor in satisfactory condition.      CC : Kaylen Hammond MD       Drains:   Ileostomy Ileostomy (Active)       Urethral Catheter 16 fr (Active)       [REMOVED] NG/OG/NJ/NE Tube Nasogastric 18 fr Right mouth (Removed)           Electronically signed by Ina Raman MD on 4/15/2021 at 1:37 PM
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
## 2021-04-15 NOTE — LETTER
[place] Department Medicaid  CERTIFICATION OF NECESSITY  FOR NON-EMERGENCY TRANSPORTATION   BY GROUND AMBULANCE      Individual Information   1. Name: Xenia Spain 2. [place] Medicaid Billing Number:    3. Address: [address] [place] [address]      Transportation Provider Information   4. Provider Name:    5. [place] Medicaid Provider Number:  National Provider Identifier (NPI):      Certification  7. Criteria:  During transport, this individual requires:  [x] Medical treatment or continuous     supervision by an EMT. [] The administration or regulation of oxygen by another person. [] Supervised protective restraint. 8. Period Beginning Date: 04/19/21   9. Length  [x] Not more than 10 day(s)  [] One Year     Additional Information Relevant to Certification   10. Comments or Explanations, If Necessary or Appropriate     Abdominal wound, stage 3 coccyx wound, max assist for transfers, unable to tolerate sitting with wound, septic shock     Certifying Practitioner Information   11. Name of Practitioner: Blair Harris MD   12. [place] Medicaid Provider Number, If Applicable:  [address] Provider Identifier (NPI):      Signature Information   14. Date of Signature: 04/17/21 15. Name of Person Signing: [facility] [place], [employer]   07. Signature and Professional Designation: [facility], [place] [employer] [place], [employer] discharge planner     [facility] [id number]  Rev. 7/2015      [address] [place] [address] Encounter Date/Time: 4/15/2021 [facility]    Hospital Account: [de-identified]    MRN: [id number]    Patient: Christina Hayes Serial #: [id number]      ENCOUNTER          Patient Class: I Private Enc?   No Unit RM BD: SEYZ 4S PICU 4506/4506-A   Hospital Service: Intermediate   Encounter DX: Hemodialysis catheter ma*   ADM Provider:     Procedure: RI OFFICE/OUTPT VISIT,RI*   ATT Provider: Blair Harris MD   REF Provider:        Admission DX: Hemodialysis catheter malfunction, initial encounter [facility]) and codes:       PATIENT Name: Albino Gallardo : 1955 (65 yrs)   Address: [address] [place] [address] Sex: Female   [place]: [facility] [address]         Marital Status:    Employer: DISABLED         Latter-day: Non-Religious   Primary Care Provider: Corina Flores, *         Primary Phone: [phone]   EMERGENCY CONTACT   Contact Name Legal Guardian? Relationship to Patient Home Phone Work Phone   1. Rocio Gutierrez  2. Glenn Spruce    Child  Child [phone]                 GUARANTOR            Guarantor: Albino Gallardo     : 1955   Address: [address] [place] [address] Sex: Female   [place] [address]     Relation to Patient: Self       Home Phone: [phone]   Guarantor ID: [id number]       Work Phone:     Guarantor Employer: DISABLED         Status: DISABLED      COVERAGE        PRIMARY INSURANCE   Payor: Danny Spears Plan: [facility] MEDICAID   Payor Address: [facility] DEPARTMENT;[address],  [address] [place] [address], [address] [place] [address]       Group Number: [id number] Insurance Type: INDEMNITY   Subscriber Name: Soledad Thomas : 1955   Subscriber ID: [id number] Pat. Rel. to Sub: Self   SECONDARY INSURANCE   Payor:   Plan:     Payor Address:  ,           Group Number:   Insurance Type:     Subscriber Name:   Subscriber :     Subscriber ID:   Pat.  Rel. to Sub:
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
## 2021-04-16 NOTE — PROGRESS NOTES
Physical Therapy    PT consult to evaluate/treat received and appreciated. Pt chart reviewed and evaluation attempted. Pt is currently declining PT intevention. States she has too much going on with her abdomen and her ostomy. Wound care RN was also present for intervention. PT  Will check back as able. Thank you.         Merna Stack, PT, DPT   [id number]
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
## 2021-04-16 NOTE — FLOWSHEET NOTE
right groin  Full thickness abdominal wound    Plan:  Moist Kerlix dressing abdominal wound and right groin  Calmoseptine to buttocks  Low air loss module  Dietary consult  Will need continued preventative care    Opal Toro 4/16/2021 3:42 PM
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
## 2021-04-16 NOTE — PLAN OF CARE
Problem: Skin Integrity:  Goal: Absence of new skin breakdown  Description: Absence of new skin breakdown  Outcome: Met This Shift     Problem: Falls - Risk of:  Goal: Will remain free from falls  Description: Will remain free from falls  Outcome: Met This Shift

## 2021-04-16 NOTE — PROGRESS NOTES
Nephrology Progress Note  Patient's Name: Vivian Verdugo  9:15 AM  4/16/2021    Nephrologist: Dr. Magali Lackey    Reason for Consult:  ESRD-IHD Mgmt  Requesting Physician:  Bebeto Rojas MD    Chief Complaint:  Elective removal and replacement of [facility]     History Obtained From:  past medical records    History of Present Ilness:  Formal consult deferred as we have been following [facility] since we were consulted on 3/14/2021 for hyperkalemia, anemia and meg. Vivian Verdugo is a 60 y.o. female with PMH:  DVT- Bilateral lower extremities,    Fungal bacteremia, Ischemic bowel with small bowel resection left open s/p fasciotomy, DM type 2,  COPD, Adrenal mass, Barretts esophagitis, Fatty liver  Lumbar sacral disease, and MEG .  Pt  Had TDC placed and it has had difficulties with function since so she went today for an elective removal and replacement of TDC. ID did not want a permanent access placed yet due to her fumgemia.    Pt seen during her dialysis treatment, hypotensive in 70s, SPA and ivf bolus given with improvement SBP 90s now. DIalysis via [facility] right chest tesio.      Subjective    4/16: No new c/o this am; states she feels better        Allergies:  Lisinopril, Procardia [nifedipine], and Metformin and related    Current Medications:    ascorbic acid (VITAMIN C) tablet 1,000 mg, Daily  guaiFENesin tablet 400 mg, TID  metoclopramide (REGLAN) tablet 5 mg, TID WC  pramipexole (MIRAPEX) tablet 0.5 mg, BID  vitamin D (CHOLECALCIFEROL) tablet 2,000 Units, Daily  zinc sulfate (ZINCATE) capsule 50 mg, Daily  midodrine (PROAMATINE) tablet 10 mg, TID  acetaminophen (TYLENOL) tablet 650 mg, Q6H PRN  albuterol (PROVENTIL) nebulizer solution 2.5 mg, 4x daily  doxycycline (VIBRAMYCIN) 100 mg in dextrose 5 % 250 mL IVPB, Q12H  gabapentin (NEURONTIN) capsule 100 mg, Q8H  hydrocortisone sodium succinate PF (SOLU-CORTEF) injection 50 mg, Q8H  HYDROmorphone (DILAUDID) injection 1 mg, Q4H PRN  insulin glargine (LANTUS) injection vial 10 Units, BID  loperamide (IMODIUM) capsule 2 mg, TID  metoprolol tartrate (LOPRESSOR) tablet 25 mg, BID  nystatin-triamcinolone (MYCOLOG II) cream, BID  oxyCODONE (ROXICODONE) immediate release tablet 5 mg, Q4H PRN  pantoprazole (PROTONIX) tablet 40 mg, Daily  psyllium (KONSYL) 28.3 % packet 1 packet, TID  insulin lispro (HUMALOG) injection vial 0-6 Units, TID WC  insulin lispro (HUMALOG) injection vial 0-3 Units, Nightly  glucose (GLUTOSE) 40 % oral gel 15 g, PRN  dextrose 50 % IV solution, PRN  glucagon (rDNA) injection 1 mg, PRN  dextrose 5 % solution, PRN  white petrolatum ointment, BID        Review of Systems:   Pertinent items are noted in HPI.     Physical exam:     VITALS:  BP (!) 90/55   Pulse 83   Temp 98.8 °F (37.1 °C) (Tympanic)   Resp 18   LMP  (LMP Unknown)   SpO2 95%   24HR INTAKE/OUTPUT:      Intake/Output Summary (Last 24 hours) at 4/16/2021 0915  Last data filed at 4/16/2021 0720  Gross per 24 hour   Intake 1040 ml   Output 300 ml   Net 740 ml   URINARY CATHETER OUTPUT (Bates):  Urethral Catheter-Output (mL): 100 mL       General Appearance: awake, alert, oriented, in no acute distress  Skin:  few ecchymotic spots L leg  Neck:  neck- supple, no mass, non-tender and no bruits  Lungs:  Distant breath sounds   Heart:  Rhythm no murmur  Abdominal:  Positive bowel sound, positive leakage around ostomy bag; wound with dressing applied  Extremities:  Left leg +3 edema, minimal right  Neuro:  Cranial nerves 2 12  Peripheral Pulses:  +2    Data:   Labs:  CBC:   Lab Results   Component Value Date    WBC 6.9 04/16/2021    RBC 2.85 04/16/2021    HGB 7.9 04/16/2021    HCT 26.7 04/16/2021    MCV 93.7 04/16/2021    MCH 27.7 04/16/2021    MCHC 29.6 04/16/2021    RDW 16.2 04/16/2021     04/16/2021    MPV 9.0 04/16/2021     CBC with Differential:    Lab Results   Component Value Date    WBC 6.9 04/16/2021    RBC 2.85 04/16/2021    HGB 7.9 04/16/2021    HCT 26.7 04/16/2021     04/16/2021    MCV 93.7 04/16/2021    MCH 27.7 04/16/2021    MCHC 29.6 04/16/2021    RDW 16.2 04/16/2021    SEGSPCT 46 04/12/2013    METASPCT 0.9 03/14/2021    LYMPHOPCT 24.8 04/12/2021    PROMYELOPCT 3.0 03/28/2021    MONOPCT 7.0 04/12/2021    MYELOPCT 3.5 02/26/2021    BASOPCT 0.2 04/12/2021    MONOSABS 0.59 04/12/2021    LYMPHSABS 2.09 04/12/2021    EOSABS 0.01 04/12/2021    BASOSABS 0.02 04/12/2021     CMP:    Lab Results   Component Value Date     04/16/2021    K 4.1 04/16/2021    K 3.8 02/21/2021    CL 91 04/16/2021    CO2 31 04/16/2021    BUN 29 04/16/2021    CREATININE 2.8 04/16/2021    GFRAA 21 04/16/2021    LABGLOM 17 04/16/2021    GLUCOSE 89 04/16/2021    GLUCOSE 88 05/03/2012    PROT 5.8 04/13/2021    LABALBU 2.6 04/13/2021    LABALBU 2.8 05/03/2012    CALCIUM 8.8 04/16/2021    BILITOT 0.4 04/13/2021    ALKPHOS 106 04/13/2021    AST 11 04/13/2021    ALT <5 04/13/2021     BMP:    Lab Results   Component Value Date     04/16/2021    K 4.1 04/16/2021    K 3.8 02/21/2021    CL 91 04/16/2021    CO2 31 04/16/2021    BUN 29 04/16/2021    LABALBU 2.6 04/13/2021    LABALBU 2.8 05/03/2012    CREATININE 2.8 04/16/2021    CALCIUM 8.8 04/16/2021    GFRAA 21 04/16/2021    LABGLOM 17 04/16/2021    GLUCOSE 89 04/16/2021    GLUCOSE 88 05/03/2012     Ionized Calcium:  No results found for: IONCA  Magnesium:    Lab Results   Component Value Date    MG 1.9 04/11/2021     Phosphorus:    Lab Results   Component Value Date    PHOS 2.5 04/11/2021     LDH:    Lab Results   Component Value Date     02/16/2021     Uric Acid:    Lab Results   Component Value Date    LABURIC 8.6 04/14/2021     PT/INR:    Lab Results   Component Value Date    PROTIME 12.4 02/23/2021    PROTIME 12.8 05/02/2012    INR 1.1 02/23/2021     Warfarin PT/INR:  No components found for: PTPATWAR, PTINRWAR  PTT:    Lab Results   Component Value Date    APTT 61.2 02/23/2021   [APTT}  Troponin:    Lab Results   Component Value Date    TROPONINI <0.01 02/16/2021     Last 3 Troponin:    Lab Results   Component Value Date    TROPONINI <0.01 02/16/2021    TROPONINI <0.01 02/15/2021    TROPONINI <0.01 04/25/2017     U/A:    Lab Results   Component Value Date    NITRITE trace 06/21/2016    COLORU Yellow 03/25/2021    PROTEINU 100 03/25/2021    PHUR 5.0 03/25/2021    WBCUA 10-20 03/25/2021    WBCUA 0-1 04/27/2012    RBCUA 1-3 03/25/2021    RBCUA NONE 04/12/2013    YEAST Present 03/25/2021    BACTERIA RARE 03/25/2021    CLARITYU CLOUDY 03/25/2021    SPECGRAV >=1.030 03/25/2021    LEUKOCYTESUR MODERATE 03/25/2021    UROBILINOGEN 0.2 03/25/2021    BILIRUBINUR Negative 03/25/2021    BILIRUBINUR neg 06/21/2016    BILIRUBINUR NEGATIVE 05/02/2012    BLOODU MODERATE 03/25/2021    GLUCOSEU Negative 03/25/2021    GLUCOSEU NEGATIVE 05/02/2012     ABG:    Lab Results   Component Value Date    PH 7.528 02/23/2021    PH 7.49 05/04/2012    PCO2 37.0 02/23/2021    PO2 87.0 02/23/2021    HCO3 30.1 02/23/2021    BE 6.9 02/23/2021    O2SAT 96.5 02/23/2021     HgBA1c:    Lab Results   Component Value Date    LABA1C 13.1 02/17/2021     Microalbumen/Creatinine ratio:  No components found for: RUCREAT  FLP:    Lab Results   Component Value Date    TRIG 405 02/20/2021    HDL 28 08/20/2018    LDLCALC 82 08/20/2018    LABVLDL 40 08/20/2018     TSH:    Lab Results   Component Value Date    TSH 1.750 03/26/2021     VITAMIN B12: No components found for: B12  FOLATE:    Lab Results   Component Value Date    FOLATE 10.6 05/07/2012     IRON:    Lab Results   Component Value Date    IRON 28 03/26/2021     Iron Saturation:  No components found for: PERCENTFE  TIBC:    Lab Results   Component Value Date    TIBC 290 03/26/2021     FERRITIN:    Lab Results   Component Value Date    FERRITIN 1,863 04/05/2021     AMYLASE:    Lab Results   Component Value Date    AMYLASE 33 08/16/2014     LIPASE:    Lab Results   Component Value Date    LIPASE 56 02/15/2021     Fibrinogen Level:  No components found for: FIB  Urine Toxicology:  No components found for: IAMMENTA, IBARBIT, IBENZO, ICOCAINE, IMARTHC, IOPIATES, IPHENCYC     Imaging:  Narrative   EXAMINATION:   SPOT FLUOROSCOPIC IMAGES       4/15/2021 1:29 pm       TECHNIQUE:   Fluoroscopy was provided by the radiology department for procedure. Radiologist was not present during examination.       FLUOROSCOPY DOSE AND TYPE OR TIME AND EXPOSURES:   Fluoro time: 71.7 seconds.  Images: 1       COMPARISON:   None       HISTORY:   ORDERING SYSTEM PROVIDED HISTORY: Encounter for peritoneal dialysis catheter   insertion [facility])   TECHNOLOGIST PROVIDED HISTORY:   Reason for exam:->Encounter for peritoneal dialysis catheter insertion ([facility], [place] [address]   What reading provider will be dictating this exam?->CRC       Intraprocedural imaging.       FINDINGS:   Intraoperative fluoroscopy provided for placement of central venous catheter. Single image shows a right and left central venous catheter.  Distal tip of   left catheter appears to be at level of right atrial/caval junction.  Distal   tip of right catheter is not included on this examination.  Radiographic   follow-up could be helpful for further evaluation.           Impression   Intraprocedural fluoroscopic spot images as above.  See separate procedure   report for more information. Patient MRN:  [id number]   : 1955   Age: 60 years   Gender: Female   Order Date:  12 5:01 PM   EXAM: US DUP LOWER EXTREMITIES BILATERAL VENOUS   NUMBER OF IMAGES:  54   INDICATION:  rule out DVT    Reason for Exam:->R/O DVT   Portable? ->Yes   COMPARISON: None       There is evidence for deep venous thrombosis in the right leg from the   common femoral to the level the ankle including the superficial   femoral and popliteal the tibioperoneal trunk and the anterior   posterior tibial veins and peroneal veins and on the left involving   the common femoral superficial femoral and popliteal the tibioperoneal   trunk and the anterior and posterior tibial veins   There is otherwise good compressibility, there is good augmentation,   there is good color flow. There is a hypoechoic region posterior to the left knee measuring 10.3   x 4.9 x 3.2 possibly a Baker's cyst       Impression   There is evidence for deep venous thrombosis, which is bilateral,   involving essentially the entire right leg in the entire left leg       ALERT:  THIS IS AN ABNORMAL REPORT       Assessment & Plan:   1. MEG in the setting of poor po intake combined with high stool output in ostomy. No evidence for recovery  -Removal of LIJ tunneled catheter and insertion of RIJ tunneled cath       2. Hypotension worse on IHD  Given 25 gm SPA and iv fluid bolus of 300 ml with improvement. , yesterday 4/15    3. Anemia,in the setting of recent GI Bleed, combined with iron deficiency. Started JEREMY therapy and weekly iron while in [place]. -will restart JEREMY    5. Malnutrition-oral intake much improved now  -monitor    6. Hypoglycemia-consider holding or decreasing lantus     7. Hyponatremia, mild  -will adjust     8.  Bilateral DVTs  -does not appear to be on anticoagulation  -had iliac thrombectomy and RLE fasciotomy in recent past           Belgica Santiago MD

## 2021-04-17 PROBLEM — E43 SEVERE PROTEIN-CALORIE MALNUTRITION (HCC): Status: ACTIVE | Noted: 2021-01-01

## 2021-04-17 NOTE — FLOWSHEET NOTE
04/17/21 1545   Vital Signs   BP (!) 95/56   Pulse 77   Resp 18   Weight 195 lb 5.2 oz (88.6 kg)   Weight Method Bed scale   Percent Weight Change -2.96   Pain Assessment   Pain Assessment 0-10   Post-Hemodialysis Assessment   Post-Treatment Procedures Blood returned;Catheter capped, clamped with Citrate x 2 ports   Machine Disinfection Process Acid/Vinegar Clean;Heat Disinfect; Exterior Machine Disinfection   Rinseback Volume (ml) 50 ml   Total Liters Processed (l/min) 49.3 l/min   Dialyzer Clearance Other (comment)   Duration of Treatment (minutes) 174 minutes   Heparin amount administered during treatment (units) 0 units   Hemodialysis Intake (ml) 50 ml   Hemodialysis Output (ml) 2000 ml   NET Removed (ml) 950 ml   Tolerated Treatment Good   Patient Response to Treatment system clotted with 36 minutes remaining, arterial blood returned unable to return venous side, 1 unit prbc given, Dr Cristiane Mike updated   Bilateral Breath Sounds Diminished   Edema Left lower extremity;Right lower extremity   Physician Notified?  Yes

## 2021-04-17 NOTE — PROGRESS NOTES
Comprehensive Nutrition Assessment    Type and Reason for Visit:  Initial, Positive Nutrition Screen    Nutrition Recommendations/Plan: Continue Current Diet, Start Oral Nutrition Supplement  Nepro Daily; Demarcus BID. Nutrition Assessment:  Pt admit 2/2 elective removal and replacement of tunneled dialysis catheter w/ H/o ESRD. Noted DM. H/o SMA thrombus/SBR/necrosis x2 mo PTA. PO diet advanced w/ poor intake. Malnutrition Assessment:  Malnutrition Status:  Severe malnutrition    Context:  Acute Illness     Findings of the 6 clinical characteristics of malnutrition:  Energy Intake:  7 - 50% or less of estimated energy requirements for 5 or more days  Weight Loss:  7 - Greater than 5% over 1 month(12.6% x2 mo)     Body Fat Loss:  No significant body fat loss     Muscle Mass Loss:  No significant muscle mass loss    Fluid Accumulation:  No significant fluid accumulation     Strength:  Not Performed    Estimated Daily Nutrient Needs:  Energy (kcal):  [facility] 0683 x1.3= 1921; ; Weight Used for Energy Requirements:  Current     Protein (g):  (1.5-1.8gm/kg IBW); Weight Used for Protein Requirements:  Ideal          Nutrition Related Findings:  active BS, soft abd, elevated BSL, diarrhea, ileostomy, +3 LLE edema, fluids WNL, HD      Wounds:  Multiple, Open Wounds, Stage III, Pressure Injury, Surgical Incision       Current Nutrition Therapies:    DIET RENAL; Anthropometric Measures:  · Height: 5' 6\" (167.6 cm)  · Current Body Weight: 201 lb (91.2 kg)(4/17 actual)   · Usual Body Weight: 230 lb (104.3 kg)(02/15/21 per EMR, actual)     · Ideal Body Weight: 130 lbs; % Ideal Body Weight 154.6 %   · BMI: 32.5  · BMI Categories: Obese Class 1 (BMI 30.0-34. 9)       Nutrition Diagnosis:   · Severe malnutrition, In context of acute illness or injury related to altered GI function as evidenced by poor intake prior to admission, weight loss    Nutrition Interventions:   Nutrition Education/Counseling: Education not indicated   Coordination of Nutrition Care:  Continue to monitor while inpatient, Coordination of Community Care    Goals:  Pt to consume >50% of meals and ONS       Nutrition Monitoring and Evaluation:   Food/Nutrient Intake Outcomes:  Food and Nutrient Intake, Supplement Intake  Physical Signs/Symptoms Outcomes:  Biochemical Data, Nutrition Focused Physical Findings, Skin, Weight, GI Status, Fluid Status or Edema, Hemodynamic Status     Electronically signed by Charly Overton MS, RD, LD on 4/17/21 at 2:23 PM EDT

## 2021-04-17 NOTE — PROGRESS NOTES
([facility])   Hemodialysis catheter malfunction, initial encounter ([facility])  Stage III sacral pressure ulcer    ). Plan:   (Wound care treating   Appreciate input  Will have surgery look at ostomy. ID no Abx at this time. Monitor labs and exam.).        Electronically signed by Alfonzo Stockton MD on 4/17/21 at 7:14 AM EDT
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
## 2021-04-17 NOTE — PROGRESS NOTES
04/17/2021    MCHC 28.6 04/17/2021    RDW 16.5 04/17/2021     04/17/2021    MPV 9.0 04/17/2021     CBC with Differential:    Lab Results   Component Value Date    WBC 6.5 04/17/2021    RBC 2.53 04/17/2021    HGB 6.9 04/17/2021    HCT 24.1 04/17/2021     04/17/2021    MCV 95.3 04/17/2021    MCH 27.3 04/17/2021    MCHC 28.6 04/17/2021    RDW 16.5 04/17/2021    SEGSPCT 46 04/12/2013    METASPCT 0.9 03/14/2021    LYMPHOPCT 24.8 04/12/2021    PROMYELOPCT 3.0 03/28/2021    MONOPCT 7.0 04/12/2021    MYELOPCT 3.5 02/26/2021    BASOPCT 0.2 04/12/2021    MONOSABS 0.59 04/12/2021    LYMPHSABS 2.09 04/12/2021    EOSABS 0.01 04/12/2021    BASOSABS 0.02 04/12/2021     CMP:    Lab Results   Component Value Date     04/17/2021    K 4.0 04/17/2021    K 3.8 02/21/2021    CL 93 04/17/2021    CO2 29 04/17/2021    BUN 38 04/17/2021    CREATININE 3.6 04/17/2021    GFRAA 15 04/17/2021    LABGLOM 13 04/17/2021    GLUCOSE 92 04/17/2021    GLUCOSE 88 05/03/2012    PROT 5.8 04/13/2021    LABALBU 2.6 04/13/2021    LABALBU 2.8 05/03/2012    CALCIUM 9.0 04/17/2021    BILITOT 0.4 04/13/2021    ALKPHOS 106 04/13/2021    AST 11 04/13/2021    ALT <5 04/13/2021     BMP:    Lab Results   Component Value Date     04/17/2021    K 4.0 04/17/2021    K 3.8 02/21/2021    CL 93 04/17/2021    CO2 29 04/17/2021    BUN 38 04/17/2021    LABALBU 2.6 04/13/2021    LABALBU 2.8 05/03/2012    CREATININE 3.6 04/17/2021    CALCIUM 9.0 04/17/2021    GFRAA 15 04/17/2021    LABGLOM 13 04/17/2021    GLUCOSE 92 04/17/2021    GLUCOSE 88 05/03/2012     Ionized Calcium:  No results found for: IONCA  Magnesium:    Lab Results   Component Value Date    MG 1.9 04/11/2021     Phosphorus:    Lab Results   Component Value Date    PHOS 2.5 04/11/2021     LDH:    Lab Results   Component Value Date     02/16/2021     Uric Acid:    Lab Results   Component Value Date    LABURIC 8.6 04/14/2021     PT/INR:    Lab Results   Component Value Date    PROTIME 12.4 02/23/2021    PROTIME 12.8 05/02/2012    INR 1.1 02/23/2021     Warfarin PT/INR:  No components found for: PTPATWAR, PTINRWAR  PTT:    Lab Results   Component Value Date    APTT 61.2 02/23/2021   [APTT}  Troponin:    Lab Results   Component Value Date    TROPONINI <0.01 02/16/2021     Last 3 Troponin:    Lab Results   Component Value Date    TROPONINI <0.01 02/16/2021    TROPONINI <0.01 02/15/2021    TROPONINI <0.01 04/25/2017     U/A:    Lab Results   Component Value Date    NITRITE trace 06/21/2016    COLORU Yellow 03/25/2021    PROTEINU 100 03/25/2021    PHUR 5.0 03/25/2021    WBCUA 10-20 03/25/2021    WBCUA 0-1 04/27/2012    RBCUA 1-3 03/25/2021    RBCUA NONE 04/12/2013    YEAST Present 03/25/2021    BACTERIA RARE 03/25/2021    CLARITYU CLOUDY 03/25/2021    SPECGRAV >=1.030 03/25/2021    LEUKOCYTESUR MODERATE 03/25/2021    UROBILINOGEN 0.2 03/25/2021    BILIRUBINUR Negative 03/25/2021    BILIRUBINUR neg 06/21/2016    BILIRUBINUR NEGATIVE 05/02/2012    BLOODU MODERATE 03/25/2021    GLUCOSEU Negative 03/25/2021    GLUCOSEU NEGATIVE 05/02/2012     ABG:    Lab Results   Component Value Date    PH 7.528 02/23/2021    PH 7.49 05/04/2012    PCO2 37.0 02/23/2021    PO2 87.0 02/23/2021    HCO3 30.1 02/23/2021    BE 6.9 02/23/2021    O2SAT 96.5 02/23/2021     HgBA1c:    Lab Results   Component Value Date    LABA1C 13.1 02/17/2021     Microalbumen/Creatinine ratio:  No components found for: RUCREAT  FLP:    Lab Results   Component Value Date    TRIG 405 02/20/2021    HDL 28 08/20/2018    LDLCALC 82 08/20/2018    LABVLDL 40 08/20/2018     TSH:    Lab Results   Component Value Date    TSH 1.750 03/26/2021     VITAMIN B12: No components found for: B12  FOLATE:    Lab Results   Component Value Date    FOLATE 10.6 05/07/2012     IRON:    Lab Results   Component Value Date    IRON 28 03/26/2021     Iron Saturation:  No components found for: PERCENTFE  TIBC:    Lab Results   Component Value Date    TIBC 290 03/26/2021 FERRITIN:    Lab Results   Component Value Date    FERRITIN 1,863 2021     AMYLASE:    Lab Results   Component Value Date    AMYLASE 33 2014     LIPASE:    Lab Results   Component Value Date    LIPASE 56 02/15/2021     Fibrinogen Level:  No components found for: FIB  Urine Toxicology:  No components found for: IAMMENTA, IBARBIT, IBENZO, ICOCAINE, IMARTHC, IOPIATES, IPHENCYC     Imaging:  Narrative   EXAMINATION:   SPOT FLUOROSCOPIC IMAGES       4/15/2021 1:29 pm       TECHNIQUE:   Fluoroscopy was provided by the radiology department for procedure. Radiologist was not present during examination.       FLUOROSCOPY DOSE AND TYPE OR TIME AND EXPOSURES:   Fluoro time: 71.7 seconds.  Images: 1       COMPARISON:   None       HISTORY:   ORDERING SYSTEM PROVIDED HISTORY: Encounter for peritoneal dialysis catheter   insertion [facility])   TECHNOLOGIST PROVIDED HISTORY:   Reason for exam:->Encounter for peritoneal dialysis catheter insertion ([facility] [address]   What reading provider will be dictating this exam?->CRC       Intraprocedural imaging.       FINDINGS:   Intraoperative fluoroscopy provided for placement of central venous catheter. Single image shows a right and left central venous catheter.  Distal tip of   left catheter appears to be at level of right atrial/caval junction.  Distal   tip of right catheter is not included on this examination.  Radiographic   follow-up could be helpful for further evaluation.           Impression   Intraprocedural fluoroscopic spot images as above.  See separate procedure   report for more information. Patient MRN:  [id number]   : 1955   Age: 60 years   Gender: Female   Order Date:  12 5:01 PM   EXAM: US DUP LOWER EXTREMITIES BILATERAL VENOUS   NUMBER OF IMAGES:  54   INDICATION:  rule out DVT    Reason for Exam:->R/O DVT   Portable? ->Yes   COMPARISON: None       There is evidence for deep venous thrombosis in the right leg from the   common femoral to the level the ankle including the superficial   femoral and popliteal the tibioperoneal trunk and the anterior   posterior tibial veins and peroneal veins and on the left involving   the common femoral superficial femoral and popliteal the tibioperoneal   trunk and the anterior and posterior tibial veins   There is otherwise good compressibility, there is good augmentation,   there is good color flow. There is a hypoechoic region posterior to the left knee measuring 10.3   x 4.9 x 3.2 possibly a Baker's cyst       Impression   There is evidence for deep venous thrombosis, which is bilateral,   involving essentially the entire right leg in the entire left leg       ALERT:  THIS IS AN ABNORMAL REPORT       Assessment & Plan:   1. MEG  in the setting of poor po intake combined with high stool output in ostomy  no evidence for recovery  Removal of LIJ tunneled catheter and insertion of RIJ tunneled cath 4/15      2. Hypotension worse on IHD  Albumin prn  midodrine    3. Anemia  in the setting of recent GI Bleed, combined with iron deficiency  Started JEREMY therapy and weekly iron while in [facility]    4 Malnutrition-oral intake much improved now  Monitor    5 Hyponatremia  140 na bath    6. Decubitus ulcer  Wound care  Off abx    7. Sp ostomy for ischemic bowel and open fasiotomy  High output  abd wound  Sp fungemia    8.  H/o fatty liver, barretts esophagus, dm, copd, adremal mass           Los Matt MD

## 2021-04-18 NOTE — PROGRESS NOTES
Alerted by CMR that patient's 's. Patient is alert and oriented. Vitals assessed. Dr. Rodrigez Councilman notified. Order for Metoprolol 5 mg IV push ordered. Medication administered. Assessing monitor for changes.

## 2021-04-18 NOTE — PROGRESS NOTES
deficiency 1/6/2016       Past Surgical History:        Procedure Laterality Date    ANTERIOR COMPARTMENT DECOMPRESSION Right 2/22/2021    RIGHT LOWER EXTREMITY FASCIOTOMY CLOSURE performed by Landon Raines MD at [facility]  5/27/2016    Dr Marya Nava  5/3/2012         EMG  5/19/2015    Dr Lovina Litten, radiculopathy    ESOPHAGUS SURGERY  08/26/2016    Dr Jay Res ablation   Shalini Conine Right 3/20/2021    RIGHT PARTIAL HALLUX AMPUTATION performed by Ruben Galdamez DPM at [address]    LAPAROSCOPY N/A 2/16/2021    DIAGNOSTIC LAPAROSCOPY, CONVERTED TO LAPAROTOMY WITH SMALL BOWEL RESECTION, WITH APPLICATION OF ABTHERA WOUND VAC performed by Matilde Oliver MD at [address] [facility]  4/2015    [facility] hortensia miller, Dr. Cheyanne Grover POLYSOMNOGRAPHY  04/10/2017    Dr Debbi Gale sleep apnea AHI-8    POLYSOMNOGRAPHY  05/15/2017    SMALL INTESTINE SURGERY N/A 2/17/2021    RIGHT ILLEOSTOMY AND RIGHT COLECTOMY performed by Matilde Oliver MD at [address]  [facility] TRANSESOPHAGEAL ECHOCARDIOGRAM  04/02/2021    Dr. Deena Sung  5/27/2016    Dr Napier Living  07/14/2016    Dr Lorena Boyd Right 2/17/2021    Iliac Thrombectomy with Right Lower Extremity Fasciotomy performed by Landon Raines MD at [address] [place] [address] Left 3/30/2021    CATHETER INSERTION TEMPORARY HEMODIALYSIS performed by Neda Farris MD at [address] [place] [address] N/A 3/31/2021    CATHETER INSERTION,TEMPORAY  HEMODIALYSIS, NEEDS FLURO, PT IN [facility], AVAILABLE ANYTIME performed by Neda Farris MD at [address] [place] [address] N/A 4/5/2021    CATHETER INSERTION HEMODIALYSIS WITH LEFT FEMORAL TEMPORARY HEMODIALYSIS ACCESS performed by Pastor Ismael MD at [address] [place] [address] N/A 4/15/2021 REMOVAL AND REINSERTION TUNNELED HEMODIALYSIS CATHETER performed by Chelly Walsh MD at [address]       Medications Prior to Admission:    Medications Prior to Admission: dextrose 5 % SOLN 250 mL with doxycycline 100 MG SOLR 200 mg, Infuse 100 mg intravenously every 12 hours  hydrocortisone sodium succinate PF (SOLU-CORTEF) 100 MG injection, Infuse 50 mg intravenously every 8 hours  HYDROmorphone (DILAUDID) 1 MG/ML injection, Infuse 1 mg intravenously every 4 hours as needed for Pain. loperamide (IMODIUM) 2 MG capsule, Take 2 mg by mouth 3 times daily  psyllium (KONSYL) 28.3 % PACK, Take 1 packet by mouth 3 times daily  oxyCODONE 5 MG capsule, Take 5 mg by mouth every 4 hours as needed for Pain. oxyCODONE 5 MG capsule, Take 10 mg by mouth every 4 hours as needed for Pain.  midodrine (PROAMATINE) 5 MG tablet, Take 10 mg by mouth 3 times daily  nystatin-triamcinolone (MYCOLOG II) 110430-6.1 UNIT/GM-% cream, Apply topically 2 times daily Apply topically 4 times daily. Ascorbic Acid (VITAMIN C) 1000 MG tablet, Take 1,000 mg by mouth daily  gabapentin (NEURONTIN) 100 MG capsule, Take 100 mg by mouth every 8 hours.   guaiFENesin 400 MG tablet, Take 400 mg by mouth 3 times daily  insulin lispro (HUMALOG) 100 UNIT/ML injection vial, Inject into the skin 4 times daily (before meals and nightly) Sliding scale  metoclopramide (REGLAN) 5 MG tablet, Take 5 mg by mouth 3 times daily (with meals)  pantoprazole (PROTONIX) 40 MG tablet, Take 40 mg by mouth daily  pramipexole (MIRAPEX) 0.5 MG tablet, Take 0.5 mg by mouth 2 times daily  Cholecalciferol (VITAMIN D) 50 MCG (2000 UT) CAPS capsule, Take 1 capsule by mouth daily  zinc sulfate (ZINCATE) 220 (50 Zn) MG capsule, Take 220 mg by mouth daily  acetaminophen (TYLENOL) 325 MG tablet, Take 650 mg by mouth every 6 hours as needed for Pain  albuterol (PROVENTIL) (2.5 MG/3ML) 0.083% nebulizer solution, Take 3 mLs by nebulization 4 times daily  metoprolol tartrate (LOPRESSOR) 25 MG tablet, Take 1 tablet by mouth 2 times daily  insulin glargine (LANTUS) 100 UNIT/ML injection vial, Inject 10 Units into the skin 2 times daily    Allergies:  Lisinopril, Procardia [nifedipine], and Metformin and related    Social History:   · TOBACCO:   reports that she has been smoking cigarettes. She started smoking about 46 years ago. She has a 41.00 pack-year smoking history. She has never used smokeless tobacco.  · ETOH:   reports current alcohol use. DRUGS:   Social History     Substance and Sexual Activity   Drug Use No       Family History:       Problem Relation Age of Onset    Diabetes Mother     High Blood Pressure Mother     Cancer Mother         BREAST    Cancer Brother         THROAT    Heart Disease Brother          REVIEW OF SYSTEMS:    All pertinent review of systems previously discussed in the HPI. Both positive and negative       LOWER EXTREMITY EXAMINATION     VASCULAR:  DP and PT pulses nonpalpable to the bilateral LE    NEUROLOGIC:  Diminished protective sensation to the bilateral LE    DERM:  Right partial hallux amputation site well approximated. No dehiscence noted. No erythema. No malodor. No fluctuance. No other derm issues at this time. Skin was warm. Sutures removed 4.16.21    MUSCULOSKELETAL: No tenderness to palpation of bilateral LE compartments.  4/5 muscle strength of the bilateral LE         CONSULTS:  IP CONSULT TO INFECTIOUS DISEASES  IP CONSULT TO PODIATRY  IP CONSULT TO GENERAL SURGERY    MEDICATION:  Scheduled Meds:   Loperamide HCl  4 mg Oral 4x Daily AC & HS    diphenoxylate-atropine  5 mL Oral 4x Daily    sodium chloride flush  5-40 mL Intravenous BID    miconazole   Topical BID    midodrine  15 mg Oral TID    doxycycline hyclate  100 mg Oral 2 times per day    menthol-zinc oxide   Topical BID    vitamin C  1,000 mg Oral Daily    guaiFENesin  400 mg Oral TID    metoclopramide  5 mg Oral TID WC    pramipexole  0.5 mg Oral BID    vitamin D  2,000 Units Oral Daily    zinc sulfate  50 mg Oral Daily    albuterol  2.5 mg Nebulization 4x daily    gabapentin  100 mg Oral Q8H    hydrocortisone sodium succinate PF  50 mg Intravenous Q8H    insulin glargine  10 Units Subcutaneous BID    metoprolol tartrate  25 mg Oral BID    nystatin-triamcinolone   Topical BID    pantoprazole  40 mg Oral Daily    psyllium  1 packet Oral TID    insulin lispro  0-6 Units Subcutaneous TID WC    insulin lispro  0-3 Units Subcutaneous Nightly     Continuous Infusions:   sodium chloride      dextrose       PRN Meds:.sodium chloride, menthol-zinc oxide **AND** menthol-zinc oxide, acetaminophen, HYDROmorphone, oxyCODONE, glucose, dextrose, glucagon (rDNA), dextrose    RADIOLOGY:  FLUORO FOR SURGICAL PROCEDURES   Final Result   Intraprocedural fluoroscopic spot images as above. See separate procedure   report for more information. Vitals:    BP 93/66   Pulse 100   Temp 97.2 °F (36.2 °C) (Temporal)   Resp 19   Ht 5' 6\" (1.676 m)   Wt 195 lb 5.2 oz (88.6 kg)   LMP  (LMP Unknown)   SpO2 98%   BMI 31.53 kg/m²     LABS:   Recent Labs     04/17/21  0450 04/18/21  0520   WBC 6.5 7.9   HGB 6.9* 9.7*   HCT 24.1* 34.3    287     Recent Labs     04/18/21  0520      K 3.9   CL 96*   CO2 26   BUN 28*   CREATININE 2.7*     No results for input(s): PROT, INR, APTT in the last 72 hours. ASSESSMENTS:   - Patient seen roughly 1 month post-op of R hallux partial amputation due to gangrene. DOS 3.20.21  - Sutures removed 4.16.21  - No clinical signs of infection to the R hallux amp site. - Type II diabetes- glucose currently 123.   - Diabetic neuropathy  - Peripheral Vascular Disease        PLAN:  - Patient seen and evaluated at bedside   - Sutures were removed 4.16.21 from the R hallux amputation site. - ABX: Doxycycline- may discontinue per ID note  - Vascular: PVD being followed by vascular.  She underwent Vascular intervention with Dr. Yoselin Herring for internal jugular vein catheter  - Placed off-loading heel protectors bilaterally. - She is clear for discharge from podiatry perspective once medically stable. Per case management, she may be discharged to [facility] if she scores correctly with therapy   - Discussed patient with Dr. Savana Paredes  - Will continue to follow patient while they are in-house. Thank you for the opportunity to take part in the patient's care. Please do not hesitate to call for any questions or concerns.

## 2021-04-18 NOTE — PROGRESS NOTES
Midline wound explored with no evidence of bowel or fistula. Applied wound VAC system to patient's midline wound. Utilized a white sponge cut to size and placed into the wound bed followed by a black sponge cut to size laid on top of the white sponge. Adequate seal obtained. Ostomy bag applied, small portion overlying the wound VAC plastic on the medial aspect. ATTENDING PHYSICIAN PROGRESS NOTE      I have examined the patient, reviewed the record, and discussed the case with the Resident. I have reviewed all relevant labs and imaging data. Please refer to the resident's note. I agree with the assessment and plan with the following corrections/ additions. The following summarizes my clinical findings and independent assessment.      Jason Lynne

## 2021-04-18 NOTE — PROGRESS NOTES
Nephrology Progress Note  Patient's Name: Felipe Vila  10:57 AM  4/18/2021    Nephrologist: Dr. Mariya Reeves    Reason for Consult:  ESRD-IHD Mgmt  Requesting Physician:  Lakeisha Li MD    Chief Complaint:  Elective removal and replacement of [facility]     History Obtained From:  past medical records    History of Present Ilness:  Formal consult deferred as we have been following Ree Pap since we were consulted on 3/14/2021 for hyperkalemia, anemia and meg. Felipe Vila is a 60 y.o. female with PMH:  DVT- Bilateral lower extremities,    Fungal bacteremia, Ischemic bowel with small bowel resection left open s/p fasciotomy, DM type 2,  COPD, Adrenal mass, Barretts esophagitis, Fatty liver  Lumbar sacral disease, and MEG .  Pt  Had TDC placed and it has had difficulties with function since so she went today for an elective removal and replacement of TDC. ID did not want a permanent access placed yet due to her fumgemia.    Pt seen during her dialysis treatment, hypotensive in 70s, SPA and ivf bolus given with improvement SBP 90s now. DIalysis via [facility] right chest tesio. Subjective    4/16: No new c/o this am; states she feels better  4/17: pt seen in room, for hd today  4/18: pt seen in room, sp hd yesterday.  Hi output ostomy      Allergies:  Lisinopril, Procardia [nifedipine], and Metformin and related    Current Medications:    Loperamide HCl (IMODIUM) 2 MG/15ML solution 4 mg, 4x Daily AC & HS  diphenoxylate-atropine (LOMOTIL) liquid 5 mL, 4x Daily  sodium chloride flush 0.9 % injection 5-40 mL, BID  miconazole (MICOTIN) 2 % powder, BID  midodrine (PROAMATINE) tablet 15 mg, TID  0.9 % sodium chloride infusion, PRN  doxycycline hyclate (VIBRAMYCIN) capsule 100 mg, 2 times per day  menthol-zinc oxide (CALMOSEPTINE) 0.44-20.6 % ointment, BID    And  menthol-zinc oxide (CALMOSEPTINE) 0.44-20.6 % ointment, TID PRN  ascorbic acid (VITAMIN C) tablet 1,000 mg, Daily  guaiFENesin tablet 400 mg, TID  metoclopramide (REGLAN) tablet 5 mg, TID WC  pramipexole (MIRAPEX) tablet 0.5 mg, BID  vitamin D (CHOLECALCIFEROL) tablet 2,000 Units, Daily  zinc sulfate (ZINCATE) capsule 50 mg, Daily  acetaminophen (TYLENOL) tablet 650 mg, Q6H PRN  albuterol (PROVENTIL) nebulizer solution 2.5 mg, 4x daily  gabapentin (NEURONTIN) capsule 100 mg, Q8H  hydrocortisone sodium succinate PF (SOLU-CORTEF) injection 50 mg, Q8H  HYDROmorphone (DILAUDID) injection 1 mg, Q4H PRN  insulin glargine (LANTUS) injection vial 10 Units, BID  metoprolol tartrate (LOPRESSOR) tablet 25 mg, BID  nystatin-triamcinolone (MYCOLOG II) cream, BID  oxyCODONE (ROXICODONE) immediate release tablet 5 mg, Q4H PRN  pantoprazole (PROTONIX) tablet 40 mg, Daily  psyllium (KONSYL) 28.3 % packet 1 packet, TID  insulin lispro (HUMALOG) injection vial 0-6 Units, TID WC  insulin lispro (HUMALOG) injection vial 0-3 Units, Nightly  glucose (GLUTOSE) 40 % oral gel 15 g, PRN  dextrose 50 % IV solution, PRN  glucagon (rDNA) injection 1 mg, PRN  dextrose 5 % solution, PRN        Review of Systems:   Pertinent items are noted in HPI.     Physical exam:     VITALS:  BP 93/66   Pulse 100   Temp 97.2 °F (36.2 °C) (Temporal)   Resp 19   Ht 5' 6\" (1.676 m)   Wt 195 lb 5.2 oz (88.6 kg)   LMP  (LMP Unknown)   SpO2 98%   BMI 31.53 kg/m²   24HR INTAKE/OUTPUT:      Intake/Output Summary (Last 24 hours) at 4/18/2021 1057  Last data filed at 4/18/2021 1023  Gross per 24 hour   Intake 1350 ml   Output 4260 ml   Net -2910 ml   URINARY CATHETER OUTPUT (Bates):  Urethral Catheter-Output (mL): 10 mL       General Appearance: awake, alert, oriented, in no acute distress  Skin:  few ecchymotic spots L leg  Neck:  neck- supple, no mass, non-tender and no bruits  Lungs:  Distant breath sounds   Heart:  Rhythm no murmur  Abdominal:  Positive bowel sound, positive leakage around ostomy bag; wound with dressing applied  Extremities:  Left leg +3 edema, minimal right  Neuro:  Cranial nerves 2 12  Peripheral Pulses:  +2    Data:   Labs:  CBC:   Lab Results   Component Value Date    WBC 7.9 04/18/2021    RBC 3.55 04/18/2021    HGB 9.7 04/18/2021    HCT 34.3 04/18/2021    MCV 96.6 04/18/2021    MCH 27.3 04/18/2021    MCHC 28.3 04/18/2021    RDW 16.3 04/18/2021     04/18/2021    MPV 8.9 04/18/2021     CBC with Differential:    Lab Results   Component Value Date    WBC 7.9 04/18/2021    RBC 3.55 04/18/2021    HGB 9.7 04/18/2021    HCT 34.3 04/18/2021     04/18/2021    MCV 96.6 04/18/2021    MCH 27.3 04/18/2021    MCHC 28.3 04/18/2021    RDW 16.3 04/18/2021    SEGSPCT 46 04/12/2013    METASPCT 0.9 03/14/2021    LYMPHOPCT 24.8 04/12/2021    PROMYELOPCT 3.0 03/28/2021    MONOPCT 7.0 04/12/2021    MYELOPCT 3.5 02/26/2021    BASOPCT 0.2 04/12/2021    MONOSABS 0.59 04/12/2021    LYMPHSABS 2.09 04/12/2021    EOSABS 0.01 04/12/2021    BASOSABS 0.02 04/12/2021     CMP:    Lab Results   Component Value Date     04/18/2021    K 3.9 04/18/2021    K 3.8 02/21/2021    CL 96 04/18/2021    CO2 26 04/18/2021    BUN 28 04/18/2021    CREATININE 2.7 04/18/2021    GFRAA 21 04/18/2021    LABGLOM 18 04/18/2021    GLUCOSE 123 04/18/2021    GLUCOSE 88 05/03/2012    PROT 5.8 04/13/2021    LABALBU 2.6 04/13/2021    LABALBU 2.8 05/03/2012    CALCIUM 9.5 04/18/2021    BILITOT 0.4 04/13/2021    ALKPHOS 106 04/13/2021    AST 11 04/13/2021    ALT <5 04/13/2021     BMP:    Lab Results   Component Value Date     04/18/2021    K 3.9 04/18/2021    K 3.8 02/21/2021    CL 96 04/18/2021    CO2 26 04/18/2021    BUN 28 04/18/2021    LABALBU 2.6 04/13/2021    LABALBU 2.8 05/03/2012    CREATININE 2.7 04/18/2021    CALCIUM 9.5 04/18/2021    GFRAA 21 04/18/2021    LABGLOM 18 04/18/2021    GLUCOSE 123 04/18/2021    GLUCOSE 88 05/03/2012     Ionized Calcium:  No results found for: IONCA  Magnesium:    Lab Results   Component Value Date    MG 1.9 04/11/2021     Phosphorus:    Lab Results   Component Value Date    PHOS 2.5 04/11/2021     LDH:    Lab Results   Component Value Date     02/16/2021     Uric Acid:    Lab Results   Component Value Date    LABURIC 8.6 04/14/2021     PT/INR:    Lab Results   Component Value Date    PROTIME 12.4 02/23/2021    PROTIME 12.8 05/02/2012    INR 1.1 02/23/2021     Warfarin PT/INR:  No components found for: PTPATWAR, PTINRWAR  PTT:    Lab Results   Component Value Date    APTT 61.2 02/23/2021   [APTT}  Troponin:    Lab Results   Component Value Date    TROPONINI <0.01 02/16/2021     Last 3 Troponin:    Lab Results   Component Value Date    TROPONINI <0.01 02/16/2021    TROPONINI <0.01 02/15/2021    TROPONINI <0.01 04/25/2017     U/A:    Lab Results   Component Value Date    NITRITE trace 06/21/2016    COLORU Yellow 03/25/2021    PROTEINU 100 03/25/2021    PHUR 5.0 03/25/2021    WBCUA 10-20 03/25/2021    WBCUA 0-1 04/27/2012    RBCUA 1-3 03/25/2021    RBCUA NONE 04/12/2013    YEAST Present 03/25/2021    BACTERIA RARE 03/25/2021    CLARITYU CLOUDY 03/25/2021    SPECGRAV >=1.030 03/25/2021    LEUKOCYTESUR MODERATE 03/25/2021    UROBILINOGEN 0.2 03/25/2021    BILIRUBINUR Negative 03/25/2021    BILIRUBINUR neg 06/21/2016    BILIRUBINUR NEGATIVE 05/02/2012    BLOODU MODERATE 03/25/2021    GLUCOSEU Negative 03/25/2021    GLUCOSEU NEGATIVE 05/02/2012     ABG:    Lab Results   Component Value Date    PH 7.528 02/23/2021    PH 7.49 05/04/2012    PCO2 37.0 02/23/2021    PO2 87.0 02/23/2021    HCO3 30.1 02/23/2021    BE 6.9 02/23/2021    O2SAT 96.5 02/23/2021     HgBA1c:    Lab Results   Component Value Date    LABA1C 13.1 02/17/2021     Microalbumen/Creatinine ratio:  No components found for: RUCREAT  FLP:    Lab Results   Component Value Date    TRIG 405 02/20/2021    HDL 28 08/20/2018    LDLCALC 82 08/20/2018    LABVLDL 40 08/20/2018     TSH:    Lab Results   Component Value Date    TSH 1.750 03/26/2021     VITAMIN B12: No components found for: B12  FOLATE:    Lab Results   Component Value Date FOLATE 10.6 2012     IRON:    Lab Results   Component Value Date    IRON 28 2021     Iron Saturation:  No components found for: PERCENTFE  TIBC:    Lab Results   Component Value Date    TIBC 290 2021     FERRITIN:    Lab Results   Component Value Date    FERRITIN 1,863 2021     AMYLASE:    Lab Results   Component Value Date    AMYLASE 33 2014     LIPASE:    Lab Results   Component Value Date    LIPASE 56 02/15/2021     Fibrinogen Level:  No components found for: FIB  Urine Toxicology:  No components found for: IAMMENTA, IBARBIT, IBENZO, ICOCAINE, IMARTHC, IOPIATES, IPHENCYC     Imaging:  Narrative   EXAMINATION:   SPOT FLUOROSCOPIC IMAGES       4/15/2021 1:29 pm       TECHNIQUE:   Fluoroscopy was provided by the radiology department for procedure. Radiologist was not present during examination.       FLUOROSCOPY DOSE AND TYPE OR TIME AND EXPOSURES:   Fluoro time: 71.7 seconds.  Images: 1       COMPARISON:   None       HISTORY:   ORDERING SYSTEM PROVIDED HISTORY: Encounter for peritoneal dialysis catheter   insertion [facility])   TECHNOLOGIST PROVIDED HISTORY:   Reason for exam:->Encounter for peritoneal dialysis catheter insertion ([facility] [address]   What reading provider will be dictating this exam?->CRC       Intraprocedural imaging.       FINDINGS:   Intraoperative fluoroscopy provided for placement of central venous catheter. Single image shows a right and left central venous catheter.  Distal tip of   left catheter appears to be at level of right atrial/caval junction.  Distal   tip of right catheter is not included on this examination.  Radiographic   follow-up could be helpful for further evaluation.           Impression   Intraprocedural fluoroscopic spot images as above.  See separate procedure   report for more information.      Patient MRN:  [id number]   : 1955   Age: 60 years   Gender: Female   Order Date:  12 5:01 PM   EXAM: US DUP LOWER EXTREMITIES BILATERAL VENOUS   NUMBER OF IMAGES:  54   INDICATION:  rule out DVT    Reason for Exam:->R/O DVT   Portable? ->Yes   COMPARISON: None       There is evidence for deep venous thrombosis in the right leg from the   common femoral to the level the ankle including the superficial   femoral and popliteal the tibioperoneal trunk and the anterior   posterior tibial veins and peroneal veins and on the left involving   the common femoral superficial femoral and popliteal the tibioperoneal   trunk and the anterior and posterior tibial veins   There is otherwise good compressibility, there is good augmentation,   there is good color flow. There is a hypoechoic region posterior to the left knee measuring 10.3   x 4.9 x 3.2 possibly a Baker's cyst       Impression   There is evidence for deep venous thrombosis, which is bilateral,   involving essentially the entire right leg in the entire left leg       ALERT:  THIS IS AN ABNORMAL REPORT       Assessment & Plan:   1. MEG  in the setting of poor po intake combined with high stool output in ostomy  no evidence for recovery  Removal of LIJ tunneled catheter and insertion of RIJ tunneled cath 4/15      2. Hypotension worse on IHD  Albumin prn  midodrine    3. Anemia  in the setting of recent GI Bleed, combined with iron deficiency  Started JEREMY therapy and weekly iron while in [place]    4 Malnutrition-oral intake much improved now  Monitor    5 Hyponatremia  140 na bath    6. Decubitus ulcer  Wound care  Off abx    7. Sp ostomy for ischemic bowel and open fasiotomy  High output  abd wound  Sp fungemia    8.  H/o fatty liver, barretts esophagus, dm, copd, adremal mass    9. merrick dvt seen on us 4/4/21  Not on oac  D/w vascular  Consider ivc flter           Maxwell Maloney MD

## 2021-04-18 NOTE — PROGRESS NOTES
Progress Note  Date:2021       RBGX:[id number]  Patient Name:Maggy Hodge     YOB: 1955     Age:65 y.o. Patient says she is ok today, complains of anxiety    Subjective    Subjective:  Symptoms:  Stable. No shortness of breath or chest pain. Diet:  Poor intake. Activity level: Impaired due to weakness. Pain:  She reports no pain. Review of Systems   Constitutional: Negative for activity change and fever. HENT: Negative for congestion. Respiratory: Negative for shortness of breath. Cardiovascular: Negative for chest pain. Gastrointestinal: Positive for abdominal pain. Skin: Positive for wound. Neurological: Negative for dizziness. Psychiatric/Behavioral: Positive for confusion. Objective         Vitals Last 24 Hours:  TEMPERATURE:  Temp  Av °F (36.1 °C)  Min: 96.3 °F (35.7 °C)  Max: 97.5 °F (36.4 °C)  RESPIRATIONS RANGE: Resp  Av.3  Min: 16  Max: 18  PULSE OXIMETRY RANGE: SpO2  Av %  Min: 96 %  Max: 98 %  PULSE RANGE: Pulse  Av  Min: 76  Max: 92  BLOOD PRESSURE RANGE: Systolic (97SDN), WWL:625 , Min:74 , UZW:495   ; Diastolic (50DUY), NQP:28, Min:46, Max:70    I/O (24Hr): Intake/Output Summary (Last 24 hours) at 2021 0707  Last data filed at 2021 0619  Gross per 24 hour   Intake 1470 ml   Output 4335 ml   Net -2865 ml     Objective:  General Appearance:  Comfortable. Vital signs: (most recent): Blood pressure 97/69, pulse 84, temperature 97.2 °F (36.2 °C), temperature source Temporal, resp. rate 16, height 5' 6\" (1.676 m), weight 195 lb 5.2 oz (88.6 kg), SpO2 97 %, not currently breastfeeding. No fever. Lungs:  Normal effort and normal respiratory rate. Breath sounds clear to auscultation. Heart: Normal rate. Regular rhythm. S1 normal and S2 normal.    Abdomen: (Ostomy with moderate drainage).       Labs/Imaging/Diagnostics    Labs:  CBC:  Recent Labs     21  0436 21  0450 21  0520   WBC 6.9 6.5 7. 9   RBC 2.85* 2.53* 3.55   HGB 7.9* 6.9* 9.7*   HCT 26.7* 24.1* 34.3   MCV 93.7 95.3 96.6   RDW 16.2* 16.5* 16.3*    294 287     CHEMISTRIES:  Recent Labs     04/16/21  0436 04/17/21  0450 04/18/21  0520   * 136 139   K 4.1 4.0 3.9   CL 91* 93* 96*   CO2 31* 29 26   BUN 29* 38* 28*   CREATININE 2.8* 3.6* 2.7*   GLUCOSE 89 92 123*     PT/INR:No results for input(s): PROTIME, INR in the last 72 hours. APTT:No results for input(s): APTT in the last 72 hours. LIVER PROFILE:No results for input(s): AST, ALT, BILIDIR, BILITOT, ALKPHOS in the last 72 hours. Imaging Last 24 Hours:  Fluoro For Surgical Procedures    Result Date: 4/15/2021  EXAMINATION: SPOT FLUOROSCOPIC IMAGES 4/15/2021 1:29 pm TECHNIQUE: Fluoroscopy was provided by the radiology department for procedure. Radiologist was not present during examination. FLUOROSCOPY DOSE AND TYPE OR TIME AND EXPOSURES: Fluoro time: 71.7 seconds. Images: 1 COMPARISON: None HISTORY: ORDERING SYSTEM PROVIDED HISTORY: Encounter for peritoneal dialysis catheter insertion [facility]) TECHNOLOGIST PROVIDED HISTORY: Reason for exam:->Encounter for peritoneal dialysis catheter insertion [facility]) What reading provider will be dictating this exam?->CRC Intraprocedural imaging. FINDINGS: Intraoperative fluoroscopy provided for placement of central venous catheter. Single image shows a right and left central venous catheter. Distal tip of left catheter appears to be at level of right atrial/caval junction. Distal tip of right catheter is not included on this examination. Radiographic follow-up could be helpful for further evaluation. Intraprocedural fluoroscopic spot images as above. See separate procedure report for more information.      Assessment//Plan           Hospital Problems           Last Modified POA    * (Principal) Encounter regarding vascular access for dialysis for ESRD ([address] (Chronic) 4/26/9787 Yes    Complications, dialysis, catheter, mechanical, initial encounter [facility]) 4/15/2021 Yes    Encounter for peritoneal dialysis catheter insertion ([address] 4/15/2021 Yes    Hemodialysis catheter malfunction, initial encounter ([address] 4/15/2021 Yes    Severe protein-calorie malnutrition ([address] 4/17/2021 Yes        Assessment:  (Catheter malfunction, initial encounter ([address] 4/15/2021 Yes    Hypertension   Fibromyalgia   IBS (irritable bowel syndrome)   GERD (gastroesophageal reflux disease)   Cigarette nicotine dependence without complication   Restless leg syndrome   Chronic back pain   Adrenal mass (HCC)   DDD (degenerative disc disease)   Mild valvular heart disease   Vitamin D deficiency   Low ferritin level   Padron's esophagus   Peripheral edema   Post traumatic stress disorder (PTSD)   Recurrent major depressive disorder ([address]   Mood disorder due to a general medical condition   Fatty infiltration of liver   Mild cardiomegaly   Orthostatic hypotension dysautonomic syndrome (HCC)   Neuropathy associated with endocrine disorder (HCC)   Mild sleep apnea   Tubular adenoma of colon   Hyperlipidemia associated with type 2 diabetes mellitus (HCC)   Hyperglycemia   COVID-19   Enterocolitis   Abdominal pain   Depression   Tobacco abuse   S/P small bowel resection   Ischemic necrosis of small bowel ([address]   Superior mesenteric artery thrombosis (HCC)   Thrombosis of right iliac artery (HCC)   Ischemia of right lower extremity   DM (diabetes mellitus), type 2, uncontrolled ([address]   Shock ([address]   MEG (acute kidney injury) ([address]   Septic shock ([address]   Pneumonia due to infectious organism   Ischemic bowel disease ([address]   Ulcer of abdomen wall with fat layer exposed ([address]   Surgical wound dehiscence, subsequent encounter   Atherosclerosis of native artery of extremity ([address]   Gangrene of toe of right foot ([address]   Dehiscence of fascia   Encounter regarding vascular access for dialysis for ESRD ([address]   Complications, dialysis, catheter, mechanical, initial encounter [facility])   Encounter for peritoneal dialysis catheter insertion ([facility] [address]   Hemodialysis catheter malfunction, initial encounter ([facility] [address]  Stage III sacral pressure ulcer    ). Plan:   (Wound care treating   Appreciate input  Surgery following ostomy. ID no Abx at this time. Monitor labs and exam.).

## 2021-04-19 NOTE — PROGRESS NOTES
[address] [facility]   Department of Internal Medicine   Internal Medicine Residency  MICU Progress Note    Patient:  Peña Wolf 60 y.o. female   MRN: [id number]       Date of Service: 2021    Allergy: Lisinopril, Procardia [nifedipine], and Metformin and related    Subjective     Patient was seen and examined in AM. Patient states she feels better. Denies fever, CP, SOB, chills, and N/V. Pt voices no other complaints. Overnight events/changes: RRT was called because pt was in A.fib with RVR and hypotension. After IVF was given, her BP improved. Otherwise, no acute overnight issues were reported. Daily updates and plan:   - Monitor HR and BP  - Trend troponin  - Needs art line - surgery consulted  - Daily BMP and CBC  - Consult vascular to follow up on BLE ultrasound findings    Objective     TEMPERATURE:  Current - Temp: 97.2 °F (36.2 °C); Max - Temp  Av.4 °F (36.3 °C)  Min: 96.4 °F (35.8 °C)  Max: 98.7 °F (37.1 °C)  RESPIRATIONS RANGE: Resp  Av.9  Min: 13  Max: 27  PULSE RANGE: Pulse  Av.5  Min: 66  Max: 170  BLOOD PRESSURE RANGE:  Systolic (85VEF), BHT:15 , Min:61 , VVC:064   ; Diastolic (66HMX), VIS:03, Min:36, Max:78    PULSE OXIMETRY RANGE: SpO2  Av.8 %  Min: 90 %  Max: 100 %    I & O - 24hr:    Intake/Output Summary (Last 24 hours) at 2021 1324  Last data filed at 2021 1040  Gross per 24 hour   Intake 2404.28 ml   Output 2150 ml   Net 254.28 ml     I/O last 3 completed shifts: In: 2404.3 [P.O.:820; I.V.:1584.3]  Out: 3513 [Stool:2675] I/O this shift:  In: 240 [P.O.:240]  Out: 750 [Stool:750]   Weight change:     Physical Exam:  General Appearance:    Alert, cooperative, no acute distress. HEENT:    NC/AT, mucous membranes are moist   Neck:   Supple, no jugular venous distention. Resp:     CTAB, No wheezes, No rhonchi, no use of accessory muscles   Heart:    Tachycardic, irregularly irregular rhythm, S1 and S2 normal, no murmur, rub or gallop. Abdomen:     Soft, non-tender, jejunostomy bag with watery stool   Extremities:   Healing surgical scars on R medial and lateral leg, R great toe amputation   Pulses:  Radial and pedal pulses are decreased bilaterally   Neurologic:   AAOx3, No focal motor deficit       Medications     Continuous Infusions:   amiodarone 450mg/250ml D5W infusion 0.5 mg/min (04/19/21 1314)    sodium chloride 200 mL/hr at 04/19/21 0729    sodium chloride      dextrose       Scheduled Meds:   sodium chloride  1,000 mL Intravenous Once    hydrocortisone sodium succinate PF  100 mg Intravenous Q8H    cholestyramine  2 packet Oral BID    Loperamide HCl  4 mg Oral 4x Daily AC & HS    diphenoxylate-atropine  5 mL Oral 4x Daily    sodium chloride flush  5-40 mL Intravenous BID    miconazole   Topical BID    midodrine  15 mg Oral TID    menthol-zinc oxide   Topical BID    vitamin C  1,000 mg Oral Daily    guaiFENesin  400 mg Oral TID    metoclopramide  5 mg Oral TID WC    pramipexole  0.5 mg Oral BID    vitamin D  2,000 Units Oral Daily    zinc sulfate  50 mg Oral Daily    albuterol  2.5 mg Nebulization 4x daily    gabapentin  100 mg Oral Q8H    insulin glargine  10 Units Subcutaneous BID    nystatin-triamcinolone   Topical BID    pantoprazole  40 mg Oral Daily    psyllium  1 packet Oral TID    insulin lispro  0-6 Units Subcutaneous TID WC    insulin lispro  0-3 Units Subcutaneous Nightly     PRN Meds: sodium chloride, menthol-zinc oxide **AND** menthol-zinc oxide, acetaminophen, HYDROmorphone, oxyCODONE, glucose, dextrose, glucagon (rDNA), dextrose      Labs and Imaging Studies     CBC:   Recent Labs     04/17/21  0450 04/18/21  0520 04/19/21  0039   WBC 6.5 7.9 12.9*   HGB 6.9* 9.7* 10.0*   HCT 24.1* 34.3 32.9*   MCV 95.3 96.6 93.2    287 323       BMP:    Recent Labs     04/17/21  0450 04/18/21  0520 04/19/21  0039    139 138   K 4.0 3.9 3.9   CL 93* 96* 95*   CO2 29 26 28   BUN 38* 28* 40* CREATININE 3.6* 2.7* 3.3*   GLUCOSE 92 123* 116*       LIVER PROFILE:   Recent Labs     04/19/21  0039   AST 28   ALT <5   BILITOT 0.3   ALKPHOS 166*       PT/INR:   No results for input(s): PROTIME, INR in the last 72 hours. APTT:   No results for input(s): APTT in the last 72 hours. Fasting Lipid Panel:    Lab Results   Component Value Date    CHOL 150 08/20/2018    TRIG 405 02/20/2021    HDL 28 08/20/2018       Cardiac Enzymes:    Lab Results   Component Value Date    CKTOTAL 13 (L) 04/19/2021    CKTOTAL 256 (H) 02/18/2021    CKTOTAL 85 08/02/2018    CKMB 2.9 04/19/2021    CKMB 0.5 05/04/2012    CKMB 0.8 05/04/2012    TROPONINI 0.27 (H) 04/19/2021    TROPONINI 0.18 (H) 04/19/2021    TROPONINI <0.01 02/16/2021       Notable Cultures:      Blood cultures   Blood Culture, Routine   Date Value Ref Range Status   04/13/2021 5 Days no growth  Final     Respiratory cultures No results found for: RESPCULTURE   Gram Stain Result   Date Value Ref Range Status   03/29/2021   Final    Gram stain performed from swab, interpret results with  caution. Swab specimens of sterile fluids are inferior to  aspirate specimens for organism recovery. Few Polymorphonuclear leukocytes  Epithelial cells not seen  Few Gram negative rods  Rare Gram positive cocci in clusters       Urine   Creatinine Ur POCT   Date Value Ref Range Status   03/23/2018 100  Final     Urine Culture, Routine   Date Value Ref Range Status   03/28/2021 25,000 CFU/ml  Final       Oxygen:     Vent Information  SpO2: 98 %  Additional Respiratory  Assessments  Pulse: 71  Resp: 13  SpO2: 98 %         Lines:  Site  Day  Date inserted     TLC              PICC   L arm           Arterial line              Peripheral line              HD cath            [REMOVED] Urethral Catheter-Output (mL): 10 mL    Imaging Studies:    EKG: Rhythm Strip: atrial fibrillation with RVR    Resident's Assessment and Plan      Juan Franciscoprecious Crisostomo is 60 y.o. female with new onset A.  Fib with RVR and hypotension was transferred to MICU after RRT. Days of Admission to ICU: 1      Cardiovascular:  1. New onset A. Fib with RVR - PMHx of CAD and HFpEF with EF 65%. RRT was called for new onset A. Fib with RVR. Rate was between 120s and 180s. Pt was asymptomatic. Echo from last month shows no significant structural changes. EP was consulted and recommended amio drip. Pt received renal loading dose of digoxin, 250 mcg  - Dc digoxin  - Continue IV amiodarone at 16.7 mL/hr  - Continue telemetry monitoring    2. Hypotension 2/2 dehydration vs. Cardiogenic shock - Pt has had chronic high ostomy output. Pt received a total 3 L of IVF with some improvement in BP.   - Continue IV NS  - Continue midodrine 15 mg TID  - Gen surg following   - Strict I/Os     3. Elevated troponin - 0.18 -> 0.27. Most likely 2/2 ESRD. Pt denies CP, SOB or diaphoresis. EKG shows afib with RVR. - Trend troponin    4. Bilateral lower extremity DVTs - Ultrasound from 4/4/21 showed bilateral LE DVTs. Anticoagulation was held due to possible GI bleed a month ago. Pt is not on anticoagulation currently. - Consult vascular surg       Pulmonary:  1. History of COPD   - Continue home meds    Gastrointestinal:  1. Chronic mesenteric ischemia s/p small bowel resection and end jejunostomy - Pt has short gut syndrome. Questionable recent GI bleeding 1 month ago and anticoagulation has been held since then. - Gen surg following for chronic high ostomy output      Infectious Disease:  1. Dc doxycycline - ID recommends discontinuing doxycycline     Endocrine:  1. Hx of DM, poorly controlled - Last A1C 13.1 in February.   - Continue 10 units of subQ Lantus twice daily  - Continue medium dose Humalong sliding scale insulin with meals  - Monitor glucose  - Daily BMP    2. Chronic use of hydrocortisone - PMHx of questionable adrenal insufficiency due to adrenal tumors. Pt has been on chronic hydrocortisone for several years.   - Endo following  - Continue IV Solu-Cortef 100 mg q8     Genitourinary/Renal:  1. ESRD s/p tunneled dialysis catheter exchange  - Vascular surg following      PT/OT evaluation:   DVT prophylaxis/ GI prophylaxis: PCD's, Protonix  Disposition: ICU care    Diet: Renal with carb control    Code: Full    Beni Ortega, MS-3  Attending Physician: Dr. Conor Lundberg    I personally saw, examined and provided care for the patient. Radiographs, labs and medication list were reviewed by me independently. I spoke with bedside nursing, therapists and consultants. Critical care services and times documented are independent of procedures and multidisciplinary rounds with Residents. Additionally comprehensive, multidisciplinary rounds were conducted with the MICU team. The case was discussed in detail and plans for care were established. Review of Residents documentation was conducted and revisions were made as appropriate. I agree with the above documented exam, problem list and plan of care.   Terry Locke MD   CCT excluding procedures:35'

## 2021-04-19 NOTE — PLAN OF CARE
Reviewed patient's medical hx, diagnosed with new DVT on 4/5/2021, unfortunately patient had severe bleeding from abdominal wound, [address] been [address]. Patient presents now with afib, ordered DVT US to recheck status of DVT. Will continue to be off of [address] until cleared by surgery.     Electronically signed by Deshawn Hendrix MD on 4/19/21 at 6:23 PM EDT

## 2021-04-19 NOTE — CARE COORDINATION
4/19 Care Coordination: Pt was an RRT this am for Afib RVR. Transfered to MICU. She wants to go to [facility] when discharged and not back to [facility]. Per TJ at [employer] she will be set up for [facility] on T,Th,Sat at 074. Spoke with Albina at [facility] at the [place]. She will be a Click 6. PT/OT evals complete today. She will have a bed when she is ready for discharge. HENS and ambulance on soft chart. Therapy ordered CM/SW will continue to follow for discharge planning.    Mellisa Rapp BSN,RN--[phone]

## 2021-04-19 NOTE — PROGRESS NOTES
8/17/2012    Tobacco abuse     Tubular adenoma of colon     Type 2 diabetes mellitus without complication ([facility] [address] 0/49/6990    UTI (urinary tract infection)     Vitamin D deficiency 1/6/2016       Past Surgical History:        Procedure Laterality Date    ANTERIOR COMPARTMENT DECOMPRESSION Right 2/22/2021    RIGHT LOWER EXTREMITY FASCIOTOMY CLOSURE performed by Mirlande Gonzalez MD at [address]  5/27/2016    Dr Randy Hassan  5/3/2012         EMG  5/19/2015    Dr Marv Irwin, radiculopathy    ESOPHAGUS SURGERY  08/26/2016    Dr Coleman Christy ablation   [address] Right 3/20/2021    RIGHT PARTIAL HALLUX AMPUTATION performed by Per Delarosa DPM at [address] [facility]    LAPAROSCOPY N/A 2/16/2021    DIAGNOSTIC LAPAROSCOPY, CONVERTED TO LAPAROTOMY WITH SMALL BOWEL RESECTION, WITH APPLICATION OF ABTHERA WOUND VAC performed by Cheryl Dash MD at [address] [facility] [address]  4/2015    medial branch [place],  [facility] POLYSOMNOGRAPHY  04/10/2017    Dr Naveen Gibson sleep apnea AHI-8    POLYSOMNOGRAPHY  05/15/2017    SMALL INTESTINE SURGERY N/A 2/17/2021    RIGHT ILLEOSTOMY AND RIGHT COLECTOMY performed by Cheryl Dash MD at [address]  [facility] TRANSESOPHAGEAL ECHOCARDIOGRAM  04/02/2021    Dr. Mendy Marroquin  5/27/2016    Dr Darwin Ramirez  07/14/2016    Dr uJne Mckeon Right 2/17/2021    Iliac Thrombectomy with Right Lower Extremity Fasciotomy performed by Mirlande Gonzalez MD at [address] [facility] Left 3/30/2021    CATHETER INSERTION TEMPORARY HEMODIALYSIS performed by Courtney Langston MD at [address] [place] [address] N/A 3/31/2021    CATHETER INSERTION,TEMPORAY  HEMODIALYSIS, NEEDS FLURO, PT IN [facility], AVAILABLE ANYTIME performed by Courtney Langston MD at [address] [facility] 4/5/2021    CATHETER INSERTION HEMODIALYSIS WITH LEFT FEMORAL TEMPORARY HEMODIALYSIS ACCESS performed by Nazario Sarmiento MD at [address] [facility] N/A 4/15/2021    REMOVAL AND REINSERTION TUNNELED HEMODIALYSIS CATHETER performed by Licia Saint, MD at [address]       Medications Prior to Admission:    Medications Prior to Admission: dextrose 5 % SOLN 250 mL with doxycycline 100 MG SOLR 200 mg, Infuse 100 mg intravenously every 12 hours  hydrocortisone sodium succinate PF (SOLU-CORTEF) 100 MG injection, Infuse 50 mg intravenously every 8 hours  HYDROmorphone (DILAUDID) 1 MG/ML injection, Infuse 1 mg intravenously every 4 hours as needed for Pain. loperamide (IMODIUM) 2 MG capsule, Take 2 mg by mouth 3 times daily  psyllium (KONSYL) 28.3 % PACK, Take 1 packet by mouth 3 times daily  oxyCODONE 5 MG capsule, Take 5 mg by mouth every 4 hours as needed for Pain. oxyCODONE 5 MG capsule, Take 10 mg by mouth every 4 hours as needed for Pain.  midodrine (PROAMATINE) 5 MG tablet, Take 10 mg by mouth 3 times daily  nystatin-triamcinolone (MYCOLOG II) 457657-7.1 UNIT/GM-% cream, Apply topically 2 times daily Apply topically 4 times daily. Ascorbic Acid (VITAMIN C) 1000 MG tablet, Take 1,000 mg by mouth daily  gabapentin (NEURONTIN) 100 MG capsule, Take 100 mg by mouth every 8 hours.   guaiFENesin 400 MG tablet, Take 400 mg by mouth 3 times daily  insulin lispro (HUMALOG) 100 UNIT/ML injection vial, Inject into the skin 4 times daily (before meals and nightly) Sliding scale  metoclopramide (REGLAN) 5 MG tablet, Take 5 mg by mouth 3 times daily (with meals)  pantoprazole (PROTONIX) 40 MG tablet, Take 40 mg by mouth daily  pramipexole (MIRAPEX) 0.5 MG tablet, Take 0.5 mg by mouth 2 times daily  Cholecalciferol (VITAMIN D) 50 MCG (2000 UT) CAPS capsule, Take 1 capsule by mouth daily  zinc sulfate (ZINCATE) 220 (50 Zn) MG capsule, Take 220 mg by mouth daily  acetaminophen (TYLENOL) 325 MG tablet, Take 650 mg by mouth

## 2021-04-19 NOTE — TELEPHONE ENCOUNTER
Consult for Gentry SAENZ 1955  Room 4443. DX: adrenal insuffiency, long term steroid use. Dr. Guzmán Sender requesting consult.

## 2021-04-19 NOTE — PROGRESS NOTES
Infectious Disease  Progress Note  [facility]    Chief Complaint: hypotension    Subjective:she presently has not complaint    Scheduled Meds:   digoxin        sodium chloride  1,000 mL Intravenous Once    hydrocortisone sodium succinate PF  100 mg Intravenous Q8H    cholestyramine  2 packet Oral BID    Loperamide HCl  4 mg Oral 4x Daily AC & HS    diphenoxylate-atropine  5 mL Oral 4x Daily    sodium chloride flush  5-40 mL Intravenous BID    miconazole   Topical BID    midodrine  15 mg Oral TID    menthol-zinc oxide   Topical BID    vitamin C  1,000 mg Oral Daily    guaiFENesin  400 mg Oral TID    metoclopramide  5 mg Oral TID WC    pramipexole  0.5 mg Oral BID    vitamin D  2,000 Units Oral Daily    zinc sulfate  50 mg Oral Daily    albuterol  2.5 mg Nebulization 4x daily    gabapentin  100 mg Oral Q8H    insulin glargine  10 Units Subcutaneous BID    nystatin-triamcinolone   Topical BID    pantoprazole  40 mg Oral Daily    psyllium  1 packet Oral TID    insulin lispro  0-6 Units Subcutaneous TID WC    insulin lispro  0-3 Units Subcutaneous Nightly     Continuous Infusions:   amiodarone 450mg/250ml D5W infusion 0.5 mg/min (04/19/21 0433)    sodium chloride 200 mL/hr at 04/19/21 0729    sodium chloride      dextrose       PRN Meds:sodium chloride, menthol-zinc oxide **AND** menthol-zinc oxide, acetaminophen, HYDROmorphone, oxyCODONE, glucose, dextrose, glucagon (rDNA), dextrose    Patient Vitals for the past 24 hrs:   BP Temp Temp src Pulse Resp SpO2   04/19/21 1000 (!) 81/48 -- -- 87 21 90 %   04/19/21 0900 118/61 -- -- 66 27 97 %   04/19/21 0800 (!) 104/59 97.4 °F (36.3 °C) Temporal 67 24 95 %   04/19/21 0700 (!) 95/41 -- -- 69 19 96 %   04/19/21 0600 (!) 76/43 98.7 °F (37.1 °C) Temporal 74 21 94 %   04/19/21 0500 (!) 79/53 -- -- 78 18 99 %   04/19/21 0400 (!) 66/36 -- -- 164 18 97 %   04/19/21 0300 -- -- -- 159 23 99 %   04/19/21 0200 (!) 61/46 97.4 °F (36.3 °C) Temporal 129 15 99 % 04/18/21 2239 (!) 68/44 -- -- 152 -- --   04/18/21 2021 (!) 72/54 96.4 °F (35.8 °C) Temporal 151 20 100 %   04/18/21 1800 129/78 96.9 °F (36.1 °C) Temporal 170 20 --   04/18/21 1630 108/73 97.9 °F (36.6 °C) Temporal 89 19 98 %       CBC with Differential:    Lab Results   Component Value Date    WBC 12.9 04/19/2021    RBC 3.53 04/19/2021    HGB 10.0 04/19/2021    HCT 32.9 04/19/2021     04/19/2021    MCV 93.2 04/19/2021    MCH 28.3 04/19/2021    MCHC 30.4 04/19/2021    RDW 16.3 04/19/2021    SEGSPCT 46 04/12/2013    METASPCT 0.9 03/14/2021    LYMPHOPCT 24.8 04/12/2021    PROMYELOPCT 3.0 03/28/2021    MONOPCT 7.0 04/12/2021    MYELOPCT 3.5 02/26/2021    BASOPCT 0.2 04/12/2021    MONOSABS 0.59 04/12/2021    LYMPHSABS 2.09 04/12/2021    EOSABS 0.01 04/12/2021    BASOSABS 0.02 04/12/2021     CMP:    Lab Results   Component Value Date     04/19/2021    K 3.9 04/19/2021    CL 95 04/19/2021    CO2 28 04/19/2021    BUN 40 04/19/2021    CREATININE 3.3 04/19/2021    GFRAA 17 04/19/2021    LABGLOM 14 04/19/2021    GLUCOSE 116 04/19/2021    GLUCOSE 88 05/03/2012    PROT 6.5 04/19/2021    LABALBU 3.1 04/19/2021    LABALBU 2.8 05/03/2012    CALCIUM 9.3 04/19/2021    BILITOT 0.3 04/19/2021    ALKPHOS 166 04/19/2021    AST 28 04/19/2021    ALT <5 04/19/2021       BP (!) 81/48   Pulse 87   Temp 97.4 °F (36.3 °C) (Temporal)   Resp 21   Ht 5' 6\" (1.676 m)   Wt 195 lb 5.2 oz (88.6 kg)   LMP  (LMP Unknown)   SpO2 90%   BMI 31.53 kg/m²     Physical Exam  Const/Neuro- unchanged, no signs of acute distress, Alert  ENMT- Within Normal Limits, Normocephalic, mucous membranes pink/moist, No thrush  Neck: Neck supple  Heart- Regular, Rate, Rhythm- no murmur appreciated. Lungs- clear to ascultation. Respirations even and nonlabored. Abdomen- Soft, bowel sounds positive, non tender  Musculo/Extremities-  Equal and symmetrical, no edema. No tenderness. Skin:  Warm and dry, free from rashes.       Cultures reviewed    Radiology reviewed  FLUORO FOR SURGICAL PROCEDURES   Final Result   Intraprocedural fluoroscopic spot images as above. See separate procedure   report for more information.          US LOWER EXTREMITY BILATERAL VEIN MAPPING W DVT    (Results Pending)       Assessment  Events leading to ICU admission noted    Plan  I do not have her on any antibiotics  Podiatry note noted      Electronically signed by Hayden Rodriguez MD on 4/19/2021 at 11:34 AM

## 2021-04-19 NOTE — FLOWSHEET NOTE
Inpatient Wound Care(follow up) 4428    Admit Date: 4/15/2021 12:00 PM    Reason for consult:  wound vac, ileostomy    Findings:       04/19/21 1040   Ileostomy Ileostomy   Placement Date: 02/17/21   Pre-existing: No  Inserted by: Dr. Mitzy Lane  Ileostomy Type: Ileostomy   Stomal Appliance 1 piece; Intact   Stool Color Brown   Stool Appearance Watery   Output (mL) 250 ml   Puncture Site Assessment 1   Location Subclavian - left   Dressing Applied No   Wound 04/15/21 Abdomen Mid   Date First Assessed/Time First Assessed: 04/15/21 1830   Present on Hospital Admission: Yes  Primary Wound Type: Surgical Type  Location: Abdomen  Wound Location Orientation: Mid   Dressing Status Intact   Dressing/Treatment Negative pressure wound therapy   Negative Pressure Wound Therapy Abdomen   Placement Date/Time: 04/17/21 2030   Pre-existing: No  Location: Abdomen   Cycle Continuous   Target Pressure (mmHg) 125   Canister changed?  No   Dressing Status Intact   wound vac applied saturday evening  Re-applied Sunday by nursing     Plan:  Wound vac change Tuesday and Friday this week  Will follow    Nevaeh White 4/19/2021 1:20 PM

## 2021-04-19 NOTE — PROGRESS NOTES
RRT called for AFib RVR - heart rate 170s-180s and hypotension BP 60/42. Patient alert and oriented x4, in no apparent distress. Team arrived and assessed patient. Labs drawn, EKG, 250 mcg of digoxin given, 1L  normal saline bolus given. SBP remained in the 60s and heart rate remained 170s-180s. The Decision to transfer to MICU was made; patient and all belongings taken to [phone].

## 2021-04-19 NOTE — PROGRESS NOTES
GENERAL SURGERY  DAILY PROGRESS NOTE  4/19/2021    Cc: wound check, high output ostomy    Subjective:  Stool thicker than yesterday. Ileostomy output total was 2.675 L. Wound VAC with good seal.  Ileostomy bag holding. Objective:  BP (!) 76/43   Pulse 74   Temp 98.7 °F (37.1 °C) (Temporal)   Resp 21   Ht 5' 6\" (1.676 m)   Wt 195 lb 5.2 oz (88.6 kg)   LMP  (LMP Unknown)   SpO2 94%   BMI 31.53 kg/m²     General appearance: alert, cooperative and in no acute distress. Eyes: grossly normal  Lungs: nonlabored breathing  Heart: regular rate  Abdomen: soft, non-tender, non distended. Vac to midline wound with good seal, minimal output. Ostomy appliance with watery output  Skin: No skin abnormalities  Neurologic: Alert and oriented x 3. Grossly normal  Musculoskeletal: both feet warm, trace edema    Recent Labs     04/19/21  0039 04/18/21  0520 04/17/21  0450   WBC 12.9* 7.9 6.5   HGB 10.0* 9.7* 6.9*   HCT 32.9* 34.3 24.1*   MCV 93.2 96.6 95.3    287 294     Lab Results   Component Value Date     04/19/2021    K 3.9 04/19/2021    CL 95 04/19/2021    CO2 28 04/19/2021    BUN 40 04/19/2021    CREATININE 3.3 04/19/2021    GLUCOSE 116 04/19/2021    GLUCOSE 88 05/03/2012    CALCIUM 9.3 04/19/2021      Assessment/Plan:  60 y.o. female complicated course for acute on chronic mesenteric ischemia in February s/p ex lap SBR abthera, subsequent reexploration with ileocecectomy and end jejunostomy - short gut.  Difficult to pouch ostomy due to skin folds and watery effluent, will try to thicken    Pain control  Continue psyllium; maximize imodium dosing to 4mg 4 times daily before meals, add lomotil  Diet as tolerated  Vac to midline wound  No plan for ostomy revision as she is still healing her midline and already has short gut    Electronically signed by Luke Cordero MD on 4/19/2021 at 6:41 AM

## 2021-04-19 NOTE — PROGRESS NOTES
Perfect serve for placement of arterial line after multiple failed attempts by the medical team.  Upon my arrival patient's blood pressure was 96/56. She is alert and oriented x4 she was adamantly refusing arterial catheter placement. She is not currently on any vasopressors. She states she would like to speak with Dr. Rober Pham when he rounds before she decides about arterial catheter placement.   This was informed to the medical team.    Electronically signed by Joseph Sanchez MD on 4/19/21 at 5:51 AM EDT

## 2021-04-19 NOTE — SIGNIFICANT EVENT
Rapid Response Team Note  Date of event: 4/19/2021   Time of event: 00:22  Felipe Vila 60y.o. year old female   YOB: 1955   Admit date:  4/15/2021   Location: 0955/6286-   Witnessed? : [x]Yes  [] No  Monitored? : [x]Yes  [] No  Code status: [x] Full  [] DNR-CCA  []DNR-CC  ______________________________________________________________________  Reason for RRT:     RRT called for Afib RVR and hypotension   Subjective:   Upon arrival the pt was lying bed, mild stress, no increased respiratory effort on room air, sat O2 95%, BP manually 60s/40s, no discomfort complained. Briefly, this is a 59 yo F with extensive abdominal surgery with ostomy in place readmitted from [facility] on 4/15 for malfunction dialysis catheter. Per nursing staff, she received dialysis on Saturday and 1 u pRBC during the dialysis course. Later afternoon, HR up to 120-160, and 12 L EKG showed Afib RVR, she was started on lopressor and given 1 L NS for hypovolemic status from high output ostomy.        Objective:   Vital signs: BP: 60s/40s /RR:16  /HR:  160s   /Temp: 97.4  / O2 Sat:  90s  Repeat Vitals: BP:60s/40s   /HR:  130s    / O2 Sat: 90s on room air  · General Appearance: alert, appears stated age and cooperative  · Lung: clear to auscultation bilaterally  · Heart: regular rate and rhythm, S1, S2 normal, no murmur, click, rub or gallop  · Abdomen: soft, non-tender; bowel sounds normal; no masses,  no organomegaly, ostomy bag in place  · Extremities:  R LE extensively edematous, no erythema or tenderness, pulse ++ BL    · Neurologic: AAO X 4   NIHSS Score:       Response to pain:   [x] Yes  [] No  Follow commands:  [x] Yes  [] No  Facial asymmetry:  [] Yes  [x] No  Motor strength:  [x] Equal  [] Focal deficit:     Diagnostic Test:  Blood Sugar:  202 mg/dL  EKG:  Afib RVR, no significant STT change    ABG:        Infusion(s):   [x] Normal Saline:  Bolus:    1   L, Rate: 999  cc/hr      Imaging:   CXR:     CT:

## 2021-04-19 NOTE — PROGRESS NOTES
SROC ordered 1L bolus of LR over 1 hr. Dr. Milton Nuñez agrees with this order. Also orders to skip 2100 dose of metoprolol per Dr. Milton Nuñez.

## 2021-04-19 NOTE — PROGRESS NOTES
Occupational Therapy  Occupational Therapy Initial Evaluation   Date:2021  Patient Name: Vallie Merlin  MRN: [id number]  : 1955  Room: [address] [place] [address]    Referring Provider: Concha Rosales MD    Evaluating OT: Hope Vazquez OTR/L #[id number]    AM-PAC Daily Activity Raw Score:     Recommended Adaptive Equipment: TBD     Diagnosis: Hemodialysis catheter malfunction, initial encounter ([facility] [address] [T82.41XA]      Surgery/Procedure:  CATHETER INSERTION HEMODIALYSIS ; REMOVAL AND REINSERTION TUNNELED HEMODIALYSIS CATHETER. 4/15    Pertinent Medical History: anxiety/depression, asthma, CAD, CHF, chronic back pain, COPD, DDD, fibromyalgia, hx of blood clots, HLD, HTN, IBS, type 2 DM      Precautions:  Falls, ostomy, R hallux partial amputation, WBAT RLE and NWB LLE per pt (need clarification)      Home Living: Pt lives alone in a senior high rise apartment on the 4th floor with no step(s) to enter and 0 rail(s); elevator access  Bathroom setup: walk-in shower w/ shower chair  Equipment owned: ww, cane    Pt reports she has been in the hospital since February. Prior Level of Function: Independent with ADLs; Independent with IADLs. ww for ambulation; SPC prn    Driving: Yes    Pain Level: pt c/o 0/10 pain  this session      Cognition: A&O: 4/4  Follows 1-2 step commands    Memory: good   Comprehension good   Problem solving: fair+   Judgement/safety: fair+               Communication skills:            Vision: wfl               Glasses: Yes                                                   Hearing: wfl     RASS: 0  CAM-ICU: (NT) Delirium    UE Assessment:  Hand Dominance: Right [x]  Left []     ROM Strength STM goal: PRN   RUE  Proximally: limited mid range shoulder   Distally: WFL Proximally: 3-/5  Distally: 4-/5    Fair- fms 4/5     LUE Proximally: limited mid range shoulder   Distally: WFL Proximally: 3-/5  Distally: 4-/5    Fair- fms 4/5       Sensation: Pt c/o numbness in BLEs. Tone:  WNL Edema: LLE edema noted (elevated on pillow)     Functional Assessment   Initial Eval Status  Date: 4/19/21 Treatment Status  Date: STG=LTG  5-14  days    Feeding Set-up                        Mod. I  while seated up in chair to increase activity tolerance        Grooming Min. A (seated EOB to wash face w/ soap)                        Set-up   while seated in bedside chair    UB dressing Mod. A d/t decreased strength                        SBA       LB dressing Dep  Mod. A   using AE as needed for safe reach/ energy conservation     Bathing Max A                        Min. A   using AE as needed for safe reach/ energy conservation       Toileting Dep                        Mod. A     Bed Mobility  Supine to sit: Max A    Sit to supine: Max A                        Min. A  in prep of ADL tasks & transfers   Functional Transfers Sit to stand: NT    Stand to sit: NT    D/t fatigue/safety                        Min. A  sit<>stand/functional bathroom transfers using AD/DME as needed for balance and safety   Functional Mobility NT                       Min. A   functional/bathroom mobility using AD as needed & demonstrating good safety     Balance Sitting: (use of UE support)    Static: SBA     Dynamic:Min. A  Standing: NT  Mod. I dynamic sitting balance; Min. A dynamic standing balance  during ADL tasks & transfers   Endurance/Activity Tolerance   fair tolerance with light/moderate activity. Pt sat EOB for <8-10 minutes for increased activity tolerance for ADLs. Fair+   tolerance with moderate activity/self care routine   Visual/  Perceptual   WFL                       Vitals:   HR at rest: 75 bpm HR at end of session: 91 bpm   Spo2 at rest:95% Spo2 at end of session 97%   BP at rest:118/61 mmHg BP at end of session 92/57 mmHg       Treatment:   · Bed mobility: Facilitated bed mobility with cues for proper body mechanics and sequencing to prepare for ADL completion.   · ADL completion: Self-care retraining for the above-mentioned ADLs; training on proper hand placement, safety technique, sequencing, and energy conservation techniques. Completed seated EOB for increase activity tolerance. · Therapeutic Exercises: B UE AROM/AAROM completed at all levels to maintain strength/flexibility and to decrease edema/contractures to enhance ADL completion. · Postural Balance: Sitting balance retraining to improve righting reactions with postural changes during ADLS. Use of UE support in sitting. · Skilled positioning: Proper positioning to improve interaction with environment, overall functioning and decrease/prevent edema and contractures. · Delirium Prevention/Management: Implementation of non-pharmacologic interventions for delirium prevention incorporated throughout session to patient. Including environmental and sensory modifications to decrease patient's internal distraction caused by delirium, and improve overall mentation. Assistance of 2 required for safety d/t pt's medical complexity, line management and pt's deconditioned. Comments: OK from RN to see patient. Upon arrival, patient lying in bed. Pt demo fair tolerance with fair+ understanding of education/techniques. At end of session, patient lying in bed (re-positioned). Call light within reach, all lines and tubes intact. Pt instructed on use of call light for assistance and fall prevention. Line management and environmental modifications made prior to and end of session to ensure patient safety and to increase efficiency of session. Skilled monitoring of HR, O2 saturation, blood pressure and patient's response to activity performed throughout session. Overall, pt presents with decreased activity tolerance, dynamic balance, functional mobility limiting completion of ADLs and safe return home. Pt can benefit from continued skilled OT to increase safety and functional independence.      Evaluation Complexity: Mod    · History: Expanded chart review of consults, good understanding. Time In: 9:25  Time Out: 10:00  Total Treatment Time: 27 minutes    Min Units   OT Eval Low [id number]       OT Eval Medium 97166  x 1   OT Eval High 04425       OT Re-Eval [id number]       Therapeutic Ex [id number]       Therapeutic Activities 99558  14  1   ADL/Self Care 44169  13  1   Orthotic Management 99809       Neuro Re-Ed 23290       Non-Billable Time          Evaluation Time includes thorough review of current medical information, gathering information on past medical history/social history and prior level of function, completion of standardized testing/informal observation of tasks, assessment of data and education on plan of care and goals.     Ninoska Mesa, OTR/L #[id number]

## 2021-04-19 NOTE — PROGRESS NOTES
Dr. Robbin Artis and [facility] notified via [employer] regarding patient's BP of 72/54.  Awaiting new orders
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
## 2021-04-19 NOTE — PROGRESS NOTES
Physical Therapy  Physical Therapy Initial Assessment     Name: Juan Francisco Crisostomo  : 1955  MRN: [id number]    Referring Provider:  Steffi Pierre MD    Date of Service: 2021    Evaluating PT:  Jeri Tomlinson PT, DPT. [id number]    Room #:  9687/6849-D  Diagnosis:  HD catheter malfunction  Reason for admission:  As above   Precautions:  Falls, wound vac, R foot drop, R great toe amputation, RLE WBAT, pt states NWB LLE? Procedures:  dialysis catheter insertion  Equipment Recommendations:  TBD    SUBJECTIVE:  Pt lives with alone in a 4th floor apartment with elevator access. Ambulating with rollator. OBJECTIVE:   Initial Evaluation  Date:  Treatment   Short Term/ Long Term   Goals   AM-PAC 6 Clicks 80/71     Was pt agreeable to Eval/treatment? Yes      Does pt have pain? Denies      Bed Mobility  Rolling: NT  Supine to sit: MaxA with HOB elevated  Sit to supine: MaxA  Scooting: MaxA  Richard   Transfers Sit to stand: NT  Stand to sit: NT  Stand pivot: NT  ModA   Ambulation    NT  >5 ft [employer] ModA   Stair negotiation: ascended and descended  NT  TBD   ROM BUE:  See OT eval   BLE:  WFL     Strength BUE:  See OT eval   BLE:  2/5 knee ext, 1/5 ankld DF  Increase by 1/3 MMT grade    Balance Sitting EOB:  Richard  Dynamic Standing:  NT  Sitting EOB:  indep  Dynamic Standing:   Mod A     -Pt is A & O x 3  -Sensation:  Decreased from waist down   -Edema:  Increased to BLEs, pitting   -RASS: 0  -CAM-ICU: NT    Vitals:  Heart Rate at rest 133 Heart Rate post session 87   SPO2 at rest 100% SPO2 post session 98%   Blood pressure at rest 118/61 Blood Pressure post session 92/57     Functional Status Score-Intensive Care Unit (FSS-ICU)   Rolling 4/7   Supine to sit transfer 2/7   Unsupported sitting  4/7   Sit to stand transfers 0/7   Ambulation 0/7   Total  8/     Therapeutic Exercises:  functional activity   BLE LAQ x10 AAROM  BLE ankle pump with stretch hold in DF x5 reps x5 second hold    Patient education  Pt educated on safety, sequencing of transfers, and role of PT    Patient response to education:   Pt verbalized understanding Pt demonstrated skill Pt requires further education in this area   Yes  No  Yes      ASSESSMENT:    Comments:  Pt received sitting in bed and agreeable to PT session with OT collaboration. RN cleared pt for participation prior to session. *OT collaboration required d/t ICU patient complexity, extent of line management, and for safety of patient and staff. Pt presents with BLE weakness and general deconditioning to entire body. Heavy assist needed to bring BLEs to bedside and to bring trunk upright sitting EOB. Pt balance fluctuating and worsening with dynamic activity to the point of needing assist to prevent LOB. LE therex completed AAROM -- pt too weak to obtain full ROM at any joint against gravity. Returned to bed to end session  Pt with all needs met and call light in reach. Pt would benefit from continued PT POC to address functional deficits described above. Treatment:  Patient practiced and was instructed in the following treatment:     Patient education provided continuously throughout session for sequencing, safety maintenance, and improving any deficits found during the evaluation.  Bed mobility training - pt given verbal and tactile cues to facilitate proper sequencing and safety during rolling and supine>sit as well as provided with physical assistance to complete task     Sitting EOB for >15 minutes for upright tolerance, postural awareness and BLE ROM    therex as above   · Skilled positioning - Pt placed in sitting position with pillows utilized to facilitate upright posture, joint and skin integrity, and interaction with environment. Pt's/ family goals   1. Rehab     Patient and or family understand(s) diagnosis, prognosis, and plan of care.   Yes     PLAN:    Current Treatment Recommendations   [x] Strengthening     [x] ROM   [x] Balance Training   [x] Endurance Training   [x] Transfer Training   [x] Gait Training   [] Stair Training   [x] Positioning   [x] Safety and Education Training   [] Patient/Caregiver Education   [] HEP  [] Other     Frequency of treatments: 2-5x/week x 1-2 weeks. Time in  0925  Time out  1005    Total Treatment Time 25 minutes     Evaluation Time includes thorough review of current medical information, gathering information on past medical history/social history and prior level of function, completion of standardized testing/informal observation of tasks, assessment of data and education on plan of care and goals.     CPT codes:  [] Low Complexity PT evaluation 39133  [x] Moderate Complexity PT evaluation 18015  [] High Complexity PT evaluation 86304  [] PT Re-evaluation 39257  [] Gait training 93504 - minutes  [] Manual therapy 98306 - minutes  [x] Therapeutic activities 52542 25 minutes  [] Therapeutic exercises 56285 - minutes  [] Neuromuscular reeducation 15489 - minutes     Jin Jensen, PT, DPT  [id number]

## 2021-04-19 NOTE — PROGRESS NOTES
Nephrology Progress Note  Patient's Name: Yvonne Garcia  10:23 AM  4/19/2021    Nephrologist: Dr. Sharp Son    Reason for Consult:  ESRD-IHD Mgmt  Requesting Physician:  Elizabeth Kevin MD    Chief Complaint:  Elective removal and replacement of [facility]     History Obtained From:  past medical records    History of Present Ilness:  Formal consult deferred as we have been following Dewaine Gisselle since we were consulted on 3/14/2021 for hyperkalemia, anemia and meg. Yvonne Garcia is a 60 y.o. female with PMH:  DVT- Bilateral lower extremities,    Fungal bacteremia, Ischemic bowel with small bowel resection left open s/p fasciotomy, DM type 2,  COPD, Adrenal mass, Barretts esophagitis, Fatty liver  Lumbar sacral disease, and MEG .  Pt  Had TDC placed and it has had difficulties with function since so she went today for an elective removal and replacement of TDC. ID did not want a permanent access placed yet due to her fumgemia.    Pt seen during her dialysis treatment, hypotensive in 70s, SPA and ivf bolus given with improvement SBP 90s now. DIalysis via [facility] right chest tesio. Subjective    4/16: No new c/o this am; states she feels better  4/17: pt seen in room, for hd today  4/18: pt seen in room, sp hd yesterday.  Hi output ostomy  4/19: pt transferred to icu overnight for afib and hypotension and was given dig and amio    Allergies:  Lisinopril, Procardia [nifedipine], and Metformin and related    Current Medications:    digoxin (LANOXIN) 0.25 MG/ML injection,   0.9 % sodium chloride bolus, Once  amiodarone (CORDARONE) 450 mg in dextrose 5 % 250 mL infusion, Continuous  hydrocortisone sodium succinate PF (SOLU-CORTEF) injection 100 mg, Q8H  cholestyramine (QUESTRAN) packet 8 g, BID  Loperamide HCl (IMODIUM) 2 MG/15ML solution 4 mg, 4x Daily AC & HS  diphenoxylate-atropine (LOMOTIL) liquid 5 mL, 4x Daily  0.9 % sodium chloride infusion, Continuous  sodium chloride flush 0.9 % injection 5-40 mL, BID  miconazole (MICOTIN) 2 % powder, BID  midodrine (PROAMATINE) tablet 15 mg, TID  0.9 % sodium chloride infusion, PRN  menthol-zinc oxide (CALMOSEPTINE) 0.44-20.6 % ointment, BID    And  menthol-zinc oxide (CALMOSEPTINE) 0.44-20.6 % ointment, TID PRN  ascorbic acid (VITAMIN C) tablet 1,000 mg, Daily  guaiFENesin tablet 400 mg, TID  metoclopramide (REGLAN) tablet 5 mg, TID WC  pramipexole (MIRAPEX) tablet 0.5 mg, BID  vitamin D (CHOLECALCIFEROL) tablet 2,000 Units, Daily  zinc sulfate (ZINCATE) capsule 50 mg, Daily  acetaminophen (TYLENOL) tablet 650 mg, Q6H PRN  albuterol (PROVENTIL) nebulizer solution 2.5 mg, 4x daily  gabapentin (NEURONTIN) capsule 100 mg, Q8H  HYDROmorphone (DILAUDID) injection 1 mg, Q4H PRN  insulin glargine (LANTUS) injection vial 10 Units, BID  nystatin-triamcinolone (MYCOLOG II) cream, BID  oxyCODONE (ROXICODONE) immediate release tablet 5 mg, Q4H PRN  pantoprazole (PROTONIX) tablet 40 mg, Daily  psyllium (KONSYL) 28.3 % packet 1 packet, TID  insulin lispro (HUMALOG) injection vial 0-6 Units, TID WC  insulin lispro (HUMALOG) injection vial 0-3 Units, Nightly  glucose (GLUTOSE) 40 % oral gel 15 g, PRN  dextrose 50 % IV solution, PRN  glucagon (rDNA) injection 1 mg, PRN  dextrose 5 % solution, PRN        Review of Systems:   Pertinent items are noted in HPI.     Physical exam:     VITALS:  BP (!) 81/48   Pulse 87   Temp 97.4 °F (36.3 °C) (Temporal)   Resp 21   Ht 5' 6\" (1.676 m)   Wt 195 lb 5.2 oz (88.6 kg)   LMP  (LMP Unknown)   SpO2 90%   BMI 31.53 kg/m²   24HR INTAKE/OUTPUT:      Intake/Output Summary (Last 24 hours) at 4/19/2021 1023  Last data filed at 4/19/2021 0800  Gross per 24 hour   Intake 2164.28 ml   Output 1900 ml   Net 264.28 ml   URINARY CATHETER OUTPUT (Bates):  [REMOVED] Urethral Catheter-Output (mL): 10 mL       General Appearance: awake, alert, oriented, in no acute distress  Skin:  few ecchymotic spots L leg  Neck:  neck- supple, no mass, non-tender and no bruits  Lungs:  Distant Calcium:  No results found for: IONCA  Magnesium:    Lab Results   Component Value Date    MG 1.9 04/11/2021     Phosphorus:    Lab Results   Component Value Date    PHOS 2.5 04/11/2021     LDH:    Lab Results   Component Value Date     02/16/2021     Uric Acid:    Lab Results   Component Value Date    LABURIC 8.6 04/14/2021     PT/INR:    Lab Results   Component Value Date    PROTIME 12.4 02/23/2021    PROTIME 12.8 05/02/2012    INR 1.1 02/23/2021     Warfarin PT/INR:  No components found for: PTPATWAR, PTINRWAR  PTT:    Lab Results   Component Value Date    APTT 61.2 02/23/2021   [APTT}  Troponin:    Lab Results   Component Value Date    TROPONINI 0.18 04/19/2021     Last 3 Troponin:    Lab Results   Component Value Date    TROPONINI 0.18 04/19/2021    TROPONINI <0.01 02/16/2021    TROPONINI <0.01 02/15/2021     U/A:    Lab Results   Component Value Date    NITRITE trace 06/21/2016    COLORU Yellow 03/25/2021    PROTEINU 100 03/25/2021    PHUR 5.0 03/25/2021    WBCUA 10-20 03/25/2021    WBCUA 0-1 04/27/2012    RBCUA 1-3 03/25/2021    RBCUA NONE 04/12/2013    YEAST Present 03/25/2021    BACTERIA RARE 03/25/2021    CLARITYU CLOUDY 03/25/2021    SPECGRAV >=1.030 03/25/2021    LEUKOCYTESUR MODERATE 03/25/2021    UROBILINOGEN 0.2 03/25/2021    BILIRUBINUR Negative 03/25/2021    BILIRUBINUR neg 06/21/2016    BILIRUBINUR NEGATIVE 05/02/2012    BLOODU MODERATE 03/25/2021    GLUCOSEU Negative 03/25/2021    GLUCOSEU NEGATIVE 05/02/2012     ABG:    Lab Results   Component Value Date    PH 7.528 02/23/2021    PH 7.49 05/04/2012    PCO2 37.0 02/23/2021    PO2 87.0 02/23/2021    HCO3 30.1 02/23/2021    BE 6.9 02/23/2021    O2SAT 96.5 02/23/2021     HgBA1c:    Lab Results   Component Value Date    LABA1C 13.1 02/17/2021     Microalbumen/Creatinine ratio:  No components found for: RUCREAT  FLP:    Lab Results   Component Value Date    TRIG 405 02/20/2021    HDL 28 08/20/2018    LDLCALC 82 08/20/2018    LABVLDL 40 08/20/2018     TSH:    Lab Results   Component Value Date    TSH 1.750 03/26/2021     VITAMIN B12: No components found for: B12  FOLATE:    Lab Results   Component Value Date    FOLATE 10.6 05/07/2012     IRON:    Lab Results   Component Value Date    IRON 28 03/26/2021     Iron Saturation:  No components found for: PERCENTFE  TIBC:    Lab Results   Component Value Date    TIBC 290 03/26/2021     FERRITIN:    Lab Results   Component Value Date    FERRITIN 1,863 04/05/2021     AMYLASE:    Lab Results   Component Value Date    AMYLASE 33 08/16/2014     LIPASE:    Lab Results   Component Value Date    LIPASE 56 02/15/2021     Fibrinogen Level:  No components found for: FIB  Urine Toxicology:  No components found for: IAMMENTA, IBARBIT, IBENZO, ICOCAINE, IMARTHC, IOPIATES, IPHENCYC     Imaging:  Narrative   EXAMINATION:   SPOT FLUOROSCOPIC IMAGES       4/15/2021 1:29 pm       TECHNIQUE:   Fluoroscopy was provided by the radiology department for procedure. Radiologist was not present during examination.       FLUOROSCOPY DOSE AND TYPE OR TIME AND EXPOSURES:   Fluoro time: 71.7 seconds.  Images: 1       COMPARISON:   None       HISTORY:   ORDERING SYSTEM PROVIDED HISTORY: Encounter for peritoneal dialysis catheter   insertion [facility])   TECHNOLOGIST PROVIDED HISTORY:   Reason for exam:->Encounter for peritoneal dialysis catheter insertion ([facility] [address]   What reading provider will be dictating this exam?->CRC       Intraprocedural imaging.       FINDINGS:   Intraoperative fluoroscopy provided for placement of central venous catheter.    Single image shows a right and left central venous catheter.  Distal tip of   left catheter appears to be at level of right atrial/caval junction.  Distal   tip of right catheter is not included on this examination.  Radiographic   follow-up could be helpful for further evaluation.           Impression   Intraprocedural fluoroscopic spot images as above.  See separate procedure   report for more

## 2021-04-20 NOTE — CARE COORDINATION
Spoke with [employer], liaison with [facility] at the [facility]. Patient is accepted and meets Click-6 criteria. Patient can transition when medically stable. If not today, she will need updated therapy notes either late today or tomorrow. Therapy needs to be within 24 hours of discharge. Therapy on floor and notified. Patient remains on IV Solucortef at this time q8 hrs. Spoke with charge nurse China Saenz. Await input from EP and ID for transition of care planning. Wound Vac remains in place to the abdomen. Per surgery, no plans to revise Ileostomy at this time due to non-healing incision to abdomen. HENS and transfer paperwork with envelope in the soft chart.       Courtney Sthal.  P:  [phone]
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
## 2021-04-20 NOTE — FLOWSHEET NOTE
04/20/21 1200   Vital Signs   /65   Temp 97.2 °F (36.2 °C)   Pulse 67   Resp 17   Weight 199 lb 8.3 oz (90.5 kg)   Percent Weight Change -0.77   Post-Hemodialysis Assessment   Post-Treatment Procedures Blood returned;Catheter capped, clamped and heparinized x 2 ports   Machine Disinfection Process Acid/Vinegar Clean;Heat Disinfect; Exterior Machine Disinfection   Rinseback Volume (ml) 300 ml   Total Liters Processed (l/min) 63.7 l/min   Dialyzer Clearance Lightly streaked   Duration of Treatment (minutes) 205 minutes   Heparin amount administered during treatment (units) 0 units   Hemodialysis Intake (ml) 300 ml   Hemodialysis Output (ml) 1100 ml   NET Removed (ml) 800 ml   Tolerated Treatment Good   Patient Response to Treatment see note   Bilateral Breath Sounds Diminished   Edema Right lower extremity; Left lower extremity   Tolerated  3.5hr tx well on 3K 2.5Ca bath per orders, 800 ml removed with out difficulty. HD CVC flushed, heparin to dwell, clamped and capped  post tx per policy. Report to floor RN, remains in care of staff.
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
## 2021-04-20 NOTE — PROGRESS NOTES
Nephrology Progress Note  Patient's Name: Aury Browning  10:15 AM  4/20/2021    Nephrologist: Dr. Aracelis Lundberg    Reason for Consult:  ESRD-IHD Mgmt  Requesting Physician:  Kevin Ling MD    Chief Complaint:  Elective removal and replacement of [facility]     History Obtained From:  past medical records    History of Present Ilness:  Formal consult deferred as we have been following Anaid Eugene since we were consulted on 3/14/2021 for hyperkalemia, anemia and meg. Aury Browning is a 60 y.o. female with PMH:  DVT- Bilateral lower extremities,    Fungal bacteremia, Ischemic bowel with small bowel resection left open s/p fasciotomy, DM type 2,  COPD, Adrenal mass, Barretts esophagitis, Fatty liver  Lumbar sacral disease, and MEG .  Pt  Had TDC placed and it has had difficulties with function since so she went today for an elective removal and replacement of TDC. ID did not want a permanent access placed yet due to her fumgemia.    Pt seen during her dialysis treatment, hypotensive in 70s, SPA and ivf bolus given with improvement SBP 90s now. DIalysis via [facility] right chest tesio. Subjective    4/16: No new c/o this am; states she feels better  4/17: pt seen in room, for hd today  4/18: pt seen in room, sp hd yesterday. Hi output ostomy  4/19: pt transferred to icu overnight for afib and hypotension and was given dig and amio  4/20: Now transferred out of ICU to the general floor -no adverse events overnight  - seen on dialysis - tolerated 3.5hr tx well on 3K 2.5Ca bath - 800 ml removed without difficulty.     Allergies:  Lisinopril, Procardia [nifedipine], and Metformin and related    Current Medications:    0.9 % sodium chloride bolus, Once  hydrocortisone sodium succinate PF (SOLU-CORTEF) injection 100 mg, Q8H  cholestyramine (QUESTRAN) packet 8 g, BID  Loperamide HCl (IMODIUM) 2 MG/15ML solution 4 mg, 4x Daily AC & HS  diphenoxylate-atropine (LOMOTIL) liquid 5 mL, 4x Daily  sodium chloride flush 0.9 % injection 5-40 mL, BID  miconazole (MICOTIN) 2 % powder, BID  midodrine (PROAMATINE) tablet 15 mg, TID  0.9 % sodium chloride infusion, PRN  menthol-zinc oxide (CALMOSEPTINE) 0.44-20.6 % ointment, BID    And  menthol-zinc oxide (CALMOSEPTINE) 0.44-20.6 % ointment, TID PRN  ascorbic acid (VITAMIN C) tablet 1,000 mg, Daily  guaiFENesin tablet 400 mg, TID  metoclopramide (REGLAN) tablet 5 mg, TID WC  pramipexole (MIRAPEX) tablet 0.5 mg, BID  vitamin D (CHOLECALCIFEROL) tablet 2,000 Units, Daily  zinc sulfate (ZINCATE) capsule 50 mg, Daily  acetaminophen (TYLENOL) tablet 650 mg, Q6H PRN  albuterol (PROVENTIL) nebulizer solution 2.5 mg, 4x daily  gabapentin (NEURONTIN) capsule 100 mg, Q8H  HYDROmorphone (DILAUDID) injection 1 mg, Q4H PRN  insulin glargine (LANTUS) injection vial 10 Units, BID  nystatin-triamcinolone (MYCOLOG II) cream, BID  oxyCODONE (ROXICODONE) immediate release tablet 5 mg, Q4H PRN  pantoprazole (PROTONIX) tablet 40 mg, Daily  psyllium (KONSYL) 28.3 % packet 1 packet, TID  insulin lispro (HUMALOG) injection vial 0-6 Units, TID WC  insulin lispro (HUMALOG) injection vial 0-3 Units, Nightly  glucose (GLUTOSE) 40 % oral gel 15 g, PRN  dextrose 50 % IV solution, PRN  glucagon (rDNA) injection 1 mg, PRN  dextrose 5 % solution, PRN        Review of Systems:   Pertinent items are noted in HPI.     Physical exam:     VITALS:  BP (!) 102/59   Pulse 60   Temp 97.4 °F (36.3 °C)   Resp 16   Ht 5' 6\" (1.676 m)   Wt 201 lb 1 oz (91.2 kg)   LMP  (LMP Unknown)   SpO2 100%   BMI 32.45 kg/m²   24HR INTAKE/OUTPUT:      Intake/Output Summary (Last 24 hours) at 4/20/2021 1015  Last data filed at 4/20/2021 0800  Gross per 24 hour   Intake 1160 ml   Output 3675 ml   Net -2515 ml   URINARY CATHETER OUTPUT (Bates):  [REMOVED] Urethral Catheter-Output (mL): 10 mL       General Appearance: awake, alert, oriented, in no acute distress  Skin:  few ecchymotic spots L leg  Neck:  neck- supple, no mass, non-tender and no bruits  Lungs:  Distant breath sounds   Heart:  Rhythm no murmur  Abdominal:  Positive bowel sound, positive leakage around ostomy bag; wound with dressing applied  Extremities:  Left leg +2 edema, minimal right  Neuro:  Cranial nerves 2 12  Peripheral Pulses:  +2    Data:   Labs:  CBC:   Lab Results   Component Value Date    WBC 10.4 04/20/2021    RBC 3.10 04/20/2021    HGB 8.6 04/20/2021    HCT 28.9 04/20/2021    MCV 93.2 04/20/2021    MCH 27.7 04/20/2021    MCHC 29.8 04/20/2021    RDW 16.2 04/20/2021     04/20/2021    MPV 9.2 04/20/2021     CBC with Differential:    Lab Results   Component Value Date    WBC 10.4 04/20/2021    RBC 3.10 04/20/2021    HGB 8.6 04/20/2021    HCT 28.9 04/20/2021     04/20/2021    MCV 93.2 04/20/2021    MCH 27.7 04/20/2021    MCHC 29.8 04/20/2021    RDW 16.2 04/20/2021    SEGSPCT 46 04/12/2013    METASPCT 0.9 03/14/2021    LYMPHOPCT 24.8 04/12/2021    PROMYELOPCT 3.0 03/28/2021    MONOPCT 7.0 04/12/2021    MYELOPCT 3.5 02/26/2021    BASOPCT 0.2 04/12/2021    MONOSABS 0.59 04/12/2021    LYMPHSABS 2.09 04/12/2021    EOSABS 0.01 04/12/2021    BASOSABS 0.02 04/12/2021     CMP:    Lab Results   Component Value Date     04/20/2021    K 4.4 04/20/2021    K 3.9 04/19/2021    CL 99 04/20/2021    CO2 24 04/20/2021    BUN 48 04/20/2021    CREATININE 3.4 04/20/2021    GFRAA 16 04/20/2021    LABGLOM 14 04/20/2021    GLUCOSE 98 04/20/2021    GLUCOSE 88 05/03/2012    PROT 6.5 04/19/2021    LABALBU 3.1 04/19/2021    LABALBU 2.8 05/03/2012    CALCIUM 8.7 04/20/2021    BILITOT 0.3 04/19/2021    ALKPHOS 166 04/19/2021    AST 28 04/19/2021    ALT <5 04/19/2021     BMP:    Lab Results   Component Value Date     04/20/2021    K 4.4 04/20/2021    K 3.9 04/19/2021    CL 99 04/20/2021    CO2 24 04/20/2021    BUN 48 04/20/2021    LABALBU 3.1 04/19/2021    LABALBU 2.8 05/03/2012    CREATININE 3.4 04/20/2021    CALCIUM 8.7 04/20/2021    GFRAA 16 04/20/2021    LABGLOM 14 04/20/2021 GLUCOSE 98 04/20/2021    GLUCOSE 88 05/03/2012     Ionized Calcium:  No results found for: IONCA  Magnesium:    Lab Results   Component Value Date    MG 1.9 04/11/2021     Phosphorus:    Lab Results   Component Value Date    PHOS 2.5 04/11/2021     LDH:    Lab Results   Component Value Date     02/16/2021     Uric Acid:    Lab Results   Component Value Date    LABURIC 8.6 04/14/2021     PT/INR:    Lab Results   Component Value Date    PROTIME 12.4 02/23/2021    PROTIME 12.8 05/02/2012    INR 1.1 02/23/2021     Warfarin PT/INR:  No components found for: PTPATWAR, PTINRWAR  PTT:    Lab Results   Component Value Date    APTT 61.2 02/23/2021   [APTT}  Troponin:    Lab Results   Component Value Date    TROPONINI 0.17 04/20/2021     Last 3 Troponin:    Lab Results   Component Value Date    TROPONINI 0.17 04/20/2021    TROPONINI 0.27 04/19/2021    TROPONINI 0.18 04/19/2021     U/A:    Lab Results   Component Value Date    NITRITE trace 06/21/2016    COLORU Yellow 03/25/2021    PROTEINU 100 03/25/2021    PHUR 5.0 03/25/2021    WBCUA 10-20 03/25/2021    WBCUA 0-1 04/27/2012    RBCUA 1-3 03/25/2021    RBCUA NONE 04/12/2013    YEAST Present 03/25/2021    BACTERIA RARE 03/25/2021    CLARITYU CLOUDY 03/25/2021    SPECGRAV >=1.030 03/25/2021    LEUKOCYTESUR MODERATE 03/25/2021    UROBILINOGEN 0.2 03/25/2021    BILIRUBINUR Negative 03/25/2021    BILIRUBINUR neg 06/21/2016    BILIRUBINUR NEGATIVE 05/02/2012    BLOODU MODERATE 03/25/2021    GLUCOSEU Negative 03/25/2021    GLUCOSEU NEGATIVE 05/02/2012     ABG:    Lab Results   Component Value Date    PH 7.528 02/23/2021    PH 7.49 05/04/2012    PCO2 37.0 02/23/2021    PO2 87.0 02/23/2021    HCO3 30.1 02/23/2021    BE 6.9 02/23/2021    O2SAT 96.5 02/23/2021     HgBA1c:    Lab Results   Component Value Date    LABA1C 13.1 02/17/2021     Microalbumen/Creatinine ratio:  No components found for: RUCREAT  FLP:    Lab Results   Component Value Date    TRIG 405 02/20/2021    HDL 28 08/20/2018    LDLCALC 82 08/20/2018    LABVLDL 40 08/20/2018     TSH:    Lab Results   Component Value Date    TSH 1.750 03/26/2021     VITAMIN B12: No components found for: B12  FOLATE:    Lab Results   Component Value Date    FOLATE 10.6 05/07/2012     IRON:    Lab Results   Component Value Date    IRON 28 03/26/2021     Iron Saturation:  No components found for: PERCENTFE  TIBC:    Lab Results   Component Value Date    TIBC 290 03/26/2021     FERRITIN:    Lab Results   Component Value Date    FERRITIN 1,863 04/05/2021     AMYLASE:    Lab Results   Component Value Date    AMYLASE 33 08/16/2014     LIPASE:    Lab Results   Component Value Date    LIPASE 56 02/15/2021     Fibrinogen Level:  No components found for: FIB  Urine Toxicology:  No components found for: IAMMENTA, IBARBIT, IBENZO, ICOCAINE, IMARTHC, IOPIATES, IPHENCYC     Imaging:  Narrative   EXAMINATION:   SPOT FLUOROSCOPIC IMAGES       4/15/2021 1:29 pm       TECHNIQUE:   Fluoroscopy was provided by the radiology department for procedure. Radiologist was not present during examination.       FLUOROSCOPY DOSE AND TYPE OR TIME AND EXPOSURES:   Fluoro time: 71.7 seconds.  Images: 1       COMPARISON:   None       HISTORY:   ORDERING SYSTEM PROVIDED HISTORY: Encounter for peritoneal dialysis catheter   insertion [facility])   TECHNOLOGIST PROVIDED HISTORY:   Reason for exam:->Encounter for peritoneal dialysis catheter insertion ([facility] [address]   What reading provider will be dictating this exam?->CRC       Intraprocedural imaging.       FINDINGS:   Intraoperative fluoroscopy provided for placement of central venous catheter.    Single image shows a right and left central venous catheter.  Distal tip of   left catheter appears to be at level of right atrial/caval junction.  Distal   tip of right catheter is not included on this examination.  Radiographic   follow-up could be helpful for further evaluation.           Impression   Intraprocedural fluoroscopic spot images as above.  See separate procedure   report for more information. Patient MRN:  [id number]   : 1955   Age: 60 years   Gender: Female   Order Date:  12 5:01 PM   EXAM: US DUP LOWER EXTREMITIES BILATERAL VENOUS   NUMBER OF IMAGES:  54   INDICATION:  rule out DVT    Reason for Exam:->R/O DVT   Portable? ->Yes   COMPARISON: None       There is evidence for deep venous thrombosis in the right leg from the   common femoral to the level the ankle including the superficial   femoral and popliteal the tibioperoneal trunk and the anterior   posterior tibial veins and peroneal veins and on the left involving   the common femoral superficial femoral and popliteal the tibioperoneal   trunk and the anterior and posterior tibial veins   There is otherwise good compressibility, there is good augmentation,   there is good color flow. There is a hypoechoic region posterior to the left knee measuring 10.3   x 4.9 x 3.2 possibly a Baker's cyst       Impression   There is evidence for deep venous thrombosis, which is bilateral,   involving essentially the entire right leg in the entire left leg       ALERT:  THIS IS AN ABNORMAL REPORT       Assessment & Plan:     1. MEG  in the setting of poor po intake combined with high stool output in ostomy  no evidence for recovery  Removal of LIJ tunneled catheter and insertion of RIJ tunneled cath 4/15  Continue hemodialysis per a TTS schedule  Patient set up at [facility] on a TTS schedule as OP      2. Hypotension worse on IHD  Albumin prn  Improvement on midodrine    3. Anemia  in the setting of recent GI Bleed, combined with iron deficiency  Started JEREMY therapy and weekly iron    4 Malnutrition-oral intake much improved now  Monitor    5 Hyponatremia  Stable    6. Decubitus ulcer  Wound care  Off abx    7. Sp ostomy for ischemic bowel and open fasiotomy  High output  abd wound  Sp fungemia    8.  H/o fatty liver, barretts esophagus, dm, copd, adremal mass    9. merrick dvt seen on us 4/4/21  Not on oac  D/w vascular  Consider ivc flter           Feli Perdue, APRN - CNP     Pt seen and examined agree with above  Hd today  Ananda Toscano MD

## 2021-04-20 NOTE — PROGRESS NOTES
CARDIAC ELECTROPHYSIOLOGY CONSULTATION    PATIENT: Tanja Chapin  MEDICAL RECORD NUMBER: [id number]  DATE OF SERVICE:  4/20/2021  CONSULTING ELECTROPHYSIOLOGIST: Paulina Cool  PHYSICIAN REQUESTING CONSULTATION: Laura Murillo  REASON FOR CONSULTATION: Atrial fibrillation    PRESENTATION  Electively 4/15, for removal and replacement of malfunctioning tunneled dialysis catheter. SYNOPSIS OF RELEVANT MEDICAL ISSUES  1. Poor metabolic health incurring multiple co-morbid conditions. 2. Hemodialysis-dependent renal dysfunction. 3. Pulmonary parenchymal disease: details uncertain. 4. Recent extensive lower extremity deep venous thrombosis. 5. Recent ischemic bowel requiring extensive resection and ostomy. 6. Atrial fibrillation: noted overnight - no prior diagnosis. Rapid ventricular response, with hemodynamic compromise. Parenteral amiodarone and digoxin initiated. Sustained sinus rhythm since this AM.  7. Preserved cardiac mechanical function. 8. CHADS-VASC > 2.    RECOMMENDATIONS  1. Continue parenteral amiodarone until tomorrow AM. If at that point sinus rhythm has been sustained, transition to PO.   2. Discontinue digoxin (done). 3. Discontinue beta blocker pro temp (done). 4. Systemic anticoagulation based on your calculation of risk-benefit. We will follow. Paulina Cool MD, [facility]  Cardiac Electrophysiology  [phone]    04/20/2021 Tanja Chapin is seen in hospital follow-up afer completion of HD. She was transferred off IV amiodarone from ICU secondary to bradycardia in sinus rhythm per nursing. She is off home lopressor 25mg BID. She remains in SR/SB with HRs 60s-70s. She is not on [address]. LVEF 60-65%, normal sized atria, and no evidence of mass/vegetation of aortic valve on TRACEY 4/2/2021. Overall she is feeling well and offers no complaints from an EP perspective. TSH 3/26/21 >< 1.750; LFTs this admission stable.      Review of Systems   Respiratory: Negative. Cardiovascular: Negative. Neurological: Negative. All other systems reviewed and are negative. PHYSICAL EXAM:  Vitals:    04/20/21 1100 04/20/21 1130 04/20/21 1200 04/20/21 1230   BP: 106/65 (!) 99/50 118/65 98/71   Pulse: 67 67 67 84   Resp:   17 18   Temp:   97.2 °F (36.2 °C) 96.7 °F (35.9 °C)   TempSrc:    Temporal   SpO2:    98%   Weight:   199 lb 8.3 oz (90.5 kg)    Height:         Constitutional: Oriented to person, place, and time. Well-developed and cooperative. Head: Normocephalic and atraumatic. Eyes: Conjunctivae are normal.   Cardiovascular: S1 normal, S2 normal and intact distal pulses. A regular rhythm present. PMI is not displaced. Pulmonary/Chest: Effort normal, no distress   Musculoskeletal: Normal range of motion of all extremities, no muscle weakness. Neurological: Alert and oriented to person, place, and time. Gait normal.   Extremity: No clubbing or cyanosis. No edema. Psychiatric: Normal mood and affect. Thought content normal.    Plan:   1. Continue off lanoxin and lopressor. 2. Start PO amiodarone 400 mg BID and monitor. 3. Continue telemetry. 4. [facility] for stroke-risk reduction if/when deemed appropriate candidate. Will defer to others. 5. Will follow with you. Thank you for allowing me to participate in their care.        Aissatou Lincoln, APRN-CNP, AGCNS - discussed with Dr [facility]

## 2021-04-20 NOTE — PROGRESS NOTES
GENERAL SURGERY  DAILY PROGRESS NOTE  4/20/2021    Cc: wound check, high output ostomy    Subjective:  Stool thicker than yesterday. Minimal leakage around ostomy    Ileostomy: > 4L yesterday    Objective:  /68   Pulse 57   Temp 98.5 °F (36.9 °C) (Axillary)   Resp 15   Ht 5' 6\" (1.676 m)   Wt 195 lb 5.2 oz (88.6 kg)   LMP  (LMP Unknown)   SpO2 99%   BMI 31.53 kg/m²     General appearance: alert, cooperative and in no acute distress. Eyes: grossly normal  Lungs: nonlabored breathing  Heart: regular rate  Abdomen: soft, non-tender, non distended. Vac to midline wound with good seal, minimal output. Ostomy appliance with watery output  Skin: No skin abnormalities  Neurologic: Alert and oriented x 3. Grossly normal  Musculoskeletal: both feet warm, trace edema    Recent Labs     04/20/21  0430 04/19/21  0039 04/18/21  0520   WBC 10.4 12.9* 7.9   HGB 8.6* 10.0* 9.7*   HCT 28.9* 32.9* 34.3   MCV 93.2 93.2 96.6    323 287     Lab Results   Component Value Date     04/19/2021    K 3.9 04/19/2021    CL 95 04/19/2021    CO2 28 04/19/2021    BUN 40 04/19/2021    CREATININE 3.3 04/19/2021    GLUCOSE 116 04/19/2021    GLUCOSE 88 05/03/2012    CALCIUM 9.3 04/19/2021      Assessment/Plan:  60 y.o. female complicated course for acute on chronic mesenteric ischemia in February s/p ex lap SBR abthera, subsequent reexploration with ileocecectomy and end jejunostomy - short gut.  Difficult to pouch ostomy due to skin folds and watery effluent, will try to thicken    Pain control  Continue wound care  Continue psyllium; maximize imodium dosing to 4mg 4 times daily before meals, add lomotil  Diet as tolerated  Vac to midline wound  No plan for ostomy revision as she is still healing her midline and already has short gut    Electronically signed by Neptali Rodriguez MD on 4/20/2021 at 6:27 AM

## 2021-04-21 NOTE — PROGRESS NOTES
CARDIAC ELECTROPHYSIOLOGY CONSULTATION    PATIENT: Carilyn Saint  MEDICAL RECORD NUMBER: [id number]  DATE OF SERVICE:  4/19/2021  CONSULTING ELECTROPHYSIOLOGIST: Omar Howe  PHYSICIAN REQUESTING CONSULTATION: January Cedeno  REASON FOR CONSULTATION: Atrial fibrillation    PRESENTATION  Electively 4/15, for removal and replacement of malfunctioning tunneled dialysis catheter. SYNOPSIS OF RELEVANT MEDICAL ISSUES  1. Poor metabolic health incurring multiple co-morbid conditions. 2. Hemodialysis-dependent renal dysfunction. 3. Pulmonary parenchymal disease: details uncertain. 4. Recent extensive lower extremity deep venous thrombosis. 5. Recent ischemic bowel requiring extensive resection and ostomy. 6. Atrial fibrillation: noted overnight - no prior diagnosis. Rapid ventricular response, with hemodynamic compromise. Parenteral amiodarone and digoxin initiated. Sustained sinus rhythm since this AM.  7. Preserved cardiac mechanical function. 8. CHADS-VASC > 2.    RECOMMENDATIONS  1. Continue parenteral amiodarone until tomorrow AM. If at that point sinus rhythm has been sustained, transition to PO.   2. Discontinue digoxin (done). 3. Discontinue beta blocker pro temp (done). 4. Systemic anticoagulation based on your calculation of risk-benefit. We will follow. Omar Howe MD, [facility]  Cardiac Electrophysiology  [phone]    04/20/2021 Carilyn Saint is seen in hospital follow-up afer completion of HD. She was transferred off IV amiodarone from ICU secondary to bradycardia in sinus rhythm per nursing. She is off home lopressor 25mg BID. She remains in SR/SB with HRs 60s-70s. She is not on [address]. LVEF 60-65%, normal sized atria, and no evidence of mass/vegetation of aortic valve on TRACEY 4/2/2021. Overall she is feeling well and offers no complaints from an EP perspective. TSH 3/26/21 >< 1.750; LFTs this admission stable. 04/21/2021 Carilyn Saint is seen in follow-up. Discussed with nursing. She had large amounts of watery output from her ostomy with leaking. There is no plan for ostomy revision at this time. She did not receive her PO amiodarone dose due to extensive patient care issues/needs. Would Care was with her this am. Her SBP has been ~ 90s mmHg. As of now she is maintaining SR. Noted DVTs bilateral lower extremities; Eliquis has been resumed. Review of Systems   Respiratory: Negative. Cardiovascular: Negative. Neurological: Negative. All other systems reviewed and are negative. PHYSICAL EXAM:  Vitals:    04/21/21 0101 04/21/21 0717 04/21/21 0730 04/21/21 1130   BP: 107/71 90/64 90/64 (!) 90/58   Pulse: 79  84 98   Resp: 16  18 20   Temp: 97.8 °F (36.6 °C)  96.3 °F (35.7 °C) 96.8 °F (36 °C)   TempSrc: Temporal  Temporal Temporal   SpO2: 99%  100%    Weight:       Height:         Constitutional: Oriented to person, place, and time. Well-developed and cooperative. Head: Normocephalic and atraumatic. Eyes: Conjunctivae are normal.   Cardiovascular: S1 normal, S2 normal and intact distal pulses. A regular rhythm present. PMI is not displaced. Pulmonary/Chest: Effort normal, no distress   Musculoskeletal: Normal range of motion of all extremities, no muscle weakness. Neurological: Alert and oriented to person, place, and time. Gait normal.   Extremity: No clubbing or cyanosis. No edema. Psychiatric: Normal mood and affect. Thought content normal.    Impression:    1. Newly diagnosed PAF  -AF/RVR on admission  -IV amiodarone with conversion to sinus bradycardia  -off digoxin and lopressor  -PO amiodarone 400 mg BID initiated  -[id number] score: 2  -Eliquis held >> resumed 4/21/21  -preserved cardiac function  -4/21/21: d/c PO amiodarone and resume IV amiodarone 0.5 mg/min to ensure patient receives medication due to likelihood of conversion back to AF  -will monitor  -currently in SR    2. ESRD  -HD 3x/week    3.  DVT  -lower extremity bilateral DVTs  -Eliquis resumed 4/21/21    4. Ischemic bowel disease  -extensive resection and ostomy 2/2021  -reexploration with ileocecectomy and end jejunostomy- short gut      Plan:   1. Continue off lanoxin and lopressor. 2. Discontinue PO amiodarone. 3. Given concern for possible conversion back to AF we will resume IV amiodarone 0.5 mg/min for now and re-evaluate. No IV bolus. 4. Continue telemetry. 5. Eliquis has been resumed. 6. Will follow with you. Thank you for allowing me to participate in their care.        Bright Arriaza, APRN-CNP, AGCNS - discussed with Dr Ashley [facility]

## 2021-04-21 NOTE — PROGRESS NOTES
ENDOCRINOLOGY PROGRESS NOTE      Date of admission: 4/15/2021  Date of service: 4/21/2021  Admitting physician: Hope Millan MD   Primary Care Physician: Hope Millan MD  Consultant physician: David Maravilla MD     Reason for the consultation:  Adrenal insufficiency, DM      History of Present Illness: The history is provided by the patient. Accuracy of the patient data is excellent    Linwood Koyanagi is a very pleasant 60 y.o. old female with PMH HTN, DM type 2, HLD and other  listed below admitted to [facility] on 4/15/2021 because of *elective removal and replacement of tunneled dialysis catheter. , endocrine service was consulted for evaluation and management of possible adrenal insufficiency     Subjective   No acute issues overnight, switched to po hydrocortisone today     Scheduled Meds:   hydrocortisone  40 mg Oral BID    [START ON 4/24/2021] hydrocortisone  20 mg Oral BID    [START ON 4/27/2021] hydrocortisone  15 mg Oral QAM    [START ON 4/27/2021] hydrocortisone  5 mg Oral QPM    apixaban  5 mg Oral BID    sodium chloride  1,000 mL Intravenous Once    cholestyramine  2 packet Oral BID    Loperamide HCl  4 mg Oral 4x Daily AC & HS    diphenoxylate-atropine  5 mL Oral 4x Daily    sodium chloride flush  5-40 mL Intravenous BID    miconazole   Topical BID    midodrine  15 mg Oral TID    menthol-zinc oxide   Topical BID    vitamin C  1,000 mg Oral Daily    guaiFENesin  400 mg Oral TID    metoclopramide  5 mg Oral TID WC    pramipexole  0.5 mg Oral BID    vitamin D  2,000 Units Oral Daily    zinc sulfate  50 mg Oral Daily    albuterol  2.5 mg Nebulization 4x daily    gabapentin  100 mg Oral Q8H    insulin glargine  10 Units Subcutaneous BID    pantoprazole  40 mg Oral Daily    psyllium  1 packet Oral TID    insulin lispro  0-6 Units Subcutaneous TID WC    insulin lispro  0-3 Units Subcutaneous Nightly     PRN Meds:   ondansetron, 4 mg, Q6H PRN  sodium chloride, , PRN  menthol-zinc oxide, , TID PRN  acetaminophen, 650 mg, Q6H PRN  HYDROmorphone, 1 mg, Q4H PRN  oxyCODONE, 5 mg, Q4H PRN  glucose, 15 g, PRN  dextrose, 12.5 g, PRN  glucagon (rDNA), 1 mg, PRN  dextrose, 100 mL/hr, PRN      Continuous Infusions:   amiodarone 450mg/250ml D5W infusion 0.5 mg/min (04/21/21 1812)    sodium chloride      dextrose         Review of Systems  All systems reviewed. All negative except for symptoms mentioned in HPI     OBJECTIVE    BP 91/63   Pulse 107   Temp 97.1 °F (36.2 °C) (Temporal)   Resp 20   Ht 5' 6\" (1.676 m)   Wt 199 lb 8.3 oz (90.5 kg)   LMP  (LMP Unknown)   SpO2 98%   BMI 32.20 kg/m²     Intake/Output Summary (Last 24 hours) at 4/21/2021 1953  Last data filed at 4/21/2021 1854  Gross per 24 hour   Intake --   Output 3325 ml   Net -3325 ml       Physical examination:  General: awake alert, oriented x3  HEENT: normocephalic non traumatic, no exophthalmos   Neck: supple, thyroid tenderness,  Pulm: Clear equal air entry no added sounds  CVS: S1 + S2  Abd: soft lax, no tenderness  Skin: warm, no lesions, no rash.  No open wounds, no ulcers   Neuro: CN intact, sensation decreased bilateral , muscle power normal  Psych: normal mood, and affect    Review of Laboratory Data:  I personally reviewed the following labs:   Recent Labs     04/19/21  0039 04/20/21 0430 04/21/21  0821   WBC 12.9* 10.4 15.6*   RBC 3.53 3.10* 3.53   HGB 10.0* 8.6* 9.9*   HCT 32.9* 28.9* 33.4*   MCV 93.2 93.2 94.6   MCH 28.3 27.7 28.0   MCHC 30.4* 29.8* 29.6*   RDW 16.3* 16.2* 16.3*    268 309   MPV 9.0 9.2 9.3     Recent Labs     04/19/21  0039 04/20/21  0430 04/21/21  0821    136 136   K 3.9 4.4 3.6   CL 95* 99 98   CO2 28 24 25   BUN 40* 48* 31*   CREATININE 3.3* 3.4* 2.5*   GLUCOSE 116* 98 79   CALCIUM 9.3 8.7 8.6   PROT 6.5  --   --    LABALBU 3.1*  --   --    BILITOT 0.3  --   --    ALKPHOS 166*  --   --    AST 28  --   --    ALT <5  --   --      Beta-Hydroxybutyrate   Date Value Ref Range Status   02/15/2021 0.70 (H) 0.02 - 0.27 mmol/L Final     Lab Results   Component Value Date    LABA1C 13.1 02/17/2021    LABA1C 12.4 02/16/2021    LABA1C 12.6 02/10/2021     Lab Results   Component Value Date/Time    TSH 1.750 03/26/2021 03:40 AM    T4FREE 1.05 11/27/2017 11:45 AM     Lab Results   Component Value Date    LABA1C 13.1 02/17/2021    GLUCOSE 79 04/21/2021    GLUCOSE 88 05/03/2012    MALBCR 455.1 03/25/2021    LABMICR 646.3 03/25/2021    LABCREA 142 03/25/2021     Lab Results   Component Value Date    TRIG 405 02/20/2021    HDL 28 08/20/2018    LDLCALC 82 08/20/2018    CHOL 150 08/20/2018       Blood culture   Lab Results   Component Value Date    BC 24 Hours no growth 04/19/2021    BC 5 Days no growth 04/13/2021       Radiology:  US LOWER EXTREMITY BILATERAL VEIN MAPPING W DVT   Final Result   There is extensive bilateral deep venous thrombosis involving the   entire right leg and the entire left leg. This is not significantly   changed from previous   Mass effect which appears to be in the left inguinal region possibly   hematoma measuring 9.2 x 4.8 x 3.1 cm. ALERT:  THIS IS AN ABNORMAL REPORT                FLUORO FOR SURGICAL PROCEDURES   Final Result   Intraprocedural fluoroscopic spot images as above. See separate procedure   report for more information.            Medical Records/Labs/Images review:   I personally reviewed and summarized previous records   All labs and imaging were reviewed independently     [address] [facility], a 60 y.o.-old female seen today for inpatient diabetes management     Secondary Adrenal Insufficiency   · Due to chronic steroid (its not clear why pt on chronic steroids)   · I  have reviewed previous labs and cortisol level was low in 2017 with low ACTH consistent with secondary adrenal insufficiency    · We recommend the following hydrocortisone regimen  · Hydrocortisone 40 mg po BID x 3 days then 20 mg po BID x 3 days then 15/5 mg and stay on this dose until I see her at office few weeks after discharge    · Patient need to be on at least maintenance dose of steroid all the time and stress dose for any stressful events. · Pt will follow with us after discharge. Endocrine follow up visit, Tuesday 6/1 at 3:30PM     Diabetes Mellitus type 2  · Patient's diabetes is uncontrolled, A1c 13.1 %   · will change diabetes regimen to:  · Humalog low dose sliding scale   · Continue glucose check with meals and at bedtime   · Will titrate insulin dose based on the blood glucose trend & insulin requirement    Bilateral Adrenal incidentaloma   · Dx in 2016 (Rt adrenal mass measuring 3.3 cm, Lt adrenal mass measuring 1.7 cm)  · Last CT scan 2/2021 -->  Stable bilateral adrenal lesions   · Radiological characteristics of the mass in favor of benign etiology. · Previous work up showed normal adrenal hormones     The above issues were reviewed with the patient who understood and agreed with the plan. Thank you for allowing us to participate in the care of this patient. Please do not hesitate to contact us with any additional questions. Stacia Odonnell MD  Endocrinologist, [facility] Diabetes [facility] and Endocrinology   [address] [place] [id number]   Phone: [phone]  Fax: [phone]  --------------------------------------------  An electronic signature was used to authenticate this note.  Merlin Cook MD on 4/21/2021 at 7:53 PM

## 2021-04-21 NOTE — FLOWSHEET NOTE
Inpatient Wound Care(follow up) 8514b    Admit Date: 4/15/2021 12:00 PM    Reason for consult:  Wound vac management, ileostomy    Findings:     04/21/21 1030   Wound 04/15/21 Abdomen Mid   Date First Assessed/Time First Assessed: 04/15/21 1830   Present on Hospital Admission: Yes  Primary Wound Type: Surgical Type  Location: Abdomen  Wound Location Orientation: Mid   Wound Etiology Non-Healing Surgical   Dressing Status New dressing applied   Wound Cleansed Cleansed with saline   Dressing/Treatment Negative pressure wound therapy   Dressing Change Due 04/23/21   Wound Length (cm) 9 cm   Wound Width (cm) 8 cm   Wound Depth (cm) 7 cm   Wound Surface Area (cm^2) 72 cm^2   Change in Wound Size % (l*w) 10   Wound Volume (cm^3) 504 cm^3   Wound Healing % -62   Undermining Starts ___ O'Clock 12   Undermining Ends___ O'Clock 1159   Undermining Maxium Distance (cm) 5   Wound Assessment Pink/red   Drainage Amount Scant   Drainage Description Serosanguinous   Odor None   Santa-wound Assessment   (red)   Wound 04/15/21 Groin Right   Date First Assessed/Time First Assessed: 04/15/21 1800   Present on Hospital Admission: Yes  Location: Groin  Wound Location Orientation: Right   Wound Etiology Surgical   Dressing Status New dressing applied   Wound Cleansed Cleansed with saline   Dressing/Treatment Alginate;Dry dressing;ABD   Wound Length (cm) 7 cm   Wound Width (cm) 4 cm   Wound Depth (cm) 0.1 cm   Wound Surface Area (cm^2) 28 cm^2   Change in Wound Size % (l*w) -133.33   Wound Volume (cm^3) 2.8 cm^3   Wound Healing % 88   Wound Assessment Pink/red  (yellow)   Drainage Amount None   Santa-wound Assessment Intact   Wound Thickness Description not for Pressure Injury Partial thickness   Wound 04/19/21 Buttocks Right red, white, excoriated   Date First Assessed/Time First Assessed: 04/19/21 0200   Present on Hospital Admission: Yes  Primary Wound Type: Pressure Injury  Location: Buttocks  Wound Location Orientation: Right  Wound Description (Comments): red, white, excoriated   Wound Image    Wound Etiology Pressure Stage  3   Dressing/Treatment Calmoseptine/zinc oxide with menthol   Wound Length (cm) 5 cm   Wound Width (cm) 15 cm   Wound Depth (cm) 0.1 cm   Wound Surface Area (cm^2) 75 cm^2   Change in Wound Size % (l*w) -733.33   Wound Volume (cm^3) 7.5 cm^3   Wound Healing % -8233   Wound Assessment Purple/maroon;Pink/red  (black)   Drainage Amount None   Santa-wound Assessment Intact   Negative Pressure Wound Therapy Abdomen   Placement Date/Time: 04/17/21 2030   Pre-existing: No  Location: Abdomen   $ Standard NPWT >50 sq cm PER TX $ Yes   Wound Type Surgical   Dressing Type Black foam;Non-adherent   Number of pieces used 3   Cycle Continuous   Target Pressure (mmHg) 125   Canister changed? No   Dressing Changed Changed/New   Drainage Amount Scant   Drainage Description Serosanguinous   Dressing Change Due 04/23/21   Incision 03/20/21 Toe (Comment  which one) Right   Date First Assessed: 03/20/21   Present on Hospital Admission: No  Primary Wound Type: Surgical Type  Location: Toe (Comment  which one)  Wound Location Orientation: Right  Wound Description (Comments): AMPUTATION RIGHT DISTAL GREAT TOE   Dressing/Treatment Open to air   Margins Approximated   Drainage Amount None     **Informed Consent**    The patient has given verbal consent to have photos taken of wounds and inserted into their chart as part of their permanent medical record for purposes of documentation, treatment management and/or medical review. All Images taken on 4/21/21 of patient name: Felix Bella were transmitted and stored on secured [place] located within [facility] by a registered [employer] Mobile Application Device.      Impression:  Coccyx extending bilateral buttocks stage 3    Plan:  Orders reviewed and updated    Marty Hutchinson 4/21/2021 10:52 AM

## 2021-04-21 NOTE — PROGRESS NOTES
Physical Therapy  Physical Therapy     Name: David Romero  : 1955  MRN: [id number]    Referring Provider:  Jeff Glynn MD    Date of Service: 2021    Evaluating PT:  Francisco Rodrigez PT, DPT. [id number]    Room #:  3162/1661-L  Diagnosis:  HD catheter malfunction  Reason for admission:  As above   Precautions:  Falls, wound vac, R foot drop, R great toe amputation, RLE WBAT, pt states NWB LLE? Procedures:  dialysis catheter insertion  Equipment Recommendations:  TBD    SUBJECTIVE:  Pt lives with alone in a 4th floor apartment with elevator access. Ambulating with rollator. OBJECTIVE:   Initial Evaluation  Date:  Treatment    Short Term/ Long Term   Goals   AM-PAC 6 Clicks     Was pt agreeable to Eval/treatment? Yes  yes    Does pt have pain? Denies  No c/o    Bed Mobility  Rolling: NT  Supine to sit: MaxA with HOB elevated  Sit to supine: MaxA  Scooting: MaxA Supine to sit MaxA +2  Sit to supine MaxA +2  Scooting MaxA Richard   Transfers Sit to stand: NT  Stand to sit: NT  Stand pivot: NT N/T ModA   Ambulation    NT N/T >5 ft [employer] ModA   Stair negotiation: ascended and descended  NT  TBD   ROM BUE:  See OT eval   BLE:  WFL     Strength BUE:  See OT eval   BLE:  2/5 knee ext, 1/5 ankld DF  Increase by 1/3 MMT grade    Balance Sitting EOB:  Richard  Dynamic Standing:  NT Sitting EOB Richard Sitting EOB:  indep  Dynamic Standing: Mod A     -Pt is A & O x 3  -Sensation:  Decreased from waist down   -Edema:  Increased to BLEs, pitting         Therapeutic Exercises:  functional activity   BLE LAQ x10 AAROM  BLE ankle pump with stretch hold in DF x5 reps x5 second hold    Patient education  Pt educated on safety, sequencing of transfers, and role of PT    Patient response to education:   Pt verbalized understanding Pt demonstrated skill Pt requires further education in this area   Yes  No  Yes      ASSESSMENT:    Comments: Pt cleared by nurse prior to tx session.  Pt found in bed and agreed to tx. Pt anxious. Pt required MaxA +2 for bed mobility. Pt sat EOB for 15 minutes Richard. Pt sidescooted toward [address] +2. AAROM for B LE . Limited ROM R ankle/foot. Pt returned to bed and repositioned. Pt given call light. Pt with all needs met and call light in reach. Pt would benefit from continued PT POC to address functional deficits described above. Treatment:  Patient practiced and was instructed in the following treatment:     Patient education provided continuously throughout session for sequencing, safety maintenance, and improving any deficits found during the evaluation.  Bed mobility training - pt given verbal and tactile cues to facilitate proper sequencing and safety during rolling and supine>sit as well as provided with physical assistance to complete task     Sitting EOB for >15 minutes for upright tolerance, postural awareness and BLE ROM    therex as above   · Skilled positioning - Pt placed in sitting position with pillows utilized to facilitate upright posture, joint and skin integrity, and interaction with environment. PLAN:    Current Treatment Recommendations   [x] Strengthening     [x] ROM   [x] Balance Training   [x] Endurance Training   [x] Transfer Training   [x] Gait Training   [] Stair Training   [x] Positioning   [x] Safety and Education Training   [] Patient/Caregiver Education   [] HEP  [] Other     Frequency of treatments: 2-5x/week x 1-2 weeks.     Time in 15:00  Time out  15:25    Total Treatment Time 25 minutes         CPT codes:  [] Low Complexity PT evaluation 40385  [] Moderate Complexity PT evaluation 78781  [] High Complexity PT evaluation 11520  [] PT Re-evaluation 56854  [] Gait training 71734 - minutes  [] Manual therapy 10451 - minutes  [x] Therapeutic activities 60804 25 minutes  [] Therapeutic exercises 45661 - minutes  [] Neuromuscular reeducation 18281 - minutes     Cassidy Stoll [id number]

## 2021-04-21 NOTE — PROGRESS NOTES
Progress Note  Date:2021       Room:8514/8514-  Patient Name:Maggy Hodge     YOB: 1955     Age:65 y.o. Patient says she is ok today, complains of anxiety    Subjective    Subjective:  Symptoms:  Stable. No shortness of breath or chest pain. Diet:  Poor intake. Activity level: Impaired due to weakness. Pain:  She reports no pain. Review of Systems   Constitutional: Negative for activity change and fever. HENT: Negative for congestion. Respiratory: Negative for shortness of breath. Cardiovascular: Negative for chest pain. Gastrointestinal: Positive for abdominal pain. Skin: Positive for wound. Neurological: Negative for dizziness. Psychiatric/Behavioral: Positive for confusion. Objective         Vitals Last 24 Hours:  TEMPERATURE:  Temp  Av.2 °F (36.2 °C)  Min: 96.7 °F (35.9 °C)  Max: 97.8 °F (36.6 °C)  RESPIRATIONS RANGE: Resp  Av.2  Min: 16  Max: 18  PULSE OXIMETRY RANGE: SpO2  Av.1 %  Min: 98 %  Max: 100 %  PULSE RANGE: Pulse  Av.4  Min: 57  Max: 91  BLOOD PRESSURE RANGE: Systolic (08SPU), CWS:931 , Min:85 , IEO:863   ; Diastolic (06VAL), NTY:68, Min:50, Max:77    I/O (24Hr): Intake/Output Summary (Last 24 hours) at 2021 0609  Last data filed at 2021 0419  Gross per 24 hour   Intake 420 ml   Output 3025 ml   Net -2605 ml     Objective:  General Appearance:  Comfortable. Vital signs: (most recent): Blood pressure 107/71, pulse 79, temperature 97.8 °F (36.6 °C), temperature source Temporal, resp. rate 16, height 5' 6\" (1.676 m), weight 199 lb 8.3 oz (90.5 kg), SpO2 99 %, not currently breastfeeding. No fever. Lungs:  Normal effort and normal respiratory rate. Breath sounds clear to auscultation. Heart: Normal rate. Regular rhythm. S1 normal and S2 normal.    Abdomen: (Ostomy with moderate drainage).       Labs/Imaging/Diagnostics    Labs:  CBC:  Recent Labs     21  0039 21  0430   WBC 12.9* 10.4 insertion ([address] 4/15/2021 Yes    Hemodialysis catheter malfunction, initial encounter ([address] 4/15/2021 Yes    Severe protein-calorie malnutrition ([address] 4/17/2021 Yes    Persistent atrial fibrillation ([address] 4/19/2021 Yes        Assessment:  (Catheter malfunction, initial encounter ([address] 4/15/2021 Yes    Hypertension   Fibromyalgia   IBS (irritable bowel syndrome)   GERD (gastroesophageal reflux disease)   Cigarette nicotine dependence without complication   Restless leg syndrome   Chronic back pain   Adrenal mass (HCC)   DDD (degenerative disc disease)   Mild valvular heart disease   Vitamin D deficiency   Low ferritin level   Padron's esophagus   Peripheral edema   Post traumatic stress disorder (PTSD)   Recurrent major depressive disorder ([address]   Mood disorder due to a general medical condition   Fatty infiltration of liver   Mild cardiomegaly   Orthostatic hypotension dysautonomic syndrome (HCC)   Neuropathy associated with endocrine disorder (HCC)   Mild sleep apnea   Tubular adenoma of colon   Hyperlipidemia associated with type 2 diabetes mellitus (HCC)   Hyperglycemia   COVID-19   Enterocolitis   Abdominal pain   Depression   Tobacco abuse   S/P small bowel resection   Ischemic necrosis of small bowel (HCC)   Superior mesenteric artery thrombosis (HCC)   Thrombosis of right iliac artery (HCC)   Ischemia of right lower extremity   DM (diabetes mellitus), type 2, uncontrolled ([address]   Shock ([address]   MEG (acute kidney injury) ([address]   Septic shock ([address]   Pneumonia due to infectious organism   Ischemic bowel disease ([address]   Ulcer of abdomen wall with fat layer exposed ([address]   Surgical wound dehiscence, subsequent encounter   Atherosclerosis of native artery of extremity ([address]   Gangrene of toe of right foot ([address]   Dehiscence of fascia   Encounter regarding vascular access for dialysis for ESRD ([address]   Complications, dialysis, catheter, mechanical, initial encounter [facility])   Encounter for peritoneal dialysis catheter insertion ([facility]   Hemodialysis catheter malfunction, initial encounter ([facility]  Stage III sacral pressure ulcer  Afib    ). Plan:   (Wound care treating   Appreciate input  Surgery following ostomy. Meds adjusted per Cardiology. Can discharge to facility when ok with Cardiology. ).

## 2021-04-21 NOTE — PROGRESS NOTES
OT BEDSIDE TREATMENT NOTE      Date:2021  Patient Name: Aj Leo  MRN: [id number]  : 1955  Room: [address] [place] [address]     Per OT Eval:    Referring Provider: Suzy Segundo MD     Evaluating OT: Burdette Sandhoff, OTR/L #[id number]     AM-PAC Daily Activity Raw Score:      Recommended Adaptive Equipment: TBD      Diagnosis: Hemodialysis catheter malfunction, initial encounter ([facility] [address] [T82.41XA]     Surgery/Procedure:  CATHETER INSERTION HEMODIALYSIS ; REMOVAL AND REINSERTION TUNNELED HEMODIALYSIS CATHETER. 4/15     Pertinent Medical History: anxiety/depression, asthma, CAD, CHF, chronic back pain, COPD, DDD, fibromyalgia, hx of blood clots, HLD, HTN, IBS, type 2 DM     Precautions:  Falls, ostomy, R hallux partial amputation, WBAT RLE and NWB LLE per pt (need clarification)      Home Living: Pt lives alone in a senior high rise apartment on the 4th floor with no step(s) to enter and 0 rail(s); elevator access  Bathroom setup: walk-in shower w/ shower chair  Equipment owned: ww, cane     Pt reports she has been in the hospital since February.      Prior Level of Function: Independent with ADLs;  Independent with IADLs.  ww for ambulation; SPC prn    Driving: Yes     Pain Level: mild abdominal pain      Cognition: A&O: 4/4  Follows 1-2 step commands               Memory: good              Comprehension good              Problem solving: fair+              Judgement/safety: fair+                 Communication skills:               Vision: wfl                     Glasses: Yes                                                        Hearing: wfl     UE Assessment:  Hand Dominance: Right [x]? Left []?       ROM Strength STM goal: PRN   RUE  Proximally: limited mid range shoulder   Distally: WFL Proximally: 3-/5  Distally: 4-/5     Fair- fms 4/5      LUE Proximally: limited mid range shoulder   Distally: WFL Proximally: 3-/5  Distally: 4-/5     Fair- fms 4/5         Sensation: Pt c/o numbness in BLEs. Tone: WNL   Edema: LLE edema noted (elevated on pillow)     Functional Assessment    Initial Eval Status  Date: 4/19/21 Treatment Status  Date:  4/21/21 STG=LTG  5-14  days    Feeding Set-up Set up   upright in bed                        Mod. I  while seated up in chair to increase activity tolerance         Grooming Min. A (seated EOB to wash face w/ soap)  Set up  Upright in bed with simple grooming tasks                        Set-up   while seated in bedside chair    UB dressing Mod. A d/t decreased strength  Min A  Doff/melissa gown upright in bed                         SBA         LB dressing Dep  Dep  Donning socks Mod. A   using AE as needed for safe reach/ energy conservation     Bathing Max A  Max A  Simulated                          Min. A   using AE as needed for safe reach/ energy conservation        Toileting Dep  Dep                       Mod. A      Bed Mobility  Supine to sit: Max A     Sit to supine: Max A  Max A x 2  Supine < > sit                        Min. A  in prep of ADL tasks & transfers   Functional Transfers Sit to stand: NT     Stand to sit: NT     D/t fatigue/safety NT                        Min. A  sit<>stand/functional bathroom transfers using AD/DME as needed for balance and safety   Functional Mobility NT  NT                      Min. A   functional/bathroom mobility using AD as needed & demonstrating good safety      Balance Sitting: (use of UE support)    Static: SBA     Dynamic:Min. A  Standing: NT  Sitting: SBA  (use of B UE support)  Pt becomes shaky as pt fatigues Mod. I dynamic sitting balance; Min. A dynamic standing balance  during ADL tasks & transfers   Endurance/Activity Tolerance    fair tolerance with light/moderate activity.   Pt sat EOB for <8-10 minutes for increased activity tolerance for ADLs.   Fair  Light tasks  Fair+   tolerance with moderate activity/self care routine   Visual/  Perceptual    WFL                              Education:  Pt was educated through out treatment regarding proper technique & safety with bed mobility, sitting tolerance/balance & ADL compensatory strategies to ease tasks to improve safety & prevent falls. Educated pt on B UE therapeutic exercises to complete while upright in bed to increase & overall tolerance to ease self care tasks. Comments: Upon arrival pt was in bed & agreeable to therapy. At end of session pt returned to bed, HOB upright, B heels off loaded, all lines and tubes intact, call light within reach. · Pt has made Fair progress towards set goals. · Continue with current plan of care      Treatment Time In: 2:40            Treatment Time Out: 3:20             Treatment Charges: Mins Units   Ther Ex  74560 10 1   Manual Therapy 28814     Thera Activities [id number] 15 1   ADL/Home Mgt 12682 15 1   Neuro Re-ed [id number]     Group Therapy      Orthotic manage/training  77111     Non-Billable Time     Total Timed Treatment 40 3       Lyla RODRIGUEZ  [address] [place] [address], [address] [place] [address]

## 2021-04-21 NOTE — PROGRESS NOTES
Patient's ostomy was leaking around 2200. Per patient, this was the 3rd time today. Patient cleaned up, ostomy bag changed, and wound care notified.

## 2021-04-21 NOTE — PROGRESS NOTES
Nephrology Progress Note  Patient's Name: Felix Bella  1:25 PM  4/21/2021    Nephrologist: Dr. Ray Helton    Reason for Consult:  ESRD-IHD Mgmt  Requesting Physician:  Alfonzo Stockton MD    Chief Complaint:  Elective removal and replacement of [facility]     History Obtained From:  past medical records    History of Present Ilness:  Formal consult deferred as we have been following Jadon Carr since we were consulted on 3/14/2021 for hyperkalemia, anemia and meg. Felix Bella is a 60 y.o. female with PMH:  DVT- Bilateral lower extremities,    Fungal bacteremia, Ischemic bowel with small bowel resection left open s/p fasciotomy, DM type 2,  COPD, Adrenal mass, Barretts esophagitis, Fatty liver  Lumbar sacral disease, and MEG .  Pt  Had TDC placed and it has had difficulties with function since so she went today for an elective removal and replacement of TDC. ID did not want a permanent access placed yet due to her fumgemia.    Pt seen during her dialysis treatment, hypotensive in 70s, SPA and ivf bolus given with improvement SBP 90s now. DIalysis via [facility] right chest tesio. Subjective    4/16: No new c/o this am; states she feels better  4/17: pt seen in room, for hd today  4/18: pt seen in room, sp hd yesterday. Hi output ostomy  4/19: pt transferred to icu overnight for afib and hypotension and was given dig and amio  4/20: Now transferred out of ICU to the general floor -no adverse events overnight  - seen on dialysis - tolerated 3.5hr tx well on 3K 2.5Ca bath - 800 ml removed without difficulty.   4/21: Still having issues with ostomy leaking yesterday - with very watery output this morning - also c/o nausea    Allergies:  Lisinopril, Procardia [nifedipine], and Metformin and related    Current Medications:    hydrocortisone (CORTEF) tablet 40 mg, BID  [START ON 4/24/2021] hydrocortisone (CORTEF) tablet 20 mg, BID  [START ON 4/27/2021] hydrocortisone (CORTEF) tablet 15 mg, QAM  [START ON 4/27/2021] hydrocortisone (CORTEF) tablet 5 mg, QPM  apixaban (ELIQUIS) tablet 5 mg, BID  ondansetron (ZOFRAN) injection 4 mg, Q6H PRN  amiodarone (CORDARONE) tablet 400 mg, BID  0.9 % sodium chloride bolus, Once  cholestyramine (QUESTRAN) packet 8 g, BID  Loperamide HCl (IMODIUM) 2 MG/15ML solution 4 mg, 4x Daily AC & HS  diphenoxylate-atropine (LOMOTIL) liquid 5 mL, 4x Daily  sodium chloride flush 0.9 % injection 5-40 mL, BID  miconazole (MICOTIN) 2 % powder, BID  midodrine (PROAMATINE) tablet 15 mg, TID  0.9 % sodium chloride infusion, PRN  menthol-zinc oxide (CALMOSEPTINE) 0.44-20.6 % ointment, BID    And  menthol-zinc oxide (CALMOSEPTINE) 0.44-20.6 % ointment, TID PRN  ascorbic acid (VITAMIN C) tablet 1,000 mg, Daily  guaiFENesin tablet 400 mg, TID  metoclopramide (REGLAN) tablet 5 mg, TID WC  pramipexole (MIRAPEX) tablet 0.5 mg, BID  vitamin D (CHOLECALCIFEROL) tablet 2,000 Units, Daily  zinc sulfate (ZINCATE) capsule 50 mg, Daily  acetaminophen (TYLENOL) tablet 650 mg, Q6H PRN  albuterol (PROVENTIL) nebulizer solution 2.5 mg, 4x daily  gabapentin (NEURONTIN) capsule 100 mg, Q8H  HYDROmorphone (DILAUDID) injection 1 mg, Q4H PRN  insulin glargine (LANTUS) injection vial 10 Units, BID  oxyCODONE (ROXICODONE) immediate release tablet 5 mg, Q4H PRN  pantoprazole (PROTONIX) tablet 40 mg, Daily  psyllium (KONSYL) 28.3 % packet 1 packet, TID  insulin lispro (HUMALOG) injection vial 0-6 Units, TID WC  insulin lispro (HUMALOG) injection vial 0-3 Units, Nightly  glucose (GLUTOSE) 40 % oral gel 15 g, PRN  dextrose 50 % IV solution, PRN  glucagon (rDNA) injection 1 mg, PRN  dextrose 5 % solution, PRN        Review of Systems:   Pertinent items are noted in HPI.     Physical exam:     VITALS:  BP (!) 90/58   Pulse 98   Temp 96.8 °F (36 °C) (Temporal)   Resp 20   Ht 5' 6\" (1.676 m)   Wt 199 lb 8.3 oz (90.5 kg)   LMP  (LMP Unknown)   SpO2 100%   BMI 32.20 kg/m²   24HR INTAKE/OUTPUT:      Intake/Output Summary (Last 24 hours) at 4/21/2021 1325  Last data filed at 4/21/2021 1320  Gross per 24 hour   Intake --   Output 3250 ml   Net -3250 ml   URINARY CATHETER OUTPUT (Bates):  [REMOVED] Urethral Catheter-Output (mL): 10 mL       General Appearance: awake, alert, oriented, in no acute distress  Skin:  few ecchymotic spots L leg  Neck:  neck- supple, no mass, non-tender and no bruits  Lungs:  Distant breath sounds   Heart:  Rhythm no murmur  Abdominal:  Positive bowel sound, positive leakage around ostomy bag; wound with dressing applied  Extremities:  Left leg +2 edema, minimal right  Neuro:  Cranial nerves 2 12  Peripheral Pulses:  +2    Data:   Labs:  CBC:   Lab Results   Component Value Date    WBC 15.6 04/21/2021    RBC 3.53 04/21/2021    HGB 9.9 04/21/2021    HCT 33.4 04/21/2021    MCV 94.6 04/21/2021    MCH 28.0 04/21/2021    MCHC 29.6 04/21/2021    RDW 16.3 04/21/2021     04/21/2021    MPV 9.3 04/21/2021     CBC with Differential:    Lab Results   Component Value Date    WBC 15.6 04/21/2021    RBC 3.53 04/21/2021    HGB 9.9 04/21/2021    HCT 33.4 04/21/2021     04/21/2021    MCV 94.6 04/21/2021    MCH 28.0 04/21/2021    MCHC 29.6 04/21/2021    RDW 16.3 04/21/2021    SEGSPCT 46 04/12/2013    METASPCT 0.9 03/14/2021    LYMPHOPCT 24.8 04/12/2021    PROMYELOPCT 3.0 03/28/2021    MONOPCT 7.0 04/12/2021    MYELOPCT 3.5 02/26/2021    BASOPCT 0.2 04/12/2021    MONOSABS 0.59 04/12/2021    LYMPHSABS 2.09 04/12/2021    EOSABS 0.01 04/12/2021    BASOSABS 0.02 04/12/2021     CMP:    Lab Results   Component Value Date     04/21/2021    K 3.6 04/21/2021    K 3.9 04/19/2021    CL 98 04/21/2021    CO2 25 04/21/2021    BUN 31 04/21/2021    CREATININE 2.5 04/21/2021    GFRAA 23 04/21/2021    LABGLOM 19 04/21/2021    GLUCOSE 79 04/21/2021    GLUCOSE 88 05/03/2012    PROT 6.5 04/19/2021    LABALBU 3.1 04/19/2021    LABALBU 2.8 05/03/2012    CALCIUM 8.6 04/21/2021    BILITOT 0.3 04/19/2021    ALKPHOS 166 04/19/2021    AST 28 04/19/2021 ALT <5 04/19/2021     BMP:    Lab Results   Component Value Date     04/21/2021    K 3.6 04/21/2021    K 3.9 04/19/2021    CL 98 04/21/2021    CO2 25 04/21/2021    BUN 31 04/21/2021    LABALBU 3.1 04/19/2021    LABALBU 2.8 05/03/2012    CREATININE 2.5 04/21/2021    CALCIUM 8.6 04/21/2021    GFRAA 23 04/21/2021    LABGLOM 19 04/21/2021    GLUCOSE 79 04/21/2021    GLUCOSE 88 05/03/2012     Ionized Calcium:  No results found for: IONCA  Magnesium:    Lab Results   Component Value Date    MG 1.9 04/11/2021     Phosphorus:    Lab Results   Component Value Date    PHOS 2.5 04/11/2021     LDH:    Lab Results   Component Value Date     02/16/2021     Uric Acid:    Lab Results   Component Value Date    LABURIC 8.6 04/14/2021     PT/INR:    Lab Results   Component Value Date    PROTIME 12.4 02/23/2021    PROTIME 12.8 05/02/2012    INR 1.1 02/23/2021     Warfarin PT/INR:  No components found for: PTPATWAR, PTINRWAR  PTT:    Lab Results   Component Value Date    APTT 61.2 02/23/2021   [APTT}  Troponin:    Lab Results   Component Value Date    TROPONINI 0.17 04/20/2021     Last 3 Troponin:    Lab Results   Component Value Date    TROPONINI 0.17 04/20/2021    TROPONINI 0.27 04/19/2021    TROPONINI 0.18 04/19/2021     U/A:    Lab Results   Component Value Date    NITRITE trace 06/21/2016    COLORU Yellow 03/25/2021    PROTEINU 100 03/25/2021    PHUR 5.0 03/25/2021    WBCUA 10-20 03/25/2021    WBCUA 0-1 04/27/2012    RBCUA 1-3 03/25/2021    RBCUA NONE 04/12/2013    YEAST Present 03/25/2021    BACTERIA RARE 03/25/2021    CLARITYU CLOUDY 03/25/2021    SPECGRAV >=1.030 03/25/2021    LEUKOCYTESUR MODERATE 03/25/2021    UROBILINOGEN 0.2 03/25/2021    BILIRUBINUR Negative 03/25/2021    BILIRUBINUR neg 06/21/2016    BILIRUBINUR NEGATIVE 05/02/2012    BLOODU MODERATE 03/25/2021    GLUCOSEU Negative 03/25/2021    GLUCOSEU NEGATIVE 05/02/2012     ABG:    Lab Results   Component Value Date    PH 7.528 02/23/2021    PH 7.49 05/04/2012    PCO2 37.0 02/23/2021    PO2 87.0 02/23/2021    HCO3 30.1 02/23/2021    BE 6.9 02/23/2021    O2SAT 96.5 02/23/2021     HgBA1c:    Lab Results   Component Value Date    LABA1C 13.1 02/17/2021     Microalbumen/Creatinine ratio:  No components found for: RUCREAT  FLP:    Lab Results   Component Value Date    TRIG 405 02/20/2021    HDL 28 08/20/2018    LDLCALC 82 08/20/2018    LABVLDL 40 08/20/2018     TSH:    Lab Results   Component Value Date    TSH 1.750 03/26/2021     VITAMIN B12: No components found for: B12  FOLATE:    Lab Results   Component Value Date    FOLATE 10.6 05/07/2012     IRON:    Lab Results   Component Value Date    IRON 28 03/26/2021     Iron Saturation:  No components found for: PERCENTFE  TIBC:    Lab Results   Component Value Date    TIBC 290 03/26/2021     FERRITIN:    Lab Results   Component Value Date    FERRITIN 1,863 04/05/2021     AMYLASE:    Lab Results   Component Value Date    AMYLASE 33 08/16/2014     LIPASE:    Lab Results   Component Value Date    LIPASE 56 02/15/2021     Fibrinogen Level:  No components found for: FIB  Urine Toxicology:  No components found for: IAMMENTA, IBARBIT, IBENZO, ICOCAINE, IMARTHC, IOPIATES, IPHENCYC     Imaging:  Narrative   EXAMINATION:   SPOT FLUOROSCOPIC IMAGES       4/15/2021 1:29 pm       TECHNIQUE:   Fluoroscopy was provided by the radiology department for procedure. Radiologist was not present during examination.       FLUOROSCOPY DOSE AND TYPE OR TIME AND EXPOSURES:   Fluoro time: 71.7 seconds.  Images: 1       COMPARISON:   None       HISTORY:   ORDERING SYSTEM PROVIDED HISTORY: Encounter for peritoneal dialysis catheter   insertion [facility])   TECHNOLOGIST PROVIDED HISTORY:   Reason for exam:->Encounter for peritoneal dialysis catheter insertion ([facility] [address]   What reading provider will be dictating this exam?->CRC       Intraprocedural imaging.       FINDINGS:   Intraoperative fluoroscopy provided for placement of central venous catheter. Single image shows a right and left central venous catheter.  Distal tip of   left catheter appears to be at level of right atrial/caval junction.  Distal   tip of right catheter is not included on this examination.  Radiographic   follow-up could be helpful for further evaluation.           Impression   Intraprocedural fluoroscopic spot images as above.  See separate procedure   report for more information. Patient MRN:  [id number]   : 1955   Age: 60 years   Gender: Female   Order Date:  12 5:01 PM   EXAM: US DUP LOWER EXTREMITIES BILATERAL VENOUS   NUMBER OF IMAGES:  54   INDICATION:  rule out DVT    Reason for Exam:->R/O DVT   Portable? ->Yes   COMPARISON: None       There is evidence for deep venous thrombosis in the right leg from the   common femoral to the level the ankle including the superficial   femoral and popliteal the tibioperoneal trunk and the anterior   posterior tibial veins and peroneal veins and on the left involving   the common femoral superficial femoral and popliteal the tibioperoneal   trunk and the anterior and posterior tibial veins   There is otherwise good compressibility, there is good augmentation,   there is good color flow. There is a hypoechoic region posterior to the left knee measuring 10.3   x 4.9 x 3.2 possibly a Baker's cyst       Impression   There is evidence for deep venous thrombosis, which is bilateral,   involving essentially the entire right leg in the entire left leg       ALERT:  THIS IS AN ABNORMAL REPORT       Assessment & Plan:     1. MEG  in the setting of poor po intake combined with high stool output in ostomy  no evidence for recovery  Removal of LIJ tunneled catheter and insertion of RIJ tunneled cath 4/15  Continue hemodialysis per a TTS schedule  Patient set up at [facility] on a TTS schedule as OP      2. Hypotension worse on IHD  Albumin prn  Improvement on midodrine    3.  Anemia  in the setting of recent GI Bleed, combined with iron deficiency  Started JEREMY therapy and weekly iron    4 Malnutrition-oral intake improved   Monitor    5 Hyponatremia  Stable    6. Decubitus ulcer  Wound care  Off abx    7. Sp ostomy for ischemic bowel and open fasiotomy  High output  abd wound  Sp fungemia    8.  H/o fatty liver, barretts esophagus, dm, copd, adremal mass    9. merrick dvt seen on us 4/4/21  Not on oac  D/w vascular  Consider ivc flter           Rehan Never, APRN - CNP     Pt seen and examined agree with above  For hd tts  Elizabeth Rachel MD

## 2021-04-21 NOTE — PROGRESS NOTES
Message sent to Dr. Seymour Richards regarding DVT's found in BLE on 4/4 and no orders for coags, awaiting response

## 2021-04-21 NOTE — PROGRESS NOTES
Physical Therapy    Medical chart reviewed for PT treatment 4/21 AM. RN requested PT to re-attempt at a later time as pt was having wound vac and ileostomy managed. Will re-attempt at a later time/date. Thank you.     Shirley Garcia, PT, DPT  [id number]

## 2021-04-22 NOTE — DISCHARGE SUMMARY
Discharge Summary    Date: 4/22/2021  Patient Name: Avelina Chun YOB: 1955 Age: 60 y.o. Admit Date: 4/15/2021  Discharge Date: 4/22/2021  Discharge Condition: Stable    Admission Diagnosis  [facility]emodialysis catheter malfunction, [facility] (T82.41XA)     Discharge Diagnosis  Principal Problem: Encounter regarding vascular access for dialysis for ESRD (HCC)Active Problems: Complications, dialysis, catheter, mechanical, initial encounter ([facility] Encounter for peritoneal dialysis catheter insertion ([facility] Hemodialysis catheter malfunction, initial encounter ([facility] Severe protein-calorie malnutrition ([facility] Persistent atrial fibrillation (HCC)Resolved Problems:  * No resolved hospital problems. [facility] Stay  Narrative of Hospital Course:  Patient admitted from [facility] for dialysis cath malfunction. This was corrected but she also went into Afib RVR. Cardiology adjusted meds. Patient also had DVT in LE and started on Eliquis. Consultants:  IP CONSULT TO INFECTIOUS DISEASESIP CONSULT TO PODIATRYIP CONSULT TO GENERAL SURGERYIP CONSULT TO ELECTROPHYSIOLOGYIP CONSULT TO GENERAL SURGERYIP CONSULT TO ENDOCRINOLOGY    Surgeries/procedures Performed:       Treatments:            Discharge Plan/Disposition:  To [facility]    Hospital/Incidental Findings Requiring Follow Up:    Patient Instructions:    Diet: Cardiac Diet    Activity:Activity as Tolerated  For number of days (if applicable): Other Instructions:    Provider Follow-Up:   No follow-ups on file. Significant Diagnostic Studies:    Recent Labs:  Admission on 04/15/2021No results displayed because visit has over 200 results. ------------    Radiology last 7 days:  Us Lower Extremity Bilateral Vein Mapping W DvtResult Date: 4/19/2021There is extensive bilateral deep venous thrombosis involving the entire right leg and the entire left leg.  This is not significantly changed from previous Mass effect which appears to be in the left inguinal region possibly hematoma measuring 9.2 x 4.8 x 3.1 cm. ALERT:  THIS IS AN ABNORMAL REPORT       Pending Labs   Order Current Status  Culture, Blood 1 Preliminary result  Culture, Blood 2 Preliminary result  Culture, Urine Preliminary result      Discharge Medications    Current Discharge Medication ListSTART taking these medicationsamiodarone (CORDARONE) 200 MG tabletTake 2 tablets by mouth 2 times daily for 7 days 400 twice daily for 7 days then 400 mg daily. Qty: 28 tablet Refills: 2apixaban (ELIQUIS) 5 MG TABS tabletTake 1 tablet by mouth 2 times dailyQty: 60 tablet Refills: 2cholestyramine (QUESTRAN) 4 g packetTake 2 packets by mouth 2 times dailyQty: 90 packet Refills: 3!! hydrocortisone (CORTEF) 5 MG tabletTake 1 tablet by mouth every eveningQty: 30 tablet Refills: 0!! hydrocortisone (CORTEF) 20 MG tabletTake 1 tablet by mouth 2 times dailyQty: 60 tablet Refills: 0!! hydrocortisone (CORTEF) 5 MG tabletTake 3 tablets by mouth every morningQty: 30 tablet Refills: 0!! - Potential duplicate medications found. Please discuss with provider. Current Discharge Medication List    Current Discharge Medication ListCONTINUE these medications which have NOT CHANGEDloperamide (IMODIUM) 2 MG capsuleTake 2 mg by mouth 3 times dailypsyllium (KONSYL) 28.3 % PACKTake 1 packet by mouth 3 times dailyoxyCODONE 5 MG capsuleTake 10 mg by mouth every 4 hours as needed for Pain.midodrine (PROAMATINE) 5 MG tabletTake 10 mg by mouth 3 times dailynystatin-triamcinolone (MYCOLOG II) 567519-0.9 UNIT/GM-% creamApply topically 2 times daily Apply topically 4 times daily. Ascorbic Acid (VITAMIN C) 1000 MG tabletTake 1,000 mg by mouth dailygabapentin (NEURONTIN) 100 MG capsuleTake 100 mg by mouth every 8 hours. guaiFENesin 400 MG tabletTake 400 mg by mouth 3 times dailyinsulin lispro (HUMALOG) 100 UNIT/ML injection vialInject into the skin 4 times daily (before meals and nightly) Sliding scalemetoclopramide (REGLAN) 5 MG tabletTake 5 mg by mouth 3 times daily (with meals)pantoprazole (PROTONIX) 40 MG tabletTake 40 mg by mouth dailypramipexole (MIRAPEX) 0.5 MG tabletTake 0.5 mg by mouth 2 times dailyCholecalciferol (VITAMIN D) 50 MCG (2000 UT) CAPS capsuleTake 1 capsule by mouth dailyzinc sulfate (ZINCATE) 220 (50 Zn) MG capsuleTake 220 mg by mouth dailyacetaminophen (TYLENOL) 325 MG tabletTake 650 mg by mouth every 6 hours as needed for Painalbuterol (PROVENTIL) (2.5 MG/3ML) 0.083% nebulizer solutionTake 3 mLs by nebulization 4 times dailyQty: 120 each Refills: 3metoprolol tartrate (LOPRESSOR) 25 MG tabletTake 1 tablet by mouth 2 times dailyQty: 60 tablet Refills: 3insulin glargine (LANTUS) 100 UNIT/ML injection vialInject 10 Units into the skin 2 times dailyQty: 1 vial Refills: 3    Current Discharge Medication ListSTOP taking these medicationsdextrose 5 % SOLN 250 mL with doxycycline 100 MG SOLR 200 mgComments:Reason for Stopping:hydrocortisone sodium succinate PF (SOLU-CORTEF) 100 MG injectionComments:Reason for Stopping:HYDROmorphone (DILAUDID) 1 MG/ML injectionComments:Reason for Stopping:    Time Spent on Discharge:1E] minutes were spent in patient examination, evaluation, counseling as well as medication reconciliation, prescriptions for required medications, discharge plan, and follow up.     Electronically signed by Hope Millan MD on 4/22/21 at 2:58 PM EDT

## 2021-04-22 NOTE — TELEPHONE ENCOUNTER
----- Message from JOHN Herrmann CNP sent at 4/22/2021  1:50 PM EDT -----  Ash Ramos was seen inpatient by Dr. Nikki Xiao and will need follow up in 4-6 weeks in office please ask her to be there 15 min ahead of time.  Thanks

## 2021-04-22 NOTE — FLOWSHEET NOTE
04/22/21 1059   Vital Signs   /63   Temp 96.9 °F (36.1 °C)   Pulse 89   Resp 18   Weight 210 lb 15.7 oz (95.7 kg)   Weight Method Bed scale   Percent Weight Change 0.31   Pain Assessment   Pain Assessment 0-10   Pain Level 0   Post-Hemodialysis Assessment   Post-Treatment Procedures Blood returned;Catheter capped, clamped and heparinized x 2 ports   Machine Disinfection Process Exterior Machine Disinfection   Rinseback Volume (ml) 300 ml   Total Liters Processed (l/min) 59.6 l/min   Dialyzer Clearance Lightly streaked   Duration of Treatment (minutes) 240 minutes   Heparin amount administered during treatment (units) 0 units   Hemodialysis Intake (ml) 300 ml   Hemodialysis Output (ml) 300 ml   NET Removed (ml) 0 ml   Tolerated Treatment Good   Patient Response to Treatment tolerated well, no fluid removal   Bilateral Breath Sounds Diminished   Edema Right upper extremity; Left upper extremity;Right lower extremity; Left lower extremity   Physician Notified?  No

## 2021-04-22 NOTE — PROGRESS NOTES
HGB 8.6* 9.9*   HCT 28.9* 33.4*   MCV 93.2 94.6   RDW 16.2* 16.3*    309     CHEMISTRIES:  Recent Labs     04/20/21  0430 04/21/21  0821    136   K 4.4 3.6   CL 99 98   CO2 24 25   BUN 48* 31*   CREATININE 3.4* 2.5*   GLUCOSE 98 79     PT/INR:No results for input(s): PROTIME, INR in the last 72 hours. APTT:No results for input(s): APTT in the last 72 hours. LIVER PROFILE:  No results for input(s): AST, ALT, BILIDIR, BILITOT, ALKPHOS in the last 72 hours. Imaging Last 24 Hours:  Fluoro For Surgical Procedures    Result Date: 4/15/2021  EXAMINATION: SPOT FLUOROSCOPIC IMAGES 4/15/2021 1:29 pm TECHNIQUE: Fluoroscopy was provided by the radiology department for procedure. Radiologist was not present during examination. FLUOROSCOPY DOSE AND TYPE OR TIME AND EXPOSURES: Fluoro time: 71.7 seconds. Images: 1 COMPARISON: None HISTORY: ORDERING SYSTEM PROVIDED HISTORY: Encounter for peritoneal dialysis catheter insertion [facility]) TECHNOLOGIST PROVIDED HISTORY: Reason for exam:->Encounter for peritoneal dialysis catheter insertion [facility]) What reading provider will be dictating this exam?->CRC Intraprocedural imaging. FINDINGS: Intraoperative fluoroscopy provided for placement of central venous catheter. Single image shows a right and left central venous catheter. Distal tip of left catheter appears to be at level of right atrial/caval junction. Distal tip of right catheter is not included on this examination. Radiographic follow-up could be helpful for further evaluation. Intraprocedural fluoroscopic spot images as above. See separate procedure report for more information.      Assessment//Plan           Hospital Problems           Last Modified POA    * (Principal) Encounter regarding vascular access for dialysis for ESRD ([address] (Chronic) 9/18/6360 Yes    Complications, dialysis, catheter, mechanical, initial encounter ([address] 4/15/2021 Yes    Encounter for peritoneal dialysis catheter insertion ([address] for peritoneal dialysis catheter insertion ([facility]   Hemodialysis catheter malfunction, initial encounter ([facility]  Stage III sacral pressure ulcer  Afib    ). Plan:   (Wound care treating   Appreciate input  Surgery following ostomy. Meds adjusted per Cardiology. Eliquis for DVT. ).

## 2021-04-22 NOTE — DISCHARGE INSTR - COC
Continuity of Care Form    Patient Name: Audrey Mora   :  1955  MRN:  [id number]    Admit date:  4/15/2021  Discharge date:  21    Code Status Order: Prior   Advance Directives:   [facility] Documentation       Date/Time Healthcare Directive Type of Healthcare Directive Copy in [address] Agent's Name Healthcare Agent's Phone Number    04/15/21 2159  No, patient does not have an advance directive for healthcare treatment -- -- -- -- --            Admitting Physician:  Kayleen Hashimoto, MD  PCP: Kayleen Hashimoto, MD    Discharging Nurse: Wally Cedillo RN  [address] Unit/Room#: 0165/5878-T  Discharging Unit Phone Number: [phone]    Emergency Contact:   Extended Emergency Contact Information  Primary Emergency Contact: Nuno Contreras [address] Phone: [phone]  Relation: Child  Secondary Emergency Contact: Zayra Scott  Mobile Phone: [phone]  Relation: Child    Past Surgical History:  Past Surgical History:   Procedure Laterality Date    ANTERIOR COMPARTMENT DECOMPRESSION Right 2021    RIGHT LOWER EXTREMITY FASCIOTOMY CLOSURE performed by Socrates Sanchez MD at [address] [facility]  2016    Dr Bryon Laws  5/3/2012         EMG  2015    Dr Bellamy Reasons, radiculopathy    ESOPHAGUS SURGERY  2016    Dr Corley Me ablation   [place] Right 3/20/2021    RIGHT PARTIAL [address] performed by Berto Jimenez DPM at [address]  N/A 2021    DIAGNOSTIC LAPAROSCOPY, CONVERTED TO LAPAROTOMY WITH SMALL BOWEL RESECTION, WITH APPLICATION OF ABTHERA WOUND VAC performed by Gerardo Mathew MD at [address] [facility] [address] [address]  2015    medial branch blocks, Dr. 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, G63    Mild sleep apnea G47.30    Tubular adenoma of colon D12.6    Hyperlipidemia associated with type 2 diabetes mellitus (HCC) E11.69, E78.5    Hyperglycemia R73.9    COVID-19 U07.1    Enterocolitis K52.9    Abdominal pain R10.9    Depression F32.9    Tobacco abuse Z72.0    S/P small bowel resection Z90.49    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 31XD    Atherosclerosis of native artery of extremity (HCC) I70.209    Gangrene of toe of right foot ([address] I96    Dehiscence of fascia T81.30XA    Encounter regarding vascular access for dialysis for ESRD ([address] M91.0, W02.2    Complications, dialysis, catheter, mechanical, initial encounter ([address] T82.49XA    Encounter for peritoneal dialysis catheter insertion ([address] Z49.02    Hemodialysis catheter malfunction, initial encounter ([address] T82.41XA    Severe protein-calorie malnutrition ([address] E43    Persistent atrial fibrillation (HCC) I48.19       Isolation/Infection:   Isolation            No Isolation          Patient Infection Status       Infection Onset Added Last Indicated Last Indicated By Review Planned Expiration Resolved Resolved By    None active    Resolved    C-diff Rule Out 04/14/21 04/14/21 04/14/21 Clostridium difficile EIA (Ordered)   04/14/21 Rule-Out Test Resulted    COVID-19 Rule Out 03/03/21 03/03/21 03/03/21 Respiratory Panel, Molecular, with COVID-19 (Restricted: peds pts or suitable admitted adults) (Ordered)   03/03/21 Rule-Out Test Resulted    COVID-19 03/03/21 03/03/21 03/03/21 Respiratory Panel, Molecular, with COVID-19 (Restricted: peds pts or suitable admitted adults)   03/29/21 Laine Hoover RN    Covid Not detected-3/8/21, 3/12/21  No temperature in last 24hours  No tylenol given in last 24 hours    COVID-19 Rule Out 02/16/21 02/16/21 02/16/21 Covid-19 Ambulatory (Ordered)   02/17/21 Rule-Out Test Resulted    COVID-19 Rule Out 02/15/21 02/15/21 02/15/21 COVID-19, Rapid (Ordered)   02/15/21 Rule-Out Test Resulted            Nurse Assessment:  Last Vital Signs: /68   Pulse 82   Temp 97 °F (36.1 °C) (Temporal)   Resp 20   Ht 5' 6\" (1.676 m)   Wt 210 lb 15.7 oz (95.7 kg)   LMP  (LMP Unknown)   SpO2 96%   BMI 34.05 kg/m²     Last documented pain score (0-10 scale): Pain Level: 7  Last Weight:   Wt Readings from Last 1 Encounters:   04/22/21 210 lb 15.7 oz (95.7 kg)     Mental Status:  oriented and alert    IV Access:  - None    Nursing Mobility/ADLs:  Walking   Assisted  Transfer  Assisted  Bathing  Assisted  Dressing  Assisted  Toileting  Assisted  Feeding  Independent  Med Admin  Independent  Med Delivery   whole    Wound Care Documentation and Therapy:  Negative Pressure Wound Therapy Abdomen (Active)   $ Standard NPWT >50 sq cm PER TX $ Yes 04/21/21 2000   Wound Type Surgical 04/21/21 2000   Dressing Type Black foam;Non-adherant 04/22/21 1200   Number of pieces used 3 04/21/21 2000   Cycle Continuous 04/22/21 1200   Target Pressure (mmHg) 125 04/22/21 1200   Canister changed? No 04/21/21 2000   Dressing Status Clean;Dry; Intact 04/22/21 1200   Dressing Changed Changed/New 04/21/21 2000   Drainage Amount Scant 04/21/21 2000   Drainage Description Serosanguinous 04/21/21 2000   Dressing Change Due 04/23/21 04/21/21 2000   Number of days: 4       Wound 04/15/21 Abdomen Mid (Active)   Wound Image   04/19/21 1615   Wound Etiology Non-Healing Surgical 04/21/21 2000   Dressing Status Intact 04/22/21 1200   Wound Cleansed Cleansed with saline 04/21/21 2000   Dressing/Treatment Other (comment) 04/21/21 2000   Dressing Change Due 04/23/21 04/22/21 1200   Wound Length (cm) 9 cm 04/21/21 1030   Wound Width (cm) 8 cm 04/21/21 1030   Wound Depth (cm) 7 cm 04/21/21 1030   Wound Surface Area (cm^2) 72 cm^2 04/21/21 1030   Change in Wound Size % (l*w) 10 04/21/21 1030   Wound Volume (cm^3) 504 cm^3 04/21/21 1030   Wound Healing % -58 04/21/21 1030   Undermining Starts ___ O'Clock 12 04/21/21 2000   Undermining Ends___ O'Clock 1159 04/21/21 2000   Undermining Maxium Distance (cm) 5 04/21/21 2000   Wound Assessment Pink/red 04/22/21 0000   Drainage Amount Small 04/22/21 1200   Drainage Description Serosanguinous 04/22/21 1200   Odor None 04/22/21 0000   Santa-wound Assessment Intact 04/19/21 1615   Number of days: 6       Wound 04/15/21 Groin Right (Active)   Wound Image   04/16/21 1510   Wound Etiology Surgical 04/21/21 2000   Dressing Status Clean;Dry; Intact 04/22/21 1200   Wound Cleansed Cleansed with saline 04/21/21 2000   Dressing/Treatment ABD; Alginate;Dry dressing 04/22/21 0435   Dressing Change Due 04/19/21 04/21/21 2000   Wound Length (cm) 7 cm 04/21/21 1030   Wound Width (cm) 4 cm 04/21/21 1030   Wound Depth (cm) 0.1 cm 04/21/21 1030   Wound Surface Area (cm^2) 28 cm^2 04/21/21 1030   Change in Wound Size % (l*w) -133.33 04/21/21 1030   Wound Volume (cm^3) 2.8 cm^3 04/21/21 1030   Wound Healing % 88 04/21/21 1030   Wound Assessment Pink/red 04/22/21 0435   Drainage Amount None 04/22/21 0435   Santa-wound Assessment Intact 04/22/21 0435   Wound Ileostomy-Peristomal Assessment: Red  Ileostomy Ileostomy-Treatment: Bag change, Stoma powder(medical adhesive spray, belt, barrier ring, strips)  Ileostomy Ileostomy-Stool Appearance: Watery  Ileostomy Ileostomy-Stool Color: Brown  Ileostomy Ileostomy-Stool Amount: Large  Ileostomy Ileostomy-Output (mL): 150 ml    Date of Last BM: 4/22/21    Intake/Output Summary (Last 24 hours) at 4/22/2021 1455  Last data filed at 4/22/2021 1430  Gross per 24 hour   Intake 300 ml   Output 3125 ml   Net -2825 ml     I/O last 3 completed shifts:  In: -   Out: 3176 [Stool:3675]    Safety Concerns:     None    Impairments/Disabilities:      Amputation - right great toe    Nutrition Therapy:  Current Nutrition Therapy:   - Oral Diet:  Carb Control 4 carbs/meal (1800kcals/day) and Renal    Routes of Feeding: Oral  Liquids: Thin Liquids  Daily Fluid Restriction: no  Last Modified Barium Swallow with Video (Video Swallowing Test): not done    Treatments at the Time of Hospital Discharge:   Respiratory Treatments: ***  Oxygen Therapy:  is not on home oxygen therapy.   Ventilator:    - No ventilator support    Rehab Therapies: Physical Therapy and Occupational Therapy  Weight Bearing Status/Restrictions: No weight bearing restirctions  Other Medical Equipment (for information only, NOT a DME order):  {EQUIPMENT:[id number]}  Other Treatments: ***    Patient's personal belongings (please select all that are sent with patient):  None    RN SIGNATURE:  Electronically signed by Emely Soares RN on 4/22/21 at 5:58 PM EDT    CASE MANAGEMENT/SOCIAL WORK SECTION    Inpatient Status Date: ***    Readmission Risk Assessment Score:  Readmission Risk              Risk of Unplanned Readmission:        28           Discharging to Facility/ Agency   · Name:   · Address:  · Phone:  · Fax:    Dialysis Facility (if applicable)   · Name:  · Address:  · Dialysis Schedule:  · Phone:  · Fax:    / signature:

## 2021-04-22 NOTE — CARE COORDINATION
EP signed off, amiodarone dosing in their note. Per Nursing, OK to discharge from EP POV. Await input from Surgery. MA[facility]enton [facility] [place] [address] [facility]. Call placed to [facility] and transport out-sourced and arranged for 7:30 pm with Eliot Bass. Call placed to the patient's daughter Smita Anderson to notify of pending discharge to [facility] today. She is in agreement. Charge nurse and bedside nurse Afia notified. Spoke with Seth Crawford, liaison with [facility] the [place] and they are ready for the patient to transition.      Courtney Stahl.  P:  [phone]

## 2021-04-22 NOTE — PROGRESS NOTES
CARDIAC ELECTROPHYSIOLOGY CONSULTATION    PATIENT: Debbie Mosquera  MEDICAL RECORD NUMBER: [id number]  DATE OF SERVICE:  4/19/2021  CONSULTING ELECTROPHYSIOLOGIST: Ezio Goodwin  PHYSICIAN REQUESTING CONSULTATION: Lucian Santo  REASON FOR CONSULTATION: Atrial fibrillation    PRESENTATION  Electively 4/15, for removal and replacement of malfunctioning tunneled dialysis catheter. SYNOPSIS OF RELEVANT MEDICAL ISSUES  1. Poor metabolic health incurring multiple co-morbid conditions. 2. Hemodialysis-dependent renal dysfunction. 3. Pulmonary parenchymal disease: details uncertain. 4. Recent extensive lower extremity deep venous thrombosis. 5. Recent ischemic bowel requiring extensive resection and ostomy. 6. Atrial fibrillation: noted overnight - no prior diagnosis. Rapid ventricular response, with hemodynamic compromise. Parenteral amiodarone and digoxin initiated. Sustained sinus rhythm since this AM.  7. Preserved cardiac mechanical function. 8. CHADS-VASC > 2.    RECOMMENDATIONS  1. Continue parenteral amiodarone until tomorrow AM. If at that point sinus rhythm has been sustained, transition to PO.   2. Discontinue digoxin (done). 3. Discontinue beta blocker pro temp (done). 4. Systemic anticoagulation based on your calculation of risk-benefit. We will follow. Ezio Goodwin MD, [facility]  Cardiac Electrophysiology  [phone]    04/20/2021 Debbie Mosquera is seen in hospital follow-up afer completion of HD. She was transferred off IV amiodarone from ICU secondary to bradycardia in sinus rhythm per nursing. She is off home lopressor 25mg BID. She remains in SR/SB with HRs 60s-70s. She is not on [address] [place] [address] Road. LVEF 60-65%, normal sized atria, and no evidence of mass/vegetation of aortic valve on TRACEY 4/2/2021. Overall she is feeling well and offers no complaints from an EP perspective. TSH 3/26/21 >< 1.750; LFTs this admission stable. 04/21/2021 Debbie Mosquera is seen in follow-up. Discussed with nursing. She had large amounts of watery output from her ostomy with leaking. There is no plan for ostomy revision at this time. She did not receive her PO amiodarone dose due to extensive patient care issues/needs. Would Care was with her this am. Her SBP has been ~ 90s mmHg. As of now she is maintaining SR. Noted DVTs bilateral lower extremities; Eliquis has been resumed. 04/22/2021 Hortencia Gilbert is seen in hospital follow-up, she continues in sinus and has no cardiac complaints. Will plan to change IV amiodarone to PO. Review of Systems   Constitutional: Positive for activity change, appetite change and fatigue. Respiratory: Negative. Cardiovascular: Negative. Neurological: Negative. Psychiatric/Behavioral: Positive for decreased concentration, dysphoric mood and sleep disturbance. All other systems reviewed and are negative. PHYSICAL EXAM:  Vitals:    04/22/21 1000 04/22/21 1030 04/22/21 1059 04/22/21 1145   BP: 127/63 114/62 110/63 101/68   Pulse: 75 101 89 82   Resp:   18 20   Temp:   96.9 °F (36.1 °C) 97 °F (36.1 °C)   TempSrc:    Temporal   SpO2:       Weight:   210 lb 15.7 oz (95.7 kg)    Height:         Constitutional: Oriented to person, place, and time. Well-developed and cooperative. Head: Normocephalic and atraumatic. Eyes: Conjunctivae are normal.   Cardiovascular: S1 normal, S2 normal and intact distal pulses. A regular rhythm present. PMI is not displaced. Pulmonary/Chest: Effort normal, no distress   Musculoskeletal: Normal range of motion of all extremities, no muscle weakness. Neurological: Alert and oriented to person, place, and time. Gait normal.   Extremity: No clubbing or cyanosis. No edema. Psychiatric: Normal mood and affect. Thought content normal.    Impression:    1.  Newly diagnosed PAF  -AF/RVR on admission  -IV amiodarone with conversion to sinus bradycardia  -off digoxin and lopressor  -PO amiodarone 400 mg TID initiated  -[id number] score: 2  -Eliquis

## 2021-04-22 NOTE — PLAN OF CARE
Problem: Skin Integrity:  Goal: Will show no infection signs and symptoms  Description: Will show no infection signs and symptoms  4/22/2021 0055 by Ralph Ramirez RN  Outcome: Met This Shift     Problem: Skin Integrity:  Goal: Absence of new skin breakdown  Description: Absence of new skin breakdown  4/22/2021 0055 by Ralph Rmairez RN  Outcome: Met This Shift     Problem: Falls - Risk of:  Goal: Will remain free from falls  Description: Will remain free from falls  4/22/2021 0055 by Ralph Ramirez RN  Outcome: Met This Shift     Problem: Falls - Risk of:  Goal: Absence of physical injury  Description: Absence of physical injury  4/22/2021 0055 by Ralph Ramirez RN  Outcome: Met This Shift

## 2021-04-22 NOTE — PLAN OF CARE
Problem: Skin Integrity:  Goal: Will show no infection signs and symptoms  Description: Will show no infection signs and symptoms  Outcome: Met This Shift  Goal: Absence of new skin breakdown  Description: Absence of new skin breakdown  Outcome: Met This Shift     Problem: Falls - Risk of:  Goal: Will remain free from falls  Description: Will remain free from falls  Outcome: Met This Shift  Goal: Absence of physical injury  Description: Absence of physical injury  Outcome: Met This Shift     Problem:  Bowel/Gastric:  Goal: Complications related to the disease process, condition or treatment will be avoided or minimized  Outcome: Met This Shift  Goal: Will be independent with ostomy care  Outcome: Met This Shift     Problem: Pain:  Goal: Pain level will decrease  Description: Pain level will decrease  Outcome: Met This Shift  Goal: Control of acute pain  Description: Control of acute pain  Outcome: Met This Shift  Goal: Control of chronic pain  Description: Control of chronic pain  Outcome: Met This Shift     Problem: Musculor/Skeletal Functional Status  Goal: Highest potential functional level  Outcome: Met This Shift  Goal: Absence of falls  Outcome: Met This Shift

## 2021-04-22 NOTE — PROGRESS NOTES
Nephrology Progress Note  Patient's Name: Renee Lopez  8:29 AM  4/22/2021    Nephrologist: Dr. Elvis Berg    Reason for Consult:  ESRD-IHD Mgmt  Requesting Physician:  Radha Bhatti MD    Chief Complaint:  Elective removal and replacement of [facility]     History Obtained From:  past medical records    History of Present Ilness:  Formal consult deferred as we have been following Gattis Breath since we were consulted on 3/14/2021 for hyperkalemia, anemia and idalmis. Renee Lopez is a 60 y.o. female with PMH:  DVT- Bilateral lower extremities,    Fungal bacteremia, Ischemic bowel with small bowel resection left open s/p fasciotomy, DM type 2,  COPD, Adrenal mass, Barretts esophagitis, Fatty liver  Lumbar sacral disease, and IDALMIS .  Pt  Had TDC placed and it has had difficulties with function since so she went today for an elective removal and replacement of TDC. ID did not want a permanent access placed yet due to her fumgemia.    Pt seen during her dialysis treatment, hypotensive in 70s, SPA and ivf bolus given with improvement SBP 90s now. DIalysis via [facility] right chest tesio. Subjective    4/16: No new c/o this am; states she feels better  4/17: pt seen in room, for hd today  4/18: pt seen in room, sp hd yesterday. Hi output ostomy  4/19: pt transferred to icu overnight for afib and hypotension and was given dig and amio  4/20: Now transferred out of ICU to the general floor -no adverse events overnight  - seen on dialysis - tolerated 3.5hr tx well on 3K 2.5Ca bath - 800 ml removed without difficulty. 4/21: Still having issues with ostomy leaking yesterday - with very watery output this morning - also c/o nausea  4/22:  Pt seen following dialysis treatment-states it wiped her out today. Pt is hoping to go to rehab soon.     Allergies:  Lisinopril, Procardia [nifedipine], and Metformin and related    Current Medications:    hydrocortisone (CORTEF) tablet 40 mg, BID  [START ON 4/24/2021] hydrocortisone (CORTEF) tablet 20 mg, BID  [START ON 4/27/2021] hydrocortisone (CORTEF) tablet 15 mg, QAM  [START ON 4/27/2021] hydrocortisone (CORTEF) tablet 5 mg, QPM  apixaban (ELIQUIS) tablet 5 mg, BID  ondansetron (ZOFRAN) injection 4 mg, Q6H PRN  amiodarone (CORDARONE) 450 mg in dextrose 5 % 250 mL infusion, Continuous  diphenoxylate-atropine (LOMOTIL) 2.5-0.025 MG per tablet 1 tablet, 4x Daily  0.9 % sodium chloride bolus, Once  cholestyramine (QUESTRAN) packet 8 g, BID  Loperamide HCl (IMODIUM) 2 MG/15ML solution 4 mg, 4x Daily AC & HS  sodium chloride flush 0.9 % injection 5-40 mL, BID  miconazole (MICOTIN) 2 % powder, BID  midodrine (PROAMATINE) tablet 15 mg, TID  0.9 % sodium chloride infusion, PRN  menthol-zinc oxide (CALMOSEPTINE) 0.44-20.6 % ointment, BID    And  menthol-zinc oxide (CALMOSEPTINE) 0.44-20.6 % ointment, TID PRN  ascorbic acid (VITAMIN C) tablet 1,000 mg, Daily  guaiFENesin tablet 400 mg, TID  metoclopramide (REGLAN) tablet 5 mg, TID WC  pramipexole (MIRAPEX) tablet 0.5 mg, BID  vitamin D (CHOLECALCIFEROL) tablet 2,000 Units, Daily  zinc sulfate (ZINCATE) capsule 50 mg, Daily  acetaminophen (TYLENOL) tablet 650 mg, Q6H PRN  albuterol (PROVENTIL) nebulizer solution 2.5 mg, 4x daily  gabapentin (NEURONTIN) capsule 100 mg, Q8H  HYDROmorphone (DILAUDID) injection 1 mg, Q4H PRN  oxyCODONE (ROXICODONE) immediate release tablet 5 mg, Q4H PRN  pantoprazole (PROTONIX) tablet 40 mg, Daily  psyllium (KONSYL) 28.3 % packet 1 packet, TID  insulin lispro (HUMALOG) injection vial 0-6 Units, TID WC  insulin lispro (HUMALOG) injection vial 0-3 Units, Nightly  glucose (GLUTOSE) 40 % oral gel 15 g, PRN  dextrose 50 % IV solution, PRN  glucagon (rDNA) injection 1 mg, PRN  dextrose 5 % solution, PRN        Review of Systems:   Pertinent items are noted in HPI.     Physical exam:     VITALS:  BP (!) 85/42   Pulse 82   Temp 97 °F (36.1 °C)   Resp 18   Ht 5' 6\" (1.676 m)   Wt 210 lb 5.1 oz (95.4 kg)   LMP  (LMP Unknown)   SpO2 96%   BMI 33.95 kg/m²   24HR INTAKE/OUTPUT:      Intake/Output Summary (Last 24 hours) at 4/22/2021 0829  Last data filed at 4/22/2021 0436  Gross per 24 hour   Intake --   Output 3375 ml   Net -3375 ml   URINARY CATHETER OUTPUT (Bates):  [REMOVED] Urethral Catheter-Output (mL): 10 mL       General Appearance: awake, alert, oriented, in no acute distress  Skin:  few ecchymotic spots L leg  Neck:  neck- supple, no mass, non-tender and no bruits  Lungs:  Distant breath sounds   Heart:  Rhythm regular,  no murmur  Abdominal:  Positive bowel sound, positive leakage around ostomy bag; wound with dressing intact  Extremities:  Left leg +2 edema, minimal right  Neuro:  Cranial nerves 2-12 intact  Peripheral Pulses:  +2    Data:   Labs:  CBC:   Lab Results   Component Value Date    WBC 14.6 04/22/2021    RBC 3.55 04/22/2021    HGB 9.8 04/22/2021    HCT 32.5 04/22/2021    MCV 91.5 04/22/2021    MCH 27.6 04/22/2021    MCHC 30.2 04/22/2021    RDW 16.3 04/22/2021     04/22/2021    MPV 9.5 04/22/2021     CBC with Differential:    Lab Results   Component Value Date    WBC 14.6 04/22/2021    RBC 3.55 04/22/2021    HGB 9.8 04/22/2021    HCT 32.5 04/22/2021     04/22/2021    MCV 91.5 04/22/2021    MCH 27.6 04/22/2021    MCHC 30.2 04/22/2021    RDW 16.3 04/22/2021    SEGSPCT 46 04/12/2013    METASPCT 0.9 03/14/2021    LYMPHOPCT 24.8 04/12/2021    PROMYELOPCT 3.0 03/28/2021    MONOPCT 7.0 04/12/2021    MYELOPCT 3.5 02/26/2021    BASOPCT 0.2 04/12/2021    MONOSABS 0.59 04/12/2021    LYMPHSABS 2.09 04/12/2021    EOSABS 0.01 04/12/2021    BASOSABS 0.02 04/12/2021     CMP:    Lab Results   Component Value Date     04/22/2021    K 3.1 04/22/2021    K 3.9 04/19/2021    CL 91 04/22/2021    CO2 29 04/22/2021    BUN 40 04/22/2021    CREATININE 3.2 04/22/2021    GFRAA 18 04/22/2021    LABGLOM 15 04/22/2021    GLUCOSE 161 04/22/2021    GLUCOSE 88 05/03/2012    PROT 6.5 04/19/2021    LABALBU 3.1 04/19/2021    LABALBU 2.8 05/03/2012    CALCIUM 8.9 04/22/2021    BILITOT 0.3 04/19/2021    ALKPHOS 166 04/19/2021    AST 28 04/19/2021    ALT <5 04/19/2021     BMP:    Lab Results   Component Value Date     04/22/2021    K 3.1 04/22/2021    K 3.9 04/19/2021    CL 91 04/22/2021    CO2 29 04/22/2021    BUN 40 04/22/2021    LABALBU 3.1 04/19/2021    LABALBU 2.8 05/03/2012    CREATININE 3.2 04/22/2021    CALCIUM 8.9 04/22/2021    GFRAA 18 04/22/2021    LABGLOM 15 04/22/2021    GLUCOSE 161 04/22/2021    GLUCOSE 88 05/03/2012     Ionized Calcium:  No results found for: IONCA  Magnesium:    Lab Results   Component Value Date    MG 1.9 04/11/2021     Phosphorus:    Lab Results   Component Value Date    PHOS 2.5 04/11/2021     LDH:    Lab Results   Component Value Date     02/16/2021     Uric Acid:    Lab Results   Component Value Date    LABURIC 8.6 04/14/2021     PT/INR:    Lab Results   Component Value Date    PROTIME 12.4 02/23/2021    PROTIME 12.8 05/02/2012    INR 1.1 02/23/2021     Warfarin PT/INR:  No components found for: PTPATWAR, PTINRWAR  PTT:    Lab Results   Component Value Date    APTT 61.2 02/23/2021   [APTT}  Troponin:    Lab Results   Component Value Date    TROPONINI 0.17 04/20/2021     Last 3 Troponin:    Lab Results   Component Value Date    TROPONINI 0.17 04/20/2021    TROPONINI 0.27 04/19/2021    TROPONINI 0.18 04/19/2021     U/A:    Lab Results   Component Value Date    NITRITE trace 06/21/2016    COLORU Yellow 03/25/2021    PROTEINU 100 03/25/2021    PHUR 5.0 03/25/2021    WBCUA 10-20 03/25/2021    WBCUA 0-1 04/27/2012    RBCUA 1-3 03/25/2021    RBCUA NONE 04/12/2013    YEAST Present 03/25/2021    BACTERIA RARE 03/25/2021    CLARITYU CLOUDY 03/25/2021    SPECGRAV >=1.030 03/25/2021    LEUKOCYTESUR MODERATE 03/25/2021    UROBILINOGEN 0.2 03/25/2021    BILIRUBINUR Negative 03/25/2021    BILIRUBINUR neg 06/21/2016    BILIRUBINUR NEGATIVE 05/02/2012    BLOODU MODERATE 03/25/2021    GLUCOSEU Negative 03/25/2021 GLUCOSEU NEGATIVE 05/02/2012     ABG:    Lab Results   Component Value Date    PH 7.528 02/23/2021    PH 7.49 05/04/2012    PCO2 37.0 02/23/2021    PO2 87.0 02/23/2021    HCO3 30.1 02/23/2021    BE 6.9 02/23/2021    O2SAT 96.5 02/23/2021     HgBA1c:    Lab Results   Component Value Date    LABA1C 13.1 02/17/2021     Microalbumen/Creatinine ratio:  No components found for: RUCREAT  FLP:    Lab Results   Component Value Date    TRIG 405 02/20/2021    HDL 28 08/20/2018    LDLCALC 82 08/20/2018    LABVLDL 40 08/20/2018     TSH:    Lab Results   Component Value Date    TSH 1.750 03/26/2021     VITAMIN B12: No components found for: B12  FOLATE:    Lab Results   Component Value Date    FOLATE 10.6 05/07/2012     IRON:    Lab Results   Component Value Date    IRON 28 03/26/2021     Iron Saturation:  No components found for: PERCENTFE  TIBC:    Lab Results   Component Value Date    TIBC 290 03/26/2021     FERRITIN:    Lab Results   Component Value Date    FERRITIN 1,863 04/05/2021     AMYLASE:    Lab Results   Component Value Date    AMYLASE 33 08/16/2014     LIPASE:    Lab Results   Component Value Date    LIPASE 56 02/15/2021     Fibrinogen Level:  No components found for: FIB  Urine Toxicology:  No components found for: IAMMENTA, IBARBIT, IBENZO, ICOCAINE, IMARTHC, IOPIATES, IPHENCYC     Imaging:  Narrative   EXAMINATION:   SPOT FLUOROSCOPIC IMAGES       4/15/2021 1:29 pm       TECHNIQUE:   Fluoroscopy was provided by the radiology department for procedure.    Radiologist was not present during examination.       FLUOROSCOPY DOSE AND TYPE OR TIME AND EXPOSURES:   Fluoro time: 71.7 seconds.  Images: 1       COMPARISON:   None       HISTORY:   ORDERING SYSTEM PROVIDED HISTORY: Encounter for peritoneal dialysis catheter   insertion [facility])   TECHNOLOGIST PROVIDED HISTORY:   Reason for exam:->Encounter for peritoneal dialysis catheter insertion [facility])   What reading provider will be dictating this exam?->CRC       Intraprocedural imaging.       FINDINGS:   Intraoperative fluoroscopy provided for placement of central venous catheter. Single image shows a right and left central venous catheter.  Distal tip of   left catheter appears to be at level of right atrial/caval junction.  Distal   tip of right catheter is not included on this examination.  Radiographic   follow-up could be helpful for further evaluation.           Impression   Intraprocedural fluoroscopic spot images as above.  See separate procedure   report for more information. Patient MRN:  [id number]   : 1955   Age: 60 years   Gender: Female   Order Date:  12 5:01 PM   EXAM: US DUP LOWER EXTREMITIES BILATERAL VENOUS   NUMBER OF IMAGES:  54   INDICATION:  rule out DVT    Reason for Exam:->R/O DVT   Portable? ->Yes   COMPARISON: None       There is evidence for deep venous thrombosis in the right leg from the   common femoral to the level the ankle including the superficial   femoral and popliteal the tibioperoneal trunk and the anterior   posterior tibial veins and peroneal veins and on the left involving   the common femoral superficial femoral and popliteal the tibioperoneal   trunk and the anterior and posterior tibial veins   There is otherwise good compressibility, there is good augmentation,   there is good color flow. There is a hypoechoic region posterior to the left knee measuring 10.3   x 4.9 x 3.2 possibly a Baker's cyst       Impression   There is evidence for deep venous thrombosis, which is bilateral,   involving essentially the entire right leg in the entire left leg       ALERT:  THIS IS AN ABNORMAL REPORT       Assessment & Plan:     1. MEG  in the setting of poor po intake combined with high stool output in ostomy  no evidence for recovery  Removal of LIJ tunneled catheter and insertion of RIJ tunneled cath 4/15  Continue hemodialysis per a TTS schedule  UF today 300 ml  Patient set up at [facility] on a TTS schedule as OP      2. Hypotension worse on IHD  Albumin prn  Improvement on midodrine    3. Anemia  in the setting of recent GI Bleed, combined with iron deficiency  Started JEREMY therapy and weekly iron    4 Malnutrition-oral intake improved   Monitor    5 Hyponatremia  Stable    6. Decubitus ulcer  Wound care  Off abx    7. Sp ostomy for ischemic bowel and open fasiotomy  High output  abd wound  Sp fungemia    8. H/o fatty liver, barretts esophagus, dm, copd, adremal mass    9. merrick dvt seen on us 4/4/21  Resumed oac    10.  Hypokalemia  K+ 3.1  Use 3K bath for dialysis, and gave 40 meq PO Klor-Con  Follow and supplement prn    Marina Orozco, APRN - CNP   4/22/2021  8:29 AM    Pt seen and examined agree with above  Hd today  Munira Barraza MD

## 2021-04-26 PROBLEM — N18.6 ESRD (END STAGE RENAL DISEASE) (HCC): Status: ACTIVE | Noted: 2021-01-01

## 2021-04-26 PROBLEM — T82.9XXA COMPLICATIONS DUE TO RENAL DIALYSIS DEVICE, IMPLANT, AND GRAFT: Status: ACTIVE | Noted: 2021-01-01

## 2021-04-26 NOTE — LETTER
[place] Department Medicaid  CERTIFICATION OF NECESSITY  FOR NON-EMERGENCY TRANSPORTATION   BY GROUND AMBULANCE      Individual Information   1. Name: Brynn Gregorio 2. [place] Medicaid Billing Number:    3. Address: [address] [place] [address]      Transportation Provider Information   4. Provider Name:    5. [place] Medicaid Provider Number: National Provider Identifier (NPI):     Certification  7. Criteria:  During transport, this individual requires:  [x] Medical treatment or continuous     supervision by an EMT. [] The administration or regulation of oxygen by another person. [] Supervised protective restraint. 8. Period Beginning Date: 2021   9. Length  [x] Not more than 2 day(s)  [] One Year     Additional Information Relevant to Certification   10. Comments or Explanations, If Necessary or Appropriate ; Endstage renal disease, Weakness, Chronic Back Pain, Dengerative Disc Disease, Non-ambulatory, Falls risk         Certifying Practitioner Information   11. Name of Practitioner: Dr. Divina Vega MD   12. [place] Medicaid Provider Number, If Applicable:  [address] Provider Identifier (NPI):     Signature Information   14. Date of Signature: 13. Name of Person Signin. Signature and Professional Designation: ***     ODM [id number]  Rev. 2015  [address] [place] [address] Encounter Date/Time: 2021 [address] [place] [address] Account: [de-identified]    MRN: [id number]    Patient: Nataliia Acosta Serial #: [id number]      ENCOUNTER          Patient Class: Observation Private Enc? No Unit [facility] Service: Intermediate   Encounter DX: Dialysis complication, i*   ADM Provider: Divina Vega MD   Procedure:     ATT Provider: Divina Vega MD   REF Provider:        Admission DX: Dialysis complication, initial encounter, ESRD (end stage renal disease) ([facility] [address] ESRD (end stage renal disease) ([facility] [address] and codes: 585.6, 585. 6      PATIENT Name: Brynn Gregorio : 1955 (65 yrs)   Address: [address] [place] [address] Sex: Female   [place] city: [place] [address]         Marital Status:    Employer: DISABLED         Rastafarian: Non-Zoroastrianism   Primary Care Provider: Anni Pedraza, *         Primary Phone: [phone]   EMERGENCY CONTACT   Contact Name Legal Guardian? Relationship to Patient Home Phone Work Phone   1. Rocio Gutierrez  2. Edilia Crystal    Child  Child [phone]                 GUARANTOR            Guarantor: Brynn Gregorio     : 1955   Address: [address] [place] [address] Sex: Female   [place] [address]     Relation to Patient: Self       Home Phone: [phone]   Guarantor ID: [id number]       Work Phone:     Guarantor Employer: DISABLED         Status: DISABLED      COVERAGE        PRIMARY INSURANCE   Payor: Rober Pham Plan: [facility] MEDICAID   Payor Address: [facility] DEPARTMENT;[address],  [address] [place] [address], [address] [place] [address]       Group Number: [id number] Insurance Type: INDEMNITY   Subscriber Name: Ronen Quinn : 1955   Subscriber ID: [id number] Pat. Rel. to Sub: Self   SECONDARY INSURANCE   Payor:   Plan:     Payor Address:  ,           Group Number:   Insurance Type:     Subscriber Name:   Subscriber :     Subscriber ID:   Pat.  Rel. to Sub:

## 2021-04-26 NOTE — Clinical Note
Patient Class: Observation [104]   REQUIRED: Diagnosis: Dialysis complication, initial encounter [398324]   Estimated Length of Stay: Estimated stay of less than 2 midnights   Admitting Provider: Angeles Beyer [[id number]]   Telemetry Bed Required?: Yes

## 2021-04-26 NOTE — ED NOTES
Bed: HE  Expected date: 4/26/21  Expected time:   Means of arrival: [facility]  Comments:  Gilbert Spain, [facility]  04/26/21 4022

## 2021-04-26 NOTE — CARE COORDINATION
Social Work/ Discharge Planning:    Pt presents to the ED secondary to needing dialysis treatment. Pt sent from [facility] at the [address] [place] [address]. Pt was discharged from this hospital to [facility] the [place] on 4/22. Pt also had an initial referral made to [address] [place] for TTHS chairtime but pt was never approved through her insurance, as pt's insurance is out of network with [address]. SW spoke to [facility] with [employer], who reports they were still waiting for an out of network approval from [place] and he was unaware that pt had discharged. Pt refused to receive dialysis treatment on Saturday 4/24 at the hospital.  Pt was then sent to ED today for dialysis treatment. CM met with pt and pt is aware a referral will be made to [facility], which is in network with pt's insurance. SW made initial referral to Sophia Montemayor, liaison for [facility] the [place] is aware and will be following. Per ED physician, pt will be admitted observation at this time. Assigned SW/CM to follow up with [facility] and [facility] the [place].
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
## 2021-04-27 NOTE — DISCHARGE INSTR - COC
Continuity of Care Form    Patient Name: Mildred Hernandez   :  1955  MRN:  [id number]    Admit date:  2021  Discharge date:  2021    Code Status Order: Prior   Advance Directives:      Admitting Physician:  Jaime Packer MD  PCP: Jaime Packer MD    Discharging Nurse:  THE [facility] Unit/Room#: 0598/1905-I  Discharging Unit Phone Number: [phone]    Emergency Contact:   Extended Emergency Contact Information  Primary Emergency Contact: Nori [address] Phone: [phone]  Relation: Child  Secondary Emergency Contact: Srinath Pearson  [employer] Phone: [phone]  Relation: Child    Past Surgical History:  Past Surgical History:   Procedure Laterality Date    ANTERIOR COMPARTMENT DECOMPRESSION Right 2021    RIGHT LOWER EXTREMITY FASCIOTOMY CLOSURE performed by Chito Steen MD at [facility]  2016    Dr Judith Lemus  5/3/2012         EMG  2015    Dr Eva Brink, radiculopathy    ESOPHAGUS SURGERY  2016    Dr Oleksandr Guerrero ablation   Zhao Keerthi Right 3/20/2021    RIGHT PARTIAL [address] performed by Juice Villatoro DPM at [address] [place] [address] N/A 2021    DIAGNOSTIC LAPAROSCOPY, CONVERTED TO LAPAROTOMY WITH SMALL BOWEL RESECTION, WITH APPLICATION OF ABTHERA WOUND VAC performed by Ronald Cisneros MD at [address]  2015    josé miguel branch angela, Dr. Elle Ace POLYSOMNOGRAPHY  04/10/2017    Dr Zeny Steel sleep apnea AHI-8    POLYSOMNOGRAPHY  05/15/2017    SMALL INTESTINE SURGERY N/A 2021    RIGHT ILLEOSTOMY AND RIGHT COLECTOMY performed by Ronald Cisneros MD at [address]  [facility] TRANSESOPHAGEAL ECHOCARDIOGRAM  2021    Dr. Paula Hickman  2016    Dr Janae Sanon  2016     Cade-Padron's    VASCULAR SURGERY Right 2/17/2021    Iliac Thrombectomy with Right Lower Extremity Fasciotomy performed by Nenita Carr MD at [address] [place] [address] Left 3/30/2021    CATHETER INSERTION TEMPORARY HEMODIALYSIS performed by Ishmael Soto MD at [address] [place] [address] N/A 3/31/2021    CATHETER INSERTION,TEMPORAY  HEMODIALYSIS, NEEDS FLURO, PT IN SELECT, AVAILABLE ANYTIME performed by Ishmael Soto MD at [address] [place] [address] N/A 4/5/2021    CATHETER INSERTION HEMODIALYSIS WITH LEFT FEMORAL TEMPORARY HEMODIALYSIS ACCESS performed by Clayton Lacey MD at [address] [place] [address] N/A 4/15/2021    REMOVAL AND REINSERTION TUNNELED HEMODIALYSIS CATHETER performed by Nenita Carr MD at [address] [place] [address]       Immunization History:   Immunization History   Administered Date(s) Administered    Influenza Virus Vaccine 11/10/2015    Influenza, JAIMIE Nicholas, (6 mo and older Fluzone, Flulaval, Fluarix and 3 yrs and older Afluria) 11/27/2017    Pneumococcal Polysaccharide ([id number]) 02/09/2011    Tdap (Boostrix, Adacel) 08/21/2013       Active Problems:  Patient Active Problem List   Diagnosis Code    Hypertension I10    Fibromyalgia M79.7    IBS (irritable bowel syndrome) K58.9    GERD (gastroesophageal reflux disease) K21.9    Cigarette nicotine dependence without complication F99.221    Restless leg syndrome G25.81    Chronic back pain M54.9, G89.29    Adrenal mass (HCC) E27.8    DDD (degenerative disc disease) [id number]    Mild valvular heart disease I38    Vitamin D deficiency E55.9    Low ferritin level R79.0    Padron's esophagus K22.70    Peripheral edema R60.9    Post traumatic stress disorder (PTSD) F43.10    Recurrent major depressive disorder (HCC) F33.9    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    Fatty infiltration of liver K76.0    Mild cardiomegaly I51.7    Orthostatic hypotension dysautonomic syndrome (HCC) G90.3  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, G63    Mild sleep apnea G47.30    Tubular adenoma of colon D12.6    Hyperlipidemia associated with type 2 diabetes mellitus (HCC) E11.69, E78.5    Hyperglycemia R73.9    COVID-19 U07.1    Enterocolitis K52.9    Abdominal pain R10.9    Depression F32.9    Tobacco abuse Z72.0    S/P small bowel resection Z90.49    Ischemic necrosis of small bowel ([address] K55.029    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 31XD    Atherosclerosis of native artery of extremity ([facility]) I70.209    Gangrene of toe of right foot ([address] I96    Dehiscence of fascia T81.30XA    Encounter regarding vascular access for dialysis for ESRD ([address] J91.0, U33.6    Complications, dialysis, catheter, mechanical, initial encounter ([address] T82.49XA    Encounter for peritoneal dialysis catheter insertion ([address] Z49.02    Hemodialysis catheter malfunction, initial encounter ([address] T82.41XA    Severe protein-calorie malnutrition ([address] E43    Persistent atrial fibrillation ([facility]) U56.56    Complications due to renal dialysis device, implant, and graft T82. 9XXA    ESRD (end stage renal disease) ([address] N18.6       Isolation/Infection:   Isolation            No Isolation          Patient Infection Status       Infection Onset Added Last Indicated Last Indicated By Review Planned Expiration Resolved Resolved By    None active    Resolved    C-diff Rule Out 04/14/21 04/14/21 04/14/21 Clostridium difficile EIA (Ordered) 04/14/21 Rule-Out Test Resulted    COVID-19 Rule Out 03/03/21 03/03/21 03/03/21 Respiratory Panel, Molecular, with COVID-19 (Restricted: peds pts or suitable admitted adults) (Ordered)   03/03/21 Rule-Out Test Resulted    COVID-19 03/03/21 03/03/21 03/03/21 Respiratory Panel, Molecular, with COVID-19 (Restricted: peds pts or suitable admitted adults)   03/29/21 Tatyana Obrien RN    Covid Not detected-3/8/21, 3/12/21  No temperature in last 24hours  No tylenol given in last 24 hours    COVID-19 Rule Out 02/16/21 02/16/21 02/16/21 Covid-19 Ambulatory (Ordered)   02/17/21 Rule-Out Test Resulted    COVID-19 Rule Out 02/15/21 02/15/21 02/15/21 COVID-19, Rapid (Ordered)   02/15/21 Rule-Out Test Resulted            Nurse Assessment:  Last Vital Signs: BP (!) 106/55   Pulse 92   Temp 98.9 °F (37.2 °C) (Oral)   Resp 18   Ht 5' 6\" (1.676 m)   Wt 282 lb 10.1 oz (128.2 kg)   LMP  (LMP Unknown)   SpO2 100%   BMI 45.62 kg/m²     Last documented pain score (0-10 scale): Pain Level: 10  Last Weight:   Wt Readings from Last 1 Encounters:   04/26/21 282 lb 10.1 oz (128.2 kg)     Mental Status:  oriented and alert    IV Access:  - None    Nursing Mobility/ADLs:  Walking   Dependent  Transfer  Dependent  Bathing  Assisted  Dressing  Dependent  Toileting  Dependent  Feeding  Independent  Med Admin  Assisted  Med Delivery   whole    Wound Care Documentation and Therapy:  Wound 04/27/21 Abdomen Mid (Active)   Number of days: 0       Wound 04/15/21 Groin Right (Active)   Number of days: 11       Wound 04/27/21 Coccyx extending to bilateral buttocks (Active)   Number of days: 0       Wound 04/19/21 Buttocks Right red, white, excoriated (Active)   Number of days: 8        Elimination:  Continence:   · Bowel: {YES / RO:[id number]}  · Bladder: No  Urinary Catheter: None   Colostomy/Ileostomy/Ileal Conduit: Yes       Date of Last BM: ***    Intake/Output Summary (Last 24 hours) at 4/27/2021 1105  Last data filed at 4/26/2021 2005  Gross per 24 hour   Intake --   Output 1300 ml   Net -1300 ml     I/O last 3 completed shifts:  In: -   Out: 1300     Safety Concerns: At Risk for Falls    Impairments/Disabilities:      None    Nutrition Therapy:  Current Nutrition Therapy:   - Oral Diet:  Carb Control 4 carbs/meal (1800kcals/day)    Routes of Feeding: Oral  Liquids: No Restrictions  Daily Fluid Restriction: no  Last Modified Barium Swallow with Video (Video Swallowing Test): not done    Treatments at the Time of Hospital Discharge:   Respiratory Treatments: ***  Oxygen Therapy:  is not on home oxygen therapy.   Ventilator:    [address] [employer] Vent VTVX:[id number]}    Rehab Therapies: Physical Therapy and Occupational Therapy  Weight Bearing Status/Restrictions: No weight bearing restirctions  Other Medical Equipment (for information only, NOT a DME order):  {EQUIPMENT:[id number]}  Other Treatments: ***    Patient's personal belongings (please select all that are sent with patient):  Amberly    RN SIGNATURE:  Electronically signed by Chiquis Shi RN on 4/28/21 at [address] PM EDT    CASE MANAGEMENT/SOCIAL WORK SECTION    Inpatient Status Date: ***    Readmission Risk Assessment Score:  Readmission Risk              Risk of Unplanned Readmission:        0           Discharging to Facility/ Agency   · Name: [facility]  · Address:  · Phone:[phone]  · Fax:    Dialysis Facility (if applicable)   · Name:  · Address:  · Dialysis Schedule:  · Phone:  · Fax:    / signature:Electronically signed by HUY Devries on 4/27/2021 at 11:06 AM      PHYSICIAN SECTION    Prognosis: {Prognosis:[id number]}    Condition at Discharge: [address] Patient Condition:[id number]}    Rehab Potential (if transferring to Rehab): {Prognosis:4235522809}    Recommended Labs or Other Treatments After Discharge: ***    Physician Certification: I certify the above information and transfer of Flavio Quiet  is necessary for the continuing treatment of the diagnosis listed and that she requires [place] for less 30 days.      Update Admission H&P: {CHP DME Changes in FGQKT:[id number]}    PHYSICIAN SIGNATURE:  Nia Arora MD
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
## 2021-04-27 NOTE — CARE COORDINATION
SOCIAL WORK/DISCHARGE PLANNING;  Pt is a readmission. She is observation status. Pt was discharged to [facility] on 04/22/21 and returned yesterday due to need for dialysis. Reviewed chart and  insurance approval for dialysis at [facility] was not received( they are not contracted provider)  prior to being discharged and pt was not able to get her dialysis treatment there on Saturday. ED  has already made referral to [facility] dialysis as they are in network for [place]. I called [facility] and they are reviewing ref. Information and will call back. I spoke with liaisonLuz and pt can return to [facility] but will need insurance pre-cert before return. Called rehab to prioritize pt/ot evals. [facility], [facility]  [phone]    Discussed all of the above with pt in room. She is agreeable to [facility] unit and she is anxious to return to facility to get on with her rehab,  [facility], [facility]  [phone]    Faxed Hep B results from 04/20/21 to [facility] dialysis as requested. Await final acceptance and outpt schedule. [facility], [facility]  [phone]    Notified IRENE bledsoe in chart. OT in room seeing pt now. \  [facility]  [phone]

## 2021-04-27 NOTE — PROGRESS NOTES
Occupational Therapy  OT consult received. Chart reviewed. Upon arrival at ~12:15, patient with ostomy bag leaking with patient awaiting ostomy care from nursing. OT return at ~13:15 with patient reporting she is too fatigued to participate with OT this date. Will re-attempt OT evaluation as schedule permits when patient is appropriate.  Raissa Coles, OTR/L #[id number]

## 2021-04-27 NOTE — FLOWSHEET NOTE
Pt ran 3.5 hours; 3251 bath; 1 L removed; stable/tolerated well; denies c/o. HD CVC hep locked per lumen fill volume, capped & clamped post Tx. good Access flow no issues achieving prescribed BFR. Next Tx scheduled tomorrow 4/27. \"B\" Profile for duration, UF goal limited by hypotension.
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
## 2021-04-27 NOTE — FLOWSHEET NOTE
Inpatient Wound Care(initial evaluation) [id number]    Admit Date: 4/26/2021  2:52 PM    Reason for consult:  Wounds, ileostomy  Patient known to wound care nurse    Significant history:  Refer to [facility] P    Wound history:  Admitted with wounds from facility    Findings:       04/27/21 1315   Ileostomy Ileostomy   Placement Date: 02/17/21   Pre-existing: No  Inserted by: Dr. Manuel Huff  Ileostomy Type: Ileostomy   Stomal Appliance 1 piece; Changed  (with convexity)   Stoma  Assessment Protrudes; Moist;Red   Peristomal Assessment Red  (raw)   Treatment Bag change  (barrier ring, barrier ring)   Stool Color   (pink)   Stool Appearance Watery   Output (mL) 600 ml   Skin Integrity   Skin Integrity Bruising   Location [facility]   Wound 04/27/21 Abdomen Mid   Date First Assessed/Time First Assessed: 04/27/21 0730   Present on Hospital Admission: Yes  Primary Wound Type: Surgical Type  Location: Abdomen  Wound Location Orientation: Mid   Wound Image    Wound Etiology Surgical   Dressing Status New dressing applied   Wound Cleansed Cleansed with saline   Dressing/Treatment ABD;Dry dressing;Moist to dry   Wound Length (cm) 9.4 cm   Wound Width (cm) 6.8 cm   Wound Depth (cm) 7 cm   Wound Surface Area (cm^2) 63.92 cm^2   Change in Wound Size % (l*w) 20.1   Wound Volume (cm^3) 447.44 cm^3   Wound Healing % -40   Wound Assessment Pink/red   Drainage Amount Scant   Drainage Description Serosanguinous   Odor None   Santa-wound Assessment   (red)   Wound 04/15/21 Groin Right   Date First Assessed/Time First Assessed: 04/15/21 1800   Present on Hospital Admission: Yes  Location: Groin  Wound Location Orientation: Right   Wound Image    Dressing Status New dressing applied   Wound Cleansed Cleansed with saline   Dressing/Treatment Alginate;Dry dressing   Wound Length (cm) 7 cm   Wound Width (cm) 2.6 cm   Wound Depth (cm) 0.1 cm   Wound Surface Area (cm^2) 18.2 cm^2   Change in Wound Size % (l*w) -51.67   Wound Volume (cm^3) 1.82 cm^3   Wound Healing

## 2021-04-27 NOTE — PROGRESS NOTES
Occupational Therapy  OCCUPATIONAL THERAPY INITIAL EVALUATION        Date:2021  Patient Name: Avelina Chun  MRN: [id number]  : 1955  Room: [address] [place] [address]      Referring Physician:  Adair Ochoa MD    Evaluating OT:  Jhonathan Patel, MOT, OTR/L #[id number]    AM-PAC Daily Activity Raw Score:    Recommended Adaptive Equipment:  TBD as pt progresses     Reason for Admission:  Pt was readmitted from SNF d/t hypervolemia, unable to have Dialysis    Diagnosis:  ESRD on Dialysis     Procedures this admission:  None     Pertinent Medical History:  anxiety/depression, asthma, CAD, CHF, chronic back pain, COPD, DDD, fibromyalgia, hx of blood clots, HLD, HTN, IBS, type 2 DM, CATHETER INSERTION HEMODIALYSIS ; REMOVAL AND REINSERTION TUNNELED HEMODIALYSIS CATHETER. 4/15      Precautions:  Falls  Colostomy R UQ  R hallux partial amputation, WBAT RLE   NWB LLE per pt (need clarification)   Abdominal Precautions/Wound  Wound Right Groin, Coccyx    Home Living: Pt lives alone in a Senior High-Rise Apartment - level entry, elevator Access. Bathroom setup:  Walk-in-Shower, [address] [employer]   Equipment owned:  [employer], [address], Shower chair    Available Family Assist:  Unknown availability of family or ability of family to safely provide appropriate level of physical assistance and medical care    Prior Level of Function:  Per pt, prior to February, She was IND with all ADLs, IADLs, Transfers and Mobility using [employer] vs PRN for ambulation.    Driving:  Yes  Occupation:  None reported    Pain Level:  7/10 Jim LEs;  Relief w/ Rest and Repositioning, Nsg Notified   Additional Complaints:  None    Vitals/Lab Values:  WFL Room air    Cognition: A & O x 4   Able to Follow Multi-Step Commands INDly   Memory:  good    Sequencing:  good    Problem solving:  good    Judgement/safety:  good   Additional Comments:  Pt was pleasant, cooperative w/ Min encouragement       Functional Assessment:   Initial Eval Status  Date: 4-27-21 Treatment Status  Date: Short Term/Long Term Goals  Treatment frequency: PRN 1-3 x/week   1-2 weeks   Feeding Set up    Able to feed self w/ utensils, drink from cup after set up of tray, item retrieval  NA   Grooming Set up    Able to wash hands, face, complete hair care after set up while in high-figueroa position  Unable to tolerate ambulating to or standing at sink, unable to tolerate sitting EOB for ax this session d/t pain, fatigue    Set up  Seated   UB Dressing Min A/Set up    Required Min A for simulated task of donning/doffing gown after set up while in high figueroa position - unable to tolerate sitting EOB for ax this session    SUP/Set up  Seated   LB Dressing Dep    Max A to don socks  Max A of 2 for simulated task of donning garment in supine, Max A of 2 for bed mobility for functional task  Pt ed for safety, adaptive techs/equip    Max A   Bathing NT      Max A   Toileting Dep    Colostomy, nsg assist for colostomy care  Max A of 2 for simulated task of hygiene/clothing adjustment in supine, Max A of 2 for bed mobility for functional task    Max A   Bed Mobility  Rolling:   Max A of 2  Repositioning:  Max A of 2   Supine to Sit:  NT    Sit to Supine:  NT     Able to use UEs to assist w/ bed mobility, unable to use LEs despite repositioning of LEs to facilitate functional use of extremities, Pt ed for safe techs; re: importance of frequent repositioning d/t multiple wounds, use of bed mechanics for IND w/ repositioning     Max A   Functional Transfers Sit to stand:  NT  Stand to sit:  NT      Unsafe to attempt d/t weakness/fatigue    Max A of 2   Functional Mobility NT    Unsafe    Max A of 2   Balance Sitting:      Static:  NT    Dynamic:  NT     Standing:      Static:  NT    Dynamic:  NT        Activity Tolerance Fair(-)  Limited by Weakness, fatigue, LE Weakness, wounds       Visual/  Perceptual WFL  Glasses:  Reading      Hearing WFL  Hearing Aids  No       Hand dominance: Right    UE ROM: RUE:  WFL      LUE:  WFL    Strength: RUE: grossly 3/5 proximally, 4/5 distally     LUE: grossly 3/5 proximally, 4/5 distally     Strength:  WFL Jim UEs    Fine Motor Coordination:  WFL Jim UEs    Sensation:  Denies numbness or tingling Jim UEs  Tone:  WFL Jim UEs  Edema:  None Noted Jim UEs, Moderately severe edema L LE                            Comments: Upon arrival, patient was found in semi-supine. She was agreeable to participate in therapeutic ax. No Family present during session. Received permission from RN prior to engaging pt in OT services. At the end of the session, patient was properly positioned in Semi-Supine. Call light and phone within reach, all lines and tubes intact. Oriented pt to call bell. Made all appropriate Environmental Modifications to facilitate pt's level of IND and safety. All needs met. Bed Alarm activated. Overall patient demonstrated decreased independence and safety during completion of ADL/functional transfer/mobility tasks. Pt would benefit from continued skilled OT to increase safety and independence with completion of ADL/IADL tasks for functional independence and quality of life. Treatment:      Provided Skilled SUP/Assist w/ Pt safety, Proper Positioning, ADLs, Functional Transfers and Functional Mobility as noted above, as well as set up and clean up for session. Skilled monitoring of Vitals and pts response to treatment.   Consulted RN, COLETTE    Education:      Provided Pt/Family ed re: Purpose of OT services;  OT Plan of Care;     ADL-  Instruction/training on use of DME/AD/Adaptive equip/techs to improve safety/IND with Functional Ax    Mobility-  Instruction/training on safety and improved independence with bed mobility, repositioning    Activity tolerance - Instruction/training on energy conservation/work simplification, techs to increase endurance for completion of Functional Ax    Cognitive retraining - Cues for safety during Functional Ax for safety, improved safety awareness, sequencing, problem solving   Skilled positioning/alignment for Pain Mgmt, Skin Integrity, Edema Control, to maximize Pt's ability to [facility] interact w/ his/her environment, maximize respiratory status   Skilled monitoring of pt's response to tx ax   Techs for improved Safety/Safety Awareness w/ Functional Activity/Mobility     Recommendations for Continued Participation in OT services during Hospitalization and at D/C - SNF     Made all appropriate Environmental Modifications to facilitate pt's level of IND and safety. Pt and/or Family verbalized/demonstrated a Good understanding of education provided. Will Review PRN. Assessment of current deficits   Functional mobility [x]  ADLs [x] Strength [x]  Cognition []  Functional transfers  [x] IADLs [x] Safety Awareness [x]  Endurance [x]  Fine Motor Coordination [] Balance [x] Vision/perception [] Sensation []   Gross Motor Coordination [] ROM [x] Delirium []                  Motor Control [x]      Plan of Care: OT 1-3 x/week for 1-2 weeks PRN   [x] ADL retraining/AE, Equipment Needs/Recommendations   [x] Energy Conservation Techniques/Strategies      [x] Neuromuscular Re-Education      [x] Functional Transfer Training         [x] Functional Mobility Training          [x] Cognitive Re-Training          [x] Splinting/Positioning Needs           [x] Therapeutic Activity   [x]Therapeutic Exercise   [] Visual/Perceptual   [x] Delirium Prevention/Treatment   [x] Positioning to Improve Functional [place], Safety, and Skin Integrity   [x] Patient and/or Family Education to Increase Safety and Functional [place]   [x] Environmental Modifications  [x] Compensatory techniques for ADLs   [x] Other:       Pt would benefit from continued skilled OT services to increase safety and independence with completion of ADL/IADL tasks for functional independence and quality of life.   Pt/Family actively participated in the establishment of goals. Rehab Potential:  Fair(+) for established goals    Patient / Family Goal:  \"[facility] [place]"     Patient and/or Family were instructed on Functional Diagnosis, Prognosis/Goals and OT Plan of Care. Demonstrated Good understanding. Evaluation Time includes thorough review of current medical information, gathering information on past medical history/social history and prior level of function, completion of standardized testing/informal observation of tasks, assessment of data and education on plan of care and goals.      Eval Complexity: Mod  Profile and History - Mod  Assessment of Occupational Performance and Identification of Deficits - High  Clinical Decision Making - Mod     Time In:  1626              Time Out:  1558  Total Treatment Time:  11 minutes      Treatment Charges: Mins Units   OT Eval Low 86170     OT Eval Medium 97166 X 1   OT Eval High 40574     OT Re-Eval [id number]     Therapeutic Ex  04548     Therapeutic Activities 74455     ADL/Self Care 88611 11 1   Neuro Re-ed 96961     Orthotic manage/training  90372     Non-Billable Time     Total Timed Treatment 11 194 [place], [place], OTR/L  # [id number]

## 2021-04-28 PROBLEM — E43 SEVERE PROTEIN-CALORIE MALNUTRITION (HCC): Chronic | Status: ACTIVE | Noted: 2021-01-01

## 2021-04-28 NOTE — PROGRESS NOTES
Department of Internal Medicine  Nephrology Attending Progress Note    HPI:   Omari Hodge is a 60 y. o. female with a hx of fibromyalgia, ptsd, esrd with recent dialysis catheter placed and diabetes who presents to the ED with complaints of missing dialysis. She was discharged last week from the hospital, last dialysis session last Thursday, four days ago. She apparently showed up to [facility] for dialysis on Saturday however they did not have her on the schedule, therefore told to come here for further evaluation. She comes from the nursing home. Initial vitals upon arrival included blood pressure 93/58, 96.8, pulse 84, respirations 16 and 100% O2 saturation on room air. Initial lab work revealed WBC 14.4, hemoglobin 11.5, hematocrit 37.9 and platelet count 49.   Initial BMP revealed sodium 132, potassium 4.1, chloride 89, CO2 24, BUN 51 and creatinine 4.6.     SUBJECTIVE:  We are following this patient for end-stage renal failure    4/28: Seen on dialysis -BP low but stable -generalized weakness -poor appetite    PROBLEM LIST:    Patient Active Problem List   Diagnosis    Hypertension    Fibromyalgia    IBS (irritable bowel syndrome)    GERD (gastroesophageal reflux disease)    Cigarette nicotine dependence without complication    Restless leg syndrome    Chronic back pain    Adrenal mass (HCC)    DDD (degenerative disc disease)    Mild valvular heart disease    Vitamin D deficiency    Low ferritin level    Padron's esophagus    Peripheral edema    Post traumatic stress disorder (PTSD)    Recurrent major depressive disorder ([address]    Mood disorder due to a general medical condition    Fatty infiltration of liver    Mild cardiomegaly    Orthostatic hypotension dysautonomic syndrome (HCC)    Neuropathy associated with endocrine disorder (HCC)    Mild sleep apnea    Tubular adenoma of colon    Hyperlipidemia associated with type 2 diabetes mellitus ([address]    Hyperglycemia    COVID-19    hrs:   BP Temp Temp src Pulse Resp SpO2 Height Weight   04/28/21 0800 (!) 81/53 -- -- 87 -- -- -- --   04/28/21 0730 (!) 83/47 -- -- 81 -- -- -- --   04/28/21 0700 (!) 89/58 -- -- 79 -- -- -- --   04/28/21 0652 (!) 84/47 -- -- 78 -- -- -- --   04/28/21 0650 (!) 93/38 96.9 °F (36.1 °C) -- 78 18 -- -- 201 lb 1 oz (91.2 kg)   04/28/21 0615 106/63 -- -- 78 16 -- -- --   04/28/21 0501 -- -- -- -- -- -- -- 193 lb 6.4 oz (87.7 kg)   04/27/21 2308 (!) 98/50 97 °F (36.1 °C) Infrared 75 16 98 % -- --   04/27/21 2130 (!) 110/56 -- -- 94 -- -- -- --   04/27/21 1930 (!) 104/59 97.3 °F (36.3 °C) Infrared 94 16 92 % -- --   04/27/21 1330 (!) 94/58 97.6 °F (36.4 °C) Oral 91 18 100 % -- --   04/27/21 1040 -- 98.9 °F (37.2 °C) Oral -- -- 100 % -- --   04/27/21 0930 -- -- -- -- -- -- 5' 6\" (1.676 m) --          Intake/Output Summary (Last 24 hours) at 4/28/2021 0825  Last data filed at 4/28/2021 0622  Gross per 24 hour   Intake 920 ml   Output 2200 ml   Net -1280 ml       Wt Readings from Last 3 Encounters:   04/28/21 201 lb 1 oz (91.2 kg)   04/22/21 210 lb 15.7 oz (95.7 kg)   04/05/21 199 lb (90.3 kg)       Constitutional: Patient in no acute distress   Head: normocephalic, atraumatic   Neck: supple, no jvd  Cardiovascular: regular rate and rhythm, no murmurs, gallops, or rubs   Respiratory: Clear, no rales, rhochi, or wheezes,   Gastrointestinal: soft, nontender, ostomy intact  Ext: Trace edema / R IJ TDC  Neuro: Alert and oriented  Skin: dry, no rash   Back: nontender      DATA:      Recent Labs     04/26/21  1516   WBC 14.4*   HGB 11.5   HCT 37.9   MCV 93.3   *     Recent Labs     04/26/21  1516 04/28/21  0555    131*   K 4.1 3.8   CL 89* 93*   CO2 24 26   BUN 51* 29*   CREATININE 4.6* 3.2*   LABGLOM 10 15   GLUCOSE 140* 67*   CALCIUM 8.8 8.0*     No results for input(s): ALT in the last 72 hours.     Invalid input(s):  AST,  ALKPHOS,  BILITOT,  BILIDIR  Lab Results   Component Value Date    LABALBU 3.1 (L) 04/19/2021 LABALBU 2.6 (L) 04/13/2021    LABALBU 2.7 (L) 03/14/2021       Iron studies:  Lab Results   Component Value Date    FERRITIN 1,863 04/05/2021    IRON 28 (L) 03/26/2021    TIBC 290 03/26/2021     Vitamin B-12   Date Value Ref Range Status   01/05/2016 351 211 - 946 pg/mL Final     Folate   Date Value Ref Range Status   05/07/2012 10.6 5.4 - 24.0 ng/mL Final       Bone disease:  Lab Results   Component Value Date    MG 1.9 04/11/2021    PHOS 2.5 04/11/2021     Vit D, 25-Hydroxy   Date Value Ref Range Status   05/02/2017 25 (L) 30 - 100 ng/mL Final     Comment:     <20 ng/mL. ........... Earnestine Kid Deficient  20-30 ng/mL. ......... Earnestine Kid Insufficient   ng/mL. ........ Earnestine Kid Sufficient  >100 ng/mL. .......... Earnestine Kid Toxic       No results found for: PTH    No components found for: URIC    Lab Results   Component Value Date    COLORU Yellow 03/25/2021    NITRU Negative 03/25/2021    GLUCOSEU Negative 03/25/2021    GLUCOSEU NEGATIVE 05/02/2012    KETUA TRACE 03/25/2021    UROBILINOGEN 0.2 03/25/2021    BILIRUBINUR Negative 03/25/2021    BILIRUBINUR neg 06/21/2016    BILIRUBINUR NEGATIVE 05/02/2012       No results found for: Abdi Diggs        IMPRESSION/RECOMMENDATIONS:      1. CKD stage 5  Recent MEG in the setting of poor po intake combined with high stool output in ostomy  no evidence for recovery  Removal of LIJ tunneled catheter and insertion of RIJ tunneled cath 4/15  Continue hemodialysis per a MWF schedule  Patient to be set up as [facility] not a contracted provider)      2. Hypotension worse on IHD  Albumin prn  Improvement on midodrine     3. Anemia  in the setting of recent GI Bleed, combined with iron deficiency  Started JEREMY therapy and weekly iron during recent admission     4 Malnutrition  oral intake improved   Monitor     5 Hyponatremia  In the setting of mild volume overload  Stable     6. Decubitus ulcer  Wound care  Off abx     7.  Sp ostomy for ischemic bowel and open fasiotomy  High output  abd wound  Sp fungemia     8. H/o fatty liver, barretts esophagus, dm, copd, adremal mass        Thank you for allowing us to participate in the care of EWA Sterling  Pt seen and examined agree with above  For mwf  Awaits outpt hd arrangements  Adair Apley MD

## 2021-04-28 NOTE — PROGRESS NOTES
Progress Note  Date:2021       ZEJN:[id number]  Patient Name:Maggy Hodge     YOB: 1955     Age:65 y.o. Patient says she is ok today, wants to go to [facility]. Subjective    Subjective:  Symptoms:  Stable. No shortness of breath or chest pain. Diet:  Poor intake. Activity level: Impaired due to weakness. Pain:  She reports no pain. Review of Systems   Constitutional: Negative for activity change and fever. HENT: Negative for congestion. Respiratory: Negative for shortness of breath. Cardiovascular: Negative for chest pain. Gastrointestinal: Positive for abdominal pain. Skin: Positive for wound. Neurological: Negative for dizziness. Psychiatric/Behavioral: Positive for confusion. Objective         Vitals Last 24 Hours:  TEMPERATURE:  Temp  Av.7 °F (36.5 °C)  Min: 97 °F (36.1 °C)  Max: 98.9 °F (37.2 °C)  RESPIRATIONS RANGE: Resp  Av.8  Min: 16  Max: 18  PULSE OXIMETRY RANGE: SpO2  Av %  Min: 92 %  Max: 100 %  PULSE RANGE: Pulse  Av.3  Min: 75  Max: 94  BLOOD PRESSURE RANGE: Systolic (34EUU), OWR:004 , Min:94 , UVX:296   ; Diastolic (12FUH), GZQ:94, Min:50, Max:63    I/O (24Hr): Intake/Output Summary (Last 24 hours) at 2021 7044  Last data filed at 2021 0622  Gross per 24 hour   Intake 920 ml   Output 2200 ml   Net -1280 ml     Objective:  General Appearance:  Comfortable. Vital signs: (most recent): Blood pressure 106/63, pulse 78, temperature 97 °F (36.1 °C), temperature source Infrared, resp. rate 16, height 5' 6\" (1.676 m), weight 193 lb 6.4 oz (87.7 kg), SpO2 98 %, not currently breastfeeding. No fever. Lungs:  Normal effort and normal respiratory rate. Breath sounds clear to auscultation. Heart: Normal rate. Regular rhythm. S1 normal and S2 normal.    Abdomen: (Ostomy with moderate drainage).       Labs/Imaging/Diagnostics    Labs:  CBC:  Recent Labs     21  1516   WBC 14.4*   RBC 4.06   HGB 11.5   HCT 37.9   MCV 93.3   RDW 16.4*   *     CHEMISTRIES:  Recent Labs     04/26/21  1516      K 4.1   CL 89*   CO2 24   BUN 51*   CREATININE 4.6*   GLUCOSE 140*     PT/INR:No results for input(s): PROTIME, INR in the last 72 hours. APTT:No results for input(s): APTT in the last 72 hours. LIVER PROFILE:  No results for input(s): AST, ALT, BILIDIR, BILITOT, ALKPHOS in the last 72 hours. Imaging Last 24 Hours:  Fluoro For Surgical Procedures    Result Date: 4/15/2021  EXAMINATION: SPOT FLUOROSCOPIC IMAGES 4/15/2021 1:29 pm TECHNIQUE: Fluoroscopy was provided by the radiology department for procedure. Radiologist was not present during examination. FLUOROSCOPY DOSE AND TYPE OR TIME AND EXPOSURES: Fluoro time: 71.7 seconds. Images: 1 COMPARISON: None HISTORY: ORDERING SYSTEM PROVIDED HISTORY: Encounter for peritoneal dialysis catheter insertion [facility]) TECHNOLOGIST PROVIDED HISTORY: Reason for exam:->Encounter for peritoneal dialysis catheter insertion [facility]) What reading provider will be dictating this exam?->CRC Intraprocedural imaging. FINDINGS: Intraoperative fluoroscopy provided for placement of central venous catheter. Single image shows a right and left central venous catheter. Distal tip of left catheter appears to be at level of right atrial/caval junction. Distal tip of right catheter is not included on this examination. Radiographic follow-up could be helpful for further evaluation. Intraprocedural fluoroscopic spot images as above. See separate procedure report for more information.      Assessment//Plan           Hospital Problems           Last Modified POA    Complications due to renal dialysis device, implant, and graft 4/26/2021 Yes    ESRD (end stage renal disease) ([facility] 4/26/2021 Yes        Assessment:  (ESRD     Patient Active Problem List  Diagnosis   Hypertension   Fibromyalgia   IBS (irritable bowel syndrome)   GERD (gastroesophageal reflux disease)   Cigarette nicotine dependence without complication   Restless leg syndrome   Chronic back pain   Adrenal mass (HCC)   DDD (degenerative disc disease)   Mild valvular heart disease   Vitamin D deficiency   Low ferritin level   Padron's esophagus   Peripheral edema   Post traumatic stress disorder (PTSD)   Recurrent major depressive disorder (HCC)   Mood disorder due to a general medical condition   Fatty infiltration of liver   Mild cardiomegaly   Orthostatic hypotension dysautonomic syndrome (HCC)   Neuropathy associated with endocrine disorder (HCC)   Mild sleep apnea   Tubular adenoma of colon   Hyperlipidemia associated with type 2 diabetes mellitus (HCC)   Hyperglycemia   COVID-19   Enterocolitis   Abdominal pain   Depression   Tobacco abuse   S/P small bowel resection   Ischemic necrosis of small bowel (HCC)   Superior mesenteric artery thrombosis (HCC)   Thrombosis of right iliac artery (HCC)   Ischemia of right lower extremity   DM (diabetes mellitus), type 2, uncontrolled ([address]   Shock ([address]   MEG (acute kidney injury) ([address]   Septic shock ([address]   Pneumonia due to infectious organism   Ischemic bowel disease ([address]   Ulcer of abdomen wall with fat layer exposed ([address]   Surgical wound dehiscence, subsequent encounter   Atherosclerosis of native artery of extremity ([address]   Gangrene of toe of right foot ([address]   Dehiscence of fascia   Encounter regarding vascular access for dialysis for ESRD ([address]   Complications, dialysis, catheter, mechanical, initial encounter ([address]   Encounter for peritoneal dialysis catheter insertion ([address]   Hemodialysis catheter malfunction, initial encounter ([address]   Severe protein-calorie malnutrition ([address]   Persistent atrial fibrillation ([address]   Complications due to renal dialysis device, implant, and graft    ). Plan:   (Dialysis per Renal  Can discharge when outpatient dialysis arranged. ).

## 2021-04-28 NOTE — CARE COORDINATION
Care Coordination:  Messaged attending:  Soin Preciado MD   Patient: Kiley Schulz     4/28/21 2:39 PM   [phone] Case Management From: Farhat Partida [facility] RE: Deloras Stain RM: [id number] Dialysis has been arranged, just need discharge orders for today, to return to [address] please  Unread     Awaiting dc orders    Farhat Partida

## 2021-04-28 NOTE — PROGRESS NOTES
Comprehensive Nutrition Assessment    Type and Reason for Visit:  Initial, Wound, Consult    Nutrition Recommendations/Plan: Recommend and start Nepro renal supplement BID and Demarcus wound healing supplement BID to help meet increased nutritional needs and to help assist with proper wound healing. Nutrition Assessment:  Patients po intake has been a little sporadic since admission, averaging 50-75% of meals served ; pt at nutritional risk d/t increased needs from wound healing ; noted ESRD w/ HD ; s/p HD cath reinsertion 4/15 ; hx of DM/CHF/COPD ; pt also meets criteria for severe malnutrition AEB poor po intake >1 month and muscle/fat wasting ; will start ONS    Malnutrition Assessment:  Malnutrition Status:  Severe malnutrition    Context:  Chronic Illness     Findings of the 6 clinical characteristics of malnutrition:  Energy Intake:  7 - 75% or less estimated energy requirements for 1 month or longer  Weight Loss:  Unable to assess(d/t possible fluid shifts ; hx of HD)     Body Fat Loss:  (moderate) Orbital, Triceps   Muscle Mass Loss:  (moderate) Temples (temporalis), Clavicles (pectoralis & deltoids)  Fluid Accumulation:  No significant fluid accumulation     Strength:  Not Performed    Estimated Daily Nutrient Needs:  Energy (kcal):  7923-8998 (REE 1480 x 1.3 SF); Weight Used for Energy Requirements:  Current     Protein (g):  100-120 (1.8-2.0g/kg IBW); Weight Used for Protein Requirements:  Ideal        Fluid (ml/day):  per renal; Method Used for Fluid Requirements:  Standard Renal      Nutrition Related Findings:  -I&Os (-2.5 L), 2+ edema, active BS, tenderness to abd, ileostomy, missing teeth, redness to heels, decreased appetite, HD, muscle/fat wasting      Wounds:  Multiple, Open Wounds, Unstageable, Partial Thickness, Surgical Incision(wounds x 3 ; Incision x 1)       Current Nutrition Therapies:    DIET CARB CONTROL;     Anthropometric Measures:  · Height: 5' 6\" (167.6 cm)  · Current Body Weight: 201 lb (91.2 kg)(4/28, bedscale)   · Admission Body Weight: 193 lb (87.5 kg)(4/28, actual)    · Usual Body Weight: (EMR shows past weights of 210# bedscale on 4/15/21 and 230# bedscale on 2/15/21)     · Ideal Body Weight: 130 lbs; % Ideal Body Weight 154.6 %   · BMI: 32.5   · BMI Categories: Obese Class 1 (BMI 30.0-34. 9)       Nutrition Diagnosis:   · Severe malnutrition, In context of chronic illness related to catabolic illness(ESRD w/ HD) as evidenced by poor intake prior to admission, moderate loss of subcutaneous fat, moderate muscle loss, dialysis      Nutrition Interventions:   Food and/or Nutrient Delivery:  Continue Current Diet, Start Oral Nutrition Supplement  Nutrition Education/Counseling:  Education not indicated   Coordination of Nutrition Care:  Continue to monitor while inpatient    Goals:  Patient will consume ~75% of meals served       Nutrition Monitoring and Evaluation:   Behavioral-Environmental Outcomes:  Beliefs and Attitutes   Food/Nutrient Intake Outcomes:  Food and Nutrient Intake, Supplement Intake  Physical Signs/Symptoms Outcomes:  Biochemical Data, Chewing or Swallowing, GI Status, Fluid Status or Edema, Hemodynamic Status, Meal Time Behavior, Nutrition Focused Physical Findings, Skin, Weight     Discharge Planning:     Too soon to determine     Electronically signed by Ninoska Fung RD, LD on 4/28/21 at 10:36 AM EDT    Contact: [phone]

## 2021-04-28 NOTE — FLOWSHEET NOTE
Pt ran 3:10 hours; 4231 bath; 0.2 L removed, UF Goal Limited by hypotension; stable/tolerated fair; c/o Nausea no emesis. HD CVC hep locked per lumen fill volume, capped & clamped post Tx. Poor Access flow Lines reversed to achieve prescribed BFR. Next Tx Scheduled Friday 4/30.     04/28/21 1013   Vital Signs   BP (!) 110/54   Temp 97.7 °F (36.5 °C)   Pulse 87   Resp 15   Height 5' 6\" (1.676 m)   Weight 200 lb 2.8 oz (90.8 kg)   Percent Weight Change -0.44   Pain Assessment   Pain Assessment 0-10   Post-Hemodialysis Assessment   Post-Treatment Procedures Blood returned;Catheter capped, clamped and heparinized x 2 ports   Machine Disinfection Process Exterior Machine Disinfection   Rinseback Volume (ml) 300 ml   Total Liters Processed (l/min) 66.1 l/min   Dialyzer Clearance Moderately streaked   Duration of Treatment (minutes) 200 minutes   Heparin amount administered during treatment (units) 0 units   Hemodialysis Output (ml) 500 ml   NET Removed (ml) 200 ml   Tolerated Treatment Fair   Patient Response to Treatment UF Goal Limited by hypotension   Bilateral Breath Sounds Diminished   RLE Edema +2   LLE Edema +2   Physician Notified?  Yes

## 2021-04-28 NOTE — CARE COORDINATION
SOCIAL WORK/DISCHARGE PLANNING;  CM-Manager reached out to care source who reported pt had already been approved to go to [facility] dialysis. This information was sent to [facility]. I spoke with [facility] [place] liaison) and they confirmed this information. Pt can go to [facility] T-Th-Sat at 12:40p.m. Pt had dialysis treatment today. Nursing to check with renal physician re; starting outpt tomorrow. Malik Lantigua hoping to get insurance pre-cert today. If so will arrange for discharge today with outpt dialysis. ([facility] notified)  Alexsander Monster  [phone]      Notified by [facility] liaison, [facility] that there is a problem with the auth for [facility] dialysis unit. He states they have not received a letter from [employer] with the approval.I re-contacted [facility] dialysis unit and spoke with Ramón Garcia. They have accepted pt.- outpt schedule will be M-W-F 5:15 a.m. Start date of this Friday 04/30/2021. Explained this to pt and she is ok with this. Also informed [facility] liaison. Still awaiting insurance pre-cert for Malik Lantigua. Please call [facility] mendoza([phone]) when pt is discharged.   Renae Pierson [facility]  [phone]

## 2021-04-28 NOTE — PLAN OF CARE
Problem: Pain:  Goal: Pain level will decrease  Description: Pain level will decrease  Outcome: Ongoing  Goal: Control of acute pain  Description: Control of acute pain  Outcome: Ongoing  Goal: Control of chronic pain  Description: Control of chronic pain  Outcome: Ongoing     Problem: Skin Integrity:  Goal: Will show no infection signs and symptoms  Description: Will show no infection signs and symptoms  Outcome: Ongoing  Goal: Absence of new skin breakdown  Description: Absence of new skin breakdown  Outcome: Ongoing     Problem:  Bowel/Gastric:  Goal: Complications related to the disease process, condition or treatment will be avoided or minimized  Outcome: Ongoing  Goal: Will be independent with ostomy care  Outcome: Ongoing     Problem: Falls - Risk of:  Goal: Will remain free from falls  Description: Will remain free from falls  Outcome: Ongoing  Goal: Absence of physical injury  Description: Absence of physical injury  Outcome: Ongoing

## 2021-04-28 NOTE — CARE COORDINATION
SOCIAL WORK/DISCHARGE PLANNING;  Notified by Adelaida Goodwin that [facility] will accept pt today pending insurance pre-cert. Pt can return today at an snf level. Arranged Physician's ambulance for transport at 7-7:30pm . CM sent perfect serve to physician for discharge order. Called [facility] and notified Belén Palma there of pt being discharged. ([facility] [place] [facility] M-W-F 5:15a.m start Fri. 4/30). Pt advised of  time if discharged.   Ana María Quarles SIMONA  [phone]

## 2021-04-28 NOTE — DISCHARGE SUMMARY
Discharge Summary    Date: 4/28/2021  Patient Name: Colletta Cooley YOB: 1955 Age: 60 y.o. Admit Date: 4/26/2021  Discharge Date: 4/28/2021  Discharge Condition: Stable    Admission Diagnosis  Dialysis complication, initial encounter (T82.9XXA);ESRD (end stage renal disease) ([facility] (N18.6);ESRD (end stage renal disease) ([facility] (N18.6)     Discharge Diagnosis  Active Problems: Severe protein-calorie malnutrition ([facility] Complications due to renal dialysis device, implant, and graft ESRD (end stage renal disease) (HCC)Resolved Problems: * No resolved hospital problems. [facility] Stay  Narrative of Hospital Course:  Patient admitted for her ESRD. Dialysis was not arranged outpatient   Dialysed and then returned to facility with dialysis plan now in place    Consultants:  [address] [facility]    Surgeries/procedures Performed:       Treatments:           Discharge Plan/Disposition:  To [facility]    Hospital/Incidental Findings Requiring Follow Up:    Patient Instructions:    Diet: Diabetic Diet    Activity:Activity as Tolerated  For number of days (if applicable): Other Instructions:    Provider Follow-Up:   No follow-ups on file.      Significant Diagnostic Studies:    Recent Labs:  Admission on 04/26/2021Ventricular Rate                              Date: 04/26/2021Value: 74          Ref range: BPM                Status: FinalAtrial Rate                                   Date: 04/26/2021Value: 74          Ref range: BPM                Status: FinalP-R Interval                                  Date: 04/26/2021Value: 174         Ref range: ms                 Status: FinalQRS Duration                                  Date: 04/26/2021Value: 72          Ref range: ms                 Status: FinalQ-T Interval                                  Date: 04/26/2021Value: 390         Ref range: ms Status: FinalQTc Calculation (Bazett)                      Date: 04/26/2021Value: 432         Ref range: ms                 Status: FinalR Axis                                        Date: 04/26/2021Value: -18         Ref range: degrees            Status: FinalT Axis                                        Date: 04/26/2021Value: 32          Ref range: degrees            Status: [facility]                                           Date: 04/26/2021Value: 14.4*       Ref range: 4.5 - 11.5 E9/L    Status: FinalRBC                                           Date: 04/26/2021Value: 4.06        Ref range: 3.50 - 5.50 E12/L  Status: FinalHemoglobin                                    Date: 04/26/2021Value: 11.5        Ref range: 11.5 - 15.5 g/dL   Status: FinalHematocrit                                    Date: 04/26/2021Value: 37.9        Ref range: 34.0 - 48.0 %      Status: FinalMCV                                           Date: 04/26/2021Value: 93.3        Ref range: 80.0 - 99.9 fL     Status: NADIA [facility]                                           Date: 04/26/2021Value: 28.3        Ref range: 26.0 - 35.0 pg     Status: [address]                                          Date: 04/26/2021Value: 30.3*       Ref range: 32.0 - 34.5 %      Status: FinalRDW                                           Date: 04/26/2021Value: 16.4*       Ref range: 11.5 - 15.0 fL     Status: FinalPlatelets                                     Date: 04/26/2021Value: 489*        Ref range: 130 - 450 E9/L     Status: FinalMPV                                           Date: 04/26/2021Value: 9.0         Ref range: 7.0 - 12.0 fL      Status: FinalNeutrophils %                                 Date: 04/26/2021Value: 71.0        Ref range: 43.0 - 80.0 %      Status: FinalImmature Granulocytes %                       Date: 04/26/2021Value: 1.2         Ref range: 0.0 - 5.0 %        Status: FinalLymphocytes %                                 Date: 04/26/2021Value: 20.3        Ref range: 20.0 - 42.0 %      Status: FinalMonocytes %                                   Date: 04/26/2021Value: 6.9         Ref range: 2.0 - 12.0 %       Status: FinalEosinophils %                                 Date: 04/26/2021Value: 0.3         Ref range: 0.0 - 6.0 %        Status: FinalBasophils %                                   Date: 04/26/2021Value: 0.3         Ref range: 0.0 - 2.0 %        Status: FinalNeutrophils Absolute                          Date: 04/26/2021Value: 10.23*      Ref range: 1.80 - 7.30 E9/L   Status: FinalImmature Granulocytes #                       Date: 04/26/2021Value: 0.17        Ref range: E9/L               Status: FinalLymphocytes Absolute                          Date: 04/26/2021Value: 2.93        Ref range: 1.50 - 4.00 E9/L   Status: FinalMonocytes Absolute                            Date: 04/26/2021Value: 0.99*       Ref range: 0.10 - 0.95 E9/L   Status: FinalEosinophils Absolute                          Date: 04/26/2021Value: 0.05        Ref range: 0.05 - 0.50 E9/L   Status: FinalBasophils Absolute                            Date: 04/26/2021Value: 0.04        Ref range: 0.00 - 0.20 E9/L   Status: FinalSodium                                        Date: 04/26/2021Value: 132         Ref range: 132 - 146 mmol/L   Status: FinalPotassium reflex Magnesium                    Date: 04/26/2021Value: 4.1         Ref range: 3.5 - 5.0 mmol/L   Status: FinalChloride                                      Date: 04/26/2021Value: 89*         Ref range: 98 - 107 mmol/L    Status: FinalCO2                                           Date: 04/26/2021Value: 24          Ref range: 22 - 29 mmol/L     Status: FinalAnion Gap                                     Date: 04/26/2021Value: 19*         Ref range: 7 - 16 mmol/L      Status: FinalGlucose                                       Date: 04/26/2021Value: 140*        Ref range: 74 - 99 mg/dL      Status: Lynda Herb Ref range: 0.5 - 1.0 mg/dL    Status: FinalGFR Non-                      Date: 04/28/2021Value: 15          Ref range: >=60 mL/min/1.73   Status: Final              Comment: Chronic Kidney Disease: less than 60 ml/min/1.73 sq.m. Kidney Failure: less than 15 ml/min/1.73 sq. m. Results valid for patients 18 years and older. GFR                           Date: 04/28/2021Value: 18            Status: FinalCalcium                                       Date: 04/28/2021Value: 8.0*        Ref range: 8.6 - 10.2 mg/dL   Status: FinalMeter Glucose                                 Date: 04/27/2021Value: 100*        Ref range: 74 - 99 mg/dL      Status: FinalMeter Glucose                                 Date: 04/28/2021Value: 80          Ref range: 74 - 99 mg/dL      Status: Final------------    Radiology last 7 days:  No results found. [unfilled]    Discharge Medications    Current Discharge Medication ListSTART taking these medicationsmiconazole (MICOTIN) 2 % powderApply topically 2 times daily. Qty: 45 g Refills: 1!! white petrolatum OINT ointmentApply topically 2 times dailyQty: 60 g Refills: 0!! white petrolatum OINT ointmentApply topically 4 times daily as needed (with drainage)Qty: 60 g Refills: 0!! - Potential duplicate medications found. Please discuss with provider.     Current Discharge Medication ListCONTINUE these medications which have CHANGEDamiodarone (PACERONE) 400 MG tabletTake 1 tablet by mouth 2 times dailyQty: 30 tablet Refills: 0midodrine (PROAMATINE) 10 MG tabletTake 1 tablet by mouth 3 times daily (with meals)Qty: 90 tablet Refills: 3pantoprazole (PROTONIX) 40 MG tabletTake 1 tablet by mouth every morning (before breakfast)Qty: 30 tablet Refills: 3    Current Discharge Medication ListCONTINUE these medications which have NOT CHANGEDapixaban (ELIQUIS) 5 MG TABS tabletTake 1 tablet by mouth 2 times dailyQty: 60 tablet Refills: 2cholestyramine (QUESTRAN) 4 g packetTake 2 packets by mouth 2 times dailyQty: 90 packet Refills: 3!! hydrocortisone (CORTEF) 5 MG tabletTake 1 tablet by mouth every eveningQty: 30 tablet Refills: 0!! hydrocortisone (CORTEF) 5 MG tabletTake 3 tablets by mouth every morningQty: 30 tablet Refills: 0loperamide (IMODIUM) 2 MG capsuleTake 2 mg by mouth 3 times dailypsyllium (KONSYL) 28.3 % PACKTake 1 packet by mouth 3 times dailyoxyCODONE 5 MG capsuleTake 10 mg by mouth every 4 hours as needed for Pain. Ascorbic Acid (VITAMIN C) 1000 MG tabletTake 1,000 mg by mouth dailygabapentin (NEURONTIN) 100 MG capsuleTake 100 mg by mouth every 8 hours. guaiFENesin 400 MG tabletTake 400 mg by mouth 3 times dailyinsulin lispro (HUMALOG) 100 UNIT/ML injection vialInject into the skin 4 times daily (before meals and nightly) Sliding scalemetoclopramide (REGLAN) 5 MG tabletTake 5 mg by mouth 3 times daily (with meals)pramipexole (MIRAPEX) 0.5 MG tabletTake 0.5 mg by mouth 2 times dailyCholecalciferol (VITAMIN D) 50 MCG (2000 UT) CAPS capsuleTake 1 capsule by mouth dailyzinc sulfate (ZINCATE) 220 (50 Zn) MG capsuleTake 220 mg by mouth dailyacetaminophen (TYLENOL) 325 MG tabletTake 650 mg by mouth every 6 hours as needed for Painalbuterol (PROVENTIL) (2.5 MG/3ML) 0.083% nebulizer solutionTake 3 mLs by nebulization 4 times dailyQty: 120 each Refills: 3metoprolol tartrate (LOPRESSOR) 25 MG tabletTake 1 tablet by mouth 2 times dailyQty: 60 tablet Refills: 3insulin glargine (LANTUS) 100 UNIT/ML injection vialInject 10 Units into the skin 2 times dailyQty: 1 vial Refills: 3!! - Potential duplicate medications found. Please discuss with provider.     Current Discharge Medication ListSTOP taking these medicationsomeprazole (PRILOSEC) 20 MG delayed release capsuleComments:Reason for Stopping:ibuprofen (ADVIL;MOTRIN) 400 MG tabletComments:Reason for Stopping:nystatin-triamcinolone (MYCOLOG II) 235788-3.6 UNIT/GM-% creamComments:Reason for Stopping:    Time Spent on Discharge:1E] minutes were spent in patient examination, evaluation, counseling as well as medication reconciliation, prescriptions for required medications, discharge plan, and follow up.     Electronically signed by Suzy Segundo MD on 4/28/21 at 2:56 PM EDT

## 2021-05-21 PROBLEM — A41.9 SEPSIS (HCC): Status: ACTIVE | Noted: 2021-01-01

## 2021-05-21 NOTE — PROGRESS NOTES
Radiology Procedure Waiver   Name: Albino Gallardo  : 1955  MRN: [id number]    Date:  21    Time: 12:51 PM EDT    Benefits of immediately proceeding with Radiology exam(s) without pre-testing outweigh the risks or are not indicated as specified below and therefore the following is/are being waived:    [] Pregnancy test   [] Patients LMP on-time and regular.   [] Patient had Tubal Ligation or has other Contraception Device. [] Patient  is Menopausal or Premenarcheal.    [] Patient had Full or Partial Hysterectomy. [] Protocol for Iodine allergy    [] MRI Questionnaire     [x] BUN/Creatinine   [] Patient age w/no hx of renal dysfunction. [] Patient on Dialysis. [] Recent Normal Labs.   Electronically signed by Joellen Espinoza DO on 21 at 12:51 PM EDT

## 2021-05-21 NOTE — ED PROVIDER NOTES
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 0.9 % injection 5-40 mL (has no administration in time range)   0.9 % sodium chloride infusion (has no administration in time range)   promethazine (PHENERGAN) tablet 12.5 mg (has no administration in time range)     Or   ondansetron (ZOFRAN) injection 4 mg (has no administration in time range)   polyethylene glycol (GLYCOLAX) packet 17 g (has no administration in time range)   acetaminophen (TYLENOL) tablet 650 mg (has no administration in time range)     Or   acetaminophen (TYLENOL) suppository 650 mg (has no administration in time range)   pantoprazole (PROTONIX) injection 40 mg (has no administration in time range)     And   sodium chloride (PF) 0.9 % injection 10 mL (has no administration in time range)   heparin (porcine) injection 5,000 Units ( Subcutaneous Automatically Held 5/25/21 2200)   albumin human 25 % IV solution 25 g (25 g Intravenous New Bag 5/22/21 7866)   hydrocortisone sodium succinate PF (SOLU-CORTEF) injection 100 mg (100 mg Intravenous Given 5/22/21 9847)   cefepime (MAXIPIME) 2000 mg IVPB minibag (2,000 mg Intravenous New Bag 5/22/21 9294)   insulin lispro (HUMALOG) injection vial 0-6 Units (1 Units Subcutaneous Given 5/22/21 2022)   0.9 % sodium

## 2021-05-21 NOTE — LETTER
[place] Department Medicaid  CERTIFICATION OF NECESSITY  FOR NON-EMERGENCY TRANSPORTATION   BY GROUND AMBULANCE      Individual Information   1. Name: Brynn Gregorio 2. [place] Medicaid Billing Number:    3. Address:       Transportation Provider Information   4. Provider Name:[facility]   5. [place] Medicaid Provider Number:  National Provider Identifier (NPI):      Certification  7. Criteria:  During transport, this individual requires:  [x] Medical treatment or continuous     supervision by an EMT. [] The administration or regulation of oxygen by another person. [] Supervised protective restraint. 8. Period Beginning Date:2021   9. Length  [] Not more than 5 day(s)  [] One Year     Additional Information Relevant to Certification   10. Comments or Explanations, If Necessary or Appropriate          Certifying Practitioner Information   11. Name of Practitioner: Chava Hughes DO   12. [place] Medicaid Provider Number, If Applicable: [address] Provider Identifier (NPI):      Signature Information   14. Date of Signature: 2021 15. Name of Person Signing:. HUY Ocasio     16. Signature and Professional Designation: Eladio Bolden [facility] [id number]  Rev. 2015  [address] Encounter Date/Time: 2021 3330 Keith Herrera Account: [de-identified]    MRN: [id number]    Patient: Zeiglerkortney [facility] Serial #: [id number]      ENCOUNTER          Patient Class: I Private Enc?   No Unit  [address]   Hospital Service: Intermediate   Encounter [facility]: [facility] [A41.9]   ADM Provider: Anni Pedraza MD   Procedure:     ATT Provider: Anni Pedraza MD   REF Provider:        Admission DX: Sepsis [facility]) and codes:       PATIENT                 Name: Brynn Gregorio : 1955 (65 yrs)   Address: [facility] at [address]* Sex: Female   City: [address] [place] [address]         Marital Status:    Employer: DISABLED         Anabaptist: Non-Islam   Primary Care Provider: Anna Burton MD         Primary Phone: [phone]   EMERGENCY CONTACT   Contact Name Legal Guardian? Relationship to Patient Home Phone Work Phone   1. Rocio Gutierrez  2. Jeancarlos Shi    Child  Child [phone]                 GUARANTOR            Guarantor: Omaira Freitas     : 1955   Address: [address] [place] [address] Sex: Female   Mayra Inman [id number]     Relation to Patient: Self       Home Phone: [phone]   Guarantor ID: [id number]       Work Phone:     Guarantor Employer: DISABLED         Status: DISABLED      COVERAGE        PRIMARY INSURANCE   Payor: Kristyn Garcia Plan: [facility] MEDICAID   Payor Address: [facility] DEPARTMENT; [address] [facility],  [address] [place] [address], [address] [place] [address]       Group Number: [id number] Insurance Type: INDEMNITY   Subscriber Name: Charly Baumann : 1955   Subscriber ID: [id number] Pat. Rel. to Sub: Self   SECONDARY INSURANCE   Payor:   Plan:     Payor Address:  ,           Group Number:   Insurance Type:     Subscriber Name:   Subscriber :     Subscriber ID:   Pat.  Rel. to Sub:
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
## 2021-05-22 NOTE — PROGRESS NOTES
Comprehensive Nutrition Assessment    Type and Reason for Visit:  Initial, Positive Nutrition Screen    Nutrition Recommendations/Plan: Continue NPO (Advance nutrition post-op)    Nutrition Assessment:  Pt admit w/ Sepsis 2/2 chronic sacral ulcer/necrosis. Noted extensive GI sx ~3mon ago. Noted hx DM, COPD, ESRD on HD, & Malnutrition. NPO for debridement- will monitor nutrition progression/add ONS when able. Malnutrition Assessment:  Malnutrition Status:  Severe malnutrition    Context:  Chronic Illness     Findings of the 6 clinical characteristics of malnutrition:  Energy Intake:  75% or less estimated energy requirements for 1 month or longer  Weight Loss:   7.5% over 3 months     Body Fat Loss:  No significant body fat loss     Muscle Mass Loss:  No significant muscle mass loss    Fluid Accumulation:  No significant fluid accumulation     Strength:  Not Performed    Estimated Daily Nutrient Needs:  Energy (kcal):  MSJ 1478 x 1.3 SF= 9901-6472; Weight Used for Energy Requirements:  Current     Protein (g):   (1.5-1.8 g/kg); Weight Used for Protein Requirements:  Ideal        Fluid (ml/day):  per critical care    Nutrition Related Findings:  A&ox4, hypotension on pressor x1, fluid bal WNL, +2 edema, active BS, ileostomy,      Wounds:  Multiple, Surgical Incision, Open Wounds       Current Nutrition Therapies:    Diet NPO Effective Now Exceptions are: Sips with Meds, Ice Chips, Other (Specify); Specify Other: sips of water    Anthropometric Measures:  · Height: 5' 6\" (167.6 cm)  · Current Body Weight: 201 lb (91.2 kg) (5/22 actual)   · Admission Body Weight: 201 lb (91.2 kg) (first measured)    · Usual Body Weight: 230 lb (104.3 kg) (2/15 EMR actual)     · Ideal Body Weight: 130 lbs; % Ideal Body Weight 154.6 %   · BMI: 32.5 BMI Categories: Obese Class 1 (BMI 30.0-34. 9)       Nutrition Diagnosis:   · Severe malnutrition, In context of chronic illness related to catabolic illness as evidenced by poor intake prior to admission, weight loss 7.5% in 3 months    Nutrition Interventions:   Nutrition Education/Counseling:  Education not indicated   Coordination of Nutrition Care:  Continue to monitor while inpatient    Goals:  Nutrition progression       Nutrition Monitoring and Evaluation:   Food/Nutrient Intake Outcomes:  Diet Advancement/Tolerance  Physical Signs/Symptoms Outcomes:  Biochemical Data, Nutrition Focused Physical Findings, Skin, Weight, GI Status, Fluid Status or Edema, Hemodynamic Status     Discharge Planning:     Too soon to determine     Electronically signed by Mary Fitzgerald RD, LD on 5/22/21 at 10:48 AM EDT    Contact: Ext [phone]

## 2021-05-22 NOTE — PROCEDURES
Arterial line placement    Procedure: Left radial arterial line placement. Indications: Continuous monitoring of blood pressure in a patient with hypotension +/- shock, on Levophed. Anesthesia: Local infiltration of 1% lidocaine. Consent:  The patient provided verbal consent for this procedure. Technique: Time Out: Immediately prior to the procedure a \"timeout\" was called to verify the correct patient and procedure. Procedure was done using strict aseptic technique. Guillermo's test was performed and was normal. right radial site was cleaned with chloraprep and draped. Radial artery was identified, then Lidocaine 1% was infiltrated locally. Radial arterial line was inserted, a good blood flow was obtained, after which guidewire was inserted all the way with no resistance. Then the canula was inserted and needle with guidewire was withdrawn. Pulsatile bright red blood flow was observed. The canula was connected to BP monitoring apparatus and a good quality waveform was noted. Then the canula was secured with 2 stay sutures of 3-0 silk after Lidocaine infiltration, following which dressing was applied. Number of sticks: 1. Number of Kits used: 1. Complications: No immediate complication. Estimated blood loss: About 5 ml. Comment: Patient tolerated the procedure well.      Audi Marie MD PGY-3  Attending Physician: Dr Marlen Raya  5/22/2021 8:12 AM

## 2021-05-22 NOTE — PROGRESS NOTES
NICOM CI HR MAP TPRI SVI TFC   Baseline 2.4 90 81 2696 27 29.9   Challenge 2.5 88 87 2768 29 30.7   Change % 4.6% -2.7% 7.4% 2.7% 8.3% 2.6%

## 2021-05-22 NOTE — PROGRESS NOTES
GENERAL SURGERY  DAILY PROGRESS NOTE  5/22/2021    Chief Complaint   Patient presents with    Wound Check     patient sent from nursing faciilty for worsening sacral wound       Subjective:  Pt states she is doing okay. States she has pain in her back side. No nausea/vomiting. Objective:  BP (!) 142/60   Pulse 66   Temp 97.6 °F (36.4 °C) (Temporal)   Resp 17   Ht 5' 6\" (1.676 m)   Wt 201 lb 1.6 oz (91.2 kg)   LMP  (LMP Unknown)   SpO2 97%   BMI 32.46 kg/m²     GENERAL:  Laying in bed, awake, alert, cooperative, no apparent distress  HEAD: Normocephalic, atraumatic  EYES: No sclera icterus, pupils equal  LUNGS:  No increased work of breathing on room air  CARDIOVASCULAR:  RR and normotensive  ABDOMEN: Right ileostomy with succus output. Midline open wound down to the abdominal wall without signs of purulence or infection. Abdomen soft nondistended nontender to palpation. EXTREMITIES: Extremities warm to touch, pink  SKIN: Sacral decubitus ulcer with kris necrosis and inferior portion tracking towards the anus and rectum. Not actively draining fluid. Very foul-smelling.   Skin surrounding it is erythematous    Assessment/Plan:  60 y.o. female with urosepsis and chronic sacral decubitus ulcer with kris necrosis and tracking towards anus and rectum    - Critical care management appreciated  - Keep patient NPO for procedure 5/22  - Plan for OR debridement today 5/22  - Pain control PRN  - Abx per primary    Electronically signed by Cuong Kaba MD on 5/22/2021 at 6:27 AM
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
## 2021-05-22 NOTE — PLAN OF CARE
Problem: Pain:  Goal: Pain level will decrease  Description: Pain level will decrease  Outcome: Met This Shift     Problem: Pain:  Goal: Control of acute pain  Description: Control of acute pain  Outcome: Met This Shift     Problem: Pain:  Goal: Control of chronic pain  Description: Control of chronic pain  Outcome: Met This Shift     Problem: Skin Integrity:  Goal: Will show no infection signs and symptoms  Description: Will show no infection signs and symptoms  Outcome: Met This Shift     Problem: Skin Integrity:  Goal: Absence of new skin breakdown  Description: Absence of new skin breakdown  Outcome: Met This Shift     Problem: Falls - Risk of:  Goal: Will remain free from falls  Description: Will remain free from falls  Outcome: Met This Shift     Problem: Falls - Risk of:  Goal: Absence of physical injury  Description: Absence of physical injury  Outcome: Met This Shift     Problem: Discharge Planning:  Goal: Discharged to appropriate level of care  Description: Discharged to appropriate level of care  Outcome: Not Met This Shift     Problem: Serum Glucose Level - Abnormal:  Goal: Ability to maintain appropriate glucose levels will improve  Description: Ability to maintain appropriate glucose levels will improve  Outcome: Met This Shift     Problem: Malnutrition  (NI-5.2)  Goal: Food and/or Nutrient Delivery  Description: Individualized approach for food/nutrient provision.   5/22/2021 1047 by Dave George RD, GOVIND  Outcome: Met This Shift

## 2021-05-22 NOTE — ED NOTES
Pt phillips emptied, 500 ml of yellow/brown, foul smelling, cloudy urine. Pt colostomy also emptied.      Marlin Messina RN  05/22/21 6882

## 2021-05-22 NOTE — PROGRESS NOTES
Pharmacy Consultation Note  (Antibiotic Dosing and Monitoring)    Initial consult date: 5/22/2021  Consulting physician: Dr. Nena Tolentino  Drug: Vancomycin  Indication: Sepsis    Age/  Gender Height Weight IBW  Allergy Information   65 y.o./female 5' 6\" (167.6 cm) 200 lb (90.7 kg)     Ideal body weight: 59.3 kg (130 lb 11.7 oz)  Adjusted ideal body weight: 72.1 kg (158 lb 14.1 oz)   Lisinopril, Procardia [nifedipine], and Metformin and related      Renal Function:  Recent Labs     05/21/21  1040 05/22/21  0317 05/22/21  0602   BUN 65* 60* 60*   CREATININE 3.7* 2.9* 3.0*       Intake/Output Summary (Last 24 hours) at 5/22/2021 0804  Last data filed at 5/22/2021 1755  Gross per 24 hour   Intake 713.46 ml   Output 300 ml   Net 413.46 ml       Vancomycin Monitoring:      Vancomycin Administration Times:  Recent vancomycin administrations                   vancomycin (VANCOCIN) 1,750 mg in dextrose 5 % 500 mL IVPB (mg) 1,750 mg New Bag 05/21/21 1420                Assessment:  · Patient is a 60 y.o. female who has been initiated on vancomycin and cefepime empirically  · Estimated Creatinine Clearance: 22 mL/min (A) (based on SCr of 3 mg/dL (H)).   · ESRD on HD  · Goal AUC/DICK = 400-600; goal trough level = 10-20 mcg/mL    Plan:  · Will give vancomycin 1 g IV after HD today   · Will check vancomycin levels when appropriate  · Will continue to monitor renal function   · Clinical pharmacy to follow    Abram Campos, PharmD, BCCCP  5/22/2021  8:05 AM

## 2021-05-22 NOTE — FLOWSHEET NOTE
Pt ran 4 hours; 4245 bath; 3 L removed; stable/tolerated well; denies c/o. HD CVC heparin locked per lumen fill volume, capped & clamped post Tx. good Access flow no issues achieving prescribed BFR. Next Tx Tuesday 5-24-21.      05/22/21 1745   Vital Signs   BP (!) 142/53   Temp 96.8 °F (36 °C)   Pulse 104   Resp 25   SpO2 98 %   Weight 202 lb 6.1 oz (91.8 kg)   Percent Weight Change -2.65   Pain Assessment   Pain Assessment 0-10   Pain Level 0   Post-Hemodialysis Assessment   Post-Treatment Procedures Blood returned;Catheter capped, clamped and heparinized x 2 ports   Machine Disinfection Process Acid/Vinegar Clean;Machine Absence of Bleach Machine; Exterior Machine Disinfection   Rinseback Volume (ml) 300 ml   Total Liters Processed (l/min) 88.3 l/min   Dialyzer Clearance Lightly streaked   Duration of Treatment (minutes) 240 minutes   Hemodialysis Output (ml) 3000 ml   NET Removed (ml) 3300 ml   Tolerated Treatment Good   Patient Response to Treatment no pt c/o throughout tx    Bilateral Breath Sounds Clear;Diminished   Edema Generalized;Right lower extremity; Left lower extremity   Edema Generalized +1   RLE Edema +2;Pitting   LLE Edema +2;Pitting   Physician Notified?  Yes

## 2021-05-22 NOTE — OP NOTE
Operative Note      Patient: Pito Allan  YOB: 1955  MRN: [id number]    Date of Procedure: 5/22/2021    Pre-Op Diagnosis: SACRAL DECUBITUS ULCER, UNSTAGEABLE    Post-Op Diagnosis: STAGE IV SACRAL DECUBITUS WOUND       Procedure(s):  SACRAL DECUBITUS ULCER DEBRIDEMENT EXCISIONAL    Surgeon(s):  Yusuf Varner MD    Assistant:   Resident: Vanessa Mayo MD    Anesthesia: Monitor Anesthesia Care    Estimated Blood Loss (mL): 25    Complications: None    Specimens:   ID Type Source Tests Collected by Time Destination   1 : SACRAL WOUND CULTURE FOR ANAEROBIC CULTURE Body Fluid Fluid CULTURE, ANAEROBIC Yusuf Varner MD 5/22/2021 4374    2 : SACRAL WOUND CULTURE FOR AEROBIC, FUNGUS, ACID FAST AND GRAM STAIN Body Fluid Fluid CULTURE, FUNGUS, GRAM STAIN, CULTURE, BODY FLUID, CULTURE WITH SMEAR, ACID FAST [id number] Joy FLORES MD 5/22/2021 6222        Implants:  * No implants in log *      Drains: none    Findings: stage IV perianal and sacrococcygeal decubitus wound; purulent drainage from right side towards inner thigh. Preop wound measurement 9cm x 7cm. 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 She is bedbound and has a large necrotic sacral wound. Has had intermittent debridements at [facility] but was sent in because of large necrotic area and perianal involvement. Excisional debridement in OR was recommended. Risks, benefits, and alternatives were discussed and the patient was agreeable to proceed. Detailed Description of Procedure: The patient was brought tot he operating suite. Monitored anesthesia was administered by Anesthesiology without issue. IV antibiotics Vancomycin and Zosyn had already been given in the MICu and were continued.  The patient was

## 2021-05-22 NOTE — PROGRESS NOTES
Pharmacy Consultation Note  (Antibiotic Dosing and Monitoring)    Initial consult date: 5-22-21  Consulting physician: Dr. Chico Begum  Drug: Vancomycin  Indication: Blood stream infection    Age/  Gender Height Weight IBW Dosing weight  Allergy Information   65 y.o./female @FLOW(11:first:1)@ @FLOW[14:FIRST:1@     Ideal body weight: 59.3 kg (130 lb 11.7 oz)  Adjusted ideal body weight: 71.9 kg (158 lb 7 oz)  90.7kg  Lisinopril, Procardia [nifedipine], and Metformin and related      @TMAX(24)@        Date  WBC BUN SCr CrCl  (mL/min) Drug/Dose Time   Given Level(s)   (Time) Comments        Vancomycin 1750 mg IV  14:20 5-21-21                                            Intake/Output Summary (Last 24 hours) at 5/22/2021 0156  Last data filed at 5/21/2021 1620  Gross per 24 hour   Intake 500 ml   Output --   Net 500 ml       Historical Cultures:  Organism   Date Value Ref Range Status   04/21/2021 Escherichia coli (A)  Final     No results for input(s): BC in the last 72 hours. Cultures:  available culture and sensitivity results were reviewed in EPIC    Assessment:  60 y.o. female has been initiated Vancomycin. Estimated CrCl = 17 mL/min  Goal trough level = 15-20 mcg/mL    Plan:  Pt received a dose of vanco 1750mg ONCE at14:20 on 5-21-21. Pt will not need another dose for at least 24hrs. Clinical pharmacy will establish a dosing regimen later this AM before the next dose is due.    Monitor renal function   Clinical pharmacy to follow      YASMANI Joya [facility] 5/22/2021 1:56 AM
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
## 2021-05-22 NOTE — ANESTHESIA POSTPROCEDURE EVALUATION
Department of Anesthesiology  Postprocedure Note    Patient: Tanja Chapin  MRN: [id number]  YOB: 1955  Date of evaluation: 5/22/2021  Time:  10:49 AM     Procedure Summary     Date: 05/22/21 Room / Location: [facility] 06 / [facility]    Anesthesia Start: 9582 Anesthesia Stop: 8103    Procedure: SACRAL DECUBITUS ULCER DEBRIDEMENT (N/A ) Diagnosis: (SACRAL DECUBITUS ULCER)    Surgeons: Priyanka Victoria MD Responsible Provider: Megan Nation MD    Anesthesia Type: MAC ASA Status: 4          Anesthesia Type: MAC    Brock Phase I:      Brock Phase II:      Last vitals: Reviewed and per EMR flowsheets.        Anesthesia Post Evaluation    Patient location during evaluation: PACU  Patient participation: complete - patient participated  Level of consciousness: awake and alert  Airway patency: patent  Nausea & Vomiting: no nausea and no vomiting  Complications: no  Cardiovascular status: hemodynamically stable  Respiratory status: acceptable  Hydration status: euvolemic
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
## 2021-05-23 NOTE — PROGRESS NOTES
Pharmacy Consultation Note  (Antibiotic Dosing and Monitoring)    Initial consult date: 5/22/2021  Consulting physician: Dr. Isabela Galvin  Drug: Vancomycin  Indication: Sepsis    Age/  Gender Height Weight IBW  Allergy Information   65 y.o./female 5' 6\" (167.6 cm) 200 lb (90.7 kg)     Ideal body weight: 59.3 kg (130 lb 11.7 oz)  Adjusted ideal body weight: 72.3 kg (159 lb 6.3 oz)   Lisinopril, Procardia [nifedipine], and Metformin and related      Renal Function:  Recent Labs     05/22/21  0602 05/22/21  1816 05/23/21  0515   BUN 60* 14 19   CREATININE 3.0* 1.0 1.5*       Intake/Output Summary (Last 24 hours) at 5/23/2021 0757  Last data filed at 5/23/2021 0600  Gross per 24 hour   Intake 3668 ml   Output 4585 ml   Net -917 ml       Vancomycin Monitoring:      Vancomycin Administration Times:  Recent vancomycin administrations      Recent vancomycin administrations                   vancomycin 1000 mg IVPB in 250 mL D5W addavial (mg) 1,000 mg New Bag 05/22/21 1735    vancomycin (VANCOCIN) intermittent dosing (placeholder) ()  Given 05/22/21 0855    vancomycin (VANCOCIN) 1,750 mg in dextrose 5 % 500 mL IVPB (mg) 1,750 mg New Bag 05/21/21 1420                Assessment:  · Patient is a 60 y.o. female who has been initiated on vancomycin and cefepime empirically  Estimated Creatinine Clearance: 43 mL/min (A) (based on SCr of 1.5 mg/dL (H)).   · ESRD on HD  · Goal AUC/DICK = 400-600; goal trough level = 10-20 mcg/mL    Plan:  · Will hold additional vancomycin as no HD scheduled today   · Will check vancomycin levels when appropriate  · Will continue to monitor renal function   · Clinical pharmacy to follow    Angelita Gracia, VanessaD, [facility]  5/23/2021  7:57 AM

## 2021-05-23 NOTE — PLAN OF CARE
Problem: Pain:  Goal: Pain level will decrease  Description: Pain level will decrease  5/23/2021 0145 by Radha Joseph RN  Outcome: Met This Shift     Problem: Pain:  Goal: Control of acute pain  Description: Control of acute pain  5/23/2021 0145 by Radha Joseph RN  Outcome: Met This Shift     Problem: Pain:  Goal: Control of chronic pain  Description: Control of chronic pain  5/23/2021 0145 by Radha Joseph RN  Outcome: Met This Shift     Problem: Skin Integrity:  Goal: Will show no infection signs and symptoms  Description: Will show no infection signs and symptoms  5/23/2021 0145 by Radha Joseph RN  Outcome: Met This Shift     Problem: Skin Integrity:  Goal: Absence of new skin breakdown  Description: Absence of new skin breakdown  5/23/2021 0145 by Radha Joseph RN  Outcome: Met This Shift     Problem: Falls - Risk of:  Goal: Will remain free from falls  Description: Will remain free from falls  5/23/2021 0145 by Radha Joseph RN  Outcome: Met This Shift     Problem: Falls - Risk of:  Goal: Absence of physical injury  Description: Absence of physical injury  5/23/2021 0145 by Radha Joseph RN  Outcome: Met This Shift

## 2021-05-23 NOTE — PROGRESS NOTES
GENERAL SURGERY  DAILY PROGRESS NOTE  5/23/2021    Chief Complaint   Patient presents with    Wound Check     patient sent from nursing faciilty for worsening sacral wound       Subjective:  Still having pain on her back side, s/p sacral wound debridement 5/22    Objective:  BP (!) 112/43   Pulse 73   Temp 98.6 °F (37 °C) (Oral)   Resp 17   Ht 5' 6\" (1.676 m)   Wt 202 lb 6.1 oz (91.8 kg)   LMP  (LMP Unknown)   SpO2 99%   BMI 32.67 kg/m²     GENERAL:  Laying in bed, awake, alert, cooperative, no apparent distress  HEAD: Normocephalic, atraumatic  EYES: No sclera icterus, pupils equal  LUNGS:  No increased work of breathing on room air  CARDIOVASCULAR:  RR and normotensive  ABDOMEN: Right ileostomy with succus output. Midline open wound down to the abdominal wall without signs of purulence or infection. Abdomen soft nondistended nontender to palpation. EXTREMITIES: Extremities warm to touch, pink  SKIN: Sacral decubitus ulcer s/p debridement. Fibrinous tissue at base of wound, no signs of infection or necrotic tissue    Assessment/Plan:  60 y.o. female with urosepsis and chronic sacral decubitus ulcer with kris necrosis and tracking towards anus and rectum s/p debridement 5/22    - Critical care management appreciated  - Santyl to base of wound. Continue with local wound care  - Pain control PRN  - Abx per primary    Electronically signed by Lucio Vail MD on 5/23/2021 at 10:35 AM    Attending Note:    Patient seen/examined 5/23/2021. Agree with above assessment and plan.

## 2021-05-23 NOTE — PROGRESS NOTES
Consult placed via perfect serve to inpatient wound care and podiatry ().       Dulce Maria Madera RN  5/23/2021  1:42 PM

## 2021-05-23 NOTE — PROGRESS NOTES
[address] [facility]   Department of Internal Medicine   Internal Medicine Residency  MICU Progress Note    Patient:  Joanie Favre 60 y.o. female   MRN: [id number]       Date of Service: 2021    Allergy: Lisinopril, Procardia [nifedipine], and Metformin and related    Subjective     She stated that she is feeling well. She denies any nausea, vomiting, headache, dizziness, chest pain, palpitation  Drop in H/H noted 8.2/26.8--> 5.7/18.1 likely due to blood loss post debridement of sacral pressure ulcer  BP has been stable, MAP >65 mmHg; weaning down on levophed  Started on midodrine 10 mg TID     Objective     TEMPERATURE:  Current - Temp: 98.9 °F (37.2 °C); Max - Temp  Av.5 °F (36.4 °C)  Min: 96.6 °F (35.9 °C)  Max: 98.9 °F (37.2 °C)  RESPIRATIONS RANGE: Resp  Av.8  Min: 0  Max: 34  PULSE RANGE: Pulse  Av  Min: 56  Max: 115  BLOOD PRESSURE RANGE:  Systolic (39EXV), YFX:650 , Min:102 , HKP:047   ; Diastolic (31IPC), OEP:09, Min:45, Max:60    PULSE OXIMETRY RANGE: SpO2  Av.1 %  Min: 87 %  Max: 100 %    I & O - 24hr:    Intake/Output Summary (Last 24 hours) at 2021 0755  Last data filed at 2021 0600  Gross per 24 hour   Intake 3668 ml   Output 4585 ml   Net -917 ml     I/O last 3 completed shifts: In: 3668 [P.O.:420; I.V.:2148; IV Piggyback:1100]  Out: 7368 [Urine:385; Stool:1200] No intake/output data recorded.    Weight change: 7 lb 14.3 oz (3.581 kg)    Physical Examination:  General: alert, awake, in no acute distress  HEENT: NC, AT, moist oral mucosa  Neck: supple  Pulmonary: clear to auscultation bilaterally, no wheezing or crackles  CV: RRR, S1 S2 heard, no MRG  Abd: soft, nontender, nondistended, BS +, left colostomy bag in place  Ext: trace pedal edema  Neuro: Alert, awake, no gross focal neurological deficit  Skin: sacral pressure ulcer; covered with clean dressing; few small blisters of bilateral lower extremieties    Medications     Continuous Infusions:   sodium chloride      sodium chloride      sodium bicarbonate infusion 50 mL/hr at 05/23/21 0357    dextrose      norepinephrine 5 mcg/min (05/23/21 0212)     Scheduled Meds:   collagenase   Topical Daily    sodium chloride flush  5-40 mL Intravenous 2 times per day    pantoprazole  40 mg Intravenous Daily    And    sodium chloride (PF)  10 mL Intravenous Daily    [Held by provider] heparin (porcine)  5,000 Units Subcutaneous 3 times per day    albumin human  25 g Intravenous Q6H    hydrocortisone sodium succinate PF  100 mg Intravenous Q8H    insulin lispro  0-6 Units Subcutaneous Q6H    vancomycin (VANCOCIN) intermittent dosing (placeholder)   Other RX Placeholder    piperacillin-tazobactam  3,375 mg Intravenous Q8H    And    sodium chloride   Intravenous Q8H    zinc sulfate  50 mg Oral Daily    [START ON 5/24/2021] epoetin konrad-epbx  30 Units/kg Subcutaneous Once per day on Mon Wed Fri    ascorbic acid  1,500 mg Intravenous Daily    sodium chloride flush  10 mL Intravenous Once     PRN Meds: sodium chloride, sodium chloride flush, sodium chloride, promethazine **OR** ondansetron, polyethylene glycol, acetaminophen **OR** [DISCONTINUED] acetaminophen, fentanNYL, heparin (porcine), glucose, dextrose, glucagon (rDNA), dextrose    Labs and Imaging Studies     CBC:   Recent Labs     05/22/21  0317 05/22/21  0602 05/23/21  0515   WBC 24.0* 22.5* 12.8*   HGB 8.4* 8.2* 5.9*   HCT 28.0* 26.8* 18.7*   MCV 93.0 93.1 89.9    385 323       BMP:    Recent Labs     05/22/21  0602 05/22/21  1816 05/23/21  0515   * 137 135   K 5.1* 3.1* 3.7    97* 96*   CO2 16* 24 26   BUN 60* 14 19   CREATININE 3.0* 1.0 1.5*   GLUCOSE 152* 183* 202*       LIVER PROFILE:   Recent Labs     05/21/21  1040 05/22/21  0317 05/22/21  0602 05/23/21  0515   AST 9 11 9 12   ALT 8 7 5 6   LIPASE 13  --   --   --    BILITOT 0.3 0.2 0.3 0.3   ALKPHOS 179* 168* 207* 98       PT/INR:   Recent Labs     05/22/21  0602   PROTIME 19.3*   INR 1.8       APTT:   No results for input(s): APTT in the last 72 hours. Fasting Lipid Panel:    Lab Results   Component Value Date    CHOL 150 08/20/2018    TRIG 405 02/20/2021    HDL 28 08/20/2018       Cardiac Enzymes:    Lab Results   Component Value Date    CKTOTAL 13 (L) 04/19/2021    CKTOTAL 256 (H) 02/18/2021    CKTOTAL 85 08/02/2018    CKMB 2.9 04/19/2021    CKMB 0.5 05/04/2012    CKMB 0.8 05/04/2012    TROPONINI 0.17 (H) 04/20/2021    TROPONINI 0.27 (H) 04/19/2021    TROPONINI 0.18 (H) 04/19/2021       Notable Cultures:      Blood cultures   Blood Culture, Routine   Date Value Ref Range Status   05/21/2021 24 Hours no growth  Preliminary     Respiratory cultures No results found for: RESPCULTURE   Gram Stain Result   Date Value Ref Range Status   05/22/2021 Refer to ordered Gram stain for results  Final     Urine   Creatinine Ur POCT   Date Value Ref Range Status   03/23/2018 100  Final     Urine Culture, Routine   Date Value Ref Range Status   04/21/2021 >100,000 CFU/ml  Final     Legionella No results found for: LABLEGI  C Diff PCR No results found for: CDIFPCR  Wound culture/abscess: No results for input(s): WNDABS in the last 72 hours. Tip culture:No results for input(s): CXCATHTIP in the last 72 hours.      Antibiotic  Days  Day started                                Oxygen:     Vent Information  SpO2: 99 %  Additional Respiratory  Assessments  Pulse: 84  Resp: 22  SpO2: 99 %  Oral Care: Lip moisturizer applied, Teeth brushed  Swallow: Normal - able to swallow solids     Nasal cannula L/min     Face mask %     Reservoirs mask %       ABG   Vent Settings     PH   Mode      PCO2   TV      PO2   RR      HCO3   PS      Sat%   PEEP      FIO2   FIO2        P/F          Lines:  Site  Day  Date inserted     TLC              PICC              Arterial line              Peripheral line              HD cath            Urethral Catheter-Output (mL): 10 mL    Imaging Studies:    XR CHEST PORTABLE Final Result   No acute pulmonary infiltrates are identified         CT ABDOMEN PELVIS W IV CONTRAST Additional Contrast? None   Final Result   1. New sacral decubitus ulcer with multiple foci of subcutaneous gas within   adjacent soft tissue and suspected developing abscess posterior to the anus   on axial image number 5: Series 3 measuring 2.2 x 1.8 cm.      2.  Redemonstration of heterogeneous fluid collection located in left   anterior thigh musculature which is slightly smaller compared to previous   examination which could suggest decreasing left intramuscular abscess or   hematoma. 3.  Loculated fluid collection located in right central pelvis appears   similar in size compared to prior with subtle wall enhancement and adjacent   inflammatory changes which could suggest abscess (image 195, series 2). 4.  Redemonstration of open ventral abdominal wall wound with decreasing   inflammation at this site. 5.  Thickened wall of urinary bladder related to cystitis. 6.  Thickened mucosa of the distal stomach and proximal duodenum related to   gastritis and duodenitis. 7.  Small perisplenic ascites which has decreased compared to prior. 8.  Redemonstration of right adrenal gland nodule. Continued follow-up could   be helpful for further evaluation. Resident's Assessment and Plan      Aj Leo is 60 y.o. female with a PMH of  A. 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 Assessment:  1.  Hemodynamic shock likely 2/2 unstagable sacral pressure ulcer vs UTI; on levophed; presented with leukocytosis with WBC count of 22,000, afebrile  2. Sacral pressure ulcer unstagable   3. UTI; UA with > 20 WBC; does not make much urine due to ESRD  4. ESRD on HD on MWF  5. Atrial fibrillation; currently in sinus rhythm; on amiodarone, metoprolol  6. T2DM; Uncontrolled; HbA1C 13.1% (2/21)  7. H/o SMA thrombosis; S/p bowel resection with end jejunostomy with large lower midline open abdominal wound  8. Adrenal insufficiency; stable bilateral adrenal adenoma; larger one on the right side 2.1  3.1 cm (2/21)-->> 2.4 x 1.8 cm (5/21); cortisol level of 24 but it was drawn after hydrocortisone administration; on hydrocortisone 15 mg in the AM and 5 mg in the PM  9. Bilateral lower extremity wound  10.  Right toe stump wound    Plan:  · Drop in H/H noted 8.2/26.8--> 5.7/18.1 likely due to blood loss post debridement of sacral pressure ulcer  · Will give one unit of PRBC  · Follow up post transfusion H/H; goal Hb >7  · Hold Apixaban due to drop in Hb  · Continue Zosyn and vancomycin  · ID on board; appreciate recommendations  · Started on midodrine 10 mg TID  · Weaning down on Hydrocortisone  · Weaning down on levophed as tolerated   · Amiodarone and metoprolol on hold due to shock  · Follow up blood culture, wound culture  · General surgery on board  · HD per nephrology  · Monitor FSBS Q4 hourly  · Increased insulin to MDSS  · Wound care consult  · Podiatry consult for right toe stump wound    # Peptic ulcer prophylaxis: Pantoprazole  # DVT Prophylaxis: PCD  # Disposition: Continue current care    Millicent Painting MD M.D., PGY-2  Internal medicine resident  Attending Physician: Dr. Lorraine Ceballos
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
## 2021-05-23 NOTE — PLAN OF CARE
Problem: Pain:  Description: Pain management should include both nonpharmacologic and pharmacologic interventions. Goal: Control of acute pain  Description: Control of acute pain  5/23/2021 1114 by Devin Gutiérrez RN  Outcome: Met This Shift     Problem: Pain:  Description: Pain management should include both nonpharmacologic and pharmacologic interventions.   Goal: Control of chronic pain  Description: Control of chronic pain  5/23/2021 1114 by Devin Gutiérrez RN  Outcome: Met This Shift     Problem: Falls - Risk of:  Goal: Will remain free from falls  Description: Will remain free from falls  5/23/2021 1114 by Devin Gutiérrez RN  Outcome: Met This Shift

## 2021-05-23 NOTE — PROGRESS NOTES
SpO2 97%   BMI 32.67 kg/m²   Temp  Av.1 °F (36.7 °C)  Min: 96.8 °F (36 °C)  Max: 98.9 °F (37.2 °C)  Constitutional: The patient is awake, alert, and oriented. Skin: Warm and dry. No rashes were noted. Sacral decubitus fairly clean granulation tissue        2021  HEENT: Round and reactive pupils. Moist mucous membranes. No ulcerations or thrush. Neck: Supple to movements. Chest: No use of accessory muscles to breathe. Symmetrical expansion. No wheezing, crackles or rhonchi. Cardiovascular: S1 and S2 are rhythmic and regular. No murmurs appreciated. Abdomen: Positive bowel sounds to auscultation. Benign to palpation. No masses felt. No hepatosplenomegaly. Ostomy with tinea corporis around the back  Genitourinary: Bates  Extremities: No clubbing, no cyanosis, no edema.   Lines: peripheral    Laboratory and Tests Review:  Lab Results   Component Value Date    WBC 12.8 (H) 2021    WBC 22.5 (H) 2021    WBC 24.0 (H) 2021    HGB 7.8 (L) 2021    HCT 23.9 (L) 2021    MCV 89.9 2021     2021     Lab Results   Component Value Date    NEUTROABS 10.57 (H) 2021    NEUTROABS 19.71 (H) 2021    NEUTROABS 21.60 (H) 2021     No results found for: [facility]  Lab Results   Component Value Date    ALT 6 2021    AST 12 2021    ALKPHOS 98 2021    BILITOT 0.3 2021     Lab Results   Component Value Date     2021    K 3.8 2021    CL 97 2021    CO2 22 2021    BUN 20 2021    CREATININE 1.6 2021    CREATININE 1.5 2021    CREATININE 1.0 2021    GFRAA 39 2021    LABGLOM 32 2021    GLUCOSE 220 2021    GLUCOSE 88 2012    PROT 5.5 2021    LABALBU 2.9 2021    LABALBU 2.8 2012    CALCIUM 8.0 2021    BILITOT 0.3 2021    ALKPHOS 98 2021    AST 12 2021    ALT 6 2021     Lab Results   Component Value Date    CRP 20.3 (H) 05/22/2021    CRP 11.6 (H) 03/22/2021    CRP 12.9 (H) 03/04/2021     Lab Results   Component Value Date    SEDRATE 113 (H) 05/22/2021    SEDRATE 90 (H) 03/22/2021    SEDRATE 98 (H) 03/04/2021     Radiology:  Reviewed    Microbiology:   Lab Results   Component Value Date    [facility]  05/21/2021     24 Hours no growth  Gram stain performed from blood culture bottle media  Gram positive cocci in clusters  Previously positive blood culture called      BC 5 Days no growth 04/19/2021    BC 5 Days no growth 04/13/2021    ORG Gram negative braxton 05/22/2021    ORG Escherichia coli 05/22/2021    ORG Gram positive cocci 05/22/2021     Lab Results   Component Value Date    BLOODCULT2  05/21/2021     Gram stain performed from blood culture bottle media  Gram positive cocci in clusters      BLOODCULT2 Identification and sensitivity to follow 05/21/2021    BLOODCULT2 5 Days no growth 04/19/2021    ORG Gram negative braxton 05/22/2021    ORG Escherichia coli 05/22/2021    ORG Gram positive cocci 05/22/2021     WOUND/ABSCESS   Date Value Ref Range Status   05/22/2021   Preliminary    Heavy growth  Identification and sensitivity to follow     05/22/2021   Preliminary    Heavy growth  Identification and sensitivity to follow     05/22/2021   Preliminary    Moderate growth  Identification and sensitivity to follow     05/22/2021   Preliminary    Moderate growth  Identification and sensitivity to follow       Smear, Respiratory   Date Value Ref Range Status   02/20/2021   Final    Group 6: <25 PMN's/LPF and <25 Epithelial cells/LPF  Polymorphonuclear leukocytes not seen  Epithelial cells not seen  No organisms seen           Component Value Date/Time    AFBCX No growth after 6 weeks of incubation. 03/20/2021 1515    FUNGSM No Fungal elements seen 03/20/2021 1515    LABFUNG No growth after 4 weeks of incubation.  03/20/2021 1515     CULTURE, RESPIRATORY   Date Value Ref Range Status   02/20/2021 Growth not present  Oral Pharyngeal Jackie absent Final     No results found for: CXCATHTIP  Body Fluid Culture, Sterile   Date Value Ref Range Status   05/22/2021 (A)  Preliminary    Additional growth present, also evaluating for;  Gram positive organisms     05/22/2021   Preliminary    Heavy growth  Identification and sensitivity to follow       Culture Surgical   Date Value Ref Range Status   03/20/2021 Growth not present  Final     Creatinine Ur POCT   Date Value Ref Range Status   03/23/2018 100  Final     Urine Culture, Routine   Date Value Ref Range Status   05/22/2021 50,000 CFU/ml  Sensitivity to follow    Preliminary   05/22/2021   Preliminary    <25,000 CFU/ml  Identification and sensitivity to follow     04/21/2021 >100,000 CFU/ml  Final     MRSA Culture Only   Date Value Ref Range Status   05/22/2021 Methicillin resistant Staph aureus not isolated  Final   04/19/2021 Methicillin resistant Staph aureus not isolated  Final   02/17/2021 Methicillin resistant Staph aureus not isolated  Final     Microbiology:    Urine 5/22/2021 E. coli and gram-positive's  Wound culture gram-negative rods  Body fluids gram-negative rods  Blood cultures from 521 2 out of 2 sets with coag negative staph      ASSESSMENT:  · Coag negative staph bacteremia may be related to catheter sepsis but most likely contaminant.   Will possibly related to the decubitus  · History of fungemia  · Questionable UTI  · Questionable infected sacral decubitus-  · Tinea corporis  · Leukocytosis improving    PLAN:  · Continue Vanco and Zosyn  · Topical Lotrimin  · Check final cultures  · Monitor labs    Senthil Dover MD  3:28 PM  5/23/2021

## 2021-05-24 NOTE — CARE COORDINATION
5/24 Care Coordination:Pt from [facility] at the [facility]. She was discharged from here 4/28 to [facility]. She presents today for evaluation of sacral wound. Patient presented SNF and was to undergo debridement of his sacral decubitus ulcer which was aborted due to the fact that the ulcer in its inferior portion tracks towards the anus and the rectum. Patient has a WBC of 23.2. Lactic acid 2.1. Bates in place and her urine is turbid cloudy and brown. Patient was hypotensive on arrival and is currently on Levophed. She was admit to MICU. Went to OR on 5/22  For debridement of Sacral wound. Spoke with Thony Barrera, she states they at the [facility] were in process of getting her transferred to [facility] so she didn't have to be transported to HD. Spoke with Crista she is aware and now can follow. At this Time Crista has no HD bed, If not one by time of discharge will have to go back to [facility] at the [facility]. Spoke with Jyoti Jeffers, she is not a bed hold but will take back. Will need to precert. She will follow along. CM/SW will continue to follow for discharge planning.    Smita GARCIAN,RN-CV-BC  [phone]
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
## 2021-05-24 NOTE — PROGRESS NOTES
[address] [facility]   Department of Internal Medicine   Internal Medicine Residency  MICU Progress Note    Patient:  Mildred Hernandez 60 y.o. female   MRN: [id number]       Date of Service: 2021    Allergy: Lisinopril, Procardia [nifedipine], and Metformin and related  follow up sepsis  Subjective     She reports left leg swelling more than on the right side  Off pressors  Receiving dialysis this morning, tolerating well  HR ranging between 50-60/min with stable blood pressure  Will get doppler US of bilateral LE; if positive for DVT, will start on low dose heparin drip and monitor H/H  If further drop in H/H noted on heparin drip; will consult vascular surgery for IVC filters  H/H stable    Objective     TEMPERATURE:  Current - Temp: 97.5 °F (36.4 °C); Max - Temp  Av.1 °F (36.7 °C)  Min: 97.2 °F (36.2 °C)  Max: 98.6 °F (37 °C)  RESPIRATIONS RANGE: Resp  Av  Min: 13  Max: 35  PULSE RANGE: Pulse  Av.2  Min: 52  Max: 88  BLOOD PRESSURE RANGE:  Systolic (03YQA), GYB:081 , Min:97 , DKI:579   ; Diastolic (38PKL), GRU:10, Min:36, Max:52    PULSE OXIMETRY RANGE: SpO2  Av.6 %  Min: 91 %  Max: 100 %    I & O - 24hr:    Intake/Output Summary (Last 24 hours) at 2021 6172  Last data filed at 2021 0600  Gross per 24 hour   Intake 1491 ml   Output 860 ml   Net 631 ml     I/O last 3 completed shifts: In: 0828 [P.O.:660; I.V.:381; Blood:450]  Out: 860 [Urine:110; Stool:750] No intake/output data recorded.    Weight change: -7 lb 4.4 oz (-3.3 kg)    Physical Examination:  · General: alert, awake, in no acute distress  · HEENT: NC, AT, moist oral mucosa  · Neck: supple  · Pulmonary: clear to auscultation bilaterally, no wheezing or crackles  · CV: RRR, S1 S2 heard, no MRG  · Abd: soft, nontender, nondistended, BS +, left colostomy bag in place  · Ext: trace pedal edema  · Neuro: Alert, awake, no gross focal neurological deficit  · Skin: sacral pressure ulcer; covered with clean dressing; few small blisters of bilateral lower extremieties    Medications     Continuous Infusions:   sodium chloride      sodium chloride      sodium chloride      dextrose      norepinephrine Stopped (05/23/21 1230)     Scheduled Meds:   collagenase   Topical Daily    midodrine  10 mg Oral TID WC    insulin lispro  0-12 Units Subcutaneous Q4H    hydrocortisone sodium succinate PF  100 mg Intravenous Q12H    insulin glargine  5 Units Subcutaneous Nightly    sodium chloride (PF)  10 mL Intravenous BID    And    pantoprazole  40 mg Intravenous BID    clotrimazole   Topical BID    sodium chloride flush  5-40 mL Intravenous 2 times per day    [Held by provider] heparin (porcine)  5,000 Units Subcutaneous 3 times per day    vancomycin (VANCOCIN) intermittent dosing (placeholder)   Other RX Placeholder    piperacillin-tazobactam  3,375 mg Intravenous Q8H    And    sodium chloride   Intravenous Q8H    zinc sulfate  50 mg Oral Daily    epoetin konrad-epbx  30 Units/kg Subcutaneous Once per day on Mon Wed Fri    ascorbic acid  1,500 mg Intravenous Daily    sodium chloride flush  10 mL Intravenous Once     PRN Meds: sodium chloride, sodium chloride, sodium chloride flush, sodium chloride, promethazine **OR** ondansetron, polyethylene glycol, acetaminophen **OR** [DISCONTINUED] acetaminophen, fentanNYL, heparin (porcine), glucose, dextrose, glucagon (rDNA), dextrose    Labs and Imaging Studies     CBC:   Recent Labs     05/22/21  0602 05/23/21 0515 05/23/21  1224 05/23/21 2123 05/24/21  0550   WBC 22.5* 12.8*  --   --  12.3*   HGB 8.2* 5.9* 7.8* 7.2* 7.6*   HCT 26.8* 18.7* 23.9* 22.8* 24.2*   MCV 93.1 89.9  --   --  91.3    323  --   --  290       BMP:    Recent Labs     05/23/21  0515 05/23/21  1224 05/24/21  0550    135 134   K 3.7 3.8 3.7   CL 96* 97* 97*   CO2 26 22 22   BUN 19 20 25*   CREATININE 1.5* 1.6* 2.0*   GLUCOSE 202* 220* 107*       LIVER PROFILE:   Recent Labs     05/21/21  1040 05/22/21  0602 05/23/21  0515 05/24/21  0550   AST 9 9 12 9   ALT 8 5 6 <5   LIPASE 13  --   --   --    BILITOT 0.3 0.3 0.3 0.5   ALKPHOS 179* 207* 98 78       PT/INR:   Recent Labs     05/22/21  0602   PROTIME 19.3*   INR 1.8       APTT:   No results for input(s): APTT in the last 72 hours.     Fasting Lipid Panel:    Lab Results   Component Value Date    CHOL 150 08/20/2018    TRIG 405 02/20/2021    HDL 28 08/20/2018       Cardiac Enzymes:    Lab Results   Component Value Date    CKTOTAL 13 (L) 04/19/2021    CKTOTAL 256 (H) 02/18/2021    CKTOTAL 85 08/02/2018    CKMB 2.9 04/19/2021    CKMB 0.5 05/04/2012    CKMB 0.8 05/04/2012    TROPONINI 0.17 (H) 04/20/2021    TROPONINI 0.27 (H) 04/19/2021    TROPONINI 0.18 (H) 04/19/2021       Notable Cultures:      Blood cultures   Blood Culture, Routine   Date Value Ref Range Status   05/21/2021 (A)  Preliminary    24 Hours no growth  Gram stain performed from blood culture bottle media  Gram positive cocci in clusters  Previously positive blood culture called       Respiratory cultures No results found for: RESPCULTURE   Gram Stain Result   Date Value Ref Range Status   05/22/2021 Refer to ordered Gram stain for results  Final     Urine   Creatinine Ur POCT   Date Value Ref Range Status   03/23/2018 100  Final     Urine Culture, Routine   Date Value Ref Range Status   05/22/2021 50,000 CFU/ml  Sensitivity to follow    Preliminary   05/22/2021   Preliminary    <25,000 CFU/ml  Identification and sensitivity to follow       Legionella No results found for: LABLEGI  C Diff PCR No results found for: CDIFPCR  Wound culture/abscess:   Recent Labs     05/22/21  0317   WNDABS Moderate growth  Identification and sensitivity to follow    Moderate growth  Identification and sensitivity to follow    Heavy growth  Identification and sensitivity to follow    Heavy growth  Identification and sensitivity to follow       Tip culture:No results for input(s): CXCATHTIP in the last 72 hours.     Antibiotic  Days  Day started                                Oxygen:     Vent Information  SpO2: 96 %  Additional Respiratory  Assessments  Pulse: 52  Resp: 13  SpO2: 96 %  Oral Care: Mouth moisturizer  Swallow: Normal - able to swallow solids     Nasal cannula L/min     Face mask %     Reservoirs mask %       ABG   Vent Settings     PH   Mode      PCO2   TV      PO2   RR      HCO3   PS      Sat%   PEEP      FIO2   FIO2        P/F          Lines:  Site  Day  Date inserted     TLC              PICC              Arterial line              Peripheral line              HD cath            Urethral Catheter-Output (mL): 15 mL    Imaging Studies:    XR FOOT RIGHT (2 VIEWS)   Final Result   Chronic appearing findings. MRI would be useful if symptoms persist, or if   there is concern for osteomyelitis. XR CHEST PORTABLE   Final Result   No acute pulmonary infiltrates are identified         CT ABDOMEN PELVIS W IV CONTRAST Additional Contrast? None   Final Result   1. New sacral decubitus ulcer with multiple foci of subcutaneous gas within   adjacent soft tissue and suspected developing abscess posterior to the anus   on axial image number 5: Series 3 measuring 2.2 x 1.8 cm.      2.  Redemonstration of heterogeneous fluid collection located in left   anterior thigh musculature which is slightly smaller compared to previous   examination which could suggest decreasing left intramuscular abscess or   hematoma. 3.  Loculated fluid collection located in right central pelvis appears   similar in size compared to prior with subtle wall enhancement and adjacent   inflammatory changes which could suggest abscess (image 195, series 2). 4.  Redemonstration of open ventral abdominal wall wound with decreasing   inflammation at this site. 5.  Thickened wall of urinary bladder related to cystitis. 6.  Thickened mucosa of the distal stomach and proximal duodenum related to   gastritis and duodenitis. and monitor H/H  · If further drop in H/H noted on heparin drip; will consult vascular surgery for IVC filter  · Transfuse as needed; goal Hb >7  · Hold Apixaban due to drop in Hb  · Antibiotics changed to ceftriaxone, linezolid per ID  · ID on board; appreciate recommendations  · Continue midodrine 10 mg TID  · Decreased hydrocortisone to 50 mg twice daily  · Started on amiodarone 200 mg twice daily; half the home dose with holding parameters; resume home dose of 400 mg BID as tolerated  · Metoprolol on hold due to shock  · General surgery on board  · HD per nephrology  · Continue Lantus 5U at night and LDSS  · Monitor FSBS Q4 hourly  · Wound care on board  · Podiatry following  · Aspiration/ fall precautions    # Peptic ulcer prophylaxis: Pantoprazole  # DVT Prophylaxis: none due to h/o DVT and recent drop in Hb  # Disposition: Continue current care    Olya Hilario  PGY 2  Internal Medicine Resident  Attending Physician: Molly Shaw      I personally saw, examined and provided care for the patient. Radiographs, labs and medication list were reviewed by me independently. Review of Residents documentation was conducted and revisions were made as appropriate. I agree with the above documented exam, problem list and plan of care. Repeat lower ext US with DVT that is improving. Start low dose hep gtt. If tolerates will advance. BP tolerated with HD.     CCT excluding procedures >30 minutes    Dante Amaral DO

## 2021-05-24 NOTE — PROGRESS NOTES
P Quality Flow/Interdisciplinary Rounds Progress Note        Quality Flow Rounds held on May 24, 2021    Disciplines Attending:  Bedside nurse, charge nurse, , PT/OT, pharmacy, nursing unit leadership, , Medical residents  Ben Schmidt was admitted on 5/21/2021  9:53 AM    Anticipated Discharge Date:       Disposition:    Davey Score:  Davey Scale Score: 13    Readmission Risk              Risk of Unplanned Readmission:  41           Discussed patient goal for the day, patient clinical progression, and barriers to discharge.   The following Goal(s) of the Day/Commitment(s) have been identified:  Continue treatment, ICU care  Gustavo Jonas RN  May 24, 2021

## 2021-05-24 NOTE — FLOWSHEET NOTE
Inpatient Wound Care(initial evaluation)  4409    Admit Date: 5/21/2021  9:53 AM    Reason for consult:  Abdomin, buttocks, groin, ileostomy    Significant history: extensive medical history refer to [facility]  Patient sent from NH because she refused dialysis over the weekend and her outpatient dialysis was reported to not have been approved by the insurance yet.     Wound history:  Admitted with wounds    Findings:     05/24/21 1055   Ileostomy Ileostomy   Placement Date: 02/17/21   Pre-existing: No  Inserted by: Dr. Niranjan Harrison  Ileostomy Type: Ileostomy   Stomal Appliance 1 piece; Changed  (with convexity, barrier ring)   Stoma  Assessment Moist;Protrudes; Red   Peristomal Assessment Red  (raw)   Treatment Bag change;Stoma powder  (skin care, medical adhesive spray, strips)   Stool Color Brown   Stool Appearance Loose   Output (mL) 100 ml   Skin Integrity   Skin Integrity Redness  (raw)   Location right abdomen   Skin Integrity Site 2   Skin Integrity Location 2 Redness;Bruising   Location 2 BUE, trunk, neck   Skin Integrity Site 3   Skin Integrity Location 3   (dried eschar)    Location 3 right first toe, right second toe   Skin Integrity Site 4   Skin Integrity Location 4 Redness;Rash   Location 4 BLE- left greater than right   Wound 02/17/21 Abdomen Mid   Date First Assessed: 02/17/21   Present on Hospital Admission: No  Location: Abdomen  Wound Location Orientation: Mid   Wound Image    Wound Etiology Non-Healing Surgical   Dressing Status New dressing applied   Wound Cleansed Cleansed with saline   Dressing/Treatment ABD;Dry dressing;Moist to dry   Wound Length (cm) 8 cm   Wound Width (cm) 9 cm   Wound Depth (cm) 4 cm   Wound Surface Area (cm^2) 72 cm^2   Wound Volume (cm^3) 288 cm^3   Undermining Starts ___ O'Clock 11   Undermining Ends___ O'Clock 6   Undermining Maxium Distance (cm) 4   Wound Assessment Pink/red   Drainage Amount None   Santa-wound Assessment Intact   right groins and coccyx will be assessed tomorrow  To much pain today and on dialysis  **Informed Consent**    The patient has given verbal consent to have photos taken of wound and inserted into their chart as part of their permanent medical record for purposes of documentation, treatment management and/or medical review. All Images taken on 5/24/21 of patient name: Brynn Gregorio were transmitted and stored on secured [employer] located within [facility] by a registered [employer] Mobile Application Device.        Plan:  Moist Kerlix to abdominal wound  opticell to right groin  Dietary consult   will need continued preventative care  Antifungal ointment    Joe Farr RN 5/24/2021 3:37 PM

## 2021-05-24 NOTE — FLOWSHEET NOTE
05/24/21 1101   Vital Signs   /60   Temp 97.6 °F (36.4 °C)   Pulse 69   Resp 18   SpO2 98 %   Weight 197 lb 5 oz (89.5 kg)   Weight Method Bed scale   Percent Weight Change -1.65   Pain Assessment   Pain Assessment 0-10   Pain Level 0   Post-Hemodialysis Assessment   Post-Treatment Procedures Blood returned;Catheter capped, clamped and heparinized x 2 ports   Machine Disinfection Process Exterior Machine Disinfection   Rinseback Volume (ml) 300 ml   Total Liters Processed (l/min) 90 l/min   Dialyzer Clearance Heavily streaked   Duration of Treatment (minutes) 240 minutes   Hemodialysis Intake (ml) 300 ml   Hemodialysis Output (ml) 1800 ml   NET Removed (ml) 1500 ml   Tolerated Treatment Good   Patient Response to Treatment Tolerated tx well   Bilateral Breath Sounds Diminished   Edema Generalized

## 2021-05-24 NOTE — PROGRESS NOTES
[address] [facility]   Department of Internal Medicine   Internal Medicine Residency  MICU Progress Note    Patient:  Carilyn Saint 60 y.o. female   MRN: [id number]       Date of Service: 2021    Allergy: Lisinopril, Procardia [nifedipine], and Metformin and related  follow up sepsis  Subjective     She stated that she is feeling well. She denies any nausea, vomiting, headache, dizziness, chest pain, palpitation  Drop in H/H noted 8.2/26.8--> 5.7/18.1 likely due to blood loss post debridement of sacral pressure ulcer  BP has been stable, MAP >65 mmHg; weaning down on levophed  Started on midodrine 10 mg TID   Some swelling legs  wlquis on hold     Objective     TEMPERATURE:  Current - Temp: 98.2 °F (36.8 °C); Max - Temp  Av.5 °F (36.9 °C)  Min: 98.2 °F (36.8 °C)  Max: 98.9 °F (37.2 °C)  RESPIRATIONS RANGE: Resp  Av.6  Min: 14  Max: 34  PULSE RANGE: Pulse  Av  Min: 55  Max: 92  BLOOD PRESSURE RANGE:  Systolic (69FAS), RENATE:754 , Min:97 , DIONNE:991   ; Diastolic (69LEI), TZI:23, Min:36, Max:52    PULSE OXIMETRY RANGE: SpO2  Av.7 %  Min: 96 %  Max: 100 %    I & O - 24hr:    Intake/Output Summary (Last 24 hours) at [address]217  Last data filed at 2021 1800  Gross per 24 hour   Intake 2853 ml   Output 660 ml   Net 2193 ml     I/O last 3 completed shifts:   In: 3208 [P.O.:480; I.V.:1928; Blood:450; IV Piggyback:350]  Out: 1246 [Urine:150; KQXCP:0758] I/O this shift:  In: 240 [P.O.:240]  Out: -    Weight change: 7 lb 14.3 oz (3.581 kg)    Physical Examination:  General: alert, awake, in no acute distress  HEENT: NC, AT, moist oral mucosa  Neck: supple  Pulmonary: clear to auscultation bilaterally, no wheezing or crackles  CV: RRR, S1 S2 heard, no MRG  Abd: soft, nontender, nondistended, BS +, left colostomy bag in place  Ext: trace pedal edema  Neuro: Alert, awake, no gross focal neurological deficit  Skin: sacral pressure ulcer; covered with clean dressing; few small blisters of Recent Labs     05/21/21  1040 05/22/21  0317 05/22/21  0602 05/23/21  0515   AST 9 11 9 12   ALT 8 7 5 6   LIPASE 13  --   --   --    BILITOT 0.3 0.2 0.3 0.3   ALKPHOS 179* 168* 207* 98       PT/INR:   Recent Labs     05/22/21  0602   PROTIME 19.3*   INR 1.8       APTT:   No results for input(s): APTT in the last 72 hours.     Fasting Lipid Panel:    Lab Results   Component Value Date    CHOL 150 08/20/2018    TRIG 405 02/20/2021    HDL 28 08/20/2018       Cardiac Enzymes:    Lab Results   Component Value Date    CKTOTAL 13 (L) 04/19/2021    CKTOTAL 256 (H) 02/18/2021    CKTOTAL 85 08/02/2018    CKMB 2.9 04/19/2021    CKMB 0.5 05/04/2012    CKMB 0.8 05/04/2012    TROPONINI 0.17 (H) 04/20/2021    TROPONINI 0.27 (H) 04/19/2021    TROPONINI 0.18 (H) 04/19/2021       Notable Cultures:      Blood cultures   Blood Culture, Routine   Date Value Ref Range Status   05/21/2021 (A)  Preliminary    24 Hours no growth  Gram stain performed from blood culture bottle media  Gram positive cocci in clusters  Previously positive blood culture called       Respiratory cultures No results found for: RESPCULTURE   Gram Stain Result   Date Value Ref Range Status   05/22/2021 Refer to ordered Gram stain for results  Final     Urine   Creatinine Ur POCT   Date Value Ref Range Status   03/23/2018 100  Final     Urine Culture, Routine   Date Value Ref Range Status   05/22/2021 50,000 CFU/ml  Sensitivity to follow    Preliminary   05/22/2021   Preliminary    <25,000 CFU/ml  Identification and sensitivity to follow       Legionella No results found for: LABLEGI  C Diff PCR No results found for: CDIFPCR  Wound culture/abscess:   Recent Labs     05/22/21 0317   WNDABS Moderate growth  Identification and sensitivity to follow    Moderate growth  Identification and sensitivity to follow    Heavy growth  Identification and sensitivity to follow    Heavy growth  Identification and sensitivity to follow       Tip culture:No results for vancomycin  · ID on board; appreciate recommendations  · Started on midodrine 10 mg TID  · Weaning down on Hydrocortisone  · Weaning down on levophed as tolerated   · Amiodarone and metoprolol on hold due to shock  · Follow up blood culture, wound culture  · General surgery on board  · HD per nephrology  · Monitor FSBS Q4 hourly  · Increased insulin to MDSS  · Wound care consult  · Podiatry consult for right toe stump wound    # Peptic ulcer prophylaxis: Pantoprazole  # DVT Prophylaxis: PCD  # Disposition: Continue current care    I personally saw, examined and provided care for the patient. Radiographs, labs and medication list were reviewed by me independently. I spoke with bedside nursing, therapists and consultants. Critical care services and times documented are independent of procedures and multidisciplinary rounds with Residents. Additionally comprehensive, multidisciplinary rounds were conducted with the MICU team. The case was discussed in detail and plans for care were established. Review of Residents documentation was conducted and revisions were made as appropriate. I agree with the above documented exam, problem list and plan of care.     Bryan Esposito MD

## 2021-05-24 NOTE — PLAN OF CARE
Problem: Pain:  Description: Pain management should include both nonpharmacologic and pharmacologic interventions.   Goal: Pain level will decrease  Description: Pain level will decrease  Outcome: Met This Shift     Problem: Skin Integrity:  Goal: Will show no infection signs and symptoms  Description: Will show no infection signs and symptoms  Outcome: Not Met This Shift  Goal: Absence of new skin breakdown  Description: Absence of new skin breakdown  Outcome: Met This Shift     Problem: Falls - Risk of:  Goal: Will remain free from falls  Description: Will remain free from falls  Outcome: Met This Shift

## 2021-05-24 NOTE — PROGRESS NOTES
[address] [place] [address] [facility]  [facility]  Progress Note      Chief Complaint   Patient presents with    Wound Check     patient sent from nursing faciilty for worsening sacral wound       SUBJECTIVE:  Patient is tolerating medications. No reported adverse drug reactions. No nausea, vomiting, diarrhea. Awake alert  Afebrile        Review of systems:  As stated above in the chief complaint, otherwise negative.     Medications:  Scheduled Meds:   hydrocortisone sodium succinate PF  50 mg Intravenous Q12H    vancomycin  1,000 mg Intravenous Once    amiodarone  200 mg Oral BID    insulin lispro  0-6 Units Subcutaneous Q4H    collagenase   Topical Daily    midodrine  10 mg Oral TID WC    insulin glargine  5 Units Subcutaneous Nightly    sodium chloride (PF)  10 mL Intravenous BID    And    pantoprazole  40 mg Intravenous BID    clotrimazole   Topical BID    sodium chloride flush  5-40 mL Intravenous 2 times per day    [Held by provider] heparin (porcine)  5,000 Units Subcutaneous 3 times per day    vancomycin (VANCOCIN) intermittent dosing (placeholder)   Other RX Placeholder    piperacillin-tazobactam  3,375 mg Intravenous Q8H    And    sodium chloride   Intravenous Q8H    zinc sulfate  50 mg Oral Daily    epoetin konrad-epbx  30 Units/kg Subcutaneous Once per day on Mon Wed Fri    ascorbic acid  1,500 mg Intravenous Daily    sodium chloride flush  10 mL Intravenous Once     Continuous Infusions:   sodium chloride      sodium chloride      sodium chloride      dextrose       PRN Meds:sodium chloride, sodium chloride, sodium chloride flush, sodium chloride, promethazine **OR** ondansetron, polyethylene glycol, acetaminophen **OR** [DISCONTINUED] acetaminophen, fentanNYL, heparin (porcine), glucose, dextrose, glucagon (rDNA), dextrose    OBJECTIVE:  BP (!) 99/57   Pulse 80   Temp 98 °F (36.7 °C)   Resp 17   Ht 5' 6\" (1.676 m)   Wt 200 lb 9.9 oz (91 kg)   LMP  (LMP Unknown)   SpO2 CRP 20.3 (H) 05/22/2021    CRP 11.6 (H) 03/22/2021    CRP 12.9 (H) 03/04/2021     Lab Results   Component Value Date    SEDRATE 113 (H) 05/22/2021    SEDRATE 90 (H) 03/22/2021    SEDRATE 98 (H) 03/04/2021     Radiology:  Reviewed    Microbiology:   Lab Results   Component Value Date    [facility]  05/21/2021     24 Hours no growth  Gram stain performed from blood culture bottle media  Gram positive cocci in clusters  Previously positive blood culture called      [facility] Identification and sensitivity to follow 05/21/2021    BC 5 Days no growth 04/19/2021    ORG Escherichia coli 05/22/2021    ORG Escherichia coli 05/22/2021    ORG Enterococcus faecium 05/22/2021     Lab Results   Component Value Date    BLOODCULT2  05/21/2021     Gram stain performed from blood culture bottle media  Gram positive cocci in clusters      BLOODCULT2 Identification and sensitivity to follow 05/21/2021    BLOODCULT2 5 Days no growth 04/19/2021    ORG Escherichia coli 05/22/2021    ORG Escherichia coli 05/22/2021    ORG Enterococcus faecium 05/22/2021     WOUND/ABSCESS   Date Value Ref Range Status   05/22/2021 Heavy growth  Final   05/22/2021 Heavy growth  Final   05/22/2021   Preliminary    Moderate growth  Identification and sensitivity to follow  Refer to previous CXWND culture collected 5-22-21 at [address] [place] [address] for susceptibility results     05/22/2021   Preliminary    Moderate growth  Identification and sensitivity to follow  Refer to previous CXWND culture collected 5-22-21 at [address] [place] [address] for susceptibility results       Smear, Respiratory   Date Value Ref Range Status   02/20/2021   Final    Group 6: <25 PMN's/LPF and <25 Epithelial cells/LPF  Polymorphonuclear leukocytes not seen  Epithelial cells not seen  No organisms seen           Component Value Date/Time    AFBCX No growth after 6 weeks of incubation.  03/20/2021 1515    FUNGSM No Fungal elements seen 03/20/2021 1515    LABFUNG No growth after 4 weeks
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
## 2021-05-25 NOTE — FLOWSHEET NOTE
2 UF tx tolerated well. Net uf 1200cc   05/25/21 1510   Vital Signs   /76   Temp 98 °F (36.7 °C)   Pulse 62   Resp 20   SpO2 98 %   Weight 194 lb 10.7 oz (88.3 kg)   Weight Method Estimated   Percent Weight Change -1.34   Post-Hemodialysis Assessment   Post-Treatment Procedures Blood returned;Catheter Capped, clamped with Saline x2 ports   Machine Disinfection Process Acid/Vinegar Clean;Heat Disinfect; Exterior Machine Disinfection   Rinseback Volume (ml) 300 ml   Dialyzer Clearance Lightly streaked   Duration of Treatment (minutes) 120 minutes   Hemodialysis Intake (ml) 300 ml   Hemodialysis Output (ml) 1500 ml   NET Removed (ml) 1200 ml   Tolerated Treatment Good   Patient Response to Treatment NET UF 1200cc Rachelle Well. Post catheter care completed. Report to floor RN. Bilateral Breath Sounds Diminished   Edema Generalized;Right upper extremity; Left upper extremity;Right lower extremity; Left lower extremity   Edema Generalized +2   Physician Notified?  No

## 2021-05-25 NOTE — PROGRESS NOTES
[facility]   Department of Internal Medicine   Internal Medicine Residency  MICU Progress Note    Patient:  Pito Allan 60 y.o. female   MRN: [id number]       Date of Service: 2021    Allergy: Lisinopril, Procardia [nifedipine], and Metformin and related  follow up sepsis  Subjective     She reports bilateral lower extremity pain that improved with gabapentin  H/H 8.3/26.6 stable   No acute overnight events    Objective     TEMPERATURE:  Current - Temp: 98.4 °F (36.9 °C); Max - Temp  Av °F (36.7 °C)  Min: 97.6 °F (36.4 °C)  Max: 98.4 °F (36.9 °C)  RESPIRATIONS RANGE: Resp  Av.8  Min: 13  Max: 32  PULSE RANGE: Pulse  Av  Min: 52  Max: 92  BLOOD PRESSURE RANGE:  Systolic (87KYY), JULIAN:02 , Min:82 , WGH:062   ; Diastolic (14ONB), PUA:41, Min:50, Max:73    PULSE OXIMETRY RANGE: SpO2  Av.2 %  Min: 94 %  Max: 99 %    I & O - 24hr:    Intake/Output Summary (Last 24 hours) at 2021 0719  Last data filed at 2021 0600  Gross per 24 hour   Intake 1523 ml   Output 2725 ml   Net -1202 ml     I/O last 3 completed shifts: In: 4542 [P.O.:820; I.V.:403]  Out: 2725 [Urine:50; Stool:875] No intake/output data recorded.    Weight change: 0 lb (0 kg)    Physical Examination:  · General: alert, awake, in no acute distress  · HEENT: NC, AT, moist oral mucosa  · Neck: supple  · Pulmonary: clear to auscultation bilaterally, no wheezing or crackles  · CV: RRR, S1 S2 heard, no MRG  · Abd: soft, nontender, nondistended, BS +, left colostomy bag in place  · Ext: trace pedal edema  · Neuro: Alert, awake, no gross focal neurological deficit  · Skin: sacral pressure ulcer; covered with clean dressing; few small blisters of bilateral lower extremieties    Medications     Continuous Infusions:   heparin (PORCINE) Infusion 13.1 Units/kg/hr (21 0641)    sodium chloride      sodium chloride      sodium chloride      dextrose       Scheduled Meds:   magnesium sulfate  2,000 mg Intravenous Once    potassium phosphate IVPB  20 mmol Intravenous Once    hydrocortisone sodium succinate PF  50 mg Intravenous Q12H    insulin lispro  0-6 Units Subcutaneous Q4H    cefTRIAXone (ROCEPHIN) IV  1,000 mg Intravenous Q24H    linezolid  600 mg Oral 2 times per day    amiodarone  200 mg Oral BID    gabapentin  300 mg Oral Daily    miconazole   Topical BID    collagenase   Topical Daily    midodrine  10 mg Oral TID WC    insulin glargine  5 Units Subcutaneous Nightly    sodium chloride (PF)  10 mL Intravenous BID    And    pantoprazole  40 mg Intravenous BID    clotrimazole   Topical BID    sodium chloride flush  5-40 mL Intravenous 2 times per day    zinc sulfate  50 mg Oral Daily    epoetin konrad-epbx  30 Units/kg Subcutaneous Once per day on Mon Wed Fri    ascorbic acid  1,500 mg Intravenous Daily    sodium chloride flush  10 mL Intravenous Once     PRN Meds: heparin (porcine), heparin (porcine), heparin (porcine), sodium chloride, sodium chloride, sodium chloride flush, sodium chloride, promethazine **OR** ondansetron, polyethylene glycol, acetaminophen **OR** [DISCONTINUED] acetaminophen, fentanNYL, glucose, dextrose, glucagon (rDNA), dextrose    Labs and Imaging Studies     CBC:   Recent Labs     05/23/21  0515 05/24/21  0550 05/24/21  1611 05/24/21  2200 05/25/21  0556   WBC 12.8* 12.3*  --   --  11.3   HGB 5.9* 7.6* 8.5* 8.5* 8.3*   HCT 18.7* 24.2* 26.2* 27.2* 26.6*   MCV 89.9 91.3  --   --  93.0    290  --   --  344       BMP:    Recent Labs     05/24/21  0550 05/24/21  1611 05/25/21  0556    138 138   K 3.7 3.4* 3.8   CL 97* 99 99   CO2 22 28 28   BUN 25* 8 11   CREATININE 2.0* 1.0 1.4*   GLUCOSE 107* 87 94       LIVER PROFILE:   Recent Labs     05/23/21  0515 05/24/21  0550 05/25/21  0556   AST 12 9 10   ALT 6 <5 <5   BILITOT 0.3 0.5 0.4   ALKPHOS 98 78 83       PT/INR:   No results for input(s): PROTIME, INR in the last 72 hours.     APTT:   Recent Labs     05/24/21  1610 P/F          Lines:  Site  Day  Date inserted     TLC              PICC              Arterial line              Peripheral line              HD cath            Urethral Catheter-Output (mL): 20 mL    Imaging Studies:    VL LOWER EXTREMITY ARTERIAL SEGMENTAL PRESSURES W PPG BILATERAL   Final Result      US DUP LOWER EXTREMITIES BILATERAL VENOUS   Final Result   There is evidence for deep venous thrombosis, the appearance is   slightly improved in the interval      ALERT:  THIS IS AN ABNORMAL REPORT               XR FOOT RIGHT (2 VIEWS)   Final Result   Chronic appearing findings. MRI would be useful if symptoms persist, or if   there is concern for osteomyelitis. XR CHEST PORTABLE   Final Result   No acute pulmonary infiltrates are identified         CT ABDOMEN PELVIS W IV CONTRAST Additional Contrast? None   Final Result   1. New sacral decubitus ulcer with multiple foci of subcutaneous gas within   adjacent soft tissue and suspected developing abscess posterior to the anus   on axial image number 5: Series 3 measuring 2.2 x 1.8 cm.      2.  Redemonstration of heterogeneous fluid collection located in left   anterior thigh musculature which is slightly smaller compared to previous   examination which could suggest decreasing left intramuscular abscess or   hematoma. 3.  Loculated fluid collection located in right central pelvis appears   similar in size compared to prior with subtle wall enhancement and adjacent   inflammatory changes which could suggest abscess (image 195, series 2). 4.  Redemonstration of open ventral abdominal wall wound with decreasing   inflammation at this site. 5.  Thickened wall of urinary bladder related to cystitis. 6.  Thickened mucosa of the distal stomach and proximal duodenum related to   gastritis and duodenitis. 7.  Small perisplenic ascites which has decreased compared to prior. 8.  Redemonstration of right adrenal gland nodule.   Continued follow-up could   be helpful for further evaluation. Resident's Assessment and Plan      Israel Olivas is 60 y.o. female with a PMH of  A. 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 Assessment:  1. Hemodynamic shock likely 2/2 unstagable sacral pressure ulcer vs UTI; on levophed; presented with leukocytosis with WBC count of 22,000, afebrile  2. Sacral pressure ulcer unstagable   3. UTI; UA with > 20 WBC; does not make much urine due to ESRD; urine culture positive for VRE  4. Coagluase negative staph bacteremia likely from sacral pressure ulcer or contaminant  5. ESRD on HD on MWF  6. Atrial fibrillation; currently in sinus rhythm; on amiodarone, metoprolol  7. T2DM; Uncontrolled; HbA1C 13.1% (2/21)  8. H/o SMA thrombosis; S/p bowel resection with end jejunostomy with large lower midline open abdominal wound  9. Adrenal insufficiency; stable bilateral adrenal adenoma; larger one on the right side 2.1  3.1 cm (2/21)-->> 2.4 x 1.8 cm (5/21); cortisol level of 24 but it was drawn after hydrocortisone administration; on hydrocortisone 15 mg in the AM and 5 mg in the PM  10. Bilateral lower extremity wound  11.  Right toe stump wound    Plan:  · Off pressor  · Started on heparin drip, H/H stable; if H/H stable will start high dose heparin drip  · If further drop in H/H noted on heparin drip; will consult vascular surgery for IVC filter  · Transfuse as needed; goal Hb >7  · Continue ceftriaxone, linezolid per ID  · ID on board; appreciate recommendations  · Continue midodrine 10 mg TID  · Tapering hydrocortisone; will start home dose hydrocortisone 15 mg in the morning and 5 mg at night  · Started on amiodarone 200 mg twice daily; half the home dose with holding parameters; resume home dose of 400 mg BID as tolerated  · Metoprolol on hold due to hypotension  · Lantus on hold due to low blood sugar  · General surgery on board  · HD per nephrology  · Continue Lantus 5U at night and LDSS  · Monitor FSBS Q4 hourly  · Wound care on board  · Podiatry following  · Aspiration/ fall precautions    # Peptic ulcer prophylaxis: Pantoprazole  # DVT Prophylaxis: Heparin  # Disposition: Transfer to telemetry    Chito Roland  PGY 2  Internal Medicine Resident  Attending Physician: Estefania Webb    I personally saw, examined and provided care for the patient. Radiographs, labs and medication list were reviewed by me independently. Review of Residents documentation was conducted and revisions were made as appropriate. I agree with the above documented exam, problem list and plan of care. Hemoglobin tolerating on heparin drip at low-dose. If continues to tolerate then can advance to full. Abx per ID. Transition to telemetry floor.       Kuldip Tello DO

## 2021-05-25 NOTE — CONSULTS
Comprehensive Nutrition Assessment    Type and Reason for Visit:  Reassess, Consult    Nutrition Recommendations/Plan: Continue with diet and start ONS BID Demarcus, Nepro BID    Nutrition Assessment:  Pt admit w/ Sepsis 2/2 chronic sacral ulcer/necrosis. Noted extensive GI sx ~3mon ago. Noted hx DM, COPD, ESRD on HD, & Malnutrition. Consult for wounds. Will start ONS    Malnutrition Assessment:  Malnutrition Status:  Severe malnutrition    Context:  Chronic Illness     Findings of the 6 clinical characteristics of malnutrition:  Energy Intake:  7 - 75% or less estimated energy requirements for 1 month or longer  Weight Loss:  7 - 7.5% over 3 months     Body Fat Loss:  No significant body fat loss     Muscle Mass Loss:  No significant muscle mass loss    Fluid Accumulation:  No significant fluid accumulation     Strength:  Not Performed    Estimated Daily Nutrient Needs:  Energy (kcal):  ; Weight Used for Energy Requirements:  Current     Protein (g):   (1.5-1.8 g/kg); Weight Used for Protein Requirements:  Ideal        Fluid (ml/day):  per critical care; Method Used for Fluid Requirements:         Nutrition Related Findings:  A/ox4, AAbd WDL, +BS, Edema +2-+4 BLE, Ileostomy, -I/O -1.0L      Wounds:  Multiple, Surgical Incision, Open Wounds       Current Nutrition Therapies:    DIET RENAL; Carb Control: 4 carb choices (60 gms)/meal; Dental Soft  Dietary Nutrition Supplements: Renal Oral Supplement  Dietary Nutrition Supplements: Wound Healing Oral Supplement    Anthropometric Measures:  · Height: 5' 6\" (167.6 cm)  · Current Body Weight: 197 lb (89.4 kg) (5/24 bed scale)   · Admission Body Weight: 201 lb (91.2 kg) (first measured)    · Usual Body Weight: 230 lb (104.3 kg) (2/15 EMR actual)     · Ideal Body Weight: 130 lbs; % Ideal Body Weight 151.5 %   · BMI: 31.8  · BMI Categories: Obese Class 1 (BMI 30.0-34. 9)       Nutrition Diagnosis:   · Severe malnutrition, In context of chronic illness related to catabolic illness as evidenced by poor intake prior to admission, wounds, weight loss 7.5% in 3 months      Nutrition Interventions:   Food and/or Nutrient Delivery:  Continue Current Diet, Start Oral Nutrition Supplement  Nutrition Education/Counseling:  Education not indicated   Coordination of Nutrition Care:  Continue to monitor while inpatient    Goals:  Pt to consume >75% most meals/ONS       Nutrition Monitoring and Evaluation:   Behavioral-Environmental Outcomes:  None Identified   Food/Nutrient Intake Outcomes:  Food and Nutrient Intake, Supplement Intake  Physical Signs/Symptoms Outcomes:  Biochemical Data, Fluid Status or Edema, Hemodynamic Status, Nutrition Focused Physical Findings, Skin, Weight     Discharge Planning:     Too soon to determine     Electronically signed by Wali Donato RD, LD on 5/25/21 at 9:11 AM EDT    Contact: [phone]

## 2021-05-25 NOTE — PROGRESS NOTES
Physician Progress Note      Meghana Garcia  CSN #:                  [id number]  :                       1955  ADMIT DATE:       2021 9:53 AM  [address] [place] DATE:  RESPONDING  PROVIDER #:        Nicko Baxter MD          QUERY TEXT:    Pt admitted with Sepsis. Pt noted to have UTI and chronic indwelling urinary   catheter. If possible, please document in the progress notes and discharge   summary if you are evaluating and/or treating any of the following: The medical record reflects the following:  Risk Factors: chronic phillips cath  Clinical Indicators: Per  CC note UTI; UA with > 20 WBC; does not make   much urine due to ESRD; urine culture positive for VRE, Per ID questionable   UTI, Urine 66719 CFU e coli/<[id number] VRE  Treatment: ID consult, antibiotics, cultures and monitoring  Thank You, Carmita Davenport RN BSN [facility]  contact number [phone]  Options provided:  -- UTI due to chronic indwelling urinary catheter  -- UTI not due to indwelling urinary catheter  -- Uti ruled out  -- Other - I will add my own diagnosis  -- Disagree - Not applicable / Not valid  -- Disagree - Clinically unable to determine / Unknown  -- Refer to Clinical Documentation Reviewer    PROVIDER RESPONSE TEXT:    UTI is due to the chronic indwelling urinary catheter. Query created by: Reena Gatica on 2021 8:03 AM      QUERY TEXT:    Pt admitted with Sepsis. Noted documentation of Severe Protein Calorie   Malnutrition by Dietary consultant on .   If possible, please document in   progress notes and discharge summary:    The medical record reflects the following:  Risk Factors: chronic illness, extensive GI surgery 3 months ago, Npo status,   DM, ESRD, chronic wounds-sacrum, colostomy  Clinical Indicators: Per consult 7.5% wt loss over 3 months , energy intake   75% or less X 1 month, poor PO intake, Per woundcare unstageable sacral wound,   debridement done per GS  Treatment: Wound care, dietary consult, ONS, skin care, drsg  change and   monitoring  Thank You, Carmita Davenport Rn BSN [facility]  contact number [phone]  Options provided:  -- Severe Protein Calorie Malnutrition confirmed present on admission  -- Severe Protein Calorie Malnutrition ruled out  -- Other - I will add my own diagnosis  -- Disagree - Not applicable / Not valid  -- Disagree - Clinically unable to determine / Unknown  -- Refer to Clinical Documentation Reviewer    PROVIDER RESPONSE TEXT:    The diagnosis of Severe Protein Calorie Malnutrition confirmed as present on   admission.     Query created by: Mayra Spann on 5/25/2021 8:18 AM      Electronically signed by:  Marino Amaya MD 5/25/2021 8:38 AM

## 2021-05-25 NOTE — CARE COORDINATION
Care Coordination:  Met with patient this am to review discharge planning. Patient was at [facility] and can return there pending a precert but she is requesting to transfer to [facility] so she does not have to transport to HD. Crista from [facility] is also following. SW phoned Crista  this am and left  for update on bed status. PT/OT  ordered today . Current HD is at [facility] on [address]  6:15 am MWF. Facility was using [facility] to transport . Patient tells me today she will refuse to transport with them again due to long wait times for , rude drivers. SW to continue to follow. Randal  Spoke to [facility] at [facility] and reviewed  case with my supervisor Erick Larios . Toby  Is HD provider at [facility] and they are OON with [address] . At previous stay we were attempting an OON benefit. but was not successful at time of discharge. Per Erick Larios and Crista at [facility] , they will   folllow up to pursue an OON approval.  While in process, patient may be appropriate for LTAC . Referral made to Cameron almanzar at [facility] who will review respond today. 1155:   accepted at [facility]. [place] per patient preference. Cameron almanzar submitting for approval.  Critical Care and IM approve of plan . Anticipate discharge to [facility] tomorrow.

## 2021-05-25 NOTE — FLOWSHEET NOTE
inpatient Wound Care(follow up) 4409    Admit Date: 5/21/2021  9:53 AM    Reason for consult:  ileostomy management, right groin    Findings:     05/25/21 1221   Ileostomy Ileostomy   Placement Date: 02/17/21   Pre-existing: No  Inserted by: Dr. Rony Mcelroy  Ileostomy Type: Ileostomy   Stomal Appliance 1 piece; Intact   Stool Color Brown   Stool Appearance Loose   Wound 04/15/21 Groin Right   Date First Assessed/Time First Assessed: 04/15/21 1800   Present on Hospital Admission: Yes  Location: Groin  Wound Location Orientation: Right   Wound Image    Wound Etiology Surgical   Dressing Status Intact  (lifted to assess)   Dressing/Treatment Alginate  (stratasorb)   Wound Length (cm) 1 cm   Wound Width (cm) 5 cm   Wound Depth (cm) 0.1 cm   Wound Surface Area (cm^2) 5 cm^2   Change in Wound Size % (l*w) 58.33   Wound Volume (cm^3) 0.5 cm^3   Wound Healing % 98   Wound Assessment Pink/red   Drainage Amount Scant   Drainage Description Serosanguinous   Odor None   Santa-wound Assessment Intact   Wound Thickness Description not for Pressure Injury Partial thickness     **Informed Consent**    The patient has given verbal consent to have photos taken of wound and inserted into their chart as part of their permanent medical record for purposes of documentation, treatment management and/or medical review. All Images taken on 5/25/21 of patient name: Ben Schmidt were transmitted and stored on secured Evoz located within "[employer] Tab by a registered [employer] Mobile Application Device.      Plan:  Ileostomy pouch intact from yesterday, no need to change  Dr. Rosy Cardozo following buttocks  Orders reviewed      Audrey Mathews RN 5/25/2021 12:23 PM

## 2021-05-25 NOTE — PROGRESS NOTES
techniques and functional independence  Recommendation of environmental modifications for increased safety with functional transfers/mobility and ADLs  Sensory re-education to improve body/limb awareness, maintain/improve skin integrity, and improve hand/UE motor function  Splinting/positioning for increased function, prevention of contractures, and improve skin integrity  Therapeutic exercise to improve motor endurance, ROM, and functional strength for ADLs/functional transfers  Therapeutic activities to facilitate/challenge dynamic balance, stand tolerance for increased safety and independence with ADLs  Neuro-muscular re-education: facilitation of righting/equilibrium reactions, midline orientation, scapular stability/mobility, normalization of muscle tone, and facilitation of volitional active controled movement  Positioning to improve skin integrity, interaction with environment and functional independence  Manual techniques for edema management      Recommended Adaptive Equipment: TBD     Home Living: Pt admitted from [facility]    Prior Level of Function: Assistance with ADLs , Assistance with IADLs; pt reports has been completing bed level activity at nursing facility; has not been getting OOB/EOB d/t sacral wound. Pain Level: Pt reports BLE/sacral pain; did not quantify; re-positioned for comfort  Cognition: A&O: 4/4; Follows 1-2 step directions   Memory:  fair   Sequencing:  Fair+   Problem solving:  fair   Judgement/safety:  fair    RASS: 0  CAM-ICU: (NT) Delirium     Functional Assessment:  AM-PAC Daily Activity Raw Score: 14/24   Initial Eval Status  Date: 5/25/21 Treatment Status  Date: STGs = LTGs  Time frame: 10-14 days   Feeding Independent      Grooming Set-up (bed level)  Modified Moody    UB Dressing Min.  A (bed level)  Modified Moody    LB Dressing Dependent   Moderate Assist    Bathing Maximal Assist (simulated)  Minimal Assist    Toileting Dependent   Moderate Assist    Bed Mobility  Supine to sit: NT  Sit to supine: NT  Rolling: Max A  Supine to sit: Minimal Assist   Sit to supine: Minimal Assist    Functional Transfers NT  Moderate Assist    Functional Mobility NT  TBD   Balance NT  Sitting:     Static: SBA    Dynamic:SBA  Standing: Mod. A   Activity Tolerance Fair w/ bed level activity(limited d/t pain)  good   Visual/  Perceptual Glasses: readers  WFL                Hand Dominance R   AROM (PROM) Strength Additional Info:    RUE  WFL Grossly 4-/5 good  and wfl FMC/dexterity noted during ADL tasks       LUE WFL Grossly 4-/5 good  and wfl FMC/dexterity noted during ADL tasks       Hearing: WFL   Sensation:  Neuropathy   Tone: WFL   Edema: none noted      Vitals:   HR at rest: 78 bpm HR at end of session: 78 bpm   Spo2 at rest:95% Spo2 at end of session 100%   BP at rest:--- mmHg BP at end of session --- mmHg       Comments: OK from RN to see patient. Upon arrival, patient lying in bed. Pt demo fair tolerance with fair understanding of education/techniques. At end of session, patient lying in bed. Limited session d/t pt on bedrest order and per pt unable to sit EOB/OOB activity d/t sacral wound/ debridement. Call light within reach, all lines and tubes intact. Pt instructed on use of call light for assistance and fall prevention. Line management and environmental modifications made prior to and end of session to ensure patient safety and to increase efficiency of session. Skilled monitoring of HR, O2 saturation, blood pressure and patient's response to activity performed throughout session. Overall, pt presents with decreased activity tolerance, dynamic balance, functional mobility limiting completion of ADLs and safe return home. Pt can benefit from continued skilled OT to increase safety and functional independence. Treatment:   · Bed mobility:  Rolling completed in prep for simulated toileting task with cueing on hand placement.     · ADL completion: Self-care retraining for the above-mentioned ADLs; training on proper hand placement, safety technique, sequencing, and energy conservation techniques. Donned/doffed gown and washed face bed level. · Therapeutic Exercises: B UE AROM completed at all levels to maintain strength/flexibility and to decrease edema/contractures to enhance ADL completion. · Skilled positioning: Proper positioning to improve interaction with environment, overall functioning and decrease/prevent edema and contractures. BLEs placed on pillows and prevalon boots donned. Assistance of 2 required for safety d/t pt's medical complexity, line management and pt's deconditioned. Rehab Potential: Good for established goals     Patient / Family Goal: not stated      Patient and/or family were instructed on functional diagnosis, prognosis/goals and OT plan of care. Demonstrated fair+ understanding. Eval Complexity: Mod  · History: Expanded chart review of consults, imaging, and psychosocial history related to current functional performance. · Exam: 5+ performance deficits identified limiting functional independence and safe return home   · Assistance/Modification: Min/mod assistance or modifications required to perform tasks. May have comorbidities that affect occupational performance. Time In: 10:15  Time Out: 10:30  Total Treatment Time: 0 minutes    Min Units   OT Eval Low 79807       OT Eval Medium 97166  x 1   OT Eval High 30355       OT Re-Eval [id number]       Therapeutic Ex 15273       Therapeutic Activities 53762       ADL/Self Care 89031       Orthotic Management 81437       Neuro Re-Ed 83346       Non-Billable Time          Evaluation Time includes thorough review of current medical information, gathering information on past medical history/social history and prior level of function, completion of standardized testing/informal observation of tasks, assessment of data and education on plan of care and goals.           Zuleyma Winn
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
## 2021-05-25 NOTE — PROGRESS NOTES
Pharmacy Consultation Note  (Antibiotic Dosing and Monitoring)    Initial consult date: 5/22/2021  Consulting physician: Dr. Larissa Santos  Drug: Vancomycin  Indication: Sepsis    · Vancomycin has been discontinued. Clinical pharmacy will sign off, please reconsult if further assistance is needed.      Jerome Simental PharmD, BCPS 5/25/2021 12:23 PM

## 2021-05-26 NOTE — CARE COORDINATION
.  Care Coordination:   Plan to transfer to [address]  When precert obtained. [facility] trying to identify Medicare status as patient turns 73 in June. Norman Ramirez and Crista at [facility] checked CMS with no record of patient being Medicare  Eligible 6/1. Patient is on SSI with no significant work hx.so most likely will stay as [employer] primary. [facility]  reported no plan change is noted for June and benefit will continue as [employer] per their records. Albina at [facility] and Crista at [facility] continue to follow. Per Chauncey Abernathy, if patient returns there, they will provide ambulance transport to dialysis at [address] [place] [address]. Per Crista at [facility]  [facility] unit. Per ESTEFANIA at [employer] they are proceeding with submitting tor an [place] of this patient but anticipate this can take a few week. Ambulance form and envelop  In soft chart.
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
## 2021-05-26 NOTE — PROGRESS NOTES
[address] [facility]   Department of Internal Medicine   Internal Medicine Residency  MICU Progress Note    Patient:  Brynn Gregorio 60 y.o. female   MRN: [id number]       Date of Service: 2021    Allergy: Lisinopril, Procardia [nifedipine], and Metformin and related  follow up sepsis  Subjective     She stated that she is feeling well  Receiving dialysis today  H/H stable; started on Apixaban     Objective     TEMPERATURE:  Current - Temp: 98.3 °F (36.8 °C); Max - Temp  Av.1 °F (36.7 °C)  Min: 97.8 °F (36.6 °C)  Max: 98.3 °F (36.8 °C)  RESPIRATIONS RANGE: Resp  Av.3  Min: 13  Max: 23  PULSE RANGE: Pulse  Av.8  Min: 62  Max: 121  BLOOD PRESSURE RANGE:  Systolic (45LKY), QAQ:47 , Min:73 , LTD:526   ; Diastolic (34VUH), ZBU:94, Min:43, Max:100    PULSE OXIMETRY RANGE: SpO2  Av.2 %  Min: 94 %  Max: 100 %    I & O - 24hr:    Intake/Output Summary (Last 24 hours) at 2021 0807  Last data filed at 2021 0600  Gross per 24 hour   Intake 1392 ml   Output 2570 ml   Net -1178 ml     I/O last 3 completed shifts: In: 8077 [P.O.:200; I.V.:842; IV Piggyback:50]  Out: 2491 [Urine:130; Stool:950] No intake/output data recorded.    Weight change: -5 lb 15.2 oz (-2.7 kg)    Physical Examination:  · General: alert, awake, in no acute distress  · HEENT: NC, AT, moist oral mucosa  · Neck: supple  · Pulmonary: clear to auscultation bilaterally, no wheezing or crackles  · CV: RRR, S1 S2 heard, no MRG  · Abd: soft, nontender, nondistended, BS +, left colostomy bag in place  · Ext: bilateral pedal edema, more on the left LE  · Neuro: Alert, awake, no gross focal neurological deficit  · Skin: sacral pressure ulcer; covered with clean dressing; few small blisters of bilateral lower extremieties, improving    Medications     Continuous Infusions:   sodium chloride      heparin (PORCINE) Infusion 13.1 Units/kg/hr (21 4204)    dextrose       Scheduled Meds:   lidocaine PF  5 mL Intradermal Once    sodium chloride flush  5-40 mL Intravenous 2 times per day    heparin flush  3 mL Intravenous 2 times per day    hydrocortisone  15 mg Oral Daily with breakfast    hydrocortisone  5 mg Oral Daily    ertapenem (INVanz) IVPB  1,000 mg Intravenous Q24H    insulin lispro  0-6 Units Subcutaneous Q4H    linezolid  600 mg Oral 2 times per day    amiodarone  200 mg Oral BID    gabapentin  300 mg Oral Daily    miconazole   Topical BID    collagenase   Topical Daily    midodrine  10 mg Oral TID WC    [Held by provider] insulin glargine  5 Units Subcutaneous Nightly    sodium chloride (PF)  10 mL Intravenous BID    And    pantoprazole  40 mg Intravenous BID    clotrimazole   Topical BID    sodium chloride flush  5-40 mL Intravenous 2 times per day    zinc sulfate  50 mg Oral Daily    epoetin konrad-epbx  30 Units/kg Subcutaneous Once per day on Mon Wed Fri    ascorbic acid  1,500 mg Intravenous Daily     PRN Meds: sodium chloride flush, sodium chloride, heparin flush, heparin (porcine), heparin (porcine), heparin (porcine), sodium chloride flush, promethazine **OR** ondansetron, polyethylene glycol, acetaminophen **OR** [DISCONTINUED] acetaminophen, fentanNYL, glucose, dextrose, glucagon (rDNA), dextrose    Labs and Imaging Studies     CBC:   Recent Labs     05/24/21  0550 05/24/21  2200 05/25/21  0556 05/26/21  0438   WBC 12.3*  --  11.3 15.4*   HGB 7.6* 8.5* 8.3* 8.6*   HCT 24.2* 27.2* 26.6* 28.1*   MCV 91.3  --  93.0 94.0     --  344 365       BMP:    Recent Labs     05/24/21  1611 05/25/21  0556 05/26/21  0439    138 137   K 3.4* 3.8 4.1   CL 99 99 98   CO2 28 28 30*   BUN 8 11 17   CREATININE 1.0 1.4* 2.0*   GLUCOSE 87 94 98       LIVER PROFILE:   Recent Labs     05/24/21  0550 05/25/21  0556 05/26/21  0439   AST 9 10 9   ALT <5 <5 5   BILITOT 0.5 0.4 <0.2   ALKPHOS 78 83 84       PT/INR:   No results for input(s): PROTIME, INR in the last 72 hours.     APTT:   Recent Labs     05/24/21  2200 P/F          Lines:  Site  Day  Date inserted     TLC              PICC              Arterial line              Peripheral line              HD cath            Urethral Catheter-Output (mL): 40 mL    Imaging Studies:    VL LOWER EXTREMITY ARTERIAL SEGMENTAL PRESSURES W PPG BILATERAL   Final Result      US DUP LOWER EXTREMITIES BILATERAL VENOUS   Final Result   There is evidence for deep venous thrombosis, the appearance is   slightly improved in the interval      ALERT:  THIS IS AN ABNORMAL REPORT               XR FOOT RIGHT (2 VIEWS)   Final Result   Chronic appearing findings. MRI would be useful if symptoms persist, or if   there is concern for osteomyelitis. XR CHEST PORTABLE   Final Result   No acute pulmonary infiltrates are identified         CT ABDOMEN PELVIS W IV CONTRAST Additional Contrast? None   Final Result   1. New sacral decubitus ulcer with multiple foci of subcutaneous gas within   adjacent soft tissue and suspected developing abscess posterior to the anus   on axial image number 5: Series 3 measuring 2.2 x 1.8 cm.      2.  Redemonstration of heterogeneous fluid collection located in left   anterior thigh musculature which is slightly smaller compared to previous   examination which could suggest decreasing left intramuscular abscess or   hematoma. 3.  Loculated fluid collection located in right central pelvis appears   similar in size compared to prior with subtle wall enhancement and adjacent   inflammatory changes which could suggest abscess (image 195, series 2). 4.  Redemonstration of open ventral abdominal wall wound with decreasing   inflammation at this site. 5.  Thickened wall of urinary bladder related to cystitis. 6.  Thickened mucosa of the distal stomach and proximal duodenum related to   gastritis and duodenitis. 7.  Small perisplenic ascites which has decreased compared to prior. 8.  Redemonstration of right adrenal gland nodule. Continued follow-up could   be helpful for further evaluation. Resident's Assessment and Plan      Fili Martinez is 60 y.o. female with a PMH of  A. 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 Assessment:  1. Hemodynamic shock likely 2/2 unstagable sacral pressure ulcer vs UTI; on levophed; presented with leukocytosis with WBC count of 22,000, afebrile  2. Sacral pressure ulcer unstagable   3. UTI; UA with > 20 WBC; does not make much urine due to ESRD; urine culture positive for VRE  4. Coagluase negative staph bacteremia likely from sacral pressure ulcer or contaminant  5. ESRD on HD on MWF  6. Atrial fibrillation; currently in sinus rhythm; on amiodarone, metoprolol  7. T2DM; Uncontrolled; HbA1C 13.1% (2/21)  8. H/o SMA thrombosis; S/p bowel resection with end jejunostomy with large lower midline open abdominal wound  9. Adrenal insufficiency; stable bilateral adrenal adenoma; larger one on the right side 2.1  3.1 cm (2/21)-->> 2.4 x 1.8 cm (5/21); cortisol level of 24 but it was drawn after hydrocortisone administration; on hydrocortisone 15 mg in the AM and 5 mg in the PM  10. Bilateral lower extremity wound  11.  Right toe stump wound    Plan:  · Off pressor  · Started apixaban  · Monitor H/H  Q12  · Transfuse as needed; goal Hb >7  · Antibiotics changed to ertapenem, linezolid per ID  · ID on board; appreciate recommendations  · Continue midodrine 10 mg TID  · Continue hydrocortisone 15 mg in the morning and 5 mg in the evening  · Started on amiodarone 200 mg twice daily; half the home dose with holding parameters; resume home dose of 400 mg BID as tolerated  · Metoprolol on hold due to hypotension  · Lantus on hold due to low blood sugar  · General surgery on board  · HD per nephrology  · Monitor FSBS Q4 hourly  · Wound care on board  · Podiatry following  · Aspiration/ fall precautions  · PT/OT    # Peptic ulcer prophylaxis: Pantoprazole  # DVT Prophylaxis: Enoxaparin  # Disposition: Transfer to [facility]  PGY 2  Internal Medicine Resident  Attending Physician: Janina Cantu    I personally saw, examined and provided care for the patient. Radiographs, labs and medication list were reviewed by me independently. Review of Residents documentation was conducted and revisions were made as appropriate. I agree with the above documented exam, problem list and plan of care.         Natalie Cantu, DO

## 2021-05-26 NOTE — PROGRESS NOTES
°C)  Constitutional: The patient is awake, alert, and oriented. Skin: Warm and dry. No rashes were noted. Sacral decubitus fairly clean granulation tissue        5/21/2021  HEENT: Round and reactive pupils. Moist mucous membranes. No ulcerations or thrush. Neck: Supple to movements. Chest: No use of accessory muscles to breathe. Symmetrical expansion. No wheezing, crackles or rhonchi. Cardiovascular: S1 and S2 are rhythmic and regular. No murmurs appreciated. Abdomen: Positive bowel sounds to auscultation. Benign to palpation. No masses felt. No hepatosplenomegaly. Ostomy with tinea corporis around the back  Genitourinary: Bates  Extremities: No clubbing, no cyanosis, no edema.   Lines: peripheral    Laboratory and Tests Review:  Lab Results   Component Value Date    WBC 11.3 05/25/2021    WBC 12.3 (H) 05/24/2021    WBC 12.8 (H) 05/23/2021    HGB 8.3 (L) 05/25/2021    HCT 26.6 (L) 05/25/2021    MCV 93.0 05/25/2021     05/25/2021     Lab Results   Component Value Date    NEUTROABS 10.51 (H) 05/25/2021    NEUTROABS 9.97 (H) 05/24/2021    NEUTROABS 10.57 (H) 05/23/2021     No results found for: [facility]  Lab Results   Component Value Date    ALT <5 05/25/2021    AST 10 05/25/2021    ALKPHOS 83 05/25/2021    BILITOT 0.4 05/25/2021     Lab Results   Component Value Date     05/25/2021    K 3.8 05/25/2021    K 3.4 05/24/2021    CL 99 05/25/2021    CO2 28 05/25/2021    BUN 11 05/25/2021    CREATININE 1.4 05/25/2021    CREATININE 1.0 05/24/2021    CREATININE 2.0 05/24/2021    GFRAA 46 05/25/2021    LABGLOM 38 05/25/2021    GLUCOSE 94 05/25/2021    GLUCOSE 88 05/03/2012    PROT 5.1 05/25/2021    LABALBU 2.7 05/25/2021    LABALBU 2.8 05/03/2012    CALCIUM 8.2 05/25/2021    BILITOT 0.4 05/25/2021    ALKPHOS 83 05/25/2021    AST 10 05/25/2021    ALT <5 05/25/2021     Lab Results   Component Value Date    CRP 20.3 (H) 05/22/2021    CRP 11.6 (H) 03/22/2021    CRP 12.9 (H) 03/04/2021     Lab Results Component Value Date    SEDRATE 113 (H) 05/22/2021    SEDRATE 90 (H) 03/22/2021    SEDRATE 98 (H) 03/04/2021     Radiology:  Reviewed    Microbiology:   Lab Results   Component Value Date    [facility]  05/21/2021     24 Hours no growth  Gram stain performed from blood culture bottle media  Gram positive cocci in clusters  Previously positive blood culture called      [facility] Identification and sensitivity to follow 05/21/2021    BC 5 Days no growth 04/19/2021    ORG Escherichia coli 05/22/2021    ORG Gram positive cocci 05/22/2021    ORG Bacteroides fragilis group 05/22/2021    ORG Anaerobic gram negative braxton 05/22/2021     Lab Results   Component Value Date    BLOODCULT2  05/21/2021     Gram stain performed from blood culture bottle media  Gram positive cocci in clusters      BLOODCULT2 Identification and sensitivity to follow 05/21/2021    BLOODCULT2 5 Days no growth 04/19/2021    ORG Escherichia coli 05/22/2021    ORG Gram positive cocci 05/22/2021    ORG Bacteroides fragilis group 05/22/2021    ORG Anaerobic gram negative braxton 05/22/2021     WOUND/ABSCESS   Date Value Ref Range Status   05/22/2021 Heavy growth  Final   05/22/2021 Heavy growth  Final   05/22/2021   Preliminary    Moderate growth  Identification and sensitivity to follow  Refer to previous CXWND culture collected 5-22-21 at [address] [place] [address] for susceptibility results     05/22/2021   Preliminary    Moderate growth  Identification and sensitivity to follow  Refer to previous CXWND culture collected 5-22-21 at [facility] for susceptibility results     05/22/2021   Preliminary    Moderate growth  Identification and sensitivity to follow       Smear, Respiratory   Date Value Ref Range Status   02/20/2021   Final    Group 6: <25 PMN's/LPF and <25 Epithelial cells/LPF  Polymorphonuclear leukocytes not seen  Epithelial cells not seen  No organisms seen           Component Value Date/Time    AFBCX No growth after 6 weeks of incubation. 03/20/2021 1515    FUNGSM No Fungal elements seen 03/20/2021 1515    LABFUNG No growth after 4 weeks of incubation. 03/20/2021 1515     CULTURE, RESPIRATORY   Date Value Ref Range Status   02/20/2021 Growth not present  Oral Pharyngeal Jackie absent    Final     No results found for: CXCATHTIP  Body Fluid Culture, Sterile   Date Value Ref Range Status   05/22/2021 (A)  Preliminary    Additional growth present, also evaluating for;  Gram positive organisms     05/22/2021   Preliminary    Heavy growth  Sensitivity to follow  Refer to previous CXWND culture collected 5-22-21 at [facility] for susceptibility results     05/22/2021   Preliminary    Moderate growth  Identification and sensitivity to follow       Culture Surgical   Date Value Ref Range Status   03/20/2021 Growth not present  Final     Creatinine Ur POCT   Date Value Ref Range Status   03/23/2018 100  Final     Urine Culture, Routine   Date Value Ref Range Status   05/22/2021 50,000 CFU/ml  Final   05/22/2021   Final    <25,000 CFU/ml  Vancomycin resistant Enterococci isolated     04/21/2021 >100,000 CFU/ml  Final     MRSA Culture Only   Date Value Ref Range Status   05/22/2021 Methicillin resistant Staph aureus not isolated  Final   04/19/2021 Methicillin resistant Staph aureus not isolated  Final   02/17/2021 Methicillin resistant Staph aureus not isolated  Final     Microbiology:    Urine 5/22/2021 E. coli and E faecium VRE  Wound culture gram-negative rods E coli and Kleb  Body fluids gram-negative rods E coli and probably VRE as well as b frag  Blood cultures from 521 2 out of 2 sets with coag negative staph epi      ASSESSMENT:  · Coag negative staph bacteremia may be related to catheter sepsis but most likely contaminant.   Will possibly related to the decubitus  · History of fungemia  · Questionable UTI   · Questionable infected sacral decubitus-  · Tinea corporis  · Leukocytosis improving    PLAN:  · Continue invanz and add Zyvox  · Topical Lotrimin  · Check final cultures  · Monitor labs    Harman Yeager MD  8:19 PM  5/25/2021

## 2021-05-26 NOTE — FLOWSHEET NOTE
Patient transferred to [address] [place] [address], report given. Patient transferred with left femoral TLC due to inability to gain other access at this time. Picc line permit signed and IV team aware of the patient transfer and will follow thru on it. Dr Lissett Rincon also aware of transfer with femoral TLC in lieu of picc line placement.

## 2021-05-26 NOTE — FLOWSHEET NOTE
Pt ran 4 hours; 3251 bath; 2 L removed, UF goal limited by hypotension; stable/tolerated fair; denies c/o. HD CVC Hep locked per lumen fill volume, capped & clamped post Tx. Good Access flow no issues achieving prescribed BFR. Next Tx scheduled Friday 5/28. Negative refill via CritLine in last 15 min of Tx with minimal Hct change. 05/26/21 1145   Vital Signs   BP (!) 92/42   Temp 98.4 °F (36.9 °C)   Pulse 80   Resp 17   SpO2 95 %   Pain Assessment   Pain Assessment 0-10   Pain Level 0   Post-Hemodialysis Assessment   Post-Treatment Procedures Blood returned;Catheter capped, clamped and heparinized x 2 ports   Machine Disinfection Process Exterior Machine Disinfection   Rinseback Volume (ml) 300 ml   Total Liters Processed (l/min) 92.6 l/min   Dialyzer Clearance Lightly streaked   Duration of Treatment (minutes) 240 minutes   Hemodialysis Intake (ml) 100 ml   Hemodialysis Output (ml) 2400 ml   NET Removed (ml) 2000 ml   Tolerated Treatment Fair   Patient Response to Treatment UF Goal limited by hypotension   Bilateral Breath Sounds Diminished   Edema Generalized +1   Physician Notified?  No

## 2021-05-26 NOTE — PLAN OF CARE
Problem: Pain:  Goal: Pain level will decrease  5/26/2021 0015 by Lili Hicks RN  Outcome: Met This Shift     Problem: Pain:  Goal: Pain level will decrease  5/26/2021 0014 by Lili Hicks RN  Outcome: Met This Shift     Problem: Pain:  Goal: Control of acute pain  5/26/2021 0015 by Lili Hicks RN  Outcome: Met This Shift     Problem: Pain:  Goal: Control of acute pain  5/26/2021 0014 by Lili Hicks RN  Outcome: Met This Shift     Problem: Pain:  Goal: Control of chronic pain  Outcome: Met This Shift     Problem: Skin Integrity:  Goal: Will show no infection signs and symptoms  Outcome: Met This Shift     Problem: Skin Integrity:  Goal: Absence of new skin breakdown  5/26/2021 0015 by Lili Hicks RN  Outcome: Met This Shift     Problem: Falls - Risk of:  Goal: Will remain free from falls  5/26/2021 0015 by Lili Hicks RN  Outcome: Met This Shift     Problem: Falls - Risk of:  Goal: Absence of physical injury  5/26/2021 0015 by Lili Hicks RN  Outcome: Met This Shift     Problem: Bowel/Gastric:  Goal: Complications related to the disease process, condition or treatment will be avoided or minimized  Outcome: Met This Shift     Problem:  Bowel/Gastric:  Goal: Will be independent with ostomy care  Outcome: Met This Shift

## 2021-05-26 NOTE — PROGRESS NOTES
Unable to obtain new peripheral access. Existing PIVs are  and the pt has a femoral TLC from . Evaluate need for PICC for IV ATB therapy and heparin gtt currently.
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
## 2021-05-26 NOTE — PROGRESS NOTES
ET note: Following patient for ileostomy management. Pouch changed by nursing this am. Will follow up tomorrow.  Mario Hale RN
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
## 2021-05-27 NOTE — DISCHARGE INSTR - COC
Continuity of Care Form    Patient Name: Linwood Koyanagi   :  1955  MRN:  [id number]    Admit date:  2021  Discharge date: 21    Code Status Order: Full Code   Advance Directives:   [facility] Documentation       Date/Time Healthcare Directive Type of Healthcare Directive Copy in [facility] [address] Agent's Name Healthcare Agent's Phone Number    21 1115  No, patient does not have an advance directive for healthcare treatment -- -- -- -- --            Admitting Physician:  Hope Millan MD  PCP: Pa Hills MD    Discharging Nurse: [facility] Unit/Room#: 4322/5569-Q  Discharging Unit Phone Number: ***    Emergency Contact:   Extended Emergency Contact Information  Primary Emergency Contact: Thuy Pan [address] Phone: [phone]  Relation: Child  Secondary Emergency Contact: Chrissy Ngo  [email or web address] Phone: [phone]  Relation: Child    Past Surgical History:  Past Surgical History:   Procedure Laterality Date    ANTERIOR COMPARTMENT DECOMPRESSION Right 2021    RIGHT LOWER EXTREMITY FASCIOTOMY CLOSURE performed by Kavita Workman MD at [facility]  2016    Dr Karen Sapp  5/3/2012         EMG  2015    Dr Deepak Lowe, radiculopathy    ESOPHAGUS SURGERY  2016    Dr Paulo Soriano ablation   [facility] Right 3/20/2021    RIGHT PARTIAL [address] performed by Prachi Loera DPM at [address] [place] [address]  N/A 2021    DIAGNOSTIC LAPAROSCOPY, CONVERTED TO LAPAROTOMY WITH SMALL BOWEL RESECTION, WITH APPLICATION OF ABTHERA WOUND VAC performed by Fely Aldridge MD at [address]  2015    medial branch blocks, Dr. Rahul Marshall POLYSOMNOGRAPHY  04/10/2017    Dr Shae Collins sleep apnea AHI-8    POLYSOMNOGRAPHY  05/15/2017    PRESSURE ULCER DEBRIDEMENT N/A 5/22/2021    SACRAL DECUBITUS ULCER DEBRIDEMENT performed by My Stevens MD at [facility] N/A 2/17/2021    RIGHT ILLEOSTOMY AND RIGHT COLECTOMY performed by Rupesh Flor MD at [address] [place] [address] TRANSESOPHAGEAL ECHOCARDIOGRAM  04/02/2021    Dr. Jenny Sepulveda  5/27/2016    Dr Kamala Chino  07/14/2016    Dr Krysten Nazarios Right 2/17/2021    Iliac Thrombectomy with Right Lower Extremity Fasciotomy performed by Bijan Dumont MD at [address] [place] [address] Left 3/30/2021    CATHETER INSERTION TEMPORARY HEMODIALYSIS performed by Darrin Jarrell MD at [address] [place] [address] N/A 3/31/2021    CATHETER INSERTION,TEMPORAY  HEMODIALYSIS, NEEDS FLURO, PT IN SELECT, AVAILABLE ANYTIME performed by Darrin Jarrell MD at [address] [place] [address] N/A 4/5/2021    CATHETER INSERTION HEMODIALYSIS WITH LEFT FEMORAL TEMPORARY HEMODIALYSIS ACCESS performed by Carlos Rehman MD at [address] [place] [address] N/A 4/15/2021    REMOVAL AND REINSERTION TUNNELED HEMODIALYSIS CATHETER performed by Bijan Dumont MD at [address] [place] [address]       Immunization History:   Immunization History   Administered Date(s) Administered    Influenza Virus Vaccine 11/10/2015    Influenza, Marc Oneil, IM, (6 mo and older Fluzone, Flulaval, Fluarix and 3 yrs and older Afluria) 11/27/2017    Pneumococcal Polysaccharide ([id number]) 02/09/2011    Tdap (Boostrix, Adacel) 08/21/2013       Active Problems:  Patient Active Problem List   Diagnosis Code    Hypertension I10    Fibromyalgia M79.7    IBS (irritable bowel syndrome) K58.9    GERD (gastroesophageal reflux disease) K21.9    Cigarette nicotine dependence without complication T48.841    Restless leg syndrome G25.81    Chronic back pain M54.9, G89.29    Adrenal mass (HCC) E27.8    DDD (degenerative disc disease) TST8810    Mild Complications due to renal dialysis device, implant, and graft T82. 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/21 05/22/21 Culture, Urine        Enterococcus faecium urine 5/22/21    Resolved    COVID-19 Rule Out 05/22/21 05/22/21 05/22/21 Respiratory Panel, Molecular, with COVID-19 (Restricted: peds pts or suitable admitted adults) (Ordered)   05/22/21 Rule-Out Test Resulted    COVID-19 Rule Out 05/21/21 05/21/21 05/21/21 COVID-19, Rapid (Ordered)   05/21/21 Rule-Out Test Resulted    C-diff Rule Out 04/14/21 04/14/21 04/14/21 Clostridium difficile EIA (Ordered)   04/14/21 Rule-Out Test Resulted    COVID-19 Rule Out 03/03/21 03/03/21 03/03/21 Respiratory Panel, Molecular, with COVID-19 (Restricted: peds pts or suitable admitted adults) (Ordered)   03/03/21 Rule-Out Test Resulted    COVID-19 03/03/21 03/03/21 03/03/21 Respiratory Panel, Molecular, with COVID-19 (Restricted: peds pts or suitable admitted adults)   03/29/21 Valentino Field RN    Covid Not detected-3/8/21, 3/12/21  No temperature in last 24hours  No tylenol given in last 24 hours    COVID-19 Rule Out 02/16/21 02/16/21 02/16/21 Covid-19 Ambulatory (Ordered)   02/17/21 Rule-Out Test Resulted    COVID-19 Rule Out 02/15/21 02/15/21 02/15/21 COVID-19, Rapid (Ordered)   02/15/21 Rule-Out Test Resulted            Nurse Assessment:  Last Vital Signs: BP (!) 110/54 Comment: manual  Pulse 85   Temp 96.2 °F (35.7 °C) (Temporal)   Resp 22   Ht 5' 6\" (1.676 m)   Wt 199 lb 7 oz (90.5 kg)   LMP  (LMP Unknown)   SpO2 98%   BMI 32.19 kg/m²     Last documented pain score (0-10 scale): Pain Level: 10  Last Weight:   Wt Readings from Last 1 Encounters:   05/27/21 199 lb 7 oz (90.5 kg)     Mental Status: oriented    IV Access:  - Central Venous Catheter  - site  femoral, insertion date: ***    Nursing Mobility/ADLs:  Walking   Dependent  Transfer  Dependent  Bathing  Assisted  Dressing  Dependent  Toileting  Dependent  Feeding  Dependent  Med Admin  Dependent  Med Delivery   whole    Wound Care Documentation and Therapy:  Wound 02/17/21 Abdomen Mid (Active)   Wound Image   05/24/21 1055   Wound Etiology Non-Healing Surgical 05/27/21 1018   Dressing Status Clean;Dry; Intact 05/27/21 1018   Wound Cleansed Cleansed with saline 05/27/21 1018   Dressing/Treatment ABD;Dry dressing;Moist to dry 05/27/21 1018   Dressing Change Due 05/27/21 05/27/21 1018   Wound Length (cm) 8 cm 05/26/21 1200   Wound Width (cm) 9 cm 05/26/21 1200   Wound Depth (cm) 4 cm 05/26/21 1200   Wound Surface Area (cm^2) 72 cm^2 05/26/21 1200   Change in Wound Size % (l*w) 0 05/26/21 1200   Wound Volume (cm^3) 288 cm^3 05/26/21 1200   Wound Healing % 0 05/26/21 1200   Undermining Starts ___ O'Clock 11 05/27/21 1018   Undermining Ends___ O'Clock 6 05/27/21 1018   Undermining Maxium Distance (cm) 4 05/27/21 1018   Wound Assessment Pink/red 05/27/21 1018   Drainage Amount None 05/27/21 1018   Santa-wound Assessment Intact; Blanchable erythema 05/27/21 1018   Number of days: 99       Wound 04/27/21 Abdomen Mid (Active)   Wound Image   04/27/21 1315   Wound Etiology Surgical 04/28/21 1640   Dressing Status New dressing applied 04/28/21 1100   Wound Cleansed Cleansed with saline 04/28/21 1100   Dressing/Treatment ABD;Dry dressing;Moist to dry 04/28/21 1640   Dressing Change Due 04/28/21 04/28/21 1100   Wound Length (cm) 9.4 cm 04/27/21 1315   Wound Width (cm) 6.8 cm 04/27/21 1315   Wound Depth (cm) 7 cm 04/27/21 1315   Wound Surface Area (cm^2) 63.92 cm^2 04/27/21 1315   Change in Wound Size % (l*w) 20.1 04/27/21 1315   Wound Volume (cm^3) 447.44 cm^3 04/27/21 1315   Wound Healing % -40 04/27/21 1315   Undermining Starts ___ O'Clock 12 04/21/21 2000 Undermining Ends___ O'Clock 1159 04/21/21 2000   Undermining Maxium Distance (cm) 5 04/21/21 2000   Wound Assessment Pink/red 04/28/21 1100   Drainage Amount Scant 04/28/21 1100   Drainage Description Serosanguinous 04/28/21 1100   Odor None 04/28/21 1100   Santa-wound Assessment Other (Comment) 04/28/21 1100   Number of days: 30       Wound 04/15/21 Groin Right (Active)   Wound Image   05/25/21 1221   Wound Etiology Surgical 05/27/21 1018   Dressing Status Intact 05/27/21 1018   Wound Cleansed Cleansed with saline 05/27/21 1018   Dressing/Treatment Alginate 05/27/21 1018   Dressing Change Due 04/19/21 05/27/21 1018   Wound Length (cm) 1 cm 05/25/21 1221   Wound Width (cm) 5 cm 05/25/21 1221   Wound Depth (cm) 0.1 cm 05/25/21 1221   Wound Surface Area (cm^2) 5 cm^2 05/25/21 1221   Change in Wound Size % (l*w) 58.33 05/25/21 1221   Wound Volume (cm^3) 0.5 cm^3 05/25/21 1221   Wound Healing % 98 05/25/21 1221   Wound Assessment Pink/red 05/27/21 1018   Drainage Amount Scant 05/27/21 1018   Drainage Description Serosanguinous 05/27/21 1018   Odor None 05/27/21 1018   Santa-wound Assessment Intact 05/27/21 1018   Wound Thickness Description not for Pressure Injury Partial thickness 05/27/21 1018   Number of days: 41       Wound 04/27/21 Coccyx extending to bilateral buttocks (Active)   Wound Image   04/27/21 1315   Wound Etiology Pressure Unstageable 04/27/21 1315   Dressing Status Other (Comment) 04/22/21 1200   Dressing/Treatment Pharmaceutical agent (see MAR) 04/28/21 1100   Offloading for Diabetic Foot Ulcers Yes (type); Other (comment) 04/21/21 2000   Wound Length (cm) 11 cm 04/27/21 1315   Wound Width (cm) 11 cm 04/27/21 1315   Wound Depth (cm) 0.01 cm 04/19/21 0133   Wound Surface Area (cm^2) 121 cm^2 04/27/21 1315   Change in Wound Size % (l*w) 0 04/27/21 1315   Wound Volume (cm^3) 0.21 cm^3 04/19/21 0133   Wound Healing % 98 04/19/21 0133   Wound Assessment Pink/red 04/28/21 1100   Drainage Amount Scant 04/28/21 1100   Drainage Description Serosanguinous 04/28/21 1100   Odor None 04/28/21 1100   Santa-wound Assessment Intact 04/28/21 1100   Number of days: 30       Wound 05/22/21 Sacrum (Active)   Dressing Status Clean;Dry; Intact 05/27/21 1018   Wound Cleansed Cleansed with saline 05/27/21 1018   Dressing/Treatment Moist to dry 05/27/21 1018   Dressing Change Due 05/27/21 05/27/21 1018   Wound Length (cm) 10 cm 05/26/21 0800   Wound Width (cm) 7 cm 05/26/21 0800   Wound Depth (cm) 2.5 cm 05/26/21 0800   Wound Surface Area (cm^2) 70 cm^2 05/26/21 0800   Change in Wound Size % (l*w) 0 05/26/21 0800   Wound Volume (cm^3) 175 cm^3 05/26/21 0800   Wound Healing % 0 05/26/21 0800   Wound Assessment Other (Comment) 05/27/21 1018   Drainage Amount Moderate 05/27/21 1018   Drainage Description Serosanguinous 05/27/21 1018   Santa-wound Assessment Fragile 05/27/21 1018   Number of days: 5        Elimination:  Continence:   · Bowel:   · Bladder: Bates  Urinary Catheter: ***  Colostomy/Ileostomy/Ileal Conduit: Yes  Ileostomy Ileostomy-Stomal Appliance: 1 piece, Dry, Intact  Ileostomy Ileostomy-Stoma  Assessment: Moist, Pink, Retracted  Ileostomy Ileostomy-Peristomal Assessment: Red  Ileostomy Ileostomy-Treatment: Bag change, Stoma powder (skin care, medical adhesive spray, strips)  Ileostomy Ileostomy-Stool Appearance: Loose  Ileostomy Ileostomy-Stool Color: Yellow, Brown  Ileostomy Ileostomy-Stool Amount: Medium  Ileostomy Ileostomy-Output (mL): 50 ml    Date of Last BM:     Intake/Output Summary (Last 24 hours) at 5/27/2021 1222  Last data filed at 5/27/2021 1018  Gross per 24 hour   Intake 1164 ml   Output 1850 ml   Net -686 ml     I/O last 3 completed shifts: In: 1264 [P. O.:980; I.V.:184]  Out: 5843 [Urine:25; Stool:2050]    Safety Concerns:         Impairments/Disabilities:      None    Nutrition Therapy:  Current Nutrition Therapy:   - Oral Diet:  General    Routes of Feeding: Oral  Liquids: No Restrictions  Daily Fluid Restriction: no  Last Modified Barium Swallow with Video (Video Swallowing Test): not done    Treatments at the Time of Hospital Discharge:   Respiratory Treatments:   Oxygen Therapy:  is not on home oxygen therapy. Ventilator:    - No ventilator support    Rehab Therapies: {THERAPEUTIC INTERVENTION:2351565488}  Weight Bearing Status/Restrictions: Cielo LEE Weight Bearin:::0}  Other Medical Equipment (for information only, NOT a DME order):  {EQUIPMENT:636162921}  Other Treatments: ***    Patient's personal belongings (please select all that are sent with patient):  Amberly    RN SIGNATURE:  Electronically signed by Zachary Tena RN on 21 at 3:37 PM EDT    CASE MANAGEMENT/SOCIAL WORK SECTION    Inpatient Status Date: ***    Readmission Risk Assessment Score:  Readmission Risk              Risk of Unplanned Readmission:  41           Discharging to Facility/ Agency   · Name:   · Address:  · Phone:  · Fax:    Dialysis Facility (if applicable)   · Name:  · Address:  · Dialysis Schedule:  · Phone:  · Fax:    / signature: {Esignature:231789809:::0}    PHYSICIAN SECTION    Prognosis: {Prognosis:4164499441:::0}    Condition at Discharge: Cielo Anand Patient Condition:819357014:::0}    Rehab Potential (if transferring to Rehab): {Prognosis:2480966821:::0}    Recommended Labs or Other Treatments After Discharge: ***    Physician Certification: I certify the above information and transfer of Don Mares  is necessary for the continuing treatment of the diagnosis listed and that she requires {Admit to Appropriate Level of Care:85972:::0} for {GREATER/LESS:724568943} 30 days.      Update Admission H&P: {CHP DME Changes in HandP:072885926:::0}    PHYSICIAN SIGNATURE:  Nia Arora MD

## 2021-05-27 NOTE — PROGRESS NOTES
Progress Note  Date:2021       WKRN:[id number]  Patient Name:Maggy Hodge     YOB: 1955     Age:65 y.o. Patient complains of some leg pain. Subjective    Subjective:  Symptoms:  Improved. No shortness of breath or chest pain. Diet:  Adequate intake. Activity level: Impaired due to pain. Pain:  She complains of pain that is mild. Review of Systems   Constitutional: Negative for activity change and fever. HENT: Negative for congestion. Respiratory: Negative for shortness of breath. Cardiovascular: Negative for chest pain. Gastrointestinal: Negative for abdominal pain. Musculoskeletal: Positive for arthralgias. Psychiatric/Behavioral: Negative for agitation. Objective         Vitals Last 24 Hours:  TEMPERATURE:  Temp  Av.4 °F (36.9 °C)  Min: 97.6 °F (36.4 °C)  Max: 99.1 °F (37.3 °C)  RESPIRATIONS RANGE: Resp  Av.3  Min: 10  Max: 27  PULSE OXIMETRY RANGE: SpO2  Av.9 %  Min: 92 %  Max: 100 %  PULSE RANGE: Pulse  Av.7  Min: 67  Max: 98  BLOOD PRESSURE RANGE: Systolic (85ZMP), SCI:32 , Min:72 , JCH:026   ; Diastolic (40DFW), ZGQ:84, Min:42, Max:76    I/O (24Hr): Intake/Output Summary (Last 24 hours) at 2021 0647  Last data filed at 2021 0135  Gross per 24 hour   Intake 1264 ml   Output 4475 ml   Net -3211 ml     Objective:  General Appearance:  Comfortable. Vital signs: (most recent): Blood pressure (!) 92/55, pulse 84, temperature 98.9 °F (37.2 °C), temperature source Temporal, resp. rate 20, height 5' 6\" (1.676 m), weight 199 lb 7 oz (90.5 kg), SpO2 100 %, not currently breastfeeding. No fever. Lungs:  Normal effort and normal respiratory rate. Breath sounds clear to auscultation. Heart: Normal rate. Regular rhythm.   S1 normal and S2 normal.      Labs/Imaging/Diagnostics    Labs:  CBC:  Recent Labs     21  0556 21  0438 21  1316 21  0505   WBC 11.3 15.4*  --  16.7*   RBC 2.86* 2.99*  --

## 2021-05-27 NOTE — DISCHARGE SUMMARY
Discharge Summary    Date: 5/27/2021  Patient Name: Carilyn Saint YOB: 1955 Age: 60 y.o. Admit Date: 5/21/2021  Discharge Date: 5/27/2021  Discharge Condition: Stable    Admission Diagnosis  Sepsis ([address] (A41.9)     Discharge Diagnosis  Active Problems: Sepsis (HCC)Resolved Problems: * No resolved hospital problems. [facility] Stay  Narrative of Hospital Course:  Patient admitted with sepsis from UTI and sacral wound ulcer. See ICU notes. Improved on Abx per ID. Agreeable to select. Consultants:  IP CONSULT TO CRITICAL CAREIP CONSULT TO GENERAL SURGERYIP CONSULT TO NEPHROLOGYIP CONSULT TO INFECTIOUS DISEASESIP CONSULT TO PODIATRYIP CONSULT TO [address] CONSULT TO INTERVENTIONAL RADIOLOGY    Surgeries/procedures Performed:       Treatments:           Discharge Plan/Disposition:  To [facility]/Incidental Findings Requiring Follow Up:    Patient Instructions:    Diet: Diabetic Diet    Activity:Bedrest  For number of days (if applicable): Other Instructions:    Provider Follow-Up:   No follow-ups on file. Significant Diagnostic Studies:    Recent Labs:  Admission on 05/21/2021No results displayed because visit has over 200 results. ------------    Radiology last 7 days:  XR FOOT RIGHT (2 VIEWS)Result Date: 5/23/2021Chronic appearing findings. MRI would be useful if symptoms persist, or if there is concern for osteomyelitis. CT ABDOMEN PELVIS W IV CONTRAST Additional Contrast? NoneResult Date: 5/21/20211. New sacral decubitus ulcer with multiple foci of subcutaneous gas within adjacent soft tissue and suspected developing abscess posterior to the anus on axial image number 5: Series 3 measuring 2.2 x 1.8 cm. 2.  Redemonstration of heterogeneous fluid collection located in left anterior thigh musculature which is slightly smaller compared to previous examination which could suggest decreasing left intramuscular abscess or hematoma.  3.  Loculated fluid collection Vomitingmidodrine (PROAMATINE) 10 MG tabletTake 1 tablet by mouth 3 times daily (with meals)Qty: 90 tablet Refills: 3pantoprazole (PROTONIX) 40 MG tabletTake 1 tablet by mouth every morning (before breakfast)Qty: 30 tablet Refills: 3apixaban (ELIQUIS) 5 MG TABS tabletTake 1 tablet by mouth 2 times dailyQty: 60 tablet Refills: 2psyllium (METAMUCIL SMOOTH TEXTURE) 28 % packetTake 1 packet by mouth 3 times daily Ascorbic Acid (VITAMIN C) 1000 MG tabletTake 1,000 mg by mouth dailygabapentin (NEURONTIN) 100 MG capsuleTake 100 mg by mouth every 8 hours. insulin lispro (HUMALOG) 100 UNIT/ML injection vialInject 0-8 Units into the skin 4 times daily (before meals and nightly) *Per Sliding Scale*Cholecalciferol (VITAMIN D) 50 MCG (2000 UT) CAPS capsuleTake 1 capsule by mouth dailyzinc sulfate (ZINCATE) 220 (50 Zn) MG capsuleTake 50 mg by mouth daily acetaminophen (TYLENOL) 325 MG tabletTake 650 mg by mouth every 6 hours as needed for Paininsulin glargine (LANTUS) 100 UNIT/ML injection vialInject 10 Units into the skin 2 times dailyQty: 1 vial Refills: 3    Current Discharge Medication ListSTOP taking these medicationsdiphenhydrAMINE (BENADRYL) 25 MG tabletComments:Reason for Stopping:melatonin 5 MG TABS tabletComments:Reason for Stopping:white petrolatum OINT ointmentComments:Reason for Stopping:white petrolatum OINT ointmentComments:Reason for Stopping:cholestyramine (QUESTRAN) 4 g packetComments:Reason for Stopping:loperamide (IMODIUM) 2 MG capsuleComments:Reason for Stopping:oxyCODONE (ROXICODONE) 5 MG immediate release tabletComments:Reason for Stopping:guaiFENesin 400 MG tabletComments:Reason for Stopping:metoclopramide (REGLAN) 5 MG tabletComments:Reason for Stopping:pramipexole (MIRAPEX) 0.5 MG tabletComments:Reason for Stopping:albuterol (PROVENTIL) (2.5 MG/3ML) 0.083% nebulizer solutionComments:Reason for Stopping:metoprolol tartrate (LOPRESSOR) 25 MG tabletComments:Reason for Stopping:    Time Spent on Discharge:1E] minutes were spent in patient examination, evaluation, counseling as well as medication reconciliation, prescriptions for required medications, discharge plan, and follow up.     Electronically signed by Sabina Cobian MD on 5/27/21 at 12:26 PM EDT

## 2021-05-27 NOTE — PROGRESS NOTES
[address] [place] [address] [facility]  Progress Note      Chief Complaint   Patient presents with    Wound Check     patient sent from nursing faciilty for worsening sacral wound       SUBJECTIVE:  Patient is tolerating medications. No reported adverse drug reactions. No nausea, vomiting, diarrhea. Awake alert  Afebrile  Having pain pelvic area. This is chronic issue related to difficulty with urination and urinary stricture. She has phillips cath, urine with sediment present in tubing. [place]. Review of systems:  As stated above in the chief complaint, otherwise negative.     Medications:  Scheduled Meds:   lidocaine PF  5 mL Intradermal Once    sodium chloride flush  5-40 mL Intravenous 2 times per day    heparin flush  3 mL Intravenous 2 times per day    apixaban  5 mg Oral BID    pantoprazole  40 mg Oral QAM AC    hydrocortisone  15 mg Oral Daily with breakfast    hydrocortisone  5 mg Oral Daily    ertapenem (INVanz) IVPB  1,000 mg Intravenous Q24H    insulin lispro  0-6 Units Subcutaneous Q4H    linezolid  600 mg Oral 2 times per day    amiodarone  200 mg Oral BID    gabapentin  300 mg Oral Daily    miconazole   Topical BID    collagenase   Topical Daily    midodrine  10 mg Oral TID WC    [Held by provider] insulin glargine  5 Units Subcutaneous Nightly    clotrimazole   Topical BID    sodium chloride flush  5-40 mL Intravenous 2 times per day    zinc sulfate  50 mg Oral Daily    epoetin konrad-epbx  30 Units/kg Subcutaneous Once per day on Mon Wed Fri    ascorbic acid  1,500 mg Intravenous Daily     Continuous Infusions:   sodium chloride      dextrose       PRN Meds:HYDROcodone 5 mg - acetaminophen, sodium chloride flush, sodium chloride, heparin flush, heparin (porcine), sodium chloride flush, promethazine **OR** ondansetron, polyethylene glycol, acetaminophen **OR** [DISCONTINUED] acetaminophen, fentanNYL, glucose, dextrose, glucagon (rDNA), dextrose    OBJECTIVE:  BP (!) 82/48 Comment: manual  Pulse 85   Temp 96.2 °F (35.7 °C) (Temporal)   Resp 20   Ht 5' 6\" (1.676 m)   Wt 199 lb 7 oz (90.5 kg)   LMP  (LMP Unknown)   SpO2 98%   BMI 32.19 kg/m²   Temp  Av °F (36.7 °C)  Min: 96.2 °F (35.7 °C)  Max: 98.9 °F (37.2 °C)  Constitutional: The patient is awake, alert, and oriented. Skin: Warm and dry. No rashes were noted. Sacral decubitus fairly clean granulation tissue        2021  HEENT: Round and reactive pupils. Moist mucous membranes. No ulcerations or thrush. Neck: Supple to movements. Chest: No use of accessory muscles to breathe. Symmetrical expansion. No wheezing, crackles or rhonchi. Cardiovascular: S1 and S2 are rhythmic and regular. No murmurs appreciated. Abdomen: Positive bowel sounds to auscultation. Benign to palpation. No masses felt. No hepatosplenomegaly. Ostomy with tinea corporis around the back  Genitourinary: Bates  Extremities: No clubbing, no cyanosis, no edema.   Lines: peripheral  TLC left fem 21  Right subclavian HD cath 4/15  Laboratory and Tests Review:  Lab Results   Component Value Date    WBC 16.7 (H) 2021    WBC 15.4 (H) 2021    WBC 11.3 2021    HGB 8.7 (L) 2021    HCT 28.1 (L) 2021    MCV 94.6 2021     2021     Lab Results   Component Value Date    NEUTROABS 12.36 (H) 2021    NEUTROABS 13.55 (H) 2021    NEUTROABS 10.51 (H) 2021     No results found for: [facility]  Lab Results   Component Value Date    ALT 6 2021    AST 13 2021    ALKPHOS 84 2021    BILITOT 0.2 2021     Lab Results   Component Value Date     2021    K 3.9 2021    K 3.4 2021    CL 97 2021    CO2 29 2021    BUN 9 2021    CREATININE 1.5 2021    CREATININE 2.0 2021    CREATININE 1.4 2021    GFRAA 42 2021    LABGLOM 35 2021    GLUCOSE 95 2021    GLUCOSE 88 2012    PROT 5.2 2021 LABALBU 3.0 05/27/2021    LABALBU 2.8 05/03/2012    CALCIUM 8.3 05/27/2021    BILITOT 0.2 05/27/2021    ALKPHOS 84 05/27/2021    AST 13 05/27/2021    ALT 6 05/27/2021     Lab Results   Component Value Date    CRP 20.3 (H) 05/22/2021    CRP 11.6 (H) 03/22/2021    CRP 12.9 (H) 03/04/2021     Lab Results   Component Value Date    SEDRATE 113 (H) 05/22/2021    SEDRATE 90 (H) 03/22/2021    SEDRATE 98 (H) 03/04/2021     Radiology:  Reviewed    Microbiology:   Lab Results   Component Value Date    BC Previously positive blood culture called 05/21/2021    BC 5 Days no growth 04/19/2021    BC 5 Days no growth 04/13/2021    ORG Escherichia coli 05/22/2021    ORG Enterococcus faecalis 05/22/2021    ORG Bacteroides fragilis group 05/22/2021    ORG Anaerobic gram negative braxton 05/22/2021     Lab Results   Component Value Date    BLOODCULT2 5 Days no growth 04/19/2021    BLOODCULT2  03/28/2021     Beta Lactamase negative  A negative Beta Lactamase test does not necessarily assure  susceptibility to this drug. Blaine Renee  03/28/2021     After multiple attempts on the [employer], the ID and /or  sensitivity was not attainable. The organism was sent out  for further testing. Please refer to MISS1. Organism sent to reference laboratory  for further identification and/or susceptibility testing. BLOODCULT2  03/28/2021     Please refer to [facility]. Organism sent to reference laboratory  for further susceptibility testing.       ORG Escherichia coli 05/22/2021    ORG Enterococcus faecalis 05/22/2021    ORG Bacteroides fragilis group 05/22/2021    ORG Anaerobic gram negative braxton 05/22/2021     WOUND/ABSCESS   Date Value Ref Range Status   05/22/2021 Heavy growth  Final   05/22/2021 Heavy growth  Final   05/22/2021   Final    Moderate growth  Refer to previous CXWND culture collected 5-22-21 at [facility] for susceptibility results     05/22/2021   Final    Moderate growth  Refer to previous CXWND culture collected 5-22-21 at [address] [place] [address] for susceptibility results     05/22/2021 Moderate growth  Final     Smear, Respiratory   Date Value Ref Range Status   02/20/2021   Final    Group 6: <25 PMN's/LPF and <25 Epithelial cells/LPF  Polymorphonuclear leukocytes not seen  Epithelial cells not seen  No organisms seen           Component Value Date/Time    AFBCX No growth after 6 weeks of incubation. 03/20/2021 1515    FUNGSM No Fungal elements seen 03/20/2021 1515    LABFUNG No growth after 4 weeks of incubation. 03/20/2021 1515     CULTURE, RESPIRATORY   Date Value Ref Range Status   02/20/2021 Growth not present  Oral Pharyngeal Jackie absent    Final     No results found for: CXCATHTIP  Body Fluid Culture, Sterile   Date Value Ref Range Status   05/22/2021   Final    Heavy growth  Refer to previous CXWND culture collected 5-22-21 at [address] [place] [address] for susceptibility results     05/22/2021 Moderate growth  Final     Culture Surgical   Date Value Ref Range Status   03/20/2021 Growth not present  Final     Creatinine Ur POCT   Date Value Ref Range Status   03/23/2018 100  Final     Urine Culture, Routine   Date Value Ref Range Status   05/22/2021 50,000 CFU/ml  Final   05/22/2021   Final    <25,000 CFU/ml  Vancomycin resistant Enterococci isolated     04/21/2021 >100,000 CFU/ml  Final     MRSA Culture Only   Date Value Ref Range Status   05/22/2021 Methicillin resistant Staph aureus not isolated  Final   04/19/2021 Methicillin resistant Staph aureus not isolated  Final   02/17/2021 Methicillin resistant Staph aureus not isolated  Final     Microbiology:    Urine 5/22/2021 E. coli and E faecium VRE  Wound culture gram-negative rods E coli and Kleb  Body fluids gram-negative rods E coli and probably VRE as well as b frag  Blood cultures from 521 2 out of 2 sets with coag negative staph epi      ASSESSMENT:  · Coag negative staph bacteremia may be related to catheter sepsis but most likely contaminant. Will possibly related to the decubitus  · History of fungemia  · Questionable UTI   · infected sacral decubitus-still needs debridement   · Tinea corporis  · Leukocytosis   · DVT- improving    PLAN:  · Continue invanz c hemo MWF and  Zyvox po 4 more weeks  · Cutaneous yest around wound  · Topical Lotrimin and po diflucan  · Check final cultures  · Monitor labs  · Remove TLC    Gar JOHN Ledezma - CNP  9:48 AM     Pt seen and examined. Above discussed agree with advanced practice nurse. Labs, cultures, and radiographs reviewed. Face to Face encounter occurred. Changes made as necessary.      Sabina Ron MD  5/27/2021

## 2021-05-27 NOTE — FLOWSHEET NOTE
ET Nurse(follow up) [id number]  Admit Date: 5/21/2021  9:53 AM    Reason for consult:  Ileostomy management    Stoma assessment:     05/27/21 1415   Ileostomy Ileostomy   Placement Date: 02/17/21   Pre-existing: No  Inserted by: Dr. Kiara Pena  Ileostomy Type: Ileostomy   Stomal Appliance 1 piece; Changed;Leaking   Stoma  Assessment Protrudes; Moist;Red   Peristomal Assessment   (slight erosion directly around stoma)   Treatment Bag change  (skin care, medical adhesive spray, barrier strips)   Stool Color Brown;Yellow   Stool Appearance Watery   Output (mL) 100 ml   Wound 05/22/21 Sacrum   Date First Assessed/Time First Assessed: 05/22/21 0130   Present on Hospital Admission: Yes  Primary Wound Type: Pressure Injury  Location: Sacrum   Dressing Status   (dressing lifted by Dr. Ryan Mendez to assess before discharge)   Santa-wound Assessment   (red,rash)   peristomal skin much improved    Plan:  Discharge today at 3 om to select    Joe Farr RN 5/27/2021 2:31 PM

## 2021-05-27 NOTE — CARE COORDINATION
Received call from Luigi(Rashid) they have approval for [facility]. Pt will need triple lumen removed and picline place. Ary Quarles obtained IR order for picline placement. Will arrange transport once picline placed. 12:10pm pt to go with triple lumen, which will need to be removed and picline placed at [facility]. Physician ambulance for 3:30pm . Brian served Dr. Lionel Briseno to update. Notified charge RN, bedside RN, Lamont Gongora, pt and daughter Shannon Rehman of discharge/time . Jessica Heck, MSW, LSW
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
## 2021-05-27 NOTE — PROGRESS NOTES
As per Interventional Radiology guidelines the  patient would need to be off Eliquis for 48 hours. Jigar RN notified on the floor. When the patient has met these guidelines we will be happy to schedule the patient for the ordered procedure. Please notify us at [phone].

## 2021-05-27 NOTE — PROGRESS NOTES
[address] [place] [address] [facility]  Progress Note      Chief Complaint   Patient presents with    Wound Check     patient sent from nursing faciilty for worsening sacral wound       SUBJECTIVE:  Patient is tolerating medications. No reported adverse drug reactions. No nausea, vomiting, diarrhea. Awake alert  Afebrile        Review of systems:  As stated above in the chief complaint, otherwise negative.     Medications:  Scheduled Meds:   lidocaine PF  5 mL Intradermal Once    sodium chloride flush  5-40 mL Intravenous 2 times per day    heparin flush  3 mL Intravenous 2 times per day    apixaban  5 mg Oral BID    [START ON 5/27/2021] pantoprazole  40 mg Oral QAM AC    hydrocortisone  15 mg Oral Daily with breakfast    hydrocortisone  5 mg Oral Daily    ertapenem (INVanz) IVPB  1,000 mg Intravenous Q24H    insulin lispro  0-6 Units Subcutaneous Q4H    linezolid  600 mg Oral 2 times per day    amiodarone  200 mg Oral BID    gabapentin  300 mg Oral Daily    miconazole   Topical BID    collagenase   Topical Daily    midodrine  10 mg Oral TID WC    [Held by provider] insulin glargine  5 Units Subcutaneous Nightly    clotrimazole   Topical BID    sodium chloride flush  5-40 mL Intravenous 2 times per day    zinc sulfate  50 mg Oral Daily    epoetin konrad-epbx  30 Units/kg Subcutaneous Once per day on Mon Wed Fri    ascorbic acid  1,500 mg Intravenous Daily     Continuous Infusions:   sodium chloride      dextrose       PRN Meds:sodium chloride flush, sodium chloride, heparin flush, heparin (porcine), sodium chloride flush, promethazine **OR** ondansetron, polyethylene glycol, acetaminophen **OR** [DISCONTINUED] acetaminophen, fentanNYL, glucose, dextrose, glucagon (rDNA), dextrose    OBJECTIVE:  BP (!) 109/57   Pulse 86   Temp 98.9 °F (37.2 °C) (Temporal)   Resp 18   Ht 5' 6\" (1.676 m)   Wt 186 lb 1.1 oz (84.4 kg)   LMP  (LMP Unknown)   SpO2 100%   BMI 30.03 kg/m²   Temp Av.4 °F (36.9 °C)  Min: 97.6 °F (36.4 °C)  Max: 99.1 °F (37.3 °C)  Constitutional: The patient is awake, alert, and oriented. Skin: Warm and dry. No rashes were noted. Sacral decubitus fairly clean granulation tissue        2021  HEENT: Round and reactive pupils. Moist mucous membranes. No ulcerations or thrush. Neck: Supple to movements. Chest: No use of accessory muscles to breathe. Symmetrical expansion. No wheezing, crackles or rhonchi. Cardiovascular: S1 and S2 are rhythmic and regular. No murmurs appreciated. Abdomen: Positive bowel sounds to auscultation. Benign to palpation. No masses felt. No hepatosplenomegaly. Ostomy with tinea corporis around the back  Genitourinary: Bates  Extremities: No clubbing, no cyanosis, no edema.   Lines: peripheral    Laboratory and Tests Review:  Lab Results   Component Value Date    WBC 15.4 (H) 2021    WBC 11.3 2021    WBC 12.3 (H) 2021    HGB 9.1 (L) 2021    HCT 28.8 (L) 2021    MCV 94.0 2021     2021     Lab Results   Component Value Date    NEUTROABS 13.55 (H) 2021    NEUTROABS 10.51 (H) 2021    NEUTROABS 9.97 (H) 2021     No results found for: [facility]  Lab Results   Component Value Date    ALT 5 2021    AST 9 2021    ALKPHOS 84 2021    BILITOT <0.2 2021     Lab Results   Component Value Date     2021    K 4.1 2021    K 3.4 2021    CL 98 2021    CO2 30 2021    BUN 17 2021    CREATININE 2.0 2021    CREATININE 1.4 2021    CREATININE 1.0 2021    GFRAA 30 2021    LABGLOM 25 2021    GLUCOSE 98 2021    GLUCOSE 88 2012    PROT 4.9 2021    LABALBU 2.6 2021    LABALBU 2.8 2012    CALCIUM 8.4 2021    BILITOT <0.2 2021    ALKPHOS 84 2021    AST 9 2021    ALT 5 2021     Lab Results   Component Value Date    CRP 20.3 (H) 2021    CRP 11.6 (H) 03/22/2021    CRP 12.9 (H) 03/04/2021     Lab Results   Component Value Date    SEDRATE 113 (H) 05/22/2021    SEDRATE 90 (H) 03/22/2021    SEDRATE 98 (H) 03/04/2021     Radiology:  Reviewed    Microbiology:   Lab Results   Component Value Date    [facility]  05/21/2021     24 Hours no growth  Gram stain performed from blood culture bottle media  Gram positive cocci in clusters  Previously positive blood culture called      [facility] Identification and sensitivity to follow 05/21/2021    BC 5 Days no growth 04/19/2021    ORG Escherichia coli 05/22/2021    ORG Enterococcus faecalis 05/22/2021    ORG Bacteroides fragilis group 05/22/2021    ORG Anaerobic gram negative braxton 05/22/2021     Lab Results   Component Value Date    BLOODCULT2  05/21/2021     Gram stain performed from blood culture bottle media  Gram positive cocci in clusters      BLOODCULT2 Identification and sensitivity to follow 05/21/2021    BLOODCULT2 5 Days no growth 04/19/2021    ORG Escherichia coli 05/22/2021    ORG Enterococcus faecalis 05/22/2021    ORG Bacteroides fragilis group 05/22/2021    ORG Anaerobic gram negative braxton 05/22/2021     WOUND/ABSCESS   Date Value Ref Range Status   05/22/2021 Heavy growth  Final   05/22/2021 Heavy growth  Final   05/22/2021   Final    Moderate growth  Refer to previous CXWND culture collected 5-22-21 at [address] [place] [address] for susceptibility results     05/22/2021   Final    Moderate growth  Refer to previous CXWND culture collected 5-22-21 at [facility] for susceptibility results     05/22/2021 Moderate growth  Final     Smear, Respiratory   Date Value Ref Range Status   02/20/2021   Final    Group 6: <25 PMN's/LPF and <25 Epithelial cells/LPF  Polymorphonuclear leukocytes not seen  Epithelial cells not seen  No organisms seen           Component Value Date/Time    AFBCX No growth after 6 weeks of incubation.  03/20/2021 1515    FUNGSM No Fungal elements seen 03/20/2021 1515    LABFUNG No growth after 4 weeks of incubation. 03/20/2021 1515     CULTURE, RESPIRATORY   Date Value Ref Range Status   02/20/2021 Growth not present  Oral Pharyngeal Jackie absent    Final     No results found for: CXCATHTIP  Body Fluid Culture, Sterile   Date Value Ref Range Status   05/22/2021   Final    Heavy growth  Refer to previous CXWND culture collected 5-22-21 at [address] [place] [address] for susceptibility results     05/22/2021 Moderate growth  Final     Culture Surgical   Date Value Ref Range Status   03/20/2021 Growth not present  Final     Creatinine Ur POCT   Date Value Ref Range Status   03/23/2018 100  Final     Urine Culture, Routine   Date Value Ref Range Status   05/22/2021 50,000 CFU/ml  Final   05/22/2021   Final    <25,000 CFU/ml  Vancomycin resistant Enterococci isolated     04/21/2021 >100,000 CFU/ml  Final     MRSA Culture Only   Date Value Ref Range Status   05/22/2021 Methicillin resistant Staph aureus not isolated  Final   04/19/2021 Methicillin resistant Staph aureus not isolated  Final   02/17/2021 Methicillin resistant Staph aureus not isolated  Final     Microbiology:    Urine 5/22/2021 E. coli and E faecium VRE  Wound culture gram-negative rods E coli and Kleb  Body fluids gram-negative rods E coli and probably VRE as well as b frag  Blood cultures from 521 2 out of 2 sets with coag negative staph epi      ASSESSMENT:  · Coag negative staph bacteremia may be related to catheter sepsis but most likely contaminant.   Will possibly related to the decubitus  · History of fungemia  · Questionable UTI   · Questionable infected sacral decubitus-  · Tinea corporis  · Leukocytosis improving  · DVT- improving    PLAN:  · Continue invanz  and add Zyvox  · Topical Lotrimin  · Check final cultures  · Monitor labs    Tamir Hough MD  10:14 PM  5/26/2021

## 2021-05-30 PROBLEM — L89.154 PRESSURE INJURY OF SACRAL REGION, STAGE 4 (HCC): Chronic | Status: ACTIVE | Noted: 2021-01-01

## 2021-06-02 NOTE — PROGRESS NOTES
Tho PRE-ADMISSION TESTING INSTRUCTIONS    The Preadmission Testing patient is instructed accordingly using the following criteria (check applicable):      GENERAL INSTRUCTIONS:    [x] Nothing by mouth after midnight, including gum, candy, mints or water    [x] You may brush your teeth, but do not swallow any water    [x] Take medications as instructed with 1-2 oz of water    [x] Stop herbal supplements and vitamins 5 days prior to procedure    [x] Follow preop dosing of blood thinners per physician instructions    [x] Do not take insulin or oral diabetic medications    [x] If diabetic and have low blood sugar or feel symptomatic, take 1-2oz apple juice or glucose tablets    [] Bring inhalers day of surgery    [] Bring C-PAP/ Bi-Pap day of surgery    [] Bring urine specimen day of surgery    [x] Antibacterial Soap shower or bath AM of Surgery, no lotion, powders or creams to surgical site    [] Follow bowel prep as instructed per surgeon    [x] No tobacco products within 24 hours of surgery     [x] No alcohol or illegal drug use within 24 hours of surgery.     [x] Jewelry, body piercing's, eyeglasses, contact lenses and dentures are not permitted into surgery (bring cases)      [x] Please do not wear any nail polish or make up on the day of surgery    [x] If not already done, you can expect a call from registration    [x] If surgeon requests a time change you will be notified the day prior to surgery    [x] A caregiver or family member must remain with the patient during their stay if they are mentally handicapped, have dementia, disoriented or unable to use a call light or would be a safety concern if left unattended    [x] Please notify surgeon if you develop any illness between now and time of surgery (cold, cough, sore throat, fever, nausea, vomiting) or any signs of infections  including skin, wounds, and dental.

## 2021-06-02 NOTE — PROGRESS NOTES
I updated Dr. Juli Anthony regarding elevated potassium level. Order to repeat AM of surgery. Spoke with Gladys from [facility], and they will do potassium tomorrow morning.
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
## 2021-06-03 NOTE — ANESTHESIA POSTPROCEDURE EVALUATION
Department of Anesthesiology  Postprocedure Note    Patient: Marleni Smith  MRN: [id number]  YOB: 1955  Date of evaluation: 6/3/2021  Time:  4:34 PM     Procedure Summary     Date: 06/03/21 Room / Location: [facility] OR 10 / [facility]    Anesthesia Start: 9979 Anesthesia Stop: 7771    Procedure: DEBRIDEMENT OF SACRAL WOUND (N/A ) Diagnosis: (SACRAL WOUND)    Surgeons: Simba Dahl MD Responsible Provider: Carolyn Mckeon MD    Anesthesia Type: MAC ASA Status: 4          Anesthesia Type: MAC    Brock Phase I: Brock Score: 10    Brock Phase II:      Last vitals: Reviewed and per EMR flowsheets.        Anesthesia Post Evaluation    Patient location during evaluation: PACU  Patient participation: complete - patient participated  Level of consciousness: awake and alert  Airway patency: patent  Nausea & Vomiting: no nausea and no vomiting  Complications: no  Cardiovascular status: hemodynamically stable  Respiratory status: acceptable  Hydration status: euvolemic

## 2021-06-03 NOTE — OP NOTE
Operative Note      Patient: Aline Shaffer  YOB: 1955  MRN: [id number]    Date of Procedure: 6/3/2021    Pre-Op Diagnosis: SACRAL WOUND    Post-Op Diagnosis: Same       Procedure(s):  DEBRIDEMENT OF SACRAL WOUND    Surgeon(s):  Jason Lynne MD    Assistant:   Resident: Luis Hargrove MD    Anesthesia: Monitor Anesthesia Care    Estimated Blood Loss (mL): Minimal    Complications: None    Specimens:   * No specimens in log *    Implants:  * No implants in log *      Drains:   Ileostomy Ileostomy (Active)   Stomal Appliance 1 piece; Changed;Leaking 05/27/21 1415   Stoma  Assessment Protrudes; Moist;Red 05/27/21 1415   Peristomal Assessment Red 05/27/21 1018   Treatment Bag change 05/27/21 1415   Stool Color Brown;Yellow 05/27/21 1415   Stool Appearance Watery 05/27/21 1415   Stool Amount Medium 05/27/21 1018   Output (mL) 100 ml 05/27/21 1415       Urethral Catheter (Active)   Catheter Indications Need for fluid volume management of the critically ill patient in a critical care setting;Assist in healing of open sacral or perineal wounds (Stage III, IV, or unstageable documented by a provider or enterostomal therapy) and/or full thickness perineal and lower extremity burns in incontinent patients 05/27/21 1018   Securement Device Date Changed 05/22/21 05/27/21 1018   Site Assessment Pink 06/03/21 1432   Urine Color Padmini 06/03/21 1432   Urine Appearance Cloudy 06/03/21 1432   Output (mL) 25 mL 05/26/21 1300       Indications for procedure:    50-year-old female with a history of SMA thrombus and iliac thrombosis status post multiple abdominal surgeries resulting in an end jejunostomy. She is bedbound and has a large necrotic sacral wound. Intermittent debridements at [place] [address] but was sent in for a large necrotic area with perianal involvement. Risk benefits and alternatives were discussed and the patient was agreeable to proceed to the operating room.     Detailed Description of Procedure:     Patient was laid right lateral decubitus on the operating room table. MAC anesthesia was induced. Wound was inspected and was noted to have some necrotic fascia overlying the sacrum. This was taken down using sharp dissection. A small amount of skin at the superior aspect of the wound that was necrosis was also taken. Few pockets on the superior aspect of the wound were broken up and opened. Wound was then thoroughly irrigated and hemostasis was assured. Wound was covered with wet Kerlix, and a heavy drainage pack. All counts were correct at the end of the case. Patient was transferred to PACU in stable condition. Please note that Dr. Hipolito Rios was present and scrubbed throughout this entire procedure.     End dictation      Electronically signed by Khris Dobbins MD on 6/3/2021 at 4:01 PM

## 2021-06-03 NOTE — H&P
of sacral region, stage 4 ([facility] [address] 5/30/2021    Restless leg syndrome 8/17/2012    Tobacco abuse     Tubular adenoma of colon     Type 2 diabetes mellitus without complication ([facility] [address] 4/82/7142    UTI (urinary tract infection)     Vitamin D deficiency 1/6/2016       Past Surgical History:   Procedure Laterality Date    ANTERIOR COMPARTMENT DECOMPRESSION Right 2/22/2021    RIGHT LOWER EXTREMITY FASCIOTOMY CLOSURE performed by Danielle Tran MD at [facility]  5/27/2016    Dr Do Omalley  5/3/2012         EMG  5/19/2015    Dr Drake Avila, radiculopathy    ESOPHAGUS SURGERY  08/26/2016    Dr Vicenta Moctezuma ablation   Jessica Elidia Right 3/20/2021    RIGHT PARTIAL HALLUX AMPUTATION performed by Ulises Magaña DPM at [facility]    LAPAROSCOPY N/A 2/16/2021    DIAGNOSTIC LAPAROSCOPY, CONVERTED TO LAPAROTOMY WITH SMALL BOWEL RESECTION, WITH APPLICATION OF ABTHERA WOUND VAC performed by Yifan Duff MD at [address]  4/2015    [facility], Dr. Angulo [facility] POLYSOMNOGRAPHY  04/10/2017    Dr Gely Aranda sleep apnea AHI-8    POLYSOMNOGRAPHY  05/15/2017    PRESSURE ULCER DEBRIDEMENT N/A 5/22/2021    SACRAL DECUBITUS ULCER DEBRIDEMENT performed by Bang Antunez MD at [address] [facility] N/A 2/17/2021    RIGHT ILLEOSTOMY AND RIGHT COLECTOMY performed by Yifan Duff MD at [address]  [facility] TRANSESOPHAGEAL ECHOCARDIOGRAM  04/02/2021    Dr. Azul Sinclair  5/27/2016    Dr Lottie Das  07/14/2016    Dr Pam Thomas Right 2/17/2021    Iliac Thrombectomy with Right Lower Extremity Fasciotomy performed by Danielle Tran MD at [address] Left 3/30/2021    CATHETER INSERTION TEMPORARY HEMODIALYSIS performed by Pema Ewing MD at [address] SURGERY N/A 3/31/2021    CATHETER INSERTION,TEMPORAY  HEMODIALYSIS, NEEDS FLURO, PT IN [facility], AVAILABLE ANYTIME performed by Goldie Espinoza MD at [address] [place] [address] N/A 4/5/2021    CATHETER INSERTION HEMODIALYSIS WITH LEFT FEMORAL TEMPORARY HEMODIALYSIS ACCESS performed by Tatiana Stark MD at [address] [place] [address] N/A 4/15/2021    REMOVAL AND REINSERTION TUNNELED HEMODIALYSIS CATHETER performed by Kb Webster MD at [address] [place] [address]       Prior to Admission medications    Medication Sig Start Date End Date Taking? Authorizing Provider   glucose (GLUTOSE) 40 % GEL Take 15 g by mouth See Admin Instructions   Yes Historical Provider, MD   Heparin Sodium, Porcine, (HEPARIN, PORCINE,) 1000 UNIT/ML injection Infuse 3,800 Units intravenously as needed   Yes Historical Provider, MD   glucagon 1 MG injection Inject 1 kit into the muscle once   Yes Historical Provider, MD   hydrocortisone (CORTEF) 5 MG tablet Take 5 mg by mouth Daily with lunch   Yes Historical Provider, MD   nitroGLYCERIN (NITRODUR) 0.2 MG/HR Place 1 patch onto the skin daily   Yes Historical Provider, MD   Alcohol Swabs (GLOBAL ALCOHOL PREP EASE) 70 % PADS  5/27/21  Yes Historical Provider, MD   Blood Glucose Monitoring Suppl (ONE TOUCH ULTRA 2) w/Device KIT  4/28/21  Yes Historical Provider, MD   sodium chloride 0.9 % SOLN 1,000 mL with oxychlorosene POWD 2 g Irrigate with 2 g as directed once   Yes Historical Provider, MD   oxyCODONE-acetaminophen (PERCOCET)  MG per tablet Take 1 tablet by mouth every 4 hours as needed for Pain.    Yes Historical Provider, MD   polyethylene glycol (GLYCOLAX) 17 g packet Take 17 g by mouth daily as needed for Constipation   Yes Historical Provider, MD   B Complex-C-Folic Acid (RENAL MULTIVITAMIN FORMULA PO) Take by mouth   Yes Historical Provider, MD   rOPINIRole (REQUIP) 0.5 MG tablet Take 0.5 mg by mouth 3 times daily   Yes Historical Provider, MD   amiodarone (CORDARONE) 200 MG tablet Take 1 tablet by mouth 2 times daily  Patient taking differently: Take 100 mg by mouth daily  5/27/21  Yes Radha Fowler MD   hydrocortisone (CORTEF) 5 MG tablet Take 3 tablets by mouth daily (with breakfast) 5/28/21  Yes Radha Fowler MD   collagenase 250 UNIT/GM ointment Apply topically daily. 5/28/21 6/6/21 Yes Radha Fowler MD   linezolid (ZYVOX) 600 MG tablet Take 1 tablet by mouth every 12 hours for 14 days 5/27/21 6/10/21 Yes Radha Fowler MD   ertapenem [facility]) infusion Infuse 1,000 mg intravenously three times a week Compound per protocol.give at the end of dialysis 5/28/21 6/28/21 Yes Senthil Dover MD   ondansetron (ZOFRAN) 4 MG tablet Take 4 mg by mouth every 8 hours as needed for Nausea or Vomiting   Yes Historical Provider, MD   midodrine (PROAMATINE) 10 MG tablet Take 1 tablet by mouth 3 times daily (with meals) 4/28/21  Yes Radha Fowler MD   pantoprazole (PROTONIX) 40 MG tablet Take 1 tablet by mouth every morning (before breakfast) 4/29/21  Yes Radha Fowler MD   Ascorbic Acid (VITAMIN C) 1000 MG tablet Take 1,000 mg by mouth daily   Yes Historical Provider, MD   gabapentin (NEURONTIN) 100 MG capsule Take 300 mg by mouth 3 times daily.     Yes Historical Provider, MD   insulin lispro (HUMALOG) 100 UNIT/ML injection vial Inject 0-8 Units into the skin 4 times daily (before meals and nightly) *Per Sliding Scale*   Yes Historical Provider, MD   zinc sulfate (ZINCATE) 220 (50 Zn) MG capsule Take 50 mg by mouth daily    Yes Historical Provider, MD   acetaminophen (TYLENOL) 325 MG tablet Take 650 mg by mouth every 6 hours as needed for Pain   Yes Historical Provider, MD   insulin glargine (LANTUS) 100 UNIT/ML injection vial Inject 10 Units into the skin 2 times daily 2/23/21  Yes Marty Mondragon MD       Allergies   Allergen Reactions    Lisinopril Swelling    Procardia [Nifedipine] Anaphylaxis    Metformin And Related      Severe diarrhea Family History   Problem Relation Age of Onset    Diabetes Mother     High Blood Pressure Mother     Cancer Mother         BREAST    Cancer Brother         THROAT    Heart Disease Brother        Social History     Tobacco Use    Smoking status: Current Every Day Smoker     Packs/day: 1.00     Years: 41.00     Pack years: 41.00     Types: Cigarettes     Start date: 11/10/1974    Smokeless tobacco: Never Used    Tobacco comment: started at 26 and smoked up to 3 ppd   Vaping Use    Vaping Use: Never used   Substance Use Topics    Alcohol use: Yes     Alcohol/week: 0.0 standard drinks     Comment: occasionally    Drug use: No         Review of Systems - History obtained from the patient  General ROS: negative  Psychological ROS: negative  Ophthalmic ROS: negative  ENT ROS: negative  Allergy and Immunology ROS: negative  positive for - REMINDER:DOCUMENT ALLERGIES IN ALLERGY ACTIVITY  Hematological and Lymphatic ROS: negative  Endocrine ROS: negative  Breast ROS: negative for breast lumps  Respiratory ROS: no cough, shortness of breath, or wheezing  Cardiovascular ROS: no chest pain or dyspnea on exertion  Gastrointestinal ROS: no abdominal pain, change in bowel habits, or black or bloody stools  Genito-Urinary ROS: no dysuria, trouble voiding, or hematuria  Musculoskeletal ROS: negative  Neurological ROS: no TIA or stroke symptoms  Dermatological ROS: negative      PHYSICAL EXAM:    Vitals:    06/03/21 1438   BP: (!) 88/50   Pulse:    Resp:    Temp:    SpO2:        General Appearance:  in no acute distress  Skin:  Skin color, texture, turgor normal. No rashes or lesions. Head/face:  NCAT  Eyes:  No gross abnormalities. Lungs:  Breathing Pattern: regular, no distress  Heart:  Heart regular rate and rhythm  Abdomen:  Soft, non-tender, normal bowel sounds. No bruits, organomegaly or masses. Extremities: Extremities warm to touch, pink, with no edema.   Neurologic:  negative      LABS:  CBC  Recent Labs 06/03/21  0353   WBC 14.9*   HGB 9.2*   HCT 30.1*        BMP  Recent Labs     06/03/21  0859      K 4.9   CL 96*   CO2 28   BUN 12   CREATININE 1.4*   CALCIUM 9.1     Liver Function  No results for input(s): AMYLASE, LIPASE, BILITOT, BILIDIR, AST, ALT, ALKPHOS, PROT, LABALBU in the last 72 hours. No results for input(s): LACTATE in the last 72 hours. Recent Labs     06/03/21  0353   INR 1.0       RADIOLOGY    No results found.       ASSESSMENT:  60 y.o. female with sacral decubitus ulcer    PLAN:    OR debridement 6/3    Electronically signed by Peewee Carcamo MD on 6/3/21 at 3:12 PM EDT

## 2021-06-15 PROBLEM — A41.9 SEPSIS (HCC): Status: RESOLVED | Noted: 2021-01-01 | Resolved: 2021-01-01

## 2021-06-15 PROBLEM — Z99.2 ENCOUNTER REGARDING VASCULAR ACCESS FOR DIALYSIS FOR ESRD (HCC): Chronic | Status: RESOLVED | Noted: 2021-01-01 | Resolved: 2021-01-01

## 2021-06-15 PROBLEM — R10.9 ABDOMINAL PAIN: Status: RESOLVED | Noted: 2021-01-01 | Resolved: 2021-01-01

## 2021-06-15 PROBLEM — T81.31XD SURGICAL WOUND DEHISCENCE, SUBSEQUENT ENCOUNTER: Chronic | Status: RESOLVED | Noted: 2021-01-01 | Resolved: 2021-01-01

## 2021-06-15 PROBLEM — K55.9 ISCHEMIC BOWEL DISEASE (HCC): Status: RESOLVED | Noted: 2021-01-01 | Resolved: 2021-01-01

## 2021-06-15 PROBLEM — T82.49XA COMPLICATIONS, DIALYSIS, CATHETER, MECHANICAL, INITIAL ENCOUNTER (HCC): Status: RESOLVED | Noted: 2021-01-01 | Resolved: 2021-01-01

## 2021-06-15 PROBLEM — K52.9 ENTEROCOLITIS: Status: RESOLVED | Noted: 2021-01-01 | Resolved: 2021-01-01

## 2021-06-15 PROBLEM — J18.9 PNEUMONIA DUE TO INFECTIOUS ORGANISM: Status: RESOLVED | Noted: 2021-01-01 | Resolved: 2021-01-01

## 2021-06-15 PROBLEM — R57.9 SHOCK (HCC): Status: RESOLVED | Noted: 2021-01-01 | Resolved: 2021-01-01

## 2021-06-15 PROBLEM — T82.9XXA COMPLICATIONS DUE TO RENAL DIALYSIS DEVICE, IMPLANT, AND GRAFT: Status: RESOLVED | Noted: 2021-01-01 | Resolved: 2021-01-01

## 2021-06-15 PROBLEM — T82.41XA HEMODIALYSIS CATHETER MALFUNCTION, INITIAL ENCOUNTER (HCC): Status: RESOLVED | Noted: 2021-01-01 | Resolved: 2021-01-01

## 2021-06-15 PROBLEM — U07.1 COVID-19: Status: RESOLVED | Noted: 2021-01-01 | Resolved: 2021-01-01

## 2021-06-15 PROBLEM — T81.30XA DEHISCENCE OF FASCIA: Status: RESOLVED | Noted: 2021-01-01 | Resolved: 2021-01-01

## 2021-06-15 PROBLEM — R73.9 HYPERGLYCEMIA: Status: RESOLVED | Noted: 2021-01-01 | Resolved: 2021-01-01

## 2021-06-15 PROBLEM — N18.6 ENCOUNTER REGARDING VASCULAR ACCESS FOR DIALYSIS FOR ESRD (HCC): Chronic | Status: RESOLVED | Noted: 2021-01-01 | Resolved: 2021-01-01

## 2021-06-15 PROBLEM — N17.9 AKI (ACUTE KIDNEY INJURY) (HCC): Status: RESOLVED | Noted: 2021-01-01 | Resolved: 2021-01-01

## 2021-06-15 NOTE — PROGRESS NOTES
CI HR MAP TPRI SVI TFC   Baseline 3.2 234 57 2357 30 33.0   Test 3.2 390 35 3453 32 33.8   % Change -0.1 1.4 -31.2 -31.1 7.0 2.4   noninvasive -  continue    Dr. Carrington Pedersen notified of findings.

## 2021-06-15 NOTE — PATIENT CARE CONFERENCE
.  Intensive Care Daily Quality Rounding Checklist      ICU Team Members: Bedside Nurse, Nursing Unit Leadership, Respiratory Therapy, Pharmacist and Manju Johnston, residents    ICU Day #: NUMBER: 1    Intubation Date: Ariadna 15    Ventilator Day #: NUMBER: 1    Central Line Insertion Date: Ariadna 15        Day #: NUMBER: 1     Arterial Line Insertion Date: Ariadna 15      Day #: NUMBER: 1    Temporary Hemodialysis Catheter Insertion Date:       Day #     DVT Prophylaxis:    GI Prophylaxis:protonix    Bates Catheter Insertion Date: Ariadna 15       Day #: 1      Continued need (if yes, reason documented and discussed with physician): yes, strict I&O    Skin Issues/ Wounds and ordered treatment discussed on rounds: Y wound care consult    Goals/ Plans for the Day: labs, fluid bolus, consult palliative care, vent management

## 2021-06-15 NOTE — PROGRESS NOTES
Patient was terminally extubated to 2 liters/min via nasal cannula.      Performed by  Aden Degroot RCP

## 2021-06-15 NOTE — PROCEDURES
by: Layla Nation MD     Consent:     Consent obtained:  Emergent situation  Pre-procedure details:     Hand hygiene: Hand hygiene performed prior to insertion      Sterile barrier technique: All elements of maximal sterile technique followed      Skin preparation:  2% chlorhexidine    Skin preparation agent: Skin preparation agent completely dried prior to procedure    Anesthesia (see MAR for exact dosages): Anesthesia method:  None  Procedure details:     Location:  L femoral    Patient position:  Flat    Procedural supplies:  Triple lumen    Catheter size:  7 Fr    Landmarks identified: yes      Ultrasound guidance: yes      Sterile ultrasound techniques: Sterile gel and sterile probe covers were used      Number of attempts:  2    Successful placement: yes    Post-procedure details:     Post-procedure:  Line sutured and dressing applied    Assessment:  Blood return through all ports and free fluid flow    Patient tolerance of procedure:   Tolerated well, no immediate complications        Bishnu Mcdermott MD  6/15/2021

## 2021-06-15 NOTE — CONSULTS
Medical Intensive Care Unit     Verena Roberts6  Resident History and Physical    Date and time: 6/15/2021 8:19 AM  Patient's name:  Marleni Smith  Medical Record Number: [id number]  Patient's account/billing number: [de-identified]  Patient's YOB: 1955  Age: 60 y.o. Date of Admission: 6/15/2021 12:07 AM  Length of stay during current admission: 0    Primary Care Physician: Tameka Camarena DO  ICU Attending Physician: Dr. Huang Kolb    Code Status: Prior    Reason for ICU admission: Septic Shock     History of Present Illness:   Patient is a 61-year-old female past medical history of stage IV pressure ulcer, ESRD, diabetes type 2, hyperlipidemia and superior mesenteric artery thrombosis. Patient presented to the ED as a transfer from [facility] due to altered mental status, lethargy and hypotension. Patient currently at [facility] due to sacral decubitus ulcer status post debridement on 6/3. According to reports patient was lethargic throughout the day and became hypotensive and altered during dialysis. On arrival to the ED patient had a GCS of 3, was not following commands, patient was intubated for airway protection. ABG demonstrated pH 7.15, PCO2 20.6, PO2 192 and HCO3 7.1. Laboratory work demonstrated leukocytosis 24.1, hemoglobin 8.4, troponin elevated 414, anion gap 31, creatinine 2.1, BUN 28. Patient also with elevated liver enzymes, alk phos 160, ALT 1148, AST 3920. CT scan of the head was obtained which demonstrated no acute abnormalities. CT abdomen pelvis without contrast demonstrated improvement in fluid collection in the medial left thigh, there was interval development of subcutaneous gas and tiny fluid collections inferior to the right lower quadrant ostomy site, possible abscess. Patient remained profoundly hypotensive blood pressure 40 over palp, patient was given 2 L normal saline started on stress dose steroids as well as levo, vaso and epinephrine.   Patient given meropenem and vancomycin. Vascular surgery was consulted for left femoral triple-lumen catheter. Findings consistent with acute hypoxic respiratory failure secondary to septic shock, decision made to admit patient to the ICU for continued monitoring. Patient seen and examined in emergency department, patient's daughter at the bedside. Findings discussed extensively with patient's daughter. Patient currently does not have a power of  however daughter does make majority of medical decisions. She states that last night patient told her that she did not want to suffer. Patient currently a full code, CODE STATUS discussed with patient's daughter at the bedside. She would like patient to be DNR CCA at this time. Patient will be admitted to the ICU for continued monitoring. CURRENT VENTILATION STATUS:   [x] Ventilator  [] BIPAP  [] Nasal Cannula [] Room Air      IF INTUBATED, ET TUBE MARKING AT LOWER LIP:       cms    SECRETIONS   Amount:  [] Small [] Moderate  [] Large [] None    Color:     [] White [] Colored  [] Bloody    SEDATION:  RAAS Score:  [] Propofol gtt  [] Versed gtt  [] Ativan gtt   [x] No Sedation    PARALYZED:  [x] No    [] Yes    VASOPRESSORS:  [] No    [x] Yes    If yes -   [x] Levophed       [] Dopamine     [x] Vasopressin       [] Dobutamine  [] Phenylephrine         [x] Epinephrine    CENTRAL LINES:     [] No   [x] Yes   (Date of Insertion:6/15   )           If yes -     [] Right IJ     [] Left IJ [] Right Femoral [x] Left Femoral                   [] Right Subclavian [] Left Subclavian     MCNEILL'S CATHETER:   [] No   [x] Yes  (Date of Insertion:   )     URINE OUTPUT:            [] Good   [x] Low              [x] Anuric    REVIEW OF SYSTEMS:    Unable to be obtained due to patient being intubated and sedated.      OBJECTIVE:     VITAL SIGNS:  BP (!) 32/0   Pulse 101   Temp 97.2 °F (36.2 °C) (Bladder)   Resp 16   Ht 5' 6\" (1.676 m)   Wt 181 lb (82.1 kg)   LMP  (LMP Unknown) infusion 0.01 Units/min (06/15/21 0352)    EPINEPHrine infusion 6 mcg/min (06/15/21 0807)     PRN Meds:        VENT SETTINGS (Comprehensive) (if applicable):  Vent Information  $Ventilation: $Initial Day  Vent Type: Crossvent  Vent Mode: AC/VC  Vt Ordered: 400 mL  Rate Set: 16 bmp  Peak Flow: 60 L/min  FiO2 : 100 %  SpO2: 92 %  SpO2/FiO2 ratio: 92  PEEP/CPAP: 5  Additional Respiratory  Assessments  Pulse: 101  Resp: 16  SpO2: 92 %    ABGs:   Recent Labs     06/15/21  0106   PH 7.158*   PCO2 20.6*   PO2 192.2*   HCO3 7.1*   BE -19.8*   O2SAT 98.4       Laboratory findings:  Complete Blood Count:   Recent Labs     06/14/21  0340 06/14/21  2312 06/15/21  0213   WBC 14.5* 21.2* 24.1*   HGB 8.8* 8.3* 8.4*   HCT 27.7* 27.7* 27.2*    255 242        Last 3 Blood Glucose:   Recent Labs     06/14/21  0340 06/14/21  2312 06/15/21  0213   GLUCOSE 118* 200* 136*        PT/INR:    Lab Results   Component Value Date    PROTIME 22.8 06/15/2021    PROTIME 12.8 05/02/2012    INR 2.1 06/15/2021     PTT:    Lab Results   Component Value Date    APTT 27.5 06/14/2021       Comprehensive Metabolic Profile:   Recent Labs     06/14/21  0340 06/14/21  2312 06/15/21  0213    135 141   K 5.5* 4.2 4.2   CL 99 94* 100   CO2 21* 7* 10*   BUN 45* 25* 28*   CREATININE 3.7* 2.2* 2.1*   GLUCOSE 118* 200* 136*   CALCIUM 9.7 9.1 8.5*   PROT 6.6 6.0* 5.8*   LABALBU 3.4* 3.0* 3.0*   BILITOT 0.3 0.5 0.6   ALKPHOS 149* 155* 160*   AST 28 1,363* 3,920*   ALT 13 531* 1,148*      Magnesium:   Lab Results   Component Value Date    MG 1.9 06/14/2021     Phosphorus:   Lab Results   Component Value Date    PHOS 1.5 06/14/2021     Ionized Calcium:   Lab Results   Component Value Date    CAION 1.16 02/23/2021      Troponin:   Recent Labs     06/15/21  0213   TROPHS 414*     Urinalysis: N/A     Microbiology:  Cultures during this admission:     Blood cultures: Pending                [] None drawn      [] Negative             []  Positive (Details: No components found for: CBLOOD, CFUNGUSBL )  Urine Culture: Pending                  [] None drawn      [] Negative             []  Positive (Details: No results for input(s): Carrera Render in the last 72 hours. )  Sputum Culture:               [x] None drawn       [] Negative             []  Positive (Details: No components found for: CSPUTUM )   Endotracheal aspirate:     [x] None drawn       [] Negative             []  Positive (Details:  )     Other pertinent Labs:     Radiology/Imaging:   Chest Xray (6/15/2021): ET tube in in proper positioning, no acute abnormalities  CT scan abdomen pelvis without contrast 6/14: Subcutaneous gas and possible fluid collection inferior to the right lower ostomy site possible abscess. CT scan head without contrast: No acute abnormalities    ASSESSMENT  And PLAN:     Neurologic  Metabolic encephalopathy  Patient currently intubated but not on any sedation, not following commands.   According to daughter patient Bryce Hodge x2 at baseline  CT head 6/15: No acute abnormalities  RASS -3 - -4   Likely due to underlying acidosis  Fentanyl GGT  Continue to monitor    Cardiovascular  Septic shock  Patient decreased blood pressure during dialysis session and select, on arrival to the ED blood pressures 40 over palp  Lactic acid 19.4 on arrival, remains elevated at 16  Cortisol level pending  Patient given 2 L normal saline in ED  Currently maxed out on Levophed at 100 mcg  Epinephrine 10 mics  Vasopressin 0.04  Continue Solu-Cortef 100 mg every 8 hours  NICOM mildly fluid responsive  Give additional 1 L LR  Increase bicarb drip to 150 cc an hour  Wean pressors as tolerated    Atrial fibrillation  EKG in the emergency department demonstrated possible atrial fib rate of 116  Patient not on any anticoagulation  Hold amiodarone  Continue to monitor    Demand ischemia  Troponin elevated on presentation at 414, proBNP elevated 10,523, no acute ischemic changes on EKG  Likely elevated secondary to demand ischemia due to hypertension and ESRD  Continue to monitor    Pulmonary  Acute hypoxic respiratory failure secondary to profound acidosis from sepsis  Patient intubated on arrival to the emergency department   Continue ventilator support  Wean FiO2 as tolerated titrate SPO2 to 90-92%. Gastrointestinal  Septic shock likely from GI origin  Patient with complicated history of ileostomy with chronic abdominal wound and ileostomy.   CT scan in ED demonstrated subcutaneous gas and possible small abscesses in the subcutaneous tissue the right lower quadrant  Patient given 2 L of normal saline emergency department and was mildly fluid responsive on NICOM given additional 1 L LR  Lactic acid profoundly elevated  Leukocytosis of 23.2  Given Vanco and meropenem in ED  Follow-up cultures  General surgery consult placed, no acute intervention  ID consult placed  Antibiotics per ID    Shock liver  Transaminitis significantly elevated today  ALT 1148, AST 2920, alk phos 160  Likely related to severe hypertension  Obtain right upper quadrant ultrasound  General surgery consult appreciated  Continue to monitor    Infectious Disease  Septic shock   Likely secondary to possible GI origin versus sacral wound versus UTI  Patient with profound lactic acidosis, initially 19 has increased to 26  New leukocytosis 23.2  Cortisol 109  Blood cultures pending  Urine culture from 6/13: Gram-negative rods oxidase positive  Patient on ertapenem at [facility]  Patient currently on Levophed, vasopressin and epinephrine.  - Continue Solu-Cortef 100 mg every 8 hours  General surgery consulted no acute interventions planned  Infectious disease consulted  Follow-up cultures  Antibiotics per infectious disease    Endocrine  Type 2 diabetes  Sugars currently well controlled  Continue to monitor    Genitourinary/Renal  ESRD  Patient on HD  Nephrology consult appreciated  Continue dialysis per nephrology recommendations    Hyperkalemia  Secondary to ESRD Potassium 5.7 this afternoon  Give 1 g calcium gluconate  Give 10 units insulin IV  Give 1 amp D50  Supplement bicarb  Continue to monitor    High anion gap metabolic acidosis  Likely secondary to profound septic shock  Anion gap 40  ABG demonstrating severe metabolic acidosis  Give 3 amp of bicarb  Increase bicarb drip to 150 cc an hour  Nephrology consult initiated  Continue HD per nephrology recs    Hematology/Oncology  Acute on chronic anemia   Baseline hemoglobin 9  Hemoglobin downtrending to 5.8  Fibrinogen 258, INR increased to 3.1  Transfused 2 units PRBCs  Transfuse 1 unit of cryo  Continue to monitor    Dermatologic/Musculoskeletal  Stage IV decubitus ulcer  Patient with chronic stage IV decubitus ulcer status post debridement 6/2  Some mild free air noted on CT scan  Wound does not appear to be overtly infected  General surgery consult appreciated no acute interventions planned  Infectious disease consult appreciated  Antibiotics per infectious disease    Spiritual/Other  Patient full code on arrival  Discussed at length with patient's daughter she would like to make patient DNR CCA  Palliative care consult placed    WEAN PER PROTOCOL:  [x] No   [] Yes  [] N/A    ICU PROPHYLAXIS:  Stress ulcer:  [x] PPI Agent  [] [id number] [] Sucralfate  [] Other:  VTE:   [] Enoxaparin  [] Unfract. Heparin Subcut  [x] EPC Cuffs    NUTRITION:  [x] NPO [] Tube Feeding (Specify: ) [] TPN  [] PO (Diet: No diet orders on file)    INSULIN DRIP:   [x] No   [] Yes    CONSULTATION NEEDED:   [] No   [x] Yes    FAMILY UPDATED:    [] No   [x] Yes    TRANSFER OUT OF ICU:   [x] No   [] Yes    Addendum 1630: Patient evaluated by general surgery and discussed with family, no acute interventions at this time. After lengthy discussion with family they have elected to pursue comfort measures at this time and would like no further aggressive measures including dialysis or blood products.   Hemodialysis was stopped, no further blood products given.  Will maintain pressors and ventilator support until family arrives for withdrawal of care. Comfort order set placed    Disposition: DNR CC, will withdraw care when family ready    Patient was seen and evaluated by myself and my attending. Assessment and Plan discussed with attending provider, please see attestation for final plan of care. Rupert Harrington DO   6/15/2021  8:19 AM    I personally saw, examined and provided care for the patient. Radiographs, labs and medication list were reviewed by me independently. I spoke with bedside nursing, therapists and consultants. Critical care services and times documented are independent of procedures and multidisciplinary rounds with Residents. Additionally comprehensive, multidisciplinary rounds were conducted with the MICU team. The case was discussed in detail and plans for care were established. Review of Residents documentation was conducted and revisions were made as appropriate. I agree with the above documented exam, problem list and plan of care. Profound septic shock   Titrate pressors   Prbc   Cryoprecipitate for DIC   Abx   Generally surgery consult  Vascular surgery consult     Enzo Chung M.D.    Pulmonary/Critical Care Medicine   47 min cct excluding procedures

## 2021-06-15 NOTE — PROGRESS NOTES
Patient's family present at bedside. Morphine infusion initiated at 1601 per family and order. Patient's daughter verbalizes that family is ready for patient to be extubated at 36. RT notified. Patient extubated to 2L NC at 1637. NG tube removed for comfort. Vasopressors and sodium bicarbonate infusions stopped at this time. Pt appears comfortable. Pt becomes bradycardic then asystole at 1645. Dr. Abril Rodriguez notified. Dr. Abril Rodriguez to room to evaluate patient, pt pronounced at this time. Emotional support provided. Family unsure of which  home they will use at this time.

## 2021-06-15 NOTE — H&P
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 thrombosis ([address] [K55.069]     S/P small bowel resection [Z90.49]     Tobacco abuse [Z72.0]     Depression [F32.9]     Hyperlipidemia associated with type 2 diabetes mellitus ([address] [E11.69, E78.5]     Tubular adenoma of colon [D12.6]     Mild sleep apnea [G47.30]     Orthostatic hypotension dysautonomic syndrome [I95.1]     Neuropathy associated with endocrine disorder ([address] [E34.9, G63]     Mild cardiomegaly [I51.7]     Fatty infiltration of liver [K76.0]     Recurrent major depressive disorder ([address] [F33.9]     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]     Post traumatic stress disorder (PTSD) [F43.10]     Padron's esophagus [K22.70]     Vitamin D deficiency [E55.9]     Mild valvular heart disease [I38]     Chronic back pain [M54.9, G89.29]     Restless leg syndrome [G25.81]     IBS (irritable bowel Vitamin D deficiency 01/06/2016    DDD (degenerative disc disease)     Mild valvular heart disease      trace aortic/mild tricuspid per echo 2012      Chronic back pain 03/07/2014    Restless leg syndrome 08/17/2012    Fibromyalgia 01/31/2011    IBS (irritable bowel syndrome) 01/31/2011    GERD (gastroesophageal reflux disease) 01/31/2011     Dr Charlene Delarosa  · Shock-- sepsis vs hypovolemic (known relative adrenal insufficiency): ICU monitoring. Critical care consultation. IVF hydration. Pressors as neededcurrently on 3. Cortef changed to stress dose steroids. Palliative care consult. · Shock liver related to hypotension: Transaminase measurements from yesterday morning were not elevated. Monitor HFP. · Metabolic encephalopathy likely related to hypotension: CT head negative. · Infected sacral decubitus ulcer w/ associated coag neg staph bacteremia and resolved sepsis: Offloading. Surgeon for wound care.   · S/P excisional debridement of sacral ulcer per Dr Mitch Prince on 6/3 .   · s/p debridement excisional 5/22. · ATB per ID- switched from invanz to merrem d/t confusion until 6/16. · Wound vac added 6/11. .   · H/o small bowel pneumatosis and ilieostomy: High output noted. WCN. Monitor abdominal wound. Gen surg input appreciatedthey do not feel the patient has necrotizing fasciitis. · DM:  Basal insulin. SSI.  Monitor blood sugars. HbA1C 13.1% (2/21)  · Afib: Eliquis. Amiodarone. Cardio following. · Anemia of chronic dz: Monitor CBC. Transfuse hgb <7.   · ESRD: HD per nephrology. Follow BMP. · RLE foot wound: S/p partial R hallux amp--incision healing (scabbed areas). Podiatry input appreciated. Prevalon boot to RLE for offloading.    · H/o DVT: Eliquis. · Continue LTAC meds  · PT/OT when able   · Follow labs  · DVT prophylaxis. · Please see orders for further management and care.   ·  for discharge planning  · Discharge plan: TBD pending clinical improvement     Poor

## 2021-06-15 NOTE — PROGRESS NOTES
TROPONINI in the last 72 hours. BNP: No results for input(s): BNP in the last 72 hours. Lipids: No results for input(s): CHOL, HDL in the last 72 hours. Invalid input(s): LDLCALCU  ABGs:   Lab Results   Component Value Date    PO2ART 164.1 02/17/2021    MUN8SOE 43.7 02/17/2021     INR:   Recent Labs     06/14/21  2312 06/15/21  0213 06/15/21  1210   INR 1.8 2.1 3.1       -----------------------------------------------------------------  RAD: XR CHEST PORTABLE    Result Date: 6/15/2021  EXAMINATION: ONE XRAY VIEW OF THE CHEST 6/15/2021 6:29 am COMPARISON: Exam performed earlier today at 0022 hours HISTORY: [facility]: post intubation XR TECHNOLOGIST PROVIDED HISTORY: Reason for exam:->post intubation XR FINDINGS: EKG leads overlie the chest.  Patient is somewhat rotated. Endotracheal tube terminates above the cecilia with the tip near the level of the aortic arch. Nasogastric tube terminates in the gastric fundus. Right-sided double lumen catheter remains in place. Heart size is normal.  No identified pneumothorax. Atelectasis and or scarring in the right greater than left lung base. No other change. Interval placement of endotracheal and nasogastric tubes. No other change. Continued follow-up recommended. XR CHEST PORTABLE    Result Date: 6/15/2021  EXAMINATION: ONE XRAY VIEW OF THE CHEST 6/15/2021 12:42 am COMPARISON: 05/22/2021 HISTORY: ORDERING SYSTEM PROVIDED HISTORY: altered TECHNOLOGIST PROVIDED HISTORY: Reason for exam:->altered FINDINGS: The lungs are without acute focal process. Right lower lobe atelectasis. There is no effusion or pneumothorax. The cardiomediastinal silhouette is without acute process. The osseous structures are without acute process. Right-sided large-bore central venous catheter tip in the right atrium. No acute process.        Objective:   Vitals: BP (!) 115/46   Pulse 109   Temp 97.9 °F (36.6 °C) (Oral)   Resp 16   Ht 5' 6\" (1.676 m) Wt 181 lb (82.1 kg)   LMP  (LMP Unknown)   SpO2 (!) 84%   BMI 29.21 kg/m²   General appearance: appears stated age   Skin:  No rashes or lesions  HEENT: Head: Normocephalic, no lesions, without obvious abnormality.   Neck: no adenopathy, no carotid bruit, no JVD, supple, symmetrical, trachea midline and thyroid not enlarged, symmetric, no tenderness/mass/nodules  Lungs: dullness to percussion bibasilar  Heart: regular rate and rhythm, S1, S2 normal, no murmur, click, rub or gallop  Abdomen: bleeding around stomy ,dark red ,? GI bleed   Extremities: extremities normal, atraumatic, no cyanosis or edema  Neurologic: Mental status: alertness: obtunded    Assessment:   Patient Active Problem List:     Fibromyalgia     IBS (irritable bowel syndrome)     GERD (gastroesophageal reflux disease)     Restless leg syndrome     Chronic back pain     Adrenal mass (HCC)     DDD (degenerative disc disease)     Mild valvular heart disease     Vitamin D deficiency     Padron's esophagus     Post traumatic stress disorder (PTSD)     Recurrent major depressive disorder (HCC)     Mood disorder due to a general medical condition     Fatty infiltration of liver     Mild cardiomegaly     Orthostatic hypotension dysautonomic syndrome     Neuropathy associated with endocrine disorder (HCC)     Mild sleep apnea     Tubular adenoma of colon     Hyperlipidemia associated with type 2 diabetes mellitus (HCC)     Depression     Tobacco abuse     S/P small bowel resection     Ischemic necrosis of small bowel (HCC)     Superior mesenteric artery thrombosis (HCC)     Thrombosis of right iliac artery (HCC)     Ischemia of right lower extremity     DM (diabetes mellitus), type 2, uncontrolled (HCC)     Septic shock (HCC)     Ulcer of abdomen wall with fat layer exposed ([address]     Atherosclerosis of native artery of extremity (HCC)     Gangrene of toe of right foot (HCC)     Severe protein-calorie malnutrition (HCC)     Persistent atrial fibrillation ([facility]     ESRD (end stage renal disease) ([facility]     Pressure injury of sacral region, stage 4 ([facility]    Plan:   IMPRESSION/RECOMMENDATIONS:      1. esrd  For hd  due to hyperkalemia SLEDD , with 2 UNITS of BLOOD TRANSFUSION , low K bath   Continue usual outpatient schedule mwf     2. Infected sacral decub  Sp debridement 5/22   on AB      3. Hypotension In setting of sepsis/ GI BLEED     4.anemia of GI bleed   trf < 7, 2 units tr today      5. afib  On amio bb     6. Sp ostomy -- fresh blood clots noted at exit      7. abd wound healing ok   abx coverage as well     8. Hyperkalemia - treated , Hemodialysis with low K bath        9. merrick le dvt  On oac - currently on hold      10.  Large Sacral decub  Sp I and d 5/22    Patient currently critical / severe lactic acidosis /Metabolic acidosis/ Active GIB   Plan d/w ICU staff       Kaitlynn High MD

## 2021-06-15 NOTE — FLOWSHEET NOTE
Pt ran . 75 hours; 1340 bath; No fluid removed ran 200 positive; stable/tolerated fair; denies c/o. HD CVC Citrate locked per lumen fill volume, capped & clamped post Tx. Good Access flow no issues achieving prescribed BFR. Next Tx Scheduled tomorrow 6/16. Tx. ended early r/t compassionate withdrawal of care by family. 06/15/21 1615   Vital Signs   BP (!) 144/47   Temp 96.7 °F (35.9 °C)   Pulse 117   Resp 29   SpO2 100 %   Weight 181 lb 7 oz (82.3 kg)   Percent Weight Change 0.24   Pain Assessment   Pain Assessment Faces   Pain Level 0   Post-Hemodialysis Assessment   Post-Treatment Procedures Blood returned;Catheter capped, clamped with Citrate x 2 ports   Machine Disinfection Process Exterior Machine Disinfection   Rinseback Volume (ml) 300 ml   Total Liters Processed (l/min) 16.2 l/min   Dialyzer Clearance Lightly streaked   Duration of Treatment (minutes) 45 minutes   Hemodialysis Intake (ml) 200 ml   Hemodialysis Output (ml) 300 ml   NET Removed (ml) -200 ml   Tolerated Treatment Fair   Patient Response to Treatment Tx. ended early r/t compassionate withdrawal of care by family.    Bilateral Breath Sounds Diminished   Edema Generalized +3

## 2021-06-15 NOTE — CONSULTS
Vascular Surgery Consultation Note    Reason for Consult:  Central line placement    HPI :    Pauline Huff is a 60 y.o. female who presents for evaluation of septic shock. Patient has an unfortunate history of SMA thrombus, proximal jejunostomy, multiple vascular access sites, and right iliac thrombectomy. Vascular surgery consulted for central line placement.     ROS : Negative if blank [], Positive if [x]  General Vascular   [] Fevers [] Claudication (Blocks)   [] Chills [] Rest Pain   [] Weight Loss [] Tissue Loss   [] Chest Pain [] Clotting Disorder    [] SOB at rest [] Leg Swelling   [] SOB with exertion [] DVT/PE      [] Nausea    [] Vomitting [] Stroke/TIA   [] Abdominal Pain [] Focal weakness   [] Melena [] Slurred Speech   [] Hematochezia [] Vision Changes   [] Hematuria    [] Dysuria [x] Hx of Central Catheters   [] Wears Glasses/Contacts  [x] Dialysis and If so date initiated   [] Blindness     []  Hand Dominant   [] Difficulty swallowing        Past Medical History:   Diagnosis Date    Ad[facility] ([facility] 9/11/2014    adenoma, has been stable    Anxiety and depression 8/21/2016    Arthropathy of facet joint     Asthma     Padron's esophagus     Dr Selam Candelario EGD one year    CAD (coronary artery disease)     Follows with PCP    Cancer ([facility]     colon    CHF (congestive heart failure) ([facility]     Chronic back pain 3/7/2014    CMV mononucleosis     COPD (chronic obstructive pulmonary disease) ([facility]     DDD (degenerative disc disease)     Dehiscence of fascia 3/24/2021    Encounter regarding vascular access for dialysis for ESRD ([facility] 3/29/2021    Fatty infiltration of liver 12/19/2016    Per CT-11/14/16    Fibromyalgia     GERD (gastroesophageal reflux disease)     Hemodialysis patient ([facility]     Hiatal hernia     History of blood transfusion     Hx of blood clots     Hyperlipidemia     Hypertension     Hypokalemia     IBS (irritable bowel syndrome)     Insomnia     Ischemic bowel ECHOCARDIOGRAM  04/02/2021    Dr. Estephanie Evans  5/27/2016    Dr Darvin Jorgensen  07/14/2016    Dr Svetlana Griffin Right 2/17/2021    Iliac Thrombectomy with Right Lower Extremity Fasciotomy performed by Belia Ruiz MD at [address] [place] [address] Left 3/30/2021    CATHETER INSERTION TEMPORARY HEMODIALYSIS performed by Trang Red MD at [address] [place] [address] N/A 3/31/2021    CATHETER INSERTION,TEMPORAY  HEMODIALYSIS, NEEDS FLURO, PT IN [facility], AVAILABLE ANYTIME performed by Trang Red MD at [address] [place] [address] N/A 4/5/2021    CATHETER INSERTION HEMODIALYSIS WITH LEFT FEMORAL TEMPORARY HEMODIALYSIS ACCESS performed by Tommy Dodge MD at [address] [place] [address] N/A 4/15/2021    REMOVAL AND REINSERTION TUNNELED HEMODIALYSIS CATHETER performed by Belia Ruiz MD at [facility]     Current Medications:    norepinephrine 55 mcg/min (06/15/21 0724)    sodium bicarbonate infusion 150 mL/hr at 06/15/21 0351    vasopressin (Septic Shock) infusion 0.01 Units/min (06/15/21 0352)    EPINEPHrine infusion 5 mcg/min (06/15/21 0721)          meropenem  1,000 mg Intravenous Once    vancomycin  20 mg/kg Intravenous Once        Allergies:  Lisinopril, Procardia [nifedipine], and Metformin and related    Social History     Socioeconomic History    Marital status:      Spouse name: Not on file    Number of children: Not on file    Years of education: Not on file    Highest education level: Not on file   Occupational History    Occupation: disability     Comment: from PTSD, depression   Tobacco Use    Smoking status: Current Every Day Smoker     Packs/day: 1.00     Years: 41.00     Pack years: 41.00     Types: Cigarettes     Start date: 11/10/1974    Smokeless tobacco: Never Used    Tobacco comment: started at 26 and smoked up to 3 ppd   Vaping Use    Vaping Use: Never used Substance and Sexual Activity    Alcohol use: Yes     Alcohol/week: 0.0 standard drinks     Comment: occasionally    Drug use: No    Sexual activity: Not Currently     Partners: Male   Other Topics Concern    Not on file   Social History Narrative    Not on file     Social Determinants of Health     Financial Resource Strain:     Difficulty of Paying Living Expenses:    Food Insecurity:     Worried About Running Out of Food in the Last Year:     [address] in the Last Year:    Transportation Needs:     Lack of Transportation (Medical):      Lack of Transportation (Non-Medical):    Physical Activity:     Days of Exercise per Week:     Minutes of Exercise per Session:    Stress:     Feeling of Stress :    Social Connections:     Frequency of Communication with Friends and Family:     Frequency of Social Gatherings with Friends and Family:     Attends [employer] Services:     Active Member of Clubs or Organizations:     Attends Club or Organization Meetings:     Marital Status:    Intimate Partner Violence:     Fear of Current or Ex-Partner:     Emotionally Abused:     Physically Abused:     Sexually Abused:         Family History   Problem Relation Age of Onset    Diabetes Mother     High Blood Pressure Mother     Cancer Mother         BREAST    Cancer Brother         THROAT    Heart Disease Brother         PHYSICAL EXAM:    BP (!) 72/1   Pulse 101   Temp 97.2 °F (36.2 °C) (Bladder)   Resp 18   Ht 5' 6\" (1.676 m)   Wt 181 lb (82.1 kg)   LMP  (LMP Unknown)   SpO2 (!) 89%   BMI 29.21 kg/m²   CONSTITUTIONAL: Intubated and sedated  Normal  EYES:  lids and lashes normal, sclera clear and conjunctiva normal  ENT:  normocepalic, without obvious abnormality, external ears without lesions  NECK:  supple, symmetrical, trachea midline,no jugular venous distension, no carotid bruits  HEMATOLOGIC/LYMPHATICS:  no cervical lymphadenopathy  LUNGS:  no increased work of breathing, good air

## 2021-06-15 NOTE — PROGRESS NOTES
CI HR MAP TPRI SVI TFC   Baseline 2.6 110   24 30.0   Test 3.1 115   27 30.7   % Change 16.3 4.5   11.4 2.6   noninvasive -  start  Dr. Jacek Rouse notified of findings.

## 2021-06-15 NOTE — ED NOTES
Bed: 22  Expected date:   Expected time:   Means of arrival:   Comments:  HOLD for [facility] pt     Dayna Hodges RN  [id number]

## 2021-06-15 NOTE — ED PROVIDER NOTES
Jadon Carr is a 60 y.o. female with a PMHx significant for sacral ulcer and wound dehiscence, abdominal wound dehiscence, GERD, HTN, HLD, COVID-19, neuropathy, cardiomegaly who presents for evaluation of altered mental status, decreased responsiveness, beginning prior to arrival.  The complaint has been constant, severe in severity, and worsened by nothing. [facility]. Upon arrival the patient is unresponsive with a GCS of 3. There is no gag reflex. Initial pressure is 40 over palp. The history is provided by the patient and medical records. Review of Systems   Unable to perform ROS: Acuity of condition        Physical Exam  Vitals and nursing note reviewed. Constitutional:       General: She is sleeping. She is in acute distress. Appearance: She is well-developed. She is ill-appearing. HENT:      Head: Normocephalic and atraumatic. Right Ear: External ear normal.      Left Ear: External ear normal.   Eyes:      General:         Right eye: No discharge. Left eye: No discharge. Conjunctiva/sclera: Conjunctivae normal.      Pupils: Pupils are equal, round, and reactive to light. Cardiovascular:      Rate and Rhythm: Normal rate and regular rhythm. Heart sounds: Normal heart sounds. Pulmonary:      Effort: Tachypnea and respiratory distress present. Breath sounds: Normal breath sounds. No stridor. Abdominal:      General: There is no distension. Palpations: Abdomen is soft. There is no mass. Tenderness: There is abdominal tenderness. Hernia: No hernia is present. Comments: Ostomy noted   Musculoskeletal:         General: No deformity. Normal range of motion. Cervical back: Normal range of motion and neck supple. Skin:     General: Skin is warm. Coloration: Skin is not jaundiced or pale. Findings: Rash present. Comments: Large, deep sacral wound  Erythema around ostomy site. Neurological:      Mental Status: She is lethargic. GCS: GCS eye subscore is 1. GCS verbal subscore is 1. GCS motor subscore is 1. Comments: No gag reflex          Procedures     Central Line Placement Procedure Note  Indication: poor peripheral access, long term access and centrally administered medications  Consent: Unable to be obtained due to the emergent nature of this procedure. Procedure: The patient was positioned appropriately and the skin over the left femoral vein was prepped with betadine and draped in a sterile fashion. Local anesthesia was obtained by infiltration using 1% Lidocaine without epinephrine. A large bore needle was used to identify the vein. A guide wire was then inserted into the vein through the needle. A triple lumen catheter was then inserted into the vessel over the guide wire using the Seldinger technique. About 2/3 of the way through the guidewire would not advance any further. At this point in time the procedure was aborted. The patient tolerated the procedure well. Complications: Unsuccessful central line placement    Intubation Procedure Note  Indication: potential airway compromise and hypoxia  Consent: Unable to be obtained due to the emergent nature of this procedure. Medications Used: etomidate intravenously and rocuronium intravenously  Procedure: The patient was placed in the appropriate position. Cricoid pressure was not required. Intubation was performed by direct laryngoscopy using a laryngoscope and a 7.5 cuffed endotracheal tube. The cuff was then inflated and the tube was secured appropriately at a distance of 23 cm to the dental ridge. Initial confirmation of placement included bilateral breath sounds, an end tidal CO2 detector, absence of sounds over the stomach, tube fogging, adequate chest rise and adequate pulse oximetry reading. A chest x-ray to verify correct placement of the tube has been ordered but is still pending.     The patient tolerated the procedure well. Complications: None            MDM     ED Course as of Victorino 15 0854   Tue Victorino 15, 2021   0112 Levophed was started through the patient's midline, she was given amp of bicarb. As blood pressure is improving, the patient is becoming more awake. She is opening her eyes looking at family at bedside. She is moving her upper extremities. [BB]   0112 Discussed patient's    [BB]   5308 Elevated lactic acid; Prior ECHO reviewed from 04/2021 with EF of 60-65%; additional 1L fluids ordered      [BB]   0334 Patient reevaluated, vasopressin ordered. [BB]   9662 Family updated on current treatment plan. [BB]   [id number] Spoke with Dr. Aide Roy, discussed the patient he will admit the patient. [BB]   [id number] Spoke with Dr. Clair Burnett, discussed the patient, she will provide consult. [BB]   2190 Spoke with Dr. Morris Gabriel, discussed the patient. She will accept the patient to the ICU    [BB]   0719 XR CHEST PORTABLE [BP]      ED Course User Index  [BB] Cindy Nuñez DO  [BP] Shelby Zamora, DO Yeagerjs Chaudhari presents to the ED for evaluation of unresponsiveness from [facility]. Workup in the ED revealed patient with an initial GCS of 3. She was not protecting her airway, however upon assessing her circulation she is incredibly hypotensive. She did have midline in the left arm and Levophed was started. She was also found to be hypothermic and was placed on the bear hugger. As the patient's pressure began to improve she became more responsive. Levophed was titrated up and vasopressin was added on additionally. Patient was given 30 cc/kg bolus followed by an additional liter of fluids. She was also given stress dose steroids. Patient has a tunneled dialysis catheter to the right IJ, and has had catheters in multiple sites in the past.  Attempt was made to place a triple-lumen in the left femoral, however was unsuccessful.   Case was discussed with vascular surgery, surgical resident deficiency. Past Surgical History:  has a past surgical history that includes hernia repair; Tonsillectomy; Colonoscopy (5/27/2016); Upper gastrointestinal endoscopy (5/27/2016); ECHO Compl W Dop Color Flow (5/3/2012); Cholecystectomy; Hysterectomy (1980); EMG (5/19/2015); Nerve Block (4/2015); Upper gastrointestinal endoscopy (07/14/2016); polysomnography (04/10/2017); Esophagus surgery (08/26/2016); polysomnography (05/15/2017); laparoscopy (N/A, 2/16/2021); vascular surgery (Right, 2/17/2021); Small intestine surgery (N/A, 2/17/2021); Anterior compartment decompression (Right, 2/22/2021); Foot Debridement (Right, 3/20/2021); vascular surgery (Left, 3/30/2021); vascular surgery (N/A, 3/31/2021); transesophageal echocardiogram (04/02/2021); vascular surgery (N/A, 4/5/2021); vascular surgery (N/A, 4/15/2021); Pressure ulcer debridement (N/A, 5/22/2021); and Abdomen surgery (N/A, 6/3/2021). Social History:  reports that she has been smoking cigarettes. She started smoking about 46 years ago. She has a 41.00 pack-year smoking history. She has never used smokeless tobacco. She reports current alcohol use. She reports that she does not use drugs. Family History: family history includes Cancer in her brother and mother; Diabetes in her mother; Heart Disease in her brother; High Blood Pressure in her mother. The patients home medications have been reviewed.     Allergies: Lisinopril, Procardia [nifedipine], and Metformin and related    -------------------------------------------------- RESULTS -------------------------------------------------    LABS:  Results for orders placed or performed during the hospital encounter of 06/15/21   CBC Auto Differential   Result Value Ref Range    WBC 24.1 (H) 4.5 - 11.5 E9/L    RBC 2.81 (L) 3.50 - 5.50 E12/L    Hemoglobin 8.4 (L) 11.5 - 15.5 g/dL    Hematocrit 27.2 (L) 34.0 - 48.0 %    MCV 96.8 80.0 - 99.9 fL    MCH 29.9 26.0 - 35.0 pg    MCHC 30.9 (L) 32.0 - 34.5 %    RDW Arterial   Result Value Ref Range    Date Analyzed [id number]     Time Analyzed 0106     Source: Blood Arterial     pH, Blood Gas 7.158 (LL) 7.350 - 7.450    PCO2 20.6 (L) 35.0 - 45.0 mmHg    PO2 192.2 (H) 75.0 - 100.0 mmHg    HCO3 7.1 (L) 22.0 - 26.0 mmol/L    B.E. -19.8 (L) -3.0 - 3.0 mmol/L    O2 Sat 98.4 92.0 - 98.5 %    O2Hb 97.7 (H) 94.0 - 97.0 %    COHb 0.6 0.0 - 1.5 %    MetHb 0.1 0.0 - 1.5 %    O2 Content 10.9 mL/dL    HHb 1.6 0.0 - 5.0 %    tHb (est) 7.6 (L) 11.5 - 16.5 g/dL    Mode NRB 15L     Date Of Collection      Time Collected      Pt Temp 37.0 C     ID [id number]     Lab 07522     Critical(s) Notified Handed report to Dr/RN    POCT Glucose   Result Value Ref Range    Meter Glucose 134 (H) 74 - 99 mg/dL   EKG 12 Lead   Result Value Ref Range    Ventricular Rate 116 BPM    Atrial Rate 113 BPM    P-R Interval 116 ms    QRS Duration 62 ms    Q-T Interval 328 ms    QTc Calculation (Bazett) 455 ms    P Axis -12 degrees    R Axis -27 degrees    T Axis 138 degrees       RADIOLOGY:  XR CHEST PORTABLE   Final Result   Interval placement of endotracheal and nasogastric tubes. No other change. Continued follow-up recommended. XR CHEST PORTABLE   Final Result   No acute process. ------------------------- NURSING NOTES AND VITALS REVIEWED ---------------------------  Date / Time Roomed:  6/15/2021 12:07 AM  ED Bed Assignment:  22/22    The nursing notes within the ED encounter and vital signs as below have been reviewed.      Patient Vitals for the past 24 hrs:   BP Temp Temp src Pulse Resp SpO2 Height Weight   06/15/21 0845 (!) 64/0   93 16 95 %     06/15/21 0830 (!) 62/0   92 16      06/15/21 0815 (!) 60/0   96 16 93 %     06/15/21 0800 (!) 32/0   101 16 92 %     06/15/21 0755    101       06/15/21 0551 (!) 72/1 97.2 °F (36.2 °C) Bladder 104 18 (!) 89 %     06/15/21 0518  96.8 °F (36 °C) Bladder 117 26 90 %     06/15/21 0356  95.2 °F (35.1 °C) CORE 111     06/15/21 0014    84 28 (!) 87 % 5' 6\" (1.676 m) 181 lb (82.1 kg)       Oxygen Saturation Interpretation: Abnormal    ------------------------------------------ PROGRESS NOTES ------------------------------------------  Counseling:  I have spoken with the patient and her two daughters and discussed todays results, in addition to providing specific details for the plan of care and counseling regarding the diagnosis and prognosis. Their questions are answered at this time and they are agreeable with the plan of admission.    --------------------------------- ADDITIONAL PROVIDER NOTES ---------------------------------  Consultations:  Time: 6683. Spoke with Dr. Selma Cardozo. Discussed case. They will provide consult. Spoke with Dr. Janneth Granados discussed the patient. She will provide consult. Spoke with Dr. Merlinda Curt, he will admit the patient. This patient's ED course included: a personal history and physicial examination, re-evaluation prior to disposition, multiple bedside re-evaluations, IV medications, cardiac monitoring, continuous pulse oximetry and complex medical decision making and emergency management    This patient has been closely monitored and deteriorated during their ED course. Please note that the withdrawal or failure to initiate urgent interventions for this patient would likely result in a life threatening deterioration or permanent disability. Accordingly this patient received 60 minutes of critical care time, excluding separately billable procedures. Diagnosis:  1. Septic shock ([address]    2. Acute respiratory failure with hypoxia (HCC)    3. Shock liver    4. Hypotension, unspecified hypotension type    5.  Hypothermia, initial encounter        Disposition:  Patient's disposition: Admit to CCU/ICU  Patient's condition is critical.               Celverena Ask,   Resident  06/15/21 4573

## 2021-06-15 NOTE — FLOWSHEET NOTE
Initial Inpatient Wound Care    Admit Date: 6/15/2021 12:07 AM    Reason for consult:  Wounds, ileostomy  Significant history:  See H&P, from [facility]. Recent ileostomy, stage 4 sacral decubitus    Wound history:  POA    Findings: intubated, non-responsive, daughter present     06/15/21 1546   Wound 05/22/21 Sacrum   Date First Assessed/Time First Assessed: 05/22/21 0130   Present on Hospital Admission: Yes  Primary Wound Type: Pressure Injury  Location: Sacrum   Wound Etiology   (seen by surgery)   Wound 02/17/21 Abdomen Mid   Date First Assessed: 02/17/21   Present on Hospital Admission: No  Location: Abdomen  Wound Location Orientation: Mid   Wound Image    Wound Etiology Non-Healing Surgical   Wound Length (cm) 5 cm   Wound Width (cm) 3 cm   Wound Depth (cm) 2 cm   Wound Surface Area (cm^2) 15 cm^2   Change in Wound Size % (l*w) 79.17   Wound Volume (cm^3) 30 cm^3   Wound Healing % 90   Wound Assessment   (pink)   Drainage Amount Small   Drainage Description Yellow   Odor None   Wound Thickness Description not for Pressure Injury Full thickness   Wound 04/15/21 Groin Right   Date First Assessed/Time First Assessed: 04/15/21 1800   Present on Hospital Admission: Yes  Location: Groin  Wound Location Orientation: Right   Dressing Status New dressing applied   Wound Cleansed Cleansed with saline   Dressing/Treatment   (aquacel rope)   Wound Length (cm) 1 cm   Wound Width (cm) 4 cm   Wound Depth (cm) 0.1 cm   Wound Surface Area (cm^2) 4 cm^2   Change in Wound Size % (l*w) 66.67   Wound Volume (cm^3) 0.4 cm^3   Wound Healing % 98   Wound Assessment   (pale pink)   Drainage Amount Scant   Drainage Description Yellow   Odor None   Santa-wound Assessment Intact   Wound Thickness Description not for Pressure Injury Partial thickness   sacral wound seen by surgery today. Bilateral lower leg mottling. Right great toe- hx partial amputation-small brown scabbed areas      Ileostomy pouch was loose- small amount brown stool.  No activity with pouch changed. abd skin as pic below      Large area of denuded, red rash skin. Multiple sites of oozing bright blood. Distal edge of stoms- skin is oozing. No blood noted from stoma itself. abd wound oozing with any touch to it  tx with domeboro, powder, no sting. Stoma pouched with ring, convex wafer. Very close to wound difficult to pouch. Bleeding areas tx with surgicel. aquacel rope. Redness extends to bedline right. Interventions in place:  Lo air loss bed (in ICU)    Plan: ostomy and skin care  Yi Viera.  Shonna Ball, ROBERTO, [address], JOHN - CNS 6/15/2021 4:13 PM

## 2021-06-15 NOTE — CONSULTS
5/30/2021    Restless leg syndrome 8/17/2012    Tobacco abuse     Tubular adenoma of colon     Type 2 diabetes mellitus without complication ([facility] [address] 3/78/7512    UTI (urinary tract infection)     Vitamin D deficiency 1/6/2016       Past Surgical History:   Procedure Laterality Date    ABDOMEN SURGERY N/A 6/3/2021    DEBRIDEMENT OF SACRAL WOUND performed by Sharon Olivo MD at [address] Right 2/22/2021    RIGHT LOWER EXTREMITY FASCIOTOMY CLOSURE performed by Tavon Smith MD at [facility]  5/27/2016    Dr Edenilson Nickerson  5/3/2012         EMG  5/19/2015    Dr Kirstin Cobos, radiculopathy    ESOPHAGUS SURGERY  08/26/2016    Dr Lambert Rios ablation   [facility] Right 3/20/2021    RIGHT PARTIAL HALLUX AMPUTATION performed by Rick Thompson DPM at [address] [facility]    LAPAROSCOPY N/A 2/16/2021    DIAGNOSTIC LAPAROSCOPY, CONVERTED TO LAPAROTOMY WITH SMALL BOWEL RESECTION, WITH APPLICATION OF ABTHERA WOUND VAC performed by Sharon Olivo MD at [facility]  4/2015    medial branch blocks, Dr. Thompson Edwin POLYSOMNOGRAPHY  04/10/2017    Dr Rachel Blackwell sleep apnea AHI-8    POLYSOMNOGRAPHY  05/15/2017    PRESSURE ULCER DEBRIDEMENT N/A 5/22/2021    SACRAL DECUBITUS ULCER DEBRIDEMENT performed by Priyanka Victoria MD at [address] N/A 2/17/2021    RIGHT ILLEOSTOMY AND RIGHT COLECTOMY performed by Sharon Olivo MD at [address]  [facility] TRANSESOPHAGEAL ECHOCARDIOGRAM  04/02/2021    Dr. Lehman Host  5/27/2016    Dr Edis Stevens  07/14/2016    Dr Shae Gonzalez Right 2/17/2021    Iliac Thrombectomy with Right Lower Extremity Fasciotomy performed by Tavon Smith MD at [address] [facility] Left 3/30/2021    CATHETER INSERTION TEMPORARY HEMODIALYSIS performed by Ishmael Soto MD at [address] [place] [address] N/A 3/31/2021    CATHETER INSERTION,TEMPORAY  HEMODIALYSIS, NEEDS FLURO, PT IN [facility], AVAILABLE ANYTIME performed by Ishmael Soto MD at [address] [place] [address] N/A 4/5/2021    CATHETER INSERTION HEMODIALYSIS WITH LEFT FEMORAL TEMPORARY HEMODIALYSIS ACCESS performed by Clayton Lacey MD at [address] [place] [address] N/A 4/15/2021    REMOVAL AND REINSERTION TUNNELED HEMODIALYSIS CATHETER performed by Nenita Carr MD at [address] [place] [address]       Medications Prior to Admission:    Prior to Admission medications    Medication Sig Start Date End Date Taking? Authorizing Provider   glucose (GLUTOSE) 40 % GEL Take 15 g by mouth See Admin Instructions    Historical Provider, MD   Heparin Sodium, Porcine, (HEPARIN, PORCINE,) 1000 UNIT/ML injection Infuse 3,800 Units intravenously as needed    Historical Provider, MD   glucagon 1 MG injection Inject 1 kit into the muscle once    Historical Provider, MD   hydrocortisone (CORTEF) 5 MG tablet Take 5 mg by mouth Daily with lunch    Historical Provider, MD   nitroGLYCERIN (NITRODUR) 0.2 MG/HR Place 1 patch onto the skin daily    Historical Provider, MD   Alcohol Swabs (GLOBAL ALCOHOL PREP EASE) 70 % PADS  5/27/21   Historical Provider, MD   Blood Glucose Monitoring Suppl (ONE TOUCH ULTRA 2) w/Device KIT  4/28/21   Historical Provider, MD   sodium chloride 0.9 % SOLN 1,000 mL with oxychlorosene POWD 2 g Irrigate with 2 g as directed once    Historical Provider, MD   oxyCODONE-acetaminophen (PERCOCET)  MG per tablet Take 1 tablet by mouth every 4 hours as needed for Pain.     Historical Provider, MD   polyethylene glycol (GLYCOLAX) 17 g packet Take 17 g by mouth daily as needed for Constipation    Historical Provider, MD   B Complex-C-Folic Acid (RENAL MULTIVITAMIN FORMULA PO) Take by mouth    Historical Provider, MD   rOPINIRole (REQUIP) 0.5 MG tablet Take 0.5 mg by mouth 3 times daily    Historical Provider, MD   amiodarone (CORDARONE) 200 MG tablet Take 1 tablet by mouth 2 times daily  Patient taking differently: Take 100 mg by mouth daily  5/27/21   Blade Hastings MD   hydrocortisone (CORTEF) 5 MG tablet Take 3 tablets by mouth daily (with breakfast) 5/28/21   Blade Hastings MD   ertapenem [facility]) infusion Infuse 1,000 mg intravenously three times a week Compound per protocol.give at the end of dialysis 5/28/21 6/28/21  Cuhy Oneil MD   ondansetron (ZOFRAN) 4 MG tablet Take 4 mg by mouth every 8 hours as needed for Nausea or Vomiting    Historical Provider, MD   midodrine (PROAMATINE) 10 MG tablet Take 1 tablet by mouth 3 times daily (with meals) 4/28/21   Blade Hastings MD   pantoprazole (PROTONIX) 40 MG tablet Take 1 tablet by mouth every morning (before breakfast) 4/29/21   Blade Hastings MD   Ascorbic Acid (VITAMIN C) 1000 MG tablet Take 1,000 mg by mouth daily    Historical Provider, MD   gabapentin (NEURONTIN) 100 MG capsule Take 300 mg by mouth 3 times daily.      Historical Provider, MD   insulin lispro (HUMALOG) 100 UNIT/ML injection vial Inject 0-8 Units into the skin 4 times daily (before meals and nightly) *Per Sliding Scale*    Historical Provider, MD   zinc sulfate (ZINCATE) 220 (50 Zn) MG capsule Take 50 mg by mouth daily     Historical Provider, MD   acetaminophen (TYLENOL) 325 MG tablet Take 650 mg by mouth every 6 hours as needed for Pain    Historical Provider, MD   insulin glargine (LANTUS) 100 UNIT/ML injection vial Inject 10 Units into the skin 2 times daily 2/23/21   Dung Dinh MD       Allergies   Allergen Reactions    Lisinopril Swelling    Procardia [Nifedipine] Anaphylaxis    Metformin And Related      Severe diarrhea       Family History   Problem Relation Age of Onset    Diabetes Mother     High Blood Pressure Mother     Cancer Mother         BREAST    Cancer Brother         THROAT    Heart Disease Brother        Social History     Tobacco Use    Smoking status: Current Every Day Smoker     Packs/day: 1.00     Years: 41.00     Pack years: 41.00     Types: Cigarettes     Start date: 11/10/1974    Smokeless tobacco: Never Used    Tobacco comment: started at 26 and smoked up to 3 ppd   Vaping Use    Vaping Use: Never used   Substance Use Topics    Alcohol use: Yes     Alcohol/week: 0.0 standard drinks     Comment: occasionally    Drug use: No         Review of Systems   Patient unresponsive      PHYSICAL EXAM:    Vitals:    06/15/21 1202   BP:    Pulse: 109   Resp: 16   Temp:    SpO2: (!) 84%       General Appearance:  pale  Skin:  positives: Pale, cold, mottling of extremities  Head/face:  NCAT   Eyes:  Pupils-nonreactive  Lungs: On ventilator, no breathing above ventilator  Heart: Normal sinus tachycardia  Abdomen: Excoriations over skin around jejunostomy, midline was some fibrinous material, jejunostomy pink, soft, no rigidity  Extremities: Mottled  Sacrum: Minimal fibrinous tissue, overall pink and healthy    LABS:    CBC  Recent Labs     06/15/21  1025   WBC 23.2*   HGB 7.1*   HCT 23.6*        BMP  Recent Labs     06/15/21  0213 06/15/21  1025    138   K 4.2 5.7*    93*   CO2 10*  --    BUN 28* 31*   CREATININE 2.1* 2.3*   CALCIUM 8.5* 8.1*     Liver Function  Recent Labs     06/14/21  0340 06/15/21  0213   BILITOT 0.3 0.6   BILIDIR <0.2  --    AST 28 3,920*   ALT 13 1,148*   ALKPHOS 149* 160*   PROT 6.6 5.8*   LABALBU 3.4* 3.0*     No results for input(s): LACTATE in the last 72 hours.   Recent Labs     06/15/21  0213   INR 2.1       RADIOLOGY    XR CHEST PORTABLE    Result Date: 6/15/2021  EXAMINATION: ONE XRAY VIEW OF THE CHEST 6/15/2021 6:29 am COMPARISON: Exam performed earlier today at 0022 hours HISTORY: [address] [facility]: post intubation XR TECHNOLOGIST PROVIDED HISTORY: Reason for exam:->post intubation XR FINDINGS: EKG leads overlie the chest.  Patient is
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
## 2021-06-16 NOTE — PROGRESS NOTES
Patient admitted from ED 22 to 206, with no belongings, placed on monitor, patient oriented to room and unit visiting hours. Patient guide at bedside, reviewed patient rights and responsibilities. MRSA nasal swab obtained. Bed alarm on. Call light within reach.

## 2021-06-17 LAB — MRSA CULTURE ONLY: NORMAL

## 2021-06-20 LAB
BLOOD CULTURE, ROUTINE: NORMAL
CULTURE, BLOOD 2: NORMAL

## 2021-06-23 NOTE — SIGNIFICANT EVENT
Death Pronouncement Note  Patient's Name: Ree Mejia   Patient's YOB: 1955  MRN Number: [id number]    Admitting Provider: Loco Jha DO  Attending Provider: Loco Jha DO    Patient was examined and the following were absent: Pulses, Blood Pressure and Respiratory effort    I declared the patient dead on 6/15/2021 at 1645    Preliminary Cause of Death: Cardiopulmonary arrest secondary to shock     Electronically signed by Toño Viera DO on 6/15/21 at 6:06 PM EDT    Dr. Torin Christina was present and and agrees with above pronouncement.      Toño Viera DO   6/15/2021  6:10 PM Modified Advancement Flap Text: The defect edges were debeveled with a #15 scalpel blade.  Given the location of the defect, shape of the defect and the proximity to free margins a modified advancement flap was deemed most appropriate.  Using a sterile surgical marker, an appropriate advancement flap was drawn incorporating the defect and placing the expected incisions within the relaxed skin tension lines where possible.    The area thus outlined was incised deep to adipose tissue with a #15 scalpel blade.  The skin margins were undermined to an appropriate distance in all directions utilizing iris scissors.

## 2021-06-26 NOTE — DISCHARGE SUMMARY
The patient was discharged/ on the same day as history and physical.  Please see history and physical as well as imaging studies, consult and evaluations, and nurse's notes.     Electronically signed by Nabil Tirado DO on 2021 at 9:14 AM

## 2022-03-03 NOTE — CARE COORDINATION
Problem: MOBILITY - ADULT  Goal: Maintain or return to baseline ADL function  Description: INTERVENTIONS:  -  Assess patient's ability to carry out ADLs; assess patient's baseline for ADL function and identify physical deficits which impact ability to perform ADLs (bathing, care of mouth/teeth, toileting, grooming, dressing, etc )  - Assess/evaluate cause of self-care deficits   - Assess range of motion  - Assess patient's mobility; develop plan if impaired  - Assess patient's need for assistive devices and provide as appropriate  - Encourage maximum independence but intervene and supervise when necessary  - Involve family in performance of ADLs  - Assess for home care needs following discharge   - Consider OT consult to assist with ADL evaluation and planning for discharge  - Provide patient education as appropriate  Outcome: Progressing     Problem: SAFETY ADULT  Goal: Maintain or return to baseline ADL function  Description: INTERVENTIONS:  -  Assess patient's ability to carry out ADLs; assess patient's baseline for ADL function and identify physical deficits which impact ability to perform ADLs (bathing, care of mouth/teeth, toileting, grooming, dressing, etc )  - Assess/evaluate cause of self-care deficits   - Assess range of motion  - Assess patient's mobility; develop plan if impaired  - Assess patient's need for assistive devices and provide as appropriate  - Encourage maximum independence but intervene and supervise when necessary  - Involve family in performance of ADLs  - Assess for home care needs following discharge   - Consider OT consult to assist with ADL evaluation and planning for discharge  - Provide patient education as appropriate  Outcome: Progressing Pt remains with high output ostomy drainage and difficulty maintaining appliance. Wound vac changed today per wound care. Placed on Eliquis today for DVT's in BLE. Pt [facility] to [facility]. Pt is accepted under click 6 will need updated notes within 24 hrs of discharge. Therapy on for updates tomorrow morning. Per Dr Unruly Shrestha ok to discharge when ok with EP.

## 2022-09-19 NOTE — BRIEF OP NOTE
Clean;Dry; Intact 02/17/21 2200   Drainage Amount Scant 02/17/21 2200   Drainage Description Serosanguinous 02/17/21 2200   Output (ml) 200 ml 02/17/21 1356   Santa-wound Assessment Dry; Intact 02/17/21 2200       [REMOVED] NG/OG/NJ/NE Tube Nasogastric 18 fr Right mouth (Removed)   Surrounding Skin Dry; Intact 02/22/21 2200   Securement device Yes 02/22/21 2200   Status Clamped 02/22/21 2200   Placement Verified by X-Ray (Initial) 02/22/21 2200   NG/OG/NJ/NE External Measurement (cm) 60 cm 02/22/21 2200   Drainage Appearance Serous;Green 02/22/21 2200   Tube Feeding Standard with Fiber 02/21/21 1800   Tube Feeding Status Continuous 02/21/21 1800   Rate/Schedule 10 mL/hr 02/21/21 1800   Tube Feeding Intake (mL) 0 ml 02/22/21 1400   Free Water Flush (mL) 30 mL 02/22/21 1400   Free Water Rate 30ml q6 02/21/21 1800   Residual Volume (ml) 50 ml 02/20/21 2230   Output (mL) 100 ml 02/20/21 0530       [REMOVED] Colostomy RLQ (Removed)   Stomal Appliance 1 piece 02/18/21 0000   Stoma  Assessment Moist 02/18/21 1000   Mucocutaneous Junction Intact 02/18/21 1000   Peristomal Assessment Intact 02/18/21 1000   Stool Appearance Other (Comment) 02/18/21 1000   Output (mL) 100 ml 02/18/21 0600       Findings:     Electronically signed by Massimo Gracia DPM on 3/20/2021 at 3:40 PM
[FreeTextEntry1] : Mr. Jamal Cuevas is a 67 year old male seen in the office today in neurological follow-up regarding his migraine headaches.  He reports he is doing well with about 1-3 headaches monthly.  On those occasions he takes his Relpax which works quickly for him and rarely does he have to take a second tablet.  He exercises regularly and maintains good fitness.  He does also have sinus disease which will sometimes exacerbate his migraine situation in certain seasons.

## 2024-08-07 NOTE — FLOWSHEET NOTE
ET Nurse(follow up) [id number]  Admit Date: 2/16/2021  9:32 PM    Reason for consult:  New ileostomy    Stoma assessment:    02/19/21 0835   Ileostomy   Placement Date: 02/17/21   Pre-existing: No  Inserted by: Dr. Netta Sutton   Stomal Appliance 1 piece; Changed   Stoma  Assessment   (purplish red- most of purple sloughed off with cleaning)   Mucocutaneous Junction Intact   Peristomal Assessment Intact   Treatment Bag change  (skin care)   Stool Color   (none)   Output (mL) 20 ml  (serosang output)   Wound 02/17/21 Abdomen Mid   Date First Assessed: 02/17/21   Present on Hospital Admission: No  Location: Abdomen  Wound Location Orientation: Mid   Dressing Status Intact     Plan:   Will follow    Vesta Bliss 2/19/2021 11:54 AM Doxycycline Counseling:  Patient counseled regarding possible photosensitivity and increased risk for sunburn.  Patient instructed to avoid sunlight, if possible.  When exposed to sunlight, patients should wear protective clothing, sunglasses, and sunscreen.  The patient was instructed to call the office immediately if the following severe adverse effects occur:  hearing changes, easy bruising/bleeding, severe headache, or vision changes.  The patient verbalized understanding of the proper use and possible adverse effects of doxycycline.  All of the patient's questions and concerns were addressed. Sarecycline Counseling: Patient advised regarding possible photosensitivity and discoloration of the teeth, skin, lips, tongue and gums.  Patient instructed to avoid sunlight, if possible.  When exposed to sunlight, patients should wear protective clothing, sunglasses, and sunscreen.  The patient was instructed to call the office immediately if the following severe adverse effects occur:  hearing changes, easy bruising/bleeding, severe headache, or vision changes.  The patient verbalized understanding of the proper use and possible adverse effects of sarecycline.  All of the patient's questions and concerns were addressed. Bactrim Counseling:  I discussed with the patient the risks of sulfa antibiotics including but not limited to GI upset, allergic reaction, drug rash, diarrhea, dizziness, photosensitivity, and yeast infections.  Rarely, more serious reactions can occur including but not limited to aplastic anemia, agranulocytosis, methemoglobinemia, blood dyscrasias, liver or kidney failure, lung infiltrates or desquamative/blistering drug rashes. Isotretinoin Counseling: Patient should get monthly blood tests, not donate blood, not drive at night if vision affected, not share medication, and not undergo elective surgery for 6 months after tx completed. Side effects reviewed, pt to contact office should one occur. Glycopyrrolate Counseling:  I discussed with the patient the risks of glycopyrrolate including but not limited to skin rash, drowsiness, dry mouth, difficulty urinating, and blurred vision. Hydroxychloroquine Pregnancy And Lactation Text: This medication has been shown to cause fetal harm but it isn't assigned a Pregnancy Risk Category. There are small amounts excreted in breast milk. Methotrexate Counseling:  Patient counseled regarding adverse effects of methotrexate including but not limited to nausea, vomiting, abnormalities in liver function tests. Patients may develop mouth sores, rash, diarrhea, and abnormalities in blood counts. The patient understands that monitoring is required including LFT's and blood counts.  There is a rare possibility of scarring of the liver and lung problems that can occur when taking methotrexate. Persistent nausea, loss of appetite, pale stools, dark urine, cough, and shortness of breath should be reported immediately. Patient advised to discontinue methotrexate treatment at least three months before attempting to become pregnant.  I discussed the need for folate supplements while taking methotrexate.  These supplements can decrease side effects during methotrexate treatment. The patient verbalized understanding of the proper use and possible adverse effects of methotrexate.  All of the patient's questions and concerns were addressed. Hydroxyzine Pregnancy And Lactation Text: This medication is not safe during pregnancy and should not be taken. It is also excreted in breast milk and breast feeding isn't recommended. Topical Clindamycin Counseling: Patient counseled that this medication may cause skin irritation or allergic reactions.  In the event of skin irritation, the patient was advised to reduce the amount of the drug applied or use it less frequently.   The patient verbalized understanding of the proper use and possible adverse effects of clindamycin.  All of the patient's questions and concerns were addressed. Sarecycline Pregnancy And Lactation Text: This medication is Pregnancy Category D and not consider safe during pregnancy. It is also excreted in breast milk. Ivermectin Pregnancy And Lactation Text: This medication is Pregnancy Category C and it isn't known if it is safe during pregnancy. It is also excreted in breast milk. Hydroxyzine Counseling: Patient advised that the medication is sedating and not to drive a car after taking this medication.  Patient informed of potential adverse effects including but not limited to dry mouth, urinary retention, and blurry vision.  The patient verbalized understanding of the proper use and possible adverse effects of hydroxyzine.  All of the patient's questions and concerns were addressed. Itraconazole Pregnancy And Lactation Text: This medication is Pregnancy Category C and it isn't know if it is safe during pregnancy. It is also excreted in breast milk. Cellcept Pregnancy And Lactation Text: This medication is Pregnancy Category D and isn't considered safe during pregnancy. It is unknown if this medication is excreted in breast milk. Skyrizi Pregnancy And Lactation Text: The risk during pregnancy and breastfeeding is uncertain with this medication. Oxybutynin Counseling:  I discussed with the patient the risks of oxybutynin including but not limited to skin rash, drowsiness, dry mouth, difficulty urinating, and blurred vision. Cyclophosphamide Pregnancy And Lactation Text: This medication is Pregnancy Category D and it isn't considered safe during pregnancy. This medication is excreted in breast milk. Clofazimine Pregnancy And Lactation Text: This medication is Pregnancy Category C and isn't considered safe during pregnancy. It is excreted in breast milk. Drysol Counseling:  I discussed with the patient the risks of drysol/aluminum chloride including but not limited to skin rash, itching, irritation, burning. Griseofulvin Counseling:  I discussed with the patient the risks of griseofulvin including but not limited to photosensitivity, cytopenia, liver damage, nausea/vomiting and severe allergy.  The patient understands that this medication is best absorbed when taken with a fatty meal (e.g., ice cream or french fries). Cosentyx Counseling:  I discussed with the patient the risks of Cosentyx including but not limited to worsening of Crohn's disease, immunosuppression, allergic reactions and infections.  The patient understands that monitoring is required including a PPD at baseline and must alert us or the primary physician if symptoms of infection or other concerning signs are noted. Solaraze Pregnancy And Lactation Text: This medication is Pregnancy Category B and is considered safe. There is some data to suggest avoiding during the third trimester. It is unknown if this medication is excreted in breast milk. Odomzo Counseling- I discussed with the patient the risks of Odomzo including but not limited to nausea, vomiting, diarrhea, constipation, weight loss, changes in the sense of taste, decreased appetite, muscle spasms, and hair loss.  The patient verbalized understanding of the proper use and possible adverse effects of Odomzo.  All of the patient's questions and concerns were addressed. Stelara Counseling:  I discussed with the patient the risks of ustekinumab including but not limited to immunosuppression, malignancy, posterior leukoencephalopathy syndrome, and serious infections.  The patient understands that monitoring is required including a PPD at baseline and must alert us or the primary physician if symptoms of infection or other concerning signs are noted. Hydroquinone Pregnancy And Lactation Text: This medication has not been assigned a Pregnancy Risk Category but animal studies failed to show danger with the topical medication. It is unknown if the medication is excreted in breast milk. Doxepin Counseling:  Patient advised that the medication is sedating and not to drive a car after taking this medication. Patient informed of potential adverse effects including but not limited to dry mouth, urinary retention, and blurry vision.  The patient verbalized understanding of the proper use and possible adverse effects of doxepin.  All of the patient's questions and concerns were addressed. Arava Pregnancy And Lactation Text: This medication is Pregnancy Category X and is absolutely contraindicated during pregnancy. It is unknown if it is excreted in breast milk. Enbrel Pregnancy And Lactation Text: This medication is Pregnancy Category B and is considered safe during pregnancy. It is unknown if this medication is excreted in breast milk. Methotrexate Pregnancy And Lactation Text: This medication is Pregnancy Category X and is known to cause fetal harm. This medication is excreted in breast milk. 5-Fu Pregnancy And Lactation Text: This medication is Pregnancy Category X and contraindicated in pregnancy and in women who may become pregnant. It is unknown if this medication is excreted in breast milk. Picato Pregnancy And Lactation Text: This medication is Pregnancy Category C. It is unknown if this medication is excreted in breast milk. Detail Level: Zone Clindamycin Pregnancy And Lactation Text: This medication can be used in pregnancy if certain situations. Clindamycin is also present in breast milk. Terbinafine Pregnancy And Lactation Text: This medication is Pregnancy Category B and is considered safe during pregnancy. It is also excreted in breast milk and breast feeding isn't recommended. Solaraze Counseling:  I discussed with the patient the risks of Solaraze including but not limited to erythema, scaling, itching, weeping, crusting, and pain. Xelheydiz Pregnancy And Lactation Text: This medication is Pregnancy Category D and is not considered safe during pregnancy.  The risk during breast feeding is also uncertain. Topical Clindamycin Pregnancy And Lactation Text: This medication is Pregnancy Category B and is considered safe during pregnancy. It is unknown if it is excreted in breast milk. Tazorac Pregnancy And Lactation Text: This medication is not safe during pregnancy. It is unknown if this medication is excreted in breast milk. Tetracycline Counseling: Patient counseled regarding possible photosensitivity and increased risk for sunburn.  Patient instructed to avoid sunlight, if possible.  When exposed to sunlight, patients should wear protective clothing, sunglasses, and sunscreen.  The patient was instructed to call the office immediately if the following severe adverse effects occur:  hearing changes, easy bruising/bleeding, severe headache, or vision changes.  The patient verbalized understanding of the proper use and possible adverse effects of tetracycline.  All of the patient's questions and concerns were addressed. Patient understands to avoid pregnancy while on therapy due to potential birth defects. Xeljanz Counseling: I discussed with the patient the risks of Xeljanz therapy including increased risk of infection, liver issues, headache, diarrhea, or cold symptoms. Live vaccines should be avoided. They were instructed to call if they have any problems. Bactrim Pregnancy And Lactation Text: This medication is Pregnancy Category D and is known to cause fetal risk.  It is also excreted in breast milk. High Dose Vitamin A Pregnancy And Lactation Text: High dose vitamin A therapy is contraindicated during pregnancy and breast feeding. Doxycycline Pregnancy And Lactation Text: This medication is Pregnancy Category D and not consider safe during pregnancy. It is also excreted in breast milk but is considered safe for shorter treatment courses. Azathioprine Counseling:  I discussed with the patient the risks of azathioprine including but not limited to myelosuppression, immunosuppression, hepatotoxicity, lymphoma, and infections.  The patient understands that monitoring is required including baseline LFTs, Creatinine, possible TPMP genotyping and weekly CBCs for the first month and then every 2 weeks thereafter.  The patient verbalized understanding of the proper use and possible adverse effects of azathioprine.  All of the patient's questions and concerns were addressed. Spironolactone Counseling: Patient advised regarding risks of diarrhea, abdominal pain, hyperkalemia, birth defects (for female patients), liver toxicity and renal toxicity. The patient may need blood work to monitor liver and kidney function and potassium levels while on therapy. The patient verbalized understanding of the proper use and possible adverse effects of spironolactone.  All of the patient's questions and concerns were addressed. Simponi Counseling:  I discussed with the patient the risks of golimumab including but not limited to myelosuppression, immunosuppression, autoimmune hepatitis, demyelinating diseases, lymphoma, and serious infections.  The patient understands that monitoring is required including a PPD at baseline and must alert us or the primary physician if symptoms of infection or other concerning signs are noted. Imiquimod Counseling:  I discussed with the patient the risks of imiquimod including but not limited to erythema, scaling, itching, weeping, crusting, and pain.  Patient understands that the inflammatory response to imiquimod is variable from person to person and was educated regarded proper titration schedule.  If flu-like symptoms develop, patient knows to discontinue the medication and contact us. Xolair Counseling:  Patient informed of potential adverse effects including but not limited to fever, muscle aches, rash and allergic reactions.  The patient verbalized understanding of the proper use and possible adverse effects of Xolair.  All of the patient's questions and concerns were addressed. Rituxan Pregnancy And Lactation Text: This medication is Pregnancy Category C and it isn't know if it is safe during pregnancy. It is unknown if this medication is excreted in breast milk but similar antibodies are known to be excreted. Xolair Pregnancy And Lactation Text: This medication is Pregnancy Category B and is considered safe during pregnancy. This medication is excreted in breast milk. Prednisone Counseling:  I discussed with the patient the risks of prolonged use of prednisone including but not limited to weight gain, insomnia, osteoporosis, mood changes, diabetes, susceptibility to infection, glaucoma and high blood pressure.  In cases where prednisone use is prolonged, patients should be monitored with blood pressure checks, serum glucose levels and an eye exam.  Additionally, the patient may need to be placed on GI prophylaxis, PCP prophylaxis, and calcium and vitamin D supplementation and/or a bisphosphonate.  The patient verbalized understanding of the proper use and the possible adverse effects of prednisone.  All of the patient's questions and concerns were addressed. Bexarotene Counseling:  I discussed with the patient the risks of bexarotene including but not limited to hair loss, dry lips/skin/eyes, liver abnormalities, hyperlipidemia, pancreatitis, depression/suicidal ideation, photosensitivity, drug rash/allergic reactions, hypothyroidism, anemia, leukopenia, infection, cataracts, and teratogenicity.  Patient understands that they will need regular blood tests to check lipid profile, liver function tests, white blood cell count, thyroid function tests and pregnancy test if applicable. Valtrex Counseling: I discussed with the patient the risks of valacyclovir including but not limited to kidney damage, nausea, vomiting and severe allergy.  The patient understands that if the infection seems to be worsening or is not improving, they are to call. Thalidomide Counseling: I discussed with the patient the risks of thalidomide including but not limited to birth defects, anxiety, weakness, chest pain, dizziness, cough and severe allergy. Cimetidine Counseling:  I discussed with the patient the risks of Cimetidine including but not limited to gynecomastia, headache, diarrhea, nausea, drowsiness, arrhythmias, pancreatitis, skin rashes, psychosis, bone marrow suppression and kidney toxicity. Benzoyl Peroxide Counseling: Patient counseled that medicine may cause skin irritation and bleach clothing.  In the event of skin irritation, the patient was advised to reduce the amount of the drug applied or use it less frequently.   The patient verbalized understanding of the proper use and possible adverse effects of benzoyl peroxide.  All of the patient's questions and concerns were addressed. Cyclosporine Pregnancy And Lactation Text: This medication is Pregnancy Category C and it isn't know if it is safe during pregnancy. This medication is excreted in breast milk. Cellcept Counseling:  I discussed with the patient the risks of mycophenolate mofetil including but not limited to infection/immunosuppression, GI upset, hypokalemia, hypercholesterolemia, bone marrow suppression, lymphoproliferative disorders, malignancy, GI ulceration/bleed/perforation, colitis, interstitial lung disease, kidney failure, progressive multifocal leukoencephalopathy, and birth defects.  The patient understands that monitoring is required including a baseline creatinine and regular CBC testing. In addition, patient must alert us immediately if symptoms of infection or other concerning signs are noted. Bexarotene Pregnancy And Lactation Text: This medication is Pregnancy Category X and should not be given to women who are pregnant or may become pregnant. This medication should not be used if you are breast feeding. Carac Counseling:  I discussed with the patient the risks of Carac including but not limited to erythema, scaling, itching, weeping, crusting, and pain. Benzoyl Peroxide Pregnancy And Lactation Text: This medication is Pregnancy Category C. It is unknown if benzoyl peroxide is excreted in breast milk. Protopic Pregnancy And Lactation Text: This medication is Pregnancy Category C. It is unknown if this medication is excreted in breast milk when applied topically. Erythromycin Counseling:  I discussed with the patient the risks of erythromycin including but not limited to GI upset, allergic reaction, drug rash, diarrhea, increase in liver enzymes, and yeast infections. Hydroquinone Counseling:  Patient advised that medication may result in skin irritation, lightening (hypopigmentation), dryness, and burning.  In the event of skin irritation, the patient was advised to reduce the amount of the drug applied or use it less frequently.  Rarely, spots that are treated with hydroquinone can become darker (pseudoochronosis).  Should this occur, patient instructed to stop medication and call the office. The patient verbalized understanding of the proper use and possible adverse effects of hydroquinone.  All of the patient's questions and concerns were addressed. Taltz Counseling: I discussed with the patient the risks of ixekizumab including but not limited to immunosuppression, serious infections, worsening of inflammatory bowel disease and drug reactions.  The patient understands that monitoring is required including a PPD at baseline and must alert us or the primary physician if symptoms of infection or other concerning signs are noted. Acitretin Pregnancy And Lactation Text: This medication is Pregnancy Category X and should not be given to women who are pregnant or may become pregnant in the future. This medication is excreted in breast milk. Nsaids Pregnancy And Lactation Text: These medications are considered safe up to 30 weeks gestation. It is excreted in breast milk. Rifampin Counseling: I discussed with the patient the risks of rifampin including but not limited to liver damage, kidney damage, red-orange body fluids, nausea/vomiting and severe allergy. Dupixent Pregnancy And Lactation Text: This medication likely crosses the placenta but the risk for the fetus is uncertain. This medication is excreted in breast milk. Humira Counseling:  I discussed with the patient the risks of adalimumab including but not limited to myelosuppression, immunosuppression, autoimmune hepatitis, demyelinating diseases, lymphoma, and serious infections.  The patient understands that monitoring is required including a PPD at baseline and must alert us or the primary physician if symptoms of infection or other concerning signs are noted. Clofazimine Counseling:  I discussed with the patient the risks of clofazimine including but not limited to skin and eye pigmentation, liver damage, nausea/vomiting, gastrointestinal bleeding and allergy. Glycopyrrolate Pregnancy And Lactation Text: This medication is Pregnancy Category B and is considered safe during pregnancy. It is unknown if it is excreted breast milk. Dupixent Counseling: I discussed with the patient the risks of dupilumab including but not limited to eye infection and irritation, cold sores, injection site reactions, worsening of asthma, allergic reactions and increased risk of parasitic infection.  Live vaccines should be avoided while taking dupilumab. Dupilumab will also interact with certain medications such as warfarin and cyclosporine. The patient understands that monitoring is required and they must alert us or the primary physician if symptoms of infection or other concerning signs are noted. Quinolones Counseling:  I discussed with the patient the risks of fluoroquinolones including but not limited to GI upset, allergic reaction, drug rash, diarrhea, dizziness, photosensitivity, yeast infections, liver function test abnormalities, tendonitis/tendon rupture. Itraconazole Counseling:  I discussed with the patient the risks of itraconazole including but not limited to liver damage, nausea/vomiting, neuropathy, and severe allergy.  The patient understands that this medication is best absorbed when taken with acidic beverages such as non-diet cola or ginger ale.  The patient understands that monitoring is required including baseline LFTs and repeat LFTs at intervals.  The patient understands that they are to contact us or the primary physician if concerning signs are noted. Dapsone Pregnancy And Lactation Text: This medication is Pregnancy Category C and is not considered safe during pregnancy or breast feeding. Isotretinoin Pregnancy And Lactation Text: This medication is Pregnancy Category X and is considered extremely dangerous during pregnancy. It is unknown if it is excreted in breast milk. Erivedge Counseling- I discussed with the patient the risks of Erivedge including but not limited to nausea, vomiting, diarrhea, constipation, weight loss, changes in the sense of taste, decreased appetite, muscle spasms, and hair loss.  The patient verbalized understanding of the proper use and possible adverse effects of Erivedge.  All of the patient's questions and concerns were addressed. Metronidazole Pregnancy And Lactation Text: This medication is Pregnancy Category B and considered safe during pregnancy.  It is also excreted in breast milk. Arava Counseling:  Patient counseled regarding adverse effects of Arava including but not limited to nausea, vomiting, abnormalities in liver function tests. Patients may develop mouth sores, rash, diarrhea, and abnormalities in blood counts. The patient understands that monitoring is required including LFTs and blood counts.  There is a rare possibility of scarring of the liver and lung problems that can occur when taking methotrexate. Persistent nausea, loss of appetite, pale stools, dark urine, cough, and shortness of breath should be reported immediately. Patient advised to discontinue Arava treatment and consult with a physician prior to attempting conception. The patient will have to undergo a treatment to eliminate Arava from the body prior to conception. Otezla Counseling: The side effects of Otezla were discussed with the patient, including but not limited to worsening or new depression, weight loss, diarrhea, nausea, upper respiratory tract infection, and headache. Patient instructed to call the office should any adverse effect occur.  The patient verbalized understanding of the proper use and possible adverse effects of Otezla.  All the patient's questions and concerns were addressed. Birth Control Pills Counseling: Birth Control Pill Counseling: I discussed with the patient the potential side effects of OCPs including but not limited to increased risk of stroke, heart attack, thrombophlebitis, deep venous thrombosis, hepatic adenomas, breast changes, GI upset, headaches, and depression.  The patient verbalized understanding of the proper use and possible adverse effects of OCPs. All of the patient's questions and concerns were addressed. Hydroxychloroquine Counseling:  I discussed with the patient that a baseline ophthalmologic exam is needed at the start of therapy and every year thereafter while on therapy. A CBC may also be warranted for monitoring.  The side effects of this medication were discussed with the patient, including but not limited to agranulocytosis, aplastic anemia, seizures, rashes, retinopathy, and liver toxicity. Patient instructed to call the office should any adverse effect occur.  The patient verbalized understanding of the proper use and possible adverse effects of Plaquenil.  All the patient's questions and concerns were addressed. Picato Counseling:  I discussed with the patient the risks of Picato including but not limited to erythema, scaling, itching, weeping, crusting, and pain. Valtrex Pregnancy And Lactation Text: this medication is Pregnancy Category B and is considered safe during pregnancy. This medication is not directly found in breast milk but it's metabolite acyclovir is present. Ivermectin Counseling:  Patient instructed to take medication on an empty stomach with a full glass of water.  Patient informed of potential adverse effects including but not limited to nausea, diarrhea, dizziness, itching, and swelling of the extremities or lymph nodes.  The patient verbalized understanding of the proper use and possible adverse effects of ivermectin.  All of the patient's questions and concerns were addressed. Topical Sulfur Applications Counseling: Topical Sulfur Counseling: Patient counseled that this medication may cause skin irritation or allergic reactions.  In the event of skin irritation, the patient was advised to reduce the amount of the drug applied or use it less frequently.   The patient verbalized understanding of the proper use and possible adverse effects of topical sulfur application.  All of the patient's questions and concerns were addressed. Rifampin Pregnancy And Lactation Text: This medication is Pregnancy Category C and it isn't know if it is safe during pregnancy. It is also excreted in breast milk and should not be used if you are breast feeding. Azithromycin Pregnancy And Lactation Text: This medication is considered safe during pregnancy and is also secreted in breast milk. Ilumya Counseling: I discussed with the patient the risks of tildrakizumab including but not limited to immunosuppression, malignancy, posterior leukoencephalopathy syndrome, and serious infections.  The patient understands that monitoring is required including a PPD at baseline and must alert us or the primary physician if symptoms of infection or other concerning signs are noted. Ketoconazole Pregnancy And Lactation Text: This medication is Pregnancy Category C and it isn't know if it is safe during pregnancy. It is also excreted in breast milk and breast feeding isn't recommended. Fluconazole Counseling:  Patient counseled regarding adverse effects of fluconazole including but not limited to headache, diarrhea, nausea, upset stomach, liver function test abnormalities, taste disturbance, and stomach pain.  There is a rare possibility of liver failure that can occur when taking fluconazole.  The patient understands that monitoring of LFTs and kidney function test may be required, especially at baseline. The patient verbalized understanding of the proper use and possible adverse effects of fluconazole.  All of the patient's questions and concerns were addressed. Metronidazole Counseling:  I discussed with the patient the risks of metronidazole including but not limited to seizures, nausea/vomiting, a metallic taste in the mouth, nausea/vomiting and severe allergy. Ketoconazole Counseling:   Patient counseled regarding improving absorption with orange juice.  Adverse effects include but are not limited to breast enlargement, headache, diarrhea, nausea, upset stomach, liver function test abnormalities, taste disturbance, and stomach pain.  There is a rare possibility of liver failure that can occur when taking ketoconazole. The patient understands that monitoring of LFTs may be required, especially at baseline. The patient verbalized understanding of the proper use and possible adverse effects of ketoconazole.  All of the patient's questions and concerns were addressed. Cyclosporine Counseling:  I discussed with the patient the risks of cyclosporine including but not limited to hypertension, gingival hyperplasia,myelosuppression, immunosuppression, liver damage, kidney damage, neurotoxicity, lymphoma, and serious infections. The patient understands that monitoring is required including baseline blood pressure, CBC, CMP, lipid panel and uric acid, and then 1-2 times monthly CMP and blood pressure. Drysol Pregnancy And Lactation Text: This medication is considered safe during pregnancy and breast feeding. Cimzia Pregnancy And Lactation Text: This medication crosses the placenta but can be considered safe in certain situations. Cimzia may be excreted in breast milk. Zyclara Counseling:  I discussed with the patient the risks of imiquimod including but not limited to erythema, scaling, itching, weeping, crusting, and pain.  Patient understands that the inflammatory response to imiquimod is variable from person to person and was educated regarded proper titration schedule.  If flu-like symptoms develop, patient knows to discontinue the medication and contact us. SSKI Counseling:  I discussed with the patient the risks of SSKI including but not limited to thyroid abnormalities, metallic taste, GI upset, fever, headache, acne, arthralgias, paraesthesias, lymphadenopathy, easy bleeding, arrhythmias, and allergic reaction. Include Pregnancy/Lactation Warning?: No Minoxidil Counseling: Minoxidil is a topical medication which can increase blood flow where it is applied. It is uncertain how this medication increases hair growth. Side effects are uncommon and include stinging and allergic reactions. Griseofulvin Pregnancy And Lactation Text: This medication is Pregnancy Category X and is known to cause serious birth defects. It is unknown if this medication is excreted in breast milk but breast feeding should be avoided. Dapsone Counseling: I discussed with the patient the risks of dapsone including but not limited to hemolytic anemia, agranulocytosis, rashes, methemoglobinemia, kidney failure, peripheral neuropathy, headaches, GI upset, and liver toxicity.  Patients who start dapsone require monitoring including baseline LFTs and weekly CBCs for the first month, then every month thereafter.  The patient verbalized understanding of the proper use and possible adverse effects of dapsone.  All of the patient's questions and concerns were addressed. Cyclophosphamide Counseling:  I discussed with the patient the risks of cyclophosphamide including but not limited to hair loss, hormonal abnormalities, decreased fertility, abdominal pain, diarrhea, nausea and vomiting, bone marrow suppression and infection. The patient understands that monitoring is required while taking this medication. Birth Control Pills Pregnancy And Lactation Text: This medication should be avoided if pregnant and for the first 30 days post-partum. Infliximab Counseling:  I discussed with the patient the risks of infliximab including but not limited to myelosuppression, immunosuppression, autoimmune hepatitis, demyelinating diseases, lymphoma, and serious infections.  The patient understands that monitoring is required including a PPD at baseline and must alert us or the primary physician if symptoms of infection or other concerning signs are noted. Gabapentin Counseling: I discussed with the patient the risks of gabapentin including but not limited to dizziness, somnolence, fatigue and ataxia. Elidel Counseling: Patient may experience a mild burning sensation during topical application. Elidel is not approved in children less than 2 years of age. There have been case reports of hematologic and skin malignancies in patients using topical calcineurin inhibitors although causality is questionable. Eucrisa Counseling: Patient may experience a mild burning sensation during topical application. Eucrisa is not approved in children less than 2 years of age. Enbrel Counseling:  I discussed with the patient the risks of etanercept including but not limited to myelosuppression, immunosuppression, autoimmune hepatitis, demyelinating diseases, lymphoma, and infections.  The patient understands that monitoring is required including a PPD at baseline and must alert us or the primary physician if symptoms of infection or other concerning signs are noted. Cephalexin Counseling: I counseled the patient regarding use of cephalexin as an antibiotic for prophylactic and/or therapeutic purposes. Cephalexin (commonly prescribed under brand name Keflex) is a cephalosporin antibiotic which is active against numerous classes of bacteria, including most skin bacteria. Side effects may include nausea, diarrhea, gastrointestinal upset, rash, hives, yeast infections, and in rare cases, hepatitis, kidney disease, seizures, fever, confusion, neurologic symptoms, and others. Patients with severe allergies to penicillin medications are cautioned that there is about a 10% incidence of cross-reactivity with cephalosporins. When possible, patients with penicillin allergies should use alternatives to cephalosporins for antibiotic therapy. Azithromycin Counseling:  I discussed with the patient the risks of azithromycin including but not limited to GI upset, allergic reaction, drug rash, diarrhea, and yeast infections. Nsaids Counseling: NSAID Counseling: I discussed with the patient that NSAIDs should be taken with food. Prolonged use of NSAIDs can result in the development of stomach ulcers.  Patient advised to stop taking NSAIDs if abdominal pain occurs.  The patient verbalized understanding of the proper use and possible adverse effects of NSAIDs.  All of the patient's questions and concerns were addressed. Spironolactone Pregnancy And Lactation Text: This medication can cause feminization of the male fetus and should be avoided during pregnancy. The active metabolite is also found in breast milk. Topical Retinoid counseling:  Patient advised to apply a pea-sized amount only at bedtime and wait 30 minutes after washing their face before applying.  If too drying, patient may add a non-comedogenic moisturizer. The patient verbalized understanding of the proper use and possible adverse effects of retinoids.  All of the patient's questions and concerns were addressed. Erythromycin Pregnancy And Lactation Text: This medication is Pregnancy Category B and is considered safe during pregnancy. It is also excreted in breast milk. Otezla Pregnancy And Lactation Text: This medication is Pregnancy Category C and it isn't known if it is safe during pregnancy. It is unknown if it is excreted in breast milk. Terbinafine Counseling: Patient counseling regarding adverse effects of terbinafine including but not limited to headache, diarrhea, rash, upset stomach, liver function test abnormalities, itching, taste/smell disturbance, nausea, abdominal pain, and flatulence.  There is a rare possibility of liver failure that can occur when taking terbinafine.  The patient understands that a baseline LFT and kidney function test may be required. The patient verbalized understanding of the proper use and possible adverse effects of terbinafine.  All of the patient's questions and concerns were addressed. 5-Fu Counseling: 5-Fluorouracil Counseling:  I discussed with the patient the risks of 5-fluorouracil including but not limited to erythema, scaling, itching, weeping, crusting, and pain. Sski Pregnancy And Lactation Text: This medication is Pregnancy Category D and isn't considered safe during pregnancy. It is excreted in breast milk. Acitretin Counseling:  I discussed with the patient the risks of acitretin including but not limited to hair loss, dry lips/skin/eyes, liver damage, hyperlipidemia, depression/suicidal ideation, photosensitivity.  Serious rare side effects can include but are not limited to pancreatitis, pseudotumor cerebri, bony changes, clot formation/stroke/heart attack.  Patient understands that alcohol is contraindicated since it can result in liver toxicity and significantly prolong the elimination of the drug by many years. Tremfya Counseling: I discussed with the patient the risks of guselkumab including but not limited to immunosuppression, serious infections, worsening of inflammatory bowel disease and drug reactions.  The patient understands that monitoring is required including a PPD at baseline and must alert us or the primary physician if symptoms of infection or other concerning signs are noted. Rituxan Counseling:  I discussed with the patient the risks of Rituxan infusions. Side effects can include infusion reactions, severe drug rashes including mucocutaneous reactions, reactivation of latent hepatitis and other infections and rarely progressive multifocal leukoencephalopathy.  All of the patient's questions and concerns were addressed. Topical Sulfur Applications Pregnancy And Lactation Text: This medication is Pregnancy Category C and has an unknown safety profile during pregnancy. It is unknown if this topical medication is excreted in breast milk. High Dose Vitamin A Counseling: Side effects reviewed, pt to contact office should one occur. Clindamycin Counseling: I counseled the patient regarding use of clindamycin as an antibiotic for prophylactic and/or therapeutic purposes. Clindamycin is active against numerous classes of bacteria, including skin bacteria. Side effects may include nausea, diarrhea, gastrointestinal upset, rash, hives, yeast infections, and in rare cases, colitis. Doxepin Pregnancy And Lactation Text: This medication is Pregnancy Category C and it isn't known if it is safe during pregnancy. It is also excreted in breast milk and breast feeding isn't recommended. Minocycline Counseling: Patient advised regarding possible photosensitivity and discoloration of the teeth, skin, lips, tongue and gums.  Patient instructed to avoid sunlight, if possible.  When exposed to sunlight, patients should wear protective clothing, sunglasses, and sunscreen.  The patient was instructed to call the office immediately if the following severe adverse effects occur:  hearing changes, easy bruising/bleeding, severe headache, or vision changes.  The patient verbalized understanding of the proper use and possible adverse effects of minocycline.  All of the patient's questions and concerns were addressed. Colchicine Counseling:  Patient counseled regarding adverse effects including but not limited to stomach upset (nausea, vomiting, stomach pain, or diarrhea).  Patient instructed to limit alcohol consumption while taking this medication.  Colchicine may reduce blood counts especially with prolonged use.  The patient understands that monitoring of kidney function and blood counts may be required, especially at baseline. The patient verbalized understanding of the proper use and possible adverse effects of colchicine.  All of the patient's questions and concerns were addressed. Protopic Counseling: Patient may experience a mild burning sensation during topical application. Protopic is not approved in children less than 2 years of age. There have been case reports of hematologic and skin malignancies in patients using topical calcineurin inhibitors although causality is questionable. Skyrizi Counseling: I discussed with the patient the risks of risankizumab-rzaa including but not limited to immunosuppression, and serious infections.  The patient understands that monitoring is required including a PPD at baseline and must alert us or the primary physician if symptoms of infection or other concerning signs are noted. Cimzia Counseling:  I discussed with the patient the risks of Cimzia including but not limited to immunosuppression, allergic reactions and infections.  The patient understands that monitoring is required including a PPD at baseline and must alert us or the primary physician if symptoms of infection or other concerning signs are noted. Tazorac Counseling:  Patient advised that medication is irritating and drying.  Patient may need to apply sparingly and wash off after an hour before eventually leaving it on overnight.  The patient verbalized understanding of the proper use and possible adverse effects of tazorac.  All of the patient's questions and concerns were addressed. Cephalexin Pregnancy And Lactation Text: This medication is Pregnancy Category B and considered safe during pregnancy.  It is also excreted in breast milk but can be used safely for shorter doses. Albendazole Counseling:  I discussed with the patient the risks of albendazole including but not limited to cytopenia, kidney damage, nausea/vomiting and severe allergy.  The patient understands that this medication is being used in an off-label manner. Siliq Counseling:  I discussed with the patient the risks of Siliq including but not limited to new or worsening depression, suicidal thoughts and behavior, immunosuppression, malignancy, posterior leukoencephalopathy syndrome, and serious infections.  The patient understands that monitoring is required including a PPD at baseline and must alert us or the primary physician if symptoms of infection or other concerning signs are noted. There is also a special program designed to monitor depression which is required with Siliq.

## (undated) DEVICE — Z DUP USE 2257490 ADHESIVE SKIN CLSRE 036ML TPCL 2CTL CNCRLTE HIGH VSCSTY DRMB

## (undated) DEVICE — Z DISCONTINUED USE 2744636  DRESSING AQUACEL 14 IN ALG W3.5XL14IN POLYUR FLM CVR W/ HYDRCOLL

## (undated) DEVICE — GLOVE ORANGE PI 7 1/2   MSG9075

## (undated) DEVICE — 4-PORT MANIFOLD: Brand: NEPTUNE 2

## (undated) DEVICE — SYSTEM CATH 20GA L1IN OD1.0668-1.143MM ID0.7874-0.8636MM

## (undated) DEVICE — MAGNETIC INSTR DRAPE 20X16: Brand: MEDLINE INDUSTRIES, INC.

## (undated) DEVICE — FOGARTY EMBOLECTOMY CATHETER: Brand: FOGARTY

## (undated) DEVICE — BANDAGE,GAUZE,CONFORMING,3"X75",STRL,LF: Brand: MEDLINE

## (undated) DEVICE — SURGICAL PROCEDURE PACK VASC MAJ CUST

## (undated) DEVICE — ADHESIVE SKIN CLOSURE TOP 36 CC HI VISC DERMBND MINI

## (undated) DEVICE — TROCAR: Brand: KII FIOS FIRST ENTRY

## (undated) DEVICE — Device

## (undated) DEVICE — BASIN NEURO

## (undated) DEVICE — LOOP VES W25MM THK1MM MAXI RED SIL FLD REPELLENT 100 PER

## (undated) DEVICE — 18 GA N.G. KIT, 10 PACK: Brand: SITE-RITE

## (undated) DEVICE — LABEL MED 4 IN SURG PANEL W/ PEN STRL

## (undated) DEVICE — ACCESS PLATFORM FOR MINIMALLY INVASIVE SURGERY.: Brand: GELPORT® LAPAROSCOPIC  SYSTEM

## (undated) DEVICE — COVER,LIGHT HANDLE,FLX,2/PK: Brand: MEDLINE INDUSTRIES, INC.

## (undated) DEVICE — GLOVE SURG SZ 75 L12IN FNGR THK94MIL TRNSLUC YEL LTX

## (undated) DEVICE — PACK,AURORA,LAVH: Brand: MEDLINE

## (undated) DEVICE — TOWEL,OR,DSP,ST,GREEN,DLX,XR,4/PK,20PK/C: Brand: MEDLINE

## (undated) DEVICE — FLEXOR, CHECK-FLO, INTRODUCER SET: Brand: FLEXOR

## (undated) DEVICE — NEEDLE HYPO 25GA L1.5IN BLU POLYPR HUB S STL REG BVL STR

## (undated) DEVICE — BANDAGE,GAUZE,CONFORMING,4"X75",STRL,LF: Brand: MEDLINE

## (undated) DEVICE — ELECTRODE PT RET AD L9FT HI MOIST COND ADH HYDRGEL CORDED

## (undated) DEVICE — MICROPUNCTURE INTRODUCER SET SILHOUETTE TRANSITIONLESS PUSH-PLUS DESIGN - STIFFENED CANNULA WITH STAINLESS STEEL WIRE GUIDE: Brand: MICROPUNCTURE

## (undated) DEVICE — SHEET, T, LAPAROTOMY, STERILE: Brand: MEDLINE

## (undated) DEVICE — TROCAR: Brand: KII® SLEEVE

## (undated) DEVICE — SOLUTION IV IRRIG POUR BRL 0.9% SODIUM CHL 2F7124

## (undated) DEVICE — GOWN,SIRUS,FABRNF,XL,20/CS: Brand: MEDLINE

## (undated) DEVICE — SURGICAL PROCEDURE PACK BASIC

## (undated) DEVICE — CLOTH SURG PREP PREOPERATIVE CHLORHEXIDINE GLUC 2% READYPREP

## (undated) DEVICE — DRAPE,EXTREMITY,89X128,STERILE: Brand: MEDLINE

## (undated) DEVICE — DOUBLE BASIN SET: Brand: MEDLINE INDUSTRIES, INC.

## (undated) DEVICE — PATIENT RETURN ELECTRODE, SINGLE-USE, CONTACT QUALITY MONITORING, ADULT, WITH 9FT CORD, FOR PATIENTS WEIGING OVER 33LBS. (15KG): Brand: MEGADYNE

## (undated) DEVICE — SET INSTRUMENT LAP I

## (undated) DEVICE — BANDAGE,GAUZE,BULKEE II,4.5"X4.1YD,STRL: Brand: MEDLINE

## (undated) DEVICE — INTENDED FOR TISSUE SEPARATION, AND OTHER PROCEDURES THAT REQUIRE A SHARP SURGICAL BLADE TO PUNCTURE OR CUT.: Brand: BARD-PARKER ® STAINLESS STEEL BLADES

## (undated) DEVICE — Z DISCONTINUED PER MEDLINE USE 2425483 TAPE UMB L30IN DIA1/8IN WHT COT NONABSORBABLE W/O NDL FOR

## (undated) DEVICE — GLOVE SURG SZ 8 L12IN FNGR THK94MIL TRNSLUC YEL LTX HYDRGEL

## (undated) DEVICE — TRAY VCF/TESIO TRAY  REUSABLE

## (undated) DEVICE — C-ARMOR C-ARM EQUIPMENT COVERS CLEAR STERILE UNIVERSAL FIT 12 PER CASE: Brand: C-ARMOR

## (undated) DEVICE — SET SURG INSTR ART III

## (undated) DEVICE — BANDAGE,GAUZE,4.5"X4.1YD,STERILE,LF: Brand: MEDLINE

## (undated) DEVICE — BNDG,ELSTC,MATRIX,STRL,4"X5YD,LF,HOOK&LP: Brand: MEDLINE

## (undated) DEVICE — CANNULA INJ L2.5IN BLNT TIP 3MM CLR BODY W/ 1 W VLV DLP

## (undated) DEVICE — BEACON TIP TORCON NB ADVANTAGE CATHETER: Brand: BEACON TIP TORCON NB

## (undated) DEVICE — SOLUTION IV 500ML 0.9% SOD CHL PH 5 INJ USP VIAFLX PLAS

## (undated) DEVICE — DRAPE THER FLUID WARMING 66X44 IN FLAT SLUSH DBL DISC ORS

## (undated) DEVICE — TUBING, SUCTION, 3/16" X 12', STRAIGHT: Brand: MEDLINE

## (undated) DEVICE — GLOVE SURG SZ 7 L12IN FNGR THK94MIL TRNSLUC YEL LTX HYDRGEL

## (undated) DEVICE — PACK,HEAVY DRAINAGE,STERILE: Brand: MEDLINE INDUSTRIES, INC.

## (undated) DEVICE — STAPLER INT L60MM REG TISS BLU B FRM 8 FIRING 2 ROW AUTO

## (undated) DEVICE — SYRINGE MED 10ML LUERLOCK TIP W/O SFTY DISP

## (undated) DEVICE — INSUFFLATION TUBING SET WITH FILTER, FUNNEL CONNECTOR AND LUER LOCK: Brand: JOSNOE MEDICAL INC

## (undated) DEVICE — SYRINGE MED 10ML POLYPR LUERSLIP TIP FLAT TOP W/O SFTY DISP

## (undated) DEVICE — GLOVE ORANGE PI 8   MSG9080

## (undated) DEVICE — SYRINGE MED 3ML CLR PLAS STD N CTRL LUERLOCK TIP DISP

## (undated) DEVICE — STAPLER INT L75MM CUT LN L73MM STPL LN L77MM BLU B FRM 8

## (undated) DEVICE — TOWEL,OR,DSP,ST,BLUE,STD,6/PK,12PK/CS: Brand: MEDLINE

## (undated) DEVICE — DRAPE,CHEST,FENES,15X10,STERIL: Brand: MEDLINE

## (undated) DEVICE — SCISSORS ENDOSCP DIA5MM CRV MPLR CAUT W/ RATCH HNDL

## (undated) DEVICE — ELECTRODE ES 36CM LAP FLAT L HK COAT DISP CLEANCOAT

## (undated) DEVICE — RETRACTOR BOOKWALTER BLADE

## (undated) DEVICE — BNDG,ELSTC,MATRIX,STRL,6"X5YD,LF,HOOK&LP: Brand: MEDLINE

## (undated) DEVICE — Z INACTIVE USE 2660664 SOLUTION IRRIG 3000ML 0.9% SOD CHL USP UROMATIC PLAS CONT

## (undated) DEVICE — SYRINGE MED 10ML TRNSLUC BRL PLUNG BLK MRK POLYPR CTRL

## (undated) DEVICE — TRAY,SKIN SCRUB,DRY,W/GAUZE: Brand: MEDLINE

## (undated) DEVICE — RETRACTOR BOOKWALTER POSTGENERAL

## (undated) DEVICE — SYRINGE 20ML LL S/C 50

## (undated) DEVICE — ADHESIVE SKIN CLSR 0.7ML TOP DERMBND ADV

## (undated) DEVICE — 3M™ IOBAN™ 2 ANTIMICROBIAL INCISE DRAPE 6640EZ: Brand: IOBAN™ 2

## (undated) DEVICE — YANKAUER,POOLE TIP,STERILE,50/CS: Brand: MEDLINE

## (undated) DEVICE — 3M™ MEDIPORE™ + PAD 3564: Brand: 3M™ MEDIPORE™

## (undated) DEVICE — DRESSING PETRO W3XL8IN OIL EMUL N ADH GZ KNIT IMPREG CELOS

## (undated) DEVICE — TROCAR: Brand: KII SHIELDED BLADED ACCESS SYSTEM

## (undated) DEVICE — DRESSING,GAUZE,XEROFORM,CURAD,1"X8",ST: Brand: CURAD

## (undated) DEVICE — PACK PROCEDURE SURG GEN CUST

## (undated) DEVICE — CATHETER HAD AD L40CM INSRT L23CM DIA14.5FR POLYUR PRE CRV

## (undated) DEVICE — RETRACTOR BOOKWALTER RING GENERAL

## (undated) DEVICE — TRAP,MUCUS SPECIMEN,40CC: Brand: MEDLINE

## (undated) DEVICE — DILATOR INTESTINAL

## (undated) DEVICE — PAD,ABDOMINAL,5"X9",ST,LF,25/BX: Brand: MEDLINE INDUSTRIES, INC.

## (undated) DEVICE — GLOVE SURG SZ 75 CRM LTX FREE POLYISOPRENE POLYMER BEAD ANTI

## (undated) DEVICE — GEL US 20GM NONIRRITATING OVERWRAPPED FILE PCH TRNSMIT

## (undated) DEVICE — HEAVY DRAINAGE PACK: Brand: CURITY

## (undated) DEVICE — SEALER ENDOSCP NANO COAT OPN DIV CRV L JAW LIGASURE IMPACT

## (undated) DEVICE — GAUZE,SPONGE,4"X4",8PLY,STRL,LF,10/TRAY: Brand: MEDLINE

## (undated) DEVICE — GLOVE SURG L12IN SZ 65FNGR THK94MIL TRNSLUC YEL LTX

## (undated) DEVICE — BASIC SINGLE BASIN 1-LF: Brand: MEDLINE INDUSTRIES, INC.

## (undated) DEVICE — SET INSTRUMENT LAP II

## (undated) DEVICE — DRIP REDUCTION MANIFOLD

## (undated) DEVICE — INSUFFLATION NEEDLE TO ESTABLISH PNEUMOPERITONEUM.: Brand: INSUFFLATION NEEDLE

## (undated) DEVICE — GLOVE SURG SZ 7 L12IN FNGR THK79MIL GRN LTX FREE

## (undated) DEVICE — 1 ML TUBERCULIN SYRINGE LUER-LOCK TIP: Brand: MONOJECT

## (undated) DEVICE — CAMERA STRYKER STER

## (undated) DEVICE — KIT,ANTI FOG,W/SPONGE & FLUID,SOFT PACK: Brand: MEDLINE

## (undated) DEVICE — SYRINGE IRRIG 60ML SFT PLIABLE BLB EZ TO GRP 1 HND USE W/

## (undated) DEVICE — STAPLER SKIN L39MM DIA0.53MM CRWN 5.7MM S STL FIX HD PROX

## (undated) DEVICE — SPONGE LAP W18XL18IN WHT COT 4 PLY FLD STRUNG RADPQ DISP ST

## (undated) DEVICE — PACK,UNIVERSAL,NO GOWNS: Brand: MEDLINE

## (undated) DEVICE — DILATORS EEA W/VALTRAC

## (undated) DEVICE — TOTAL TRAY, 16FR 10ML SIL FOLEY, URN: Brand: MEDLINE

## (undated) DEVICE — FOGARTY ARTERIAL EMBOLECTOMY CATHETER 4F 80CM: Brand: FOGARTY

## (undated) DEVICE — INSTRUMENT CLAMP TOWEL LARGE REUSABLE

## (undated) DEVICE — READY WET SKIN SCRUB TRAY-LF: Brand: MEDLINE INDUSTRIES, INC.

## (undated) DEVICE — SET LAPAROSCOPIC HERNIA

## (undated) DEVICE — BLADE CLIPPER GEN PURP NS

## (undated) DEVICE — 1.5L THIN WALL CAN: Brand: CRD

## (undated) DEVICE — GLOVE SURG SZ 75 L12IN FNGR THK79MIL GRN LTX FREE

## (undated) DEVICE — KIT SURG PREP POVIDONE IOD WET FOR UNIV USE SPNG STK

## (undated) DEVICE — GLOVE SURG SIGNATURE LTX ESS LTX PF 7.5

## (undated) DEVICE — JELLY,LUBE,STERILE,FLIP TOP,TUBE,4-OZ: Brand: MEDLINE

## (undated) DEVICE — SAGITTAL CMD II (14.0 X 0.38 X 25.5MM)

## (undated) DEVICE — RADIFOCUS GLIDEWIRE ADVANTAGE GUIDEWIRE: Brand: GLIDEWIRE ADVANTAGE

## (undated) DEVICE — SET INSTR ART 1

## (undated) DEVICE — CAMERA STRYKER 1488

## (undated) DEVICE — FOGARTY ARTERIAL EMBOLECTOMY CATHETER 3F 80CM: Brand: FOGARTY

## (undated) DEVICE — RELOAD STPL L75MM OPN H3.8MM CLS 1.5MM WIRE DIA0.2MM REG

## (undated) DEVICE — 14F X 24CM SLX MAX BARRIER TRAY: Brand: SLX HEMO-CATH®

## (undated) DEVICE — BENTSON WIRE GUIDE 20CM DISTAL FLEXIBILITY WITH SOFTENED TIP: Brand: BENTSON

## (undated) DEVICE — DILATOR ART

## (undated) DEVICE — DRAPE SHEET: Brand: UNBRANDED

## (undated) DEVICE — TOWEL,OR,DSP,ST,BLUE,DLX,10/PK,8PK/CS: Brand: MEDLINE

## (undated) DEVICE — CATHETER HAD ADMIN SET LNG TERM PRECRV W/ SIDE H

## (undated) DEVICE — 14F X 20CM SLX MAX BARRIER TRAY: Brand: SLX HEMO-CATH®

## (undated) DEVICE — APPLICATOR MEDICATED 26 CC SOLUTION HI LT ORNG CHLORAPREP

## (undated) DEVICE — TOTAL TRAY, DB, 100% SILI FOLEY, 16FR 10: Brand: MEDLINE

## (undated) DEVICE — PUMP SUC IRR TBNG L10FT W/ HNDPC ASSEMB STRYKEFLOW 2

## (undated) DEVICE — CHLORAPREP 26ML ORANGE

## (undated) DEVICE — SOLUTION IV IRRIG WATER 1000ML POUR BRL 2F7114

## (undated) DEVICE — CLAMP INSERT: Brand: STEALTH® CLAMP INSERT

## (undated) DEVICE — SEALER ENDOSCP L37CM NANO COAT BLNT TIP LAP DIV

## (undated) DEVICE — TOWEL,OR,DSP,ST,WHITE,DLX,4/PK,20PK/CS: Brand: MEDLINE

## (undated) DEVICE — SET ORTHO MINI STD

## (undated) DEVICE — SOLUTION IRRIG 2000ML 0.9% SOD CHL USP UROMATIC PLAS CONT

## (undated) DEVICE — LEGGINGS: Brand: CONVERTORS

## (undated) DEVICE — GOWN,SIRUS,FABRNF,L,20/CS: Brand: MEDLINE

## (undated) DEVICE — HANDPIECE SET WITH BONE CLEANING TIP AND SUCTION TUBE: Brand: INTERPULSE

## (undated) DEVICE — GOWN,AURORA,NONREINF,RAGLAN,L,STERILE: Brand: MEDLINE

## (undated) DEVICE — GAUZE,SPONGE,AVANT,4"X4",4PLY,STRL,10/TR: Brand: MEDLINE